# Patient Record
Sex: FEMALE | Race: WHITE | NOT HISPANIC OR LATINO | URBAN - METROPOLITAN AREA
[De-identification: names, ages, dates, MRNs, and addresses within clinical notes are randomized per-mention and may not be internally consistent; named-entity substitution may affect disease eponyms.]

---

## 2017-07-17 ENCOUNTER — EMERGENCY (EMERGENCY)
Facility: HOSPITAL | Age: 82
LOS: 1 days | Discharge: HOME | End: 2017-07-17
Attending: EMERGENCY MEDICINE | Admitting: EMERGENCY MEDICINE
Payer: MEDICARE

## 2017-07-17 VITALS
DIASTOLIC BLOOD PRESSURE: 98 MMHG | SYSTOLIC BLOOD PRESSURE: 194 MMHG | TEMPERATURE: 98 F | OXYGEN SATURATION: 97 % | HEART RATE: 74 BPM | RESPIRATION RATE: 18 BRPM

## 2017-07-17 DIAGNOSIS — Z95.810 PRESENCE OF AUTOMATIC (IMPLANTABLE) CARDIAC DEFIBRILLATOR: Chronic | ICD-10-CM

## 2017-07-17 LAB
BASE EXCESS BLDV CALC-SCNC: 1.6 MMOL/L — SIGNIFICANT CHANGE UP
BLOOD GAS VENOUS - CREATININE: 1.12 MG/DL — SIGNIFICANT CHANGE UP (ref 0.5–1.3)
CHLORIDE BLDV-SCNC: 103 MMOL/L — SIGNIFICANT CHANGE UP (ref 96–108)
GAS PNL BLDV: 137 MMOL/L — SIGNIFICANT CHANGE UP (ref 136–146)
GLUCOSE BLDV-MCNC: 272 — HIGH (ref 70–99)
HCO3 BLDV-SCNC: 25 MMOL/L — SIGNIFICANT CHANGE UP (ref 20–27)
HCT VFR BLDV CALC: 38.8 % — SIGNIFICANT CHANGE UP (ref 34.5–45)
HGB BLDV-MCNC: 12.6 G/DL — SIGNIFICANT CHANGE UP (ref 11.5–15.5)
LACTATE BLDV-MCNC: 1.9 MMOL/L — SIGNIFICANT CHANGE UP (ref 0.5–2)
PCO2 BLDV: 44 MMHG — SIGNIFICANT CHANGE UP (ref 41–51)
PH BLDV: 7.39 PH — SIGNIFICANT CHANGE UP (ref 7.32–7.43)
PO2 BLDV: 36 MMHG — SIGNIFICANT CHANGE UP (ref 35–40)
POTASSIUM BLDV-SCNC: 3.9 MMOL/L — SIGNIFICANT CHANGE UP (ref 3.4–4.5)
SAO2 % BLDV: 61.4 % — SIGNIFICANT CHANGE UP (ref 60–85)

## 2017-07-17 PROCEDURE — 99285 EMERGENCY DEPT VISIT HI MDM: CPT | Mod: 25

## 2017-07-17 RX ORDER — ONDANSETRON 8 MG/1
4 TABLET, FILM COATED ORAL ONCE
Qty: 0 | Refills: 0 | Status: COMPLETED | OUTPATIENT
Start: 2017-07-17 | End: 2017-07-17

## 2017-07-17 NOTE — ED PROVIDER NOTE - OBJECTIVE STATEMENT
88 yo female with pmhx of HTN, DM2, MV/AR valve replacement,  Afib on coumadin, PPM/DFIB, CHF, h/o hysterectomy p/w abdominal pain, nausea, vomiting since last night. Last BM 2 days ago, usually goes daily, unable to pass gas. No fevers, chills.

## 2017-07-17 NOTE — ED PROVIDER NOTE - CARE PLAN
Principal Discharge DX:	Abdominal pain  Instructions for follow-up, activity and diet:	1) Follow up with your doctor in 1-2 days  2) Return to the ER for worsening or concerning symptoms  3) Take keflex twice a day for 7 days for urinary tract infection  Secondary Diagnosis:	UTI (urinary tract infection)

## 2017-07-17 NOTE — ED PROVIDER NOTE - PSH
AICD (automatic cardioverter/defibrillator) present    Aortic valve replaced  10/11  History of cataract extraction  bilateral  History of tonsillectomy    Mitral valve replaced  10/11  S/P GERTRUDE (Total Abdominal Hysterectomy)

## 2017-07-17 NOTE — ED PROVIDER NOTE - PMH
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

## 2017-07-17 NOTE — ED PROVIDER NOTE - PLAN OF CARE
1) Follow up with your doctor in 1-2 days  2) Return to the ER for worsening or concerning symptoms  3) Take keflex twice a day for 7 days for urinary tract infection

## 2017-07-17 NOTE — ED PROVIDER NOTE - ATTENDING CONTRIBUTION TO CARE
pt with constipation and NV denying BM and flatus.  Abd is ttp with no rebound.  Will get CT to ensure not SBO.

## 2017-07-17 NOTE — ED PROVIDER NOTE - PROGRESS NOTE DETAILS
Ronan: Pain improved, abdomen soft nontender, tolerating PO. Ct with no acute findings, pt to follow up with pmd/surgery re gallstones though not surgical candidate. Will dc on keflex for UTI

## 2017-07-17 NOTE — ED ADULT TRIAGE NOTE - CHIEF COMPLAINT QUOTE
pt. c/o abd pain and nausea w/ 4 episodes of vomiting since yesterday, last BM 3 days ago.  Pt. denies CP, endorses mild SOB but states she feels this way at baseline.  Pt. hypertensive in triage, states she was not able to take her PM dose of meds d/t the n/v.  PMHx AICD, CHF, Afib, mitral valve replacement, DM ().

## 2017-07-18 VITALS
HEART RATE: 69 BPM | SYSTOLIC BLOOD PRESSURE: 199 MMHG | DIASTOLIC BLOOD PRESSURE: 71 MMHG | RESPIRATION RATE: 16 BRPM | TEMPERATURE: 98 F | OXYGEN SATURATION: 99 %

## 2017-07-18 LAB
ALBUMIN SERPL ELPH-MCNC: 4.3 G/DL — SIGNIFICANT CHANGE UP (ref 3.3–5)
ALP SERPL-CCNC: 81 U/L — SIGNIFICANT CHANGE UP (ref 40–120)
ALT FLD-CCNC: 12 U/L — SIGNIFICANT CHANGE UP (ref 4–33)
APPEARANCE UR: CLEAR — SIGNIFICANT CHANGE UP
APTT BLD: 33.5 SEC — SIGNIFICANT CHANGE UP (ref 27.5–37.4)
AST SERPL-CCNC: 19 U/L — SIGNIFICANT CHANGE UP (ref 4–32)
BACTERIA # UR AUTO: SIGNIFICANT CHANGE UP
BASOPHILS # BLD AUTO: 0.02 K/UL — SIGNIFICANT CHANGE UP (ref 0–0.2)
BASOPHILS NFR BLD AUTO: 0.2 % — SIGNIFICANT CHANGE UP (ref 0–2)
BILIRUB SERPL-MCNC: 0.5 MG/DL — SIGNIFICANT CHANGE UP (ref 0.2–1.2)
BILIRUB UR-MCNC: NEGATIVE — SIGNIFICANT CHANGE UP
BLOOD UR QL VISUAL: NEGATIVE — SIGNIFICANT CHANGE UP
BUN SERPL-MCNC: 24 MG/DL — HIGH (ref 7–23)
CALCIUM SERPL-MCNC: 9.1 MG/DL — SIGNIFICANT CHANGE UP (ref 8.4–10.5)
CHLORIDE SERPL-SCNC: 99 MMOL/L — SIGNIFICANT CHANGE UP (ref 98–107)
CO2 SERPL-SCNC: 22 MMOL/L — SIGNIFICANT CHANGE UP (ref 22–31)
COLOR SPEC: SIGNIFICANT CHANGE UP
CREAT SERPL-MCNC: 1.23 MG/DL — SIGNIFICANT CHANGE UP (ref 0.5–1.3)
EOSINOPHIL # BLD AUTO: 0.01 K/UL — SIGNIFICANT CHANGE UP (ref 0–0.5)
EOSINOPHIL NFR BLD AUTO: 0.1 % — SIGNIFICANT CHANGE UP (ref 0–6)
GLUCOSE SERPL-MCNC: 256 MG/DL — HIGH (ref 70–99)
GLUCOSE UR-MCNC: 300 — SIGNIFICANT CHANGE UP
HCT VFR BLD CALC: 37.9 % — SIGNIFICANT CHANGE UP (ref 34.5–45)
HGB BLD-MCNC: 12.3 G/DL — SIGNIFICANT CHANGE UP (ref 11.5–15.5)
IMM GRANULOCYTES # BLD AUTO: 0.03 # — SIGNIFICANT CHANGE UP
IMM GRANULOCYTES NFR BLD AUTO: 0.3 % — SIGNIFICANT CHANGE UP (ref 0–1.5)
INR BLD: 1.98 — HIGH (ref 0.88–1.17)
KETONES UR-MCNC: SIGNIFICANT CHANGE UP
LEUKOCYTE ESTERASE UR-ACNC: HIGH
LIDOCAIN IGE QN: 24.5 U/L — SIGNIFICANT CHANGE UP (ref 7–60)
LYMPHOCYTES # BLD AUTO: 1.18 K/UL — SIGNIFICANT CHANGE UP (ref 1–3.3)
LYMPHOCYTES # BLD AUTO: 13.6 % — SIGNIFICANT CHANGE UP (ref 13–44)
MCHC RBC-ENTMCNC: 27.8 PG — SIGNIFICANT CHANGE UP (ref 27–34)
MCHC RBC-ENTMCNC: 32.5 % — SIGNIFICANT CHANGE UP (ref 32–36)
MCV RBC AUTO: 85.6 FL — SIGNIFICANT CHANGE UP (ref 80–100)
MONOCYTES # BLD AUTO: 0.26 K/UL — SIGNIFICANT CHANGE UP (ref 0–0.9)
MONOCYTES NFR BLD AUTO: 3 % — SIGNIFICANT CHANGE UP (ref 2–14)
MUCOUS THREADS # UR AUTO: SIGNIFICANT CHANGE UP
NEUTROPHILS # BLD AUTO: 7.2 K/UL — SIGNIFICANT CHANGE UP (ref 1.8–7.4)
NEUTROPHILS NFR BLD AUTO: 82.8 % — HIGH (ref 43–77)
NITRITE UR-MCNC: NEGATIVE — SIGNIFICANT CHANGE UP
NRBC # FLD: 0 — SIGNIFICANT CHANGE UP
PH UR: 7.5 — SIGNIFICANT CHANGE UP (ref 4.6–8)
PLATELET # BLD AUTO: 123 K/UL — LOW (ref 150–400)
PMV BLD: 11.1 FL — SIGNIFICANT CHANGE UP (ref 7–13)
POTASSIUM SERPL-MCNC: 4.1 MMOL/L — SIGNIFICANT CHANGE UP (ref 3.5–5.3)
POTASSIUM SERPL-SCNC: 4.1 MMOL/L — SIGNIFICANT CHANGE UP (ref 3.5–5.3)
PROT SERPL-MCNC: 7.9 G/DL — SIGNIFICANT CHANGE UP (ref 6–8.3)
PROT UR-MCNC: 30 — HIGH
PROTHROM AB SERPL-ACNC: 22.5 SEC — HIGH (ref 9.8–13.1)
RBC # BLD: 4.43 M/UL — SIGNIFICANT CHANGE UP (ref 3.8–5.2)
RBC # FLD: 15.1 % — HIGH (ref 10.3–14.5)
RBC CASTS # UR COMP ASSIST: SIGNIFICANT CHANGE UP (ref 0–?)
SODIUM SERPL-SCNC: 139 MMOL/L — SIGNIFICANT CHANGE UP (ref 135–145)
SP GR SPEC: 1.02 — SIGNIFICANT CHANGE UP (ref 1–1.03)
SQUAMOUS # UR AUTO: SIGNIFICANT CHANGE UP
UROBILINOGEN FLD QL: NORMAL E.U. — SIGNIFICANT CHANGE UP (ref 0.1–0.2)
WBC # BLD: 8.7 K/UL — SIGNIFICANT CHANGE UP (ref 3.8–10.5)
WBC # FLD AUTO: 8.7 K/UL — SIGNIFICANT CHANGE UP (ref 3.8–10.5)
WBC UR QL: SIGNIFICANT CHANGE UP (ref 0–?)

## 2017-07-18 PROCEDURE — 74177 CT ABD & PELVIS W/CONTRAST: CPT | Mod: 26

## 2017-07-18 PROCEDURE — 71010: CPT | Mod: 26

## 2017-07-18 RX ORDER — ONDANSETRON 8 MG/1
4 TABLET, FILM COATED ORAL ONCE
Qty: 0 | Refills: 0 | Status: COMPLETED | OUTPATIENT
Start: 2017-07-18 | End: 2017-07-18

## 2017-07-18 RX ORDER — CEPHALEXIN 500 MG
1 CAPSULE ORAL
Qty: 14 | Refills: 0
Start: 2017-07-18 | End: 2017-07-25

## 2017-07-18 RX ORDER — CEFTRIAXONE 500 MG/1
1 INJECTION, POWDER, FOR SOLUTION INTRAMUSCULAR; INTRAVENOUS ONCE
Qty: 0 | Refills: 0 | Status: COMPLETED | OUTPATIENT
Start: 2017-07-18 | End: 2017-07-18

## 2017-07-18 RX ORDER — ONDANSETRON 8 MG/1
1 TABLET, FILM COATED ORAL
Qty: 12 | Refills: 0
Start: 2017-07-18

## 2017-07-18 RX ADMIN — CEFTRIAXONE 100 GRAM(S): 500 INJECTION, POWDER, FOR SOLUTION INTRAMUSCULAR; INTRAVENOUS at 03:23

## 2017-07-18 RX ADMIN — ONDANSETRON 4 MILLIGRAM(S): 8 TABLET, FILM COATED ORAL at 04:32

## 2017-07-18 RX ADMIN — ONDANSETRON 4 MILLIGRAM(S): 8 TABLET, FILM COATED ORAL at 00:23

## 2017-07-18 NOTE — ED ADULT NURSE REASSESSMENT NOTE - NS ED NURSE REASSESS COMMENT FT1
Report received from float RN. Medication given as ordered. VS as noted. Will continue to monitor.
No acute distress. Pt. discharged.
Pt. is being discharged. Pt. began feeling nauseous. Medication given as ordered. Will continue to monitor.
Report received from break coverage MICHAEL Nails. Pt. is resting comfortably. No acute distress at present. Medication infusing as per order. Will continue to monitor.

## 2017-07-18 NOTE — ED ADULT NURSE NOTE - OBJECTIVE STATEMENT
Pt recd A+Ox3. C/o abd pain since yesterday with N/V that began today. abd pain is generalized. LBM 3 days ago. Pt usually has a BM every day. Denies fever, dysuria. Skin intact. PSH hysterectomy, appendectomy. Primary RN made aware of all findings.

## 2017-07-19 ENCOUNTER — EMERGENCY (EMERGENCY)
Facility: HOSPITAL | Age: 82
LOS: 1 days | Discharge: HOME | End: 2017-07-19
Attending: EMERGENCY MEDICINE | Admitting: EMERGENCY MEDICINE
Payer: MEDICARE

## 2017-07-19 VITALS
OXYGEN SATURATION: 96 % | DIASTOLIC BLOOD PRESSURE: 97 MMHG | TEMPERATURE: 98 F | RESPIRATION RATE: 18 BRPM | SYSTOLIC BLOOD PRESSURE: 149 MMHG | HEART RATE: 69 BPM

## 2017-07-19 DIAGNOSIS — Z95.810 PRESENCE OF AUTOMATIC (IMPLANTABLE) CARDIAC DEFIBRILLATOR: Chronic | ICD-10-CM

## 2017-07-19 LAB
ALBUMIN SERPL ELPH-MCNC: 3.9 G/DL — SIGNIFICANT CHANGE UP (ref 3.3–5)
ALP SERPL-CCNC: 80 U/L — SIGNIFICANT CHANGE UP (ref 40–120)
ALT FLD-CCNC: 12 U/L — SIGNIFICANT CHANGE UP (ref 4–33)
APPEARANCE UR: CLEAR — SIGNIFICANT CHANGE UP
APTT BLD: 35.2 SEC — SIGNIFICANT CHANGE UP (ref 27.5–37.4)
AST SERPL-CCNC: 22 U/L — SIGNIFICANT CHANGE UP (ref 4–32)
BACTERIA UR CULT: SIGNIFICANT CHANGE UP
BASOPHILS # BLD AUTO: 0.02 K/UL — SIGNIFICANT CHANGE UP (ref 0–0.2)
BASOPHILS NFR BLD AUTO: 0.2 % — SIGNIFICANT CHANGE UP (ref 0–2)
BILIRUB SERPL-MCNC: 0.8 MG/DL — SIGNIFICANT CHANGE UP (ref 0.2–1.2)
BILIRUB UR-MCNC: NEGATIVE — SIGNIFICANT CHANGE UP
BLOOD UR QL VISUAL: HIGH
BUN SERPL-MCNC: 17 MG/DL — SIGNIFICANT CHANGE UP (ref 7–23)
CALCIUM SERPL-MCNC: 9.2 MG/DL — SIGNIFICANT CHANGE UP (ref 8.4–10.5)
CHLORIDE SERPL-SCNC: 100 MMOL/L — SIGNIFICANT CHANGE UP (ref 98–107)
CO2 SERPL-SCNC: 24 MMOL/L — SIGNIFICANT CHANGE UP (ref 22–31)
COLOR SPEC: YELLOW — SIGNIFICANT CHANGE UP
CREAT SERPL-MCNC: 1.19 MG/DL — SIGNIFICANT CHANGE UP (ref 0.5–1.3)
EOSINOPHIL # BLD AUTO: 0.02 K/UL — SIGNIFICANT CHANGE UP (ref 0–0.5)
EOSINOPHIL NFR BLD AUTO: 0.2 % — SIGNIFICANT CHANGE UP (ref 0–6)
GLUCOSE SERPL-MCNC: 178 MG/DL — HIGH (ref 70–99)
GLUCOSE UR-MCNC: NEGATIVE — SIGNIFICANT CHANGE UP
HCT VFR BLD CALC: 39 % — SIGNIFICANT CHANGE UP (ref 34.5–45)
HGB BLD-MCNC: 12.8 G/DL — SIGNIFICANT CHANGE UP (ref 11.5–15.5)
IMM GRANULOCYTES # BLD AUTO: 0.04 # — SIGNIFICANT CHANGE UP
IMM GRANULOCYTES NFR BLD AUTO: 0.4 % — SIGNIFICANT CHANGE UP (ref 0–1.5)
INR BLD: 2.75 — HIGH (ref 0.88–1.17)
KETONES UR-MCNC: NEGATIVE — SIGNIFICANT CHANGE UP
LEUKOCYTE ESTERASE UR-ACNC: NEGATIVE — SIGNIFICANT CHANGE UP
LYMPHOCYTES # BLD AUTO: 1.84 K/UL — SIGNIFICANT CHANGE UP (ref 1–3.3)
LYMPHOCYTES # BLD AUTO: 16.7 % — SIGNIFICANT CHANGE UP (ref 13–44)
MCHC RBC-ENTMCNC: 27.4 PG — SIGNIFICANT CHANGE UP (ref 27–34)
MCHC RBC-ENTMCNC: 32.8 % — SIGNIFICANT CHANGE UP (ref 32–36)
MCV RBC AUTO: 83.3 FL — SIGNIFICANT CHANGE UP (ref 80–100)
MONOCYTES # BLD AUTO: 0.86 K/UL — SIGNIFICANT CHANGE UP (ref 0–0.9)
MONOCYTES NFR BLD AUTO: 7.8 % — SIGNIFICANT CHANGE UP (ref 2–14)
MUCOUS THREADS # UR AUTO: SIGNIFICANT CHANGE UP
NEUTROPHILS # BLD AUTO: 8.23 K/UL — HIGH (ref 1.8–7.4)
NEUTROPHILS NFR BLD AUTO: 74.7 % — SIGNIFICANT CHANGE UP (ref 43–77)
NITRITE UR-MCNC: NEGATIVE — SIGNIFICANT CHANGE UP
NRBC # FLD: 0 — SIGNIFICANT CHANGE UP
OB PNL STL: NEGATIVE — SIGNIFICANT CHANGE UP
PH UR: 6.5 — SIGNIFICANT CHANGE UP (ref 4.6–8)
PLATELET # BLD AUTO: 129 K/UL — LOW (ref 150–400)
PMV BLD: 10.9 FL — SIGNIFICANT CHANGE UP (ref 7–13)
POTASSIUM SERPL-MCNC: 3.4 MMOL/L — LOW (ref 3.5–5.3)
POTASSIUM SERPL-SCNC: 3.4 MMOL/L — LOW (ref 3.5–5.3)
PROT SERPL-MCNC: 7.6 G/DL — SIGNIFICANT CHANGE UP (ref 6–8.3)
PROT UR-MCNC: 100 — HIGH
PROTHROM AB SERPL-ACNC: 31.4 SEC — HIGH (ref 9.8–13.1)
RBC # BLD: 4.68 M/UL — SIGNIFICANT CHANGE UP (ref 3.8–5.2)
RBC # FLD: 15.1 % — HIGH (ref 10.3–14.5)
RBC CASTS # UR COMP ASSIST: HIGH (ref 0–?)
SODIUM SERPL-SCNC: 137 MMOL/L — SIGNIFICANT CHANGE UP (ref 135–145)
SP GR SPEC: 1.02 — SIGNIFICANT CHANGE UP (ref 1–1.03)
SPECIMEN SOURCE: SIGNIFICANT CHANGE UP
SQUAMOUS # UR AUTO: SIGNIFICANT CHANGE UP
UROBILINOGEN FLD QL: NORMAL E.U. — SIGNIFICANT CHANGE UP (ref 0.1–0.2)
WBC # BLD: 11.01 K/UL — HIGH (ref 3.8–10.5)
WBC # FLD AUTO: 11.01 K/UL — HIGH (ref 3.8–10.5)
WBC UR QL: SIGNIFICANT CHANGE UP (ref 0–?)

## 2017-07-19 PROCEDURE — 99284 EMERGENCY DEPT VISIT MOD MDM: CPT

## 2017-07-19 RX ORDER — SODIUM CHLORIDE 9 MG/ML
500 INJECTION INTRAMUSCULAR; INTRAVENOUS; SUBCUTANEOUS ONCE
Qty: 0 | Refills: 0 | Status: COMPLETED | OUTPATIENT
Start: 2017-07-19 | End: 2017-07-19

## 2017-07-19 RX ORDER — MULTIVIT WITH MIN/MFOLATE/K2 340-15/3 G
300 POWDER (GRAM) ORAL ONCE
Qty: 0 | Refills: 0 | Status: DISCONTINUED | OUTPATIENT
Start: 2017-07-19 | End: 2017-07-19

## 2017-07-19 RX ADMIN — Medication 1 ENEMA: at 14:47

## 2017-07-19 RX ADMIN — SODIUM CHLORIDE 250 MILLILITER(S): 9 INJECTION INTRAMUSCULAR; INTRAVENOUS; SUBCUTANEOUS at 14:47

## 2017-07-19 NOTE — ED PROVIDER NOTE - PLAN OF CARE
Rest and drink plenty of fluids. Advance your diet as tolerated. Continue current medications as prescribed. Follow a high fiber diet. Follow up with your primary care doctor within 2-3 days of hospital discharge. Follow up with a gastroenterologist if pain persists (referral list given). If symptoms worsen, please come back to the emergency room.

## 2017-07-19 NOTE — ED PROVIDER NOTE - OBJECTIVE STATEMENT
86 yo female with pmhx of HTN, DM2, MV/AR valve replacement,  Afib on coumadin, PPM/DFIB, CHF, h/o hysterectomy p/w abdominal pain, nausea, vomiting for 4 days Last BM 6 days ago, usually goes daily, unable to pass gas. Pt was here 2 days ago, ct abdomen pelvis and xray chest unremarkable, diagnosed with UTI, currently taking keflex and zofran. Pt denies f/c, weakness, sob, cp.      No fevers, chills. 88 yo female with pmhx of HTN, DM2, MV/AR valve replacement,  Afib on coumadin, PPM/DFIB, CHF, h/o hysterectomy p/w abdominal pain, nausea, vomiting for 4 days Last BM 6 days ago, usually goes daily. Pt was here 2 days ago, ct abdomen pelvis and xray chest unremarkable, diagnosed with UTI, currently taking keflex and zofran. Pt denies f/c, weakness, sob, cp.      No fevers, chills.  Attending - Reviewed above and I evaluated patient myself.  86 y/o F with son at bedside.  c/o nausea, vomiting, and constipation since Sunday.  No focal abd pain but reports mild diffuse discomfort from feeling bloated.  Decreased PO intake.  No hematemesis.  No BRBPR.  Normally has BM QD.  To ED on Monday.  [place] records reviewed.  Found to have UTI.  Taking Keflex and zofran since.  RTER because symptoms persist.  States has not used miralax or colace.  No urinary complaints.  No chest pain or shortness of breathe.  No other complaints. 88 yo female with pmhx of HTN, DM2, MV/AR valve replacement,  Afib on coumadin, PPM/DFIB, CHF, h/o hysterectomy p/w abdominal pain, nausea, vomiting for 4 days Last BM 6 days ago, usually goes daily. Pt was here 2 days ago, ct abdomen pelvis and xray chest unremarkable, diagnosed with UTI, currently taking keflex and zofran. Pt denies f/c, weakness, sob, cp.      Attending - Reviewed above and I evaluated patient myself.  86 y/o F with son at bedside.  c/o nausea, vomiting, and constipation since Sunday.  No focal abd pain but reports mild diffuse discomfort from feeling bloated.  Decreased PO intake.  No hematemesis.  No BRBPR.  Normally has BM QD.  To ED on Monday.  [place] records reviewed.  Found to have UTI.  Taking Keflex and zofran since.  RTER because symptoms persist.  States has not used miralax or colace.  No urinary complaints.  No chest pain or shortness of breathe.  No other complaints.

## 2017-07-19 NOTE — ED PROVIDER NOTE - PHYSICAL EXAMINATION
ATTENDING PHYSICAL EXAM DR. Diaz ***GEN - NAD; A+O x3 ***HEAD - NC/AT ***EYES/NOSE - PERRL, EOMI, mucous membranes moist, no discharge ***THROAT: Oral cavity and pharynx normal. No inflammation, swelling, exudate, or lesions.  ***NECK: Neck supple, non-tender without lymphadenopathy, no masses, no JVD.   ***PULMONARY - CTA b/l, symmetric breath sounds. ***CARDIAC -clicking S2, 3/6 sys murmur LUSB  ***ABDOMEN - +BS, ND, NT, soft, no guarding, no rebound, no masses, Rectal exam performed with TATYANA Gonzáles - no mass, no impaction appreciated.  ***BACK - no CVA tenderness, Normal  spine ***EXTREMITIES - symmetric pulses, 2+ dp, capillary refill < 2 seconds, no clubbing, no cyanosis, no edema ***SKIN - no rash or bruising   ***NEUROLOGIC - alert, CN 2-12 intact

## 2017-07-19 NOTE — ED PROVIDER NOTE - CARE PLAN
Instructions for follow-up, activity and diet:	Rest and drink plenty of fluids. Advance your diet as tolerated. Continue current medications as prescribed. Follow a high fiber diet. Follow up with your primary care doctor within 2-3 days of hospital discharge. Follow up with a gastroenterologist if pain persists (referral list given). If symptoms worsen, please come back to the emergency room. Principal Discharge DX:	Constipation  Instructions for follow-up, activity and diet:	Rest and drink plenty of fluids. Advance your diet as tolerated. Continue current medications as prescribed. Follow a high fiber diet. Follow up with your primary care doctor within 2-3 days of hospital discharge. Follow up with a gastroenterologist if pain persists (referral list given). If symptoms worsen, please come back to the emergency room.

## 2017-07-19 NOTE — ED ADULT TRIAGE NOTE - CHIEF COMPLAINT QUOTE
pt states that she has not had a BM since Saturday, seen  in this ED 2 days ago for abd pain and constipation.  Pt states that she has still not had a BM, but has not taken any laxatives or stool softeners, states that she is uncomfortable and feels like she needs to have BM

## 2017-07-19 NOTE — ED ADULT NURSE REASSESSMENT NOTE - NS ED NURSE REASSESS COMMENT FT1
Fleet enema given while pt lying on L side.  Pt passed brownish stool, with symptom relief, pt feeling a lot better.   MD Diaz made aware.

## 2017-07-19 NOTE — ED PROVIDER NOTE - MEDICAL DECISION MAKING DETAILS
86 y/o F pt returns to ED after persistent constipation, nausea and vomiting, with recent UTI  constipation, gastritis, cbc, cmp, ua, coags,

## 2020-05-14 ENCOUNTER — APPOINTMENT (OUTPATIENT)
Dept: ELECTROPHYSIOLOGY | Facility: CLINIC | Age: 85
End: 2020-05-14
Payer: MEDICARE

## 2020-05-14 PROCEDURE — 93296 REM INTERROG EVL PM/IDS: CPT

## 2020-05-14 PROCEDURE — 93295 DEV INTERROG REMOTE 1/2/MLT: CPT

## 2020-05-19 ENCOUNTER — INPATIENT (INPATIENT)
Facility: HOSPITAL | Age: 85
LOS: 1 days | Discharge: HOME HEALTH | End: 2020-05-21
Attending: INTERNAL MEDICINE | Admitting: INTERNAL MEDICINE
Payer: MEDICARE

## 2020-05-19 VITALS
RESPIRATION RATE: 20 BRPM | SYSTOLIC BLOOD PRESSURE: 134 MMHG | HEART RATE: 70 BPM | DIASTOLIC BLOOD PRESSURE: 108 MMHG | TEMPERATURE: 100 F | OXYGEN SATURATION: 94 %

## 2020-05-19 DIAGNOSIS — R50.9 FEVER, UNSPECIFIED: ICD-10-CM

## 2020-05-19 DIAGNOSIS — I48.91 UNSPECIFIED ATRIAL FIBRILLATION: ICD-10-CM

## 2020-05-19 DIAGNOSIS — E11.9 TYPE 2 DIABETES MELLITUS WITHOUT COMPLICATIONS: ICD-10-CM

## 2020-05-19 DIAGNOSIS — I71.9 AORTIC ANEURYSM OF UNSPECIFIED SITE, WITHOUT RUPTURE: ICD-10-CM

## 2020-05-19 DIAGNOSIS — Z95.2 PRESENCE OF PROSTHETIC HEART VALVE: ICD-10-CM

## 2020-05-19 DIAGNOSIS — I50.22 CHRONIC SYSTOLIC (CONGESTIVE) HEART FAILURE: ICD-10-CM

## 2020-05-19 DIAGNOSIS — I10 ESSENTIAL (PRIMARY) HYPERTENSION: ICD-10-CM

## 2020-05-19 DIAGNOSIS — Z95.810 PRESENCE OF AUTOMATIC (IMPLANTABLE) CARDIAC DEFIBRILLATOR: Chronic | ICD-10-CM

## 2020-05-19 DIAGNOSIS — Z29.9 ENCOUNTER FOR PROPHYLACTIC MEASURES, UNSPECIFIED: ICD-10-CM

## 2020-05-19 DIAGNOSIS — A41.9 SEPSIS, UNSPECIFIED ORGANISM: ICD-10-CM

## 2020-05-19 LAB
ALBUMIN SERPL ELPH-MCNC: 3.4 G/DL — SIGNIFICANT CHANGE UP (ref 3.3–5)
ALP SERPL-CCNC: 61 U/L — SIGNIFICANT CHANGE UP (ref 40–120)
ALT FLD-CCNC: 6 U/L — SIGNIFICANT CHANGE UP (ref 4–33)
ANION GAP SERPL CALC-SCNC: 13 MMO/L — SIGNIFICANT CHANGE UP (ref 7–14)
APPEARANCE UR: CLEAR — SIGNIFICANT CHANGE UP
APTT BLD: 38 SEC — HIGH (ref 27.5–36.3)
AST SERPL-CCNC: 9 U/L — SIGNIFICANT CHANGE UP (ref 4–32)
BACTERIA # UR AUTO: SIGNIFICANT CHANGE UP
BASE EXCESS BLDV CALC-SCNC: 0.6 MMOL/L — SIGNIFICANT CHANGE UP
BASOPHILS # BLD AUTO: 0.03 K/UL — SIGNIFICANT CHANGE UP (ref 0–0.2)
BASOPHILS NFR BLD AUTO: 0.2 % — SIGNIFICANT CHANGE UP (ref 0–2)
BILIRUB SERPL-MCNC: 0.8 MG/DL — SIGNIFICANT CHANGE UP (ref 0.2–1.2)
BILIRUB UR-MCNC: NEGATIVE — SIGNIFICANT CHANGE UP
BLOOD GAS VENOUS - CREATININE: 1.12 MG/DL — SIGNIFICANT CHANGE UP (ref 0.5–1.3)
BLOOD UR QL VISUAL: NEGATIVE — SIGNIFICANT CHANGE UP
BUN SERPL-MCNC: 22 MG/DL — SIGNIFICANT CHANGE UP (ref 7–23)
CALCIUM SERPL-MCNC: 8.9 MG/DL — SIGNIFICANT CHANGE UP (ref 8.4–10.5)
CHLORIDE BLDV-SCNC: 103 MMOL/L — SIGNIFICANT CHANGE UP (ref 96–108)
CHLORIDE SERPL-SCNC: 99 MMOL/L — SIGNIFICANT CHANGE UP (ref 98–107)
CO2 SERPL-SCNC: 23 MMOL/L — SIGNIFICANT CHANGE UP (ref 22–31)
COLOR SPEC: YELLOW — SIGNIFICANT CHANGE UP
CREAT SERPL-MCNC: 1.18 MG/DL — SIGNIFICANT CHANGE UP (ref 0.5–1.3)
EOSINOPHIL # BLD AUTO: 0.24 K/UL — SIGNIFICANT CHANGE UP (ref 0–0.5)
EOSINOPHIL NFR BLD AUTO: 1.9 % — SIGNIFICANT CHANGE UP (ref 0–6)
GAS PNL BLDV: 136 MMOL/L — SIGNIFICANT CHANGE UP (ref 136–146)
GLUCOSE BLDV-MCNC: 369 MG/DL — HIGH (ref 70–99)
GLUCOSE SERPL-MCNC: 395 MG/DL — HIGH (ref 70–99)
GLUCOSE UR-MCNC: 150 — HIGH
HCO3 BLDV-SCNC: 24 MMOL/L — SIGNIFICANT CHANGE UP (ref 20–27)
HCT VFR BLD CALC: 34.1 % — LOW (ref 34.5–45)
HCT VFR BLDV CALC: 35.5 % — SIGNIFICANT CHANGE UP (ref 34.5–45)
HGB BLD-MCNC: 11.2 G/DL — LOW (ref 11.5–15.5)
HGB BLDV-MCNC: 11.5 G/DL — SIGNIFICANT CHANGE UP (ref 11.5–15.5)
HYALINE CASTS # UR AUTO: HIGH
IMM GRANULOCYTES NFR BLD AUTO: 0.6 % — SIGNIFICANT CHANGE UP (ref 0–1.5)
INR BLD: 3 — HIGH (ref 0.88–1.17)
KETONES UR-MCNC: NEGATIVE — SIGNIFICANT CHANGE UP
LACTATE BLDV-MCNC: 3.1 MMOL/L — HIGH (ref 0.5–2)
LEUKOCYTE ESTERASE UR-ACNC: SIGNIFICANT CHANGE UP
LYMPHOCYTES # BLD AUTO: 1.26 K/UL — SIGNIFICANT CHANGE UP (ref 1–3.3)
LYMPHOCYTES # BLD AUTO: 10 % — LOW (ref 13–44)
MCHC RBC-ENTMCNC: 28.5 PG — SIGNIFICANT CHANGE UP (ref 27–34)
MCHC RBC-ENTMCNC: 32.8 % — SIGNIFICANT CHANGE UP (ref 32–36)
MCV RBC AUTO: 86.8 FL — SIGNIFICANT CHANGE UP (ref 80–100)
MONOCYTES # BLD AUTO: 0.86 K/UL — SIGNIFICANT CHANGE UP (ref 0–0.9)
MONOCYTES NFR BLD AUTO: 6.8 % — SIGNIFICANT CHANGE UP (ref 2–14)
MUCOUS THREADS # UR AUTO: SIGNIFICANT CHANGE UP
NEUTROPHILS # BLD AUTO: 10.17 K/UL — HIGH (ref 1.8–7.4)
NEUTROPHILS NFR BLD AUTO: 80.5 % — HIGH (ref 43–77)
NITRITE UR-MCNC: NEGATIVE — SIGNIFICANT CHANGE UP
NRBC # FLD: 0 K/UL — SIGNIFICANT CHANGE UP (ref 0–0)
NT-PROBNP SERPL-SCNC: SIGNIFICANT CHANGE UP PG/ML
PCO2 BLDV: 42 MMHG — SIGNIFICANT CHANGE UP (ref 41–51)
PH BLDV: 7.39 PH — SIGNIFICANT CHANGE UP (ref 7.32–7.43)
PH UR: 5.5 — SIGNIFICANT CHANGE UP (ref 5–8)
PLATELET # BLD AUTO: 120 K/UL — LOW (ref 150–400)
PMV BLD: 12.4 FL — SIGNIFICANT CHANGE UP (ref 7–13)
PO2 BLDV: 29 MMHG — LOW (ref 35–40)
POTASSIUM BLDV-SCNC: 3.9 MMOL/L — SIGNIFICANT CHANGE UP (ref 3.4–4.5)
POTASSIUM SERPL-MCNC: 4 MMOL/L — SIGNIFICANT CHANGE UP (ref 3.5–5.3)
POTASSIUM SERPL-SCNC: 4 MMOL/L — SIGNIFICANT CHANGE UP (ref 3.5–5.3)
PROT SERPL-MCNC: 6.9 G/DL — SIGNIFICANT CHANGE UP (ref 6–8.3)
PROT UR-MCNC: 30 — SIGNIFICANT CHANGE UP
PROTHROM AB SERPL-ACNC: 35.7 SEC — HIGH (ref 9.8–13.1)
RBC # BLD: 3.93 M/UL — SIGNIFICANT CHANGE UP (ref 3.8–5.2)
RBC # FLD: 15.1 % — HIGH (ref 10.3–14.5)
RBC CASTS # UR COMP ASSIST: SIGNIFICANT CHANGE UP (ref 0–?)
SAO2 % BLDV: 49.2 % — LOW (ref 60–85)
SARS-COV-2 RNA SPEC QL NAA+PROBE: SIGNIFICANT CHANGE UP
SODIUM SERPL-SCNC: 135 MMOL/L — SIGNIFICANT CHANGE UP (ref 135–145)
SP GR SPEC: 1.02 — SIGNIFICANT CHANGE UP (ref 1–1.04)
SQUAMOUS # UR AUTO: SIGNIFICANT CHANGE UP
UROBILINOGEN FLD QL: NORMAL — SIGNIFICANT CHANGE UP
WBC # BLD: 12.63 K/UL — HIGH (ref 3.8–10.5)
WBC # FLD AUTO: 12.63 K/UL — HIGH (ref 3.8–10.5)
WBC UR QL: HIGH (ref 0–?)

## 2020-05-19 PROCEDURE — 71045 X-RAY EXAM CHEST 1 VIEW: CPT | Mod: 26

## 2020-05-19 PROCEDURE — 99223 1ST HOSP IP/OBS HIGH 75: CPT

## 2020-05-19 RX ORDER — INSULIN LISPRO 100/ML
VIAL (ML) SUBCUTANEOUS AT BEDTIME
Refills: 0 | Status: DISCONTINUED | OUTPATIENT
Start: 2020-05-19 | End: 2020-05-21

## 2020-05-19 RX ORDER — DEXTROSE 50 % IN WATER 50 %
25 SYRINGE (ML) INTRAVENOUS ONCE
Refills: 0 | Status: DISCONTINUED | OUTPATIENT
Start: 2020-05-19 | End: 2020-05-21

## 2020-05-19 RX ORDER — SODIUM CHLORIDE 9 MG/ML
1000 INJECTION, SOLUTION INTRAVENOUS
Refills: 0 | Status: DISCONTINUED | OUTPATIENT
Start: 2020-05-19 | End: 2020-05-21

## 2020-05-19 RX ORDER — SODIUM CHLORIDE 9 MG/ML
500 INJECTION INTRAMUSCULAR; INTRAVENOUS; SUBCUTANEOUS ONCE
Refills: 0 | Status: COMPLETED | OUTPATIENT
Start: 2020-05-19 | End: 2020-05-19

## 2020-05-19 RX ORDER — FUROSEMIDE 40 MG
20 TABLET ORAL DAILY
Refills: 0 | Status: DISCONTINUED | OUTPATIENT
Start: 2020-05-19 | End: 2020-05-21

## 2020-05-19 RX ORDER — DEXTROSE 50 % IN WATER 50 %
12.5 SYRINGE (ML) INTRAVENOUS ONCE
Refills: 0 | Status: DISCONTINUED | OUTPATIENT
Start: 2020-05-19 | End: 2020-05-21

## 2020-05-19 RX ORDER — INSULIN LISPRO 100/ML
VIAL (ML) SUBCUTANEOUS
Refills: 0 | Status: DISCONTINUED | OUTPATIENT
Start: 2020-05-19 | End: 2020-05-21

## 2020-05-19 RX ORDER — WARFARIN SODIUM 2.5 MG/1
3.5 TABLET ORAL ONCE
Refills: 0 | Status: COMPLETED | OUTPATIENT
Start: 2020-05-19 | End: 2020-05-19

## 2020-05-19 RX ORDER — ACETAMINOPHEN 500 MG
650 TABLET ORAL ONCE
Refills: 0 | Status: COMPLETED | OUTPATIENT
Start: 2020-05-19 | End: 2020-05-19

## 2020-05-19 RX ORDER — GLUCAGON INJECTION, SOLUTION 0.5 MG/.1ML
1 INJECTION, SOLUTION SUBCUTANEOUS ONCE
Refills: 0 | Status: DISCONTINUED | OUTPATIENT
Start: 2020-05-19 | End: 2020-05-21

## 2020-05-19 RX ORDER — ISOSORBIDE MONONITRATE 60 MG/1
30 TABLET, EXTENDED RELEASE ORAL DAILY
Refills: 0 | Status: DISCONTINUED | OUTPATIENT
Start: 2020-05-19 | End: 2020-05-21

## 2020-05-19 RX ORDER — CEFTRIAXONE 500 MG/1
1000 INJECTION, POWDER, FOR SOLUTION INTRAMUSCULAR; INTRAVENOUS ONCE
Refills: 0 | Status: COMPLETED | OUTPATIENT
Start: 2020-05-19 | End: 2020-05-19

## 2020-05-19 RX ORDER — METOPROLOL TARTRATE 50 MG
25 TABLET ORAL
Refills: 0 | Status: DISCONTINUED | OUTPATIENT
Start: 2020-05-19 | End: 2020-05-21

## 2020-05-19 RX ORDER — DEXTROSE 50 % IN WATER 50 %
15 SYRINGE (ML) INTRAVENOUS ONCE
Refills: 0 | Status: DISCONTINUED | OUTPATIENT
Start: 2020-05-19 | End: 2020-05-21

## 2020-05-19 RX ORDER — CEFTRIAXONE 500 MG/1
1000 INJECTION, POWDER, FOR SOLUTION INTRAMUSCULAR; INTRAVENOUS EVERY 24 HOURS
Refills: 0 | Status: DISCONTINUED | OUTPATIENT
Start: 2020-05-20 | End: 2020-05-21

## 2020-05-19 RX ADMIN — WARFARIN SODIUM 3.5 MILLIGRAM(S): 2.5 TABLET ORAL at 17:54

## 2020-05-19 RX ADMIN — Medication 650 MILLIGRAM(S): at 13:12

## 2020-05-19 RX ADMIN — CEFTRIAXONE 100 MILLIGRAM(S): 500 INJECTION, POWDER, FOR SOLUTION INTRAMUSCULAR; INTRAVENOUS at 15:18

## 2020-05-19 RX ADMIN — Medication 25 MILLIGRAM(S): at 17:54

## 2020-05-19 RX ADMIN — SODIUM CHLORIDE 500 MILLILITER(S): 9 INJECTION INTRAMUSCULAR; INTRAVENOUS; SUBCUTANEOUS at 14:29

## 2020-05-19 NOTE — H&P ADULT - NSHPPHYSICALEXAM_GEN_ALL_CORE
PHYSICAL EXAM:  Vital Signs Vital Signs Last 24 Hrs  T(C): 37.1 (19 May 2020 14:29), Max: 38.6 (19 May 2020 12:53)  T(F): 98.8 (19 May 2020 14:29), Max: 101.4 (19 May 2020 12:53)  HR: 55 (19 May 2020 14:29) (55 - 70)  BP: 113/51 (19 May 2020 14:29) (113/51 - 134/108)  BP(mean): --  RR: 23 (19 May 2020 14:29) (20 - 27)  SpO2: 97% (19 May 2020 14:29) (91% - 97%)  CAPILLARY BLOOD GLUCOSE      POCT Blood Glucose.: 429 mg/dL (19 May 2020 11:34)    GENERAL: NAD, well-developed  HEENT: MMM, conjunctiva and sclera clear  NECK: Supple, No JVD  CHEST/LUNG: Clear to auscultation bilaterally; No wheezes  HEART: PPM in place. Regular rate and rhythm; Normal S1 and S2. No murmurs, rubs, or gallops  ABDOMEN: Bowel sounds present. Soft, Nontender, Nondistended.  No guarding, rigidity or rebound tenderness.   EXTREMITIES:  2+ Peripheral Pulses, 1+ LE edema  PSYCH: AAOx3  NEUROLOGY: non-focal  SKIN: No rashes or lesions

## 2020-05-19 NOTE — ED ADULT TRIAGE NOTE - CHIEF COMPLAINT QUOTE
pt coming with episode of mental status change yest. family noted and brought her for eval.  pt AOX 3 in triage, pt stated she was talking with grand daughter and got confuse for few min.  pt denies HA, CP, SOb. fever,

## 2020-05-19 NOTE — H&P ADULT - ASSESSMENT
Patient is a 89yoF with h/o MVR/AVR, Afib, DM type II, HTN, CHFrEF presents admitted with fever and SIRS.

## 2020-05-19 NOTE — ED ADULT NURSE NOTE - OBJECTIVE STATEMENT
Pt received to room 17 c/o 1 episode of confusion/AMS. Pt was dx with UTI on 5/6, was on Bactrim, has now been feeling increasingly weak and fatigued. Pt is awake/alert, Aox4, ambulatory with walker at baseline. Currently denies any dysuria, pain, denies fever at home. On arrival, pt appears SOB that worsens with movement or speaking, Spo 90-94% on RA, RR 25-33 on RA and with activity. Afib on cardiac monitor. AICD in place. Reports she is on coumadin. Denies feeling SOB. Denies any cough. Skin intact. 20g IV placed to R ac. Normotensive. Febrile, TATYANA Kingsley made aware/notified. Will continue to monitor.

## 2020-05-19 NOTE — H&P ADULT - NSICDXFAMILYHX_GEN_ALL_CORE_FT
FAMILY HISTORY:  Family history of congestive heart failure  Family history of coronary artery disease    Sibling  Still living? Unknown  Family history of cancer, Age at diagnosis: Age Unknown  Family history of coronary artery disease, Age at diagnosis: Age Unknown

## 2020-05-19 NOTE — H&P ADULT - NSHPLABSRESULTS_GEN_ALL_CORE
LABS:                        11.2   12.63 )-----------( 120      ( 19 May 2020 12:30 )             34.1     05-19    135  |  99  |  22  ----------------------------<  395<H>  4.0   |  23  |  1.18    Ca    8.9      19 May 2020 12:30    TPro  6.9  /  Alb  3.4  /  TBili  0.8  /  DBili  x   /  AST  9   /  ALT  6   /  AlkPhos  61  05-19      PT/INR - ( 19 May 2020 12:30 )   PT: 35.7 SEC;   INR: 3.00          PTT - ( 19 May 2020 12:30 )  PTT:38.0 SEC          VBG 05-19 @ 12:30  pH: 7.39/pCO2: 42 /pO2: 29/HCO3: 24/lactate: 3.1    Microbiology     EKG:    RADIOLOGY & ADDITIONAL TESTS:

## 2020-05-19 NOTE — ED PROVIDER NOTE - PROGRESS NOTE DETAILS
TATYANA Grey: Pts PMD  called, states pt diagnosed bactrim 5/06 for UTI, states family called her saying pt was incoherent, instructed them to bring her to the ER. TATYANA Grey: Spoke with pts son who confirmed pt history, Craig [phone]. Spoke with  who accepts pt to her service, pt agrees with admission for urosepsis TATYANA Grey: Spoke with pts son who confirmed pt history, Craig [phone]. Spoke with  who accepts pt to her service, pt agrees with admission for urosepsis. EKG discussed with attg

## 2020-05-19 NOTE — H&P ADULT - PROBLEM SELECTOR PLAN 3
Currently mildly hypervolemic. Lying relatively flat  Will c/w home lasix with close volume assessments  Daily weights, I/Os  c/w metoprolol  Has allergy to ACE INH  TTE

## 2020-05-19 NOTE — ED PROVIDER NOTE - ATTENDING CONTRIBUTION TO CARE
I performed a face to face bedside interview with patient regarding history of present illness, review of symptoms and past medical history. I completed an independent physical exam.  I have discussed patient's plan of care.   I agree with note as stated above, having amended the EMR as needed to reflect my findings. I have discussed the assessment and plan of care.  This includes during the time I functioned as the attending physician for this patient.  Attending Contribution to Care: agree with plan of pa. 89yF w/pmhx HTN, DM, MV/AR valve replacement, afib on coumadin?, PPM/dfib, CHF, recently diagnosed wit UTI 5/06 on bactrim presenting with AMS. Pt states she was first diagnosed with a UTI one month ago and has taken 2 separate antibiotics. States yesterday she was "incoherent" for a few hours she was having difficulty answering questions from her family. She states her granddaughter told her she had a fever this morning. Pt reports increased fatigue over the past month. Pt states when she was first diagnosed with a UTI she had urinary frequency which has since resolved. Pt denies abd pain, nausea, vomiting, diarrhea, back/flank pain, chest pain, shortness of breath, headache, dizziness, leg pain or swelling. Pt reports exertional dyspnea is at her baseline.   pt p/w +sirs, +sepsis due to probable UTI. pt stable for admission .abx given, cultures before abx.

## 2020-05-19 NOTE — ED PROVIDER NOTE - OBJECTIVE STATEMENT
89yF w/pmhx HTN, DM, MV/AR valve replacement, afib on coumadin?, PPM/dfib, CHF, recently diagnosed wit UTI 5/06 on bactrim presenting with AMS. Pt states she was first diagnosed with a UTI one month ago and has taken 2 separate antibiotics. States yesterday she was "incoherent" for a few hours she was having difficulty answering questions from her family. She states her granddaughter told her she had a fever this morning. Pt reports increased fatigue over the past month. Pt states when she was first diagnosed with a UTI she had urinary frequency which has since resolved. Pt denies abd pain, nausea, vomiting, diarrhea, back/flank pain, chest pain, shortness of breath, headache, dizziness, leg pain or swelling.  Pt reports exertional dyspnea is at her baseline.

## 2020-05-19 NOTE — H&P ADULT - NSICDXPASTSURGICALHX_GEN_ALL_CORE_FT
PAST SURGICAL HISTORY:  AICD (automatic cardioverter/defibrillator) present     Aortic valve replaced 10/11    History of cataract extraction bilateral    History of tonsillectomy     Mitral valve replaced 10/11    S/P GERTRUDE (Total Abdominal Hysterectomy)

## 2020-05-19 NOTE — H&P ADULT - PROBLEM SELECTOR PLAN 1
Suspect UTI vs COVID-19 infection  UA, Urine cx pending  COVID PCR pending  BCx pending  c/w IV ceftriaxone empirically

## 2020-05-19 NOTE — H&P ADULT - NSICDXPASTMEDICALHX_GEN_ALL_CORE_FT
PAST MEDICAL HISTORY:  Aneurysm of aorta 5.1 cm of ascending aorta as per CT scan    Aortic valve replaced pig valve    Arteriosclerotic heart disease (ASHD)     Atrial fibrillation     CHF (congestive heart failure)     Diabetes mellitus, type II     Hypertension     Mitral valve replaced     Pacemaker     Scoliosis

## 2020-05-19 NOTE — ED PROVIDER NOTE - CLINICAL SUMMARY MEDICAL DECISION MAKING FREE TEXT BOX
89yF w/pmhx HTN, DM, MV/AR valve replacement, afib on coumadin?, PPM/dfib, CHF, recently diagnosed wit UTI 5/06 on bactrim presenting with AMS. Concern for UTI vs urosepsis. Will check cbc/cmp/vbg, ua and culture, CXR. If rectally febrile will start on broad spectrum antibiotics.

## 2020-05-19 NOTE — H&P ADULT - NSHPREVIEWOFSYSTEMS_GEN_ALL_CORE
CONSTITUTIONAL: See HPI  EYES: No eye pain, visual disturbances  ENMT:  No difficulty hearing, tinnitus, vertigo;   NECK: No pain or stiffness  RESPIRATORY: See HPI  CARDIOVASCULAR: See HPI  GASTROINTESTINAL: See HPI  GENITOURINARY: See HPI  NEUROLOGICAL: No headaches or current confusion  SKIN: No itching, burning, rashes  LYMPH NODES: No enlarged lymph nodes  ENDOCRINE: No heat or cold intolerance;  MUSCULOSKELETAL: No joint pain or swelling; No muscle, back, or extremity pain  PSYCHIATRIC: No depression, anxiety  HEME/LYMPH: No easy bruising, or bleeding gums  ALLERGY AND IMMUNOLOGIC: No hives or eczema

## 2020-05-20 LAB
ALBUMIN SERPL ELPH-MCNC: 3.2 G/DL — LOW (ref 3.3–5)
ALP SERPL-CCNC: 59 U/L — SIGNIFICANT CHANGE UP (ref 40–120)
ALT FLD-CCNC: 7 U/L — SIGNIFICANT CHANGE UP (ref 4–33)
ANION GAP SERPL CALC-SCNC: 11 MMO/L — SIGNIFICANT CHANGE UP (ref 7–14)
ANION GAP SERPL CALC-SCNC: 11 MMO/L — SIGNIFICANT CHANGE UP (ref 7–14)
APTT BLD: 35.6 SEC — SIGNIFICANT CHANGE UP (ref 27.5–36.3)
AST SERPL-CCNC: 9 U/L — SIGNIFICANT CHANGE UP (ref 4–32)
BILIRUB SERPL-MCNC: 0.6 MG/DL — SIGNIFICANT CHANGE UP (ref 0.2–1.2)
BUN SERPL-MCNC: 24 MG/DL — HIGH (ref 7–23)
BUN SERPL-MCNC: 24 MG/DL — HIGH (ref 7–23)
CALCIUM SERPL-MCNC: 8.7 MG/DL — SIGNIFICANT CHANGE UP (ref 8.4–10.5)
CALCIUM SERPL-MCNC: 8.7 MG/DL — SIGNIFICANT CHANGE UP (ref 8.4–10.5)
CHLORIDE SERPL-SCNC: 101 MMOL/L — SIGNIFICANT CHANGE UP (ref 98–107)
CHLORIDE SERPL-SCNC: 101 MMOL/L — SIGNIFICANT CHANGE UP (ref 98–107)
CO2 SERPL-SCNC: 23 MMOL/L — SIGNIFICANT CHANGE UP (ref 22–31)
CO2 SERPL-SCNC: 23 MMOL/L — SIGNIFICANT CHANGE UP (ref 22–31)
CREAT SERPL-MCNC: 1.15 MG/DL — SIGNIFICANT CHANGE UP (ref 0.5–1.3)
CREAT SERPL-MCNC: 1.15 MG/DL — SIGNIFICANT CHANGE UP (ref 0.5–1.3)
CULTURE RESULTS: NO GROWTH — SIGNIFICANT CHANGE UP
GLUCOSE BLDC GLUCOMTR-MCNC: 211 MG/DL — HIGH (ref 70–99)
GLUCOSE BLDC GLUCOMTR-MCNC: 248 MG/DL — HIGH (ref 70–99)
GLUCOSE SERPL-MCNC: 264 MG/DL — HIGH (ref 70–99)
GLUCOSE SERPL-MCNC: 264 MG/DL — HIGH (ref 70–99)
HBA1C BLD-MCNC: 7.5 % — HIGH (ref 4–5.6)
HCT VFR BLD CALC: 32 % — LOW (ref 34.5–45)
HGB BLD-MCNC: 10.3 G/DL — LOW (ref 11.5–15.5)
INR BLD: 3.1 — HIGH (ref 0.88–1.17)
MAGNESIUM SERPL-MCNC: 1.8 MG/DL — SIGNIFICANT CHANGE UP (ref 1.6–2.6)
MCHC RBC-ENTMCNC: 27.5 PG — SIGNIFICANT CHANGE UP (ref 27–34)
MCHC RBC-ENTMCNC: 32.2 % — SIGNIFICANT CHANGE UP (ref 32–36)
MCV RBC AUTO: 85.3 FL — SIGNIFICANT CHANGE UP (ref 80–100)
NRBC # FLD: 0 K/UL — SIGNIFICANT CHANGE UP (ref 0–0)
PHOSPHATE SERPL-MCNC: 2.8 MG/DL — SIGNIFICANT CHANGE UP (ref 2.5–4.5)
PLATELET # BLD AUTO: 100 K/UL — LOW (ref 150–400)
PMV BLD: 12.2 FL — SIGNIFICANT CHANGE UP (ref 7–13)
POTASSIUM SERPL-MCNC: 3.9 MMOL/L — SIGNIFICANT CHANGE UP (ref 3.5–5.3)
POTASSIUM SERPL-MCNC: 3.9 MMOL/L — SIGNIFICANT CHANGE UP (ref 3.5–5.3)
POTASSIUM SERPL-SCNC: 3.9 MMOL/L — SIGNIFICANT CHANGE UP (ref 3.5–5.3)
POTASSIUM SERPL-SCNC: 3.9 MMOL/L — SIGNIFICANT CHANGE UP (ref 3.5–5.3)
PROT SERPL-MCNC: 6.6 G/DL — SIGNIFICANT CHANGE UP (ref 6–8.3)
PROTHROM AB SERPL-ACNC: 36.9 SEC — HIGH (ref 9.8–13.1)
RBC # BLD: 3.75 M/UL — LOW (ref 3.8–5.2)
RBC # FLD: 15.2 % — HIGH (ref 10.3–14.5)
SODIUM SERPL-SCNC: 135 MMOL/L — SIGNIFICANT CHANGE UP (ref 135–145)
SODIUM SERPL-SCNC: 135 MMOL/L — SIGNIFICANT CHANGE UP (ref 135–145)
SPECIMEN SOURCE: SIGNIFICANT CHANGE UP
WBC # BLD: 10.5 K/UL — SIGNIFICANT CHANGE UP (ref 3.8–10.5)
WBC # FLD AUTO: 10.5 K/UL — SIGNIFICANT CHANGE UP (ref 3.8–10.5)

## 2020-05-20 PROCEDURE — 99233 SBSQ HOSP IP/OBS HIGH 50: CPT

## 2020-05-20 PROCEDURE — 93306 TTE W/DOPPLER COMPLETE: CPT | Mod: 26

## 2020-05-20 RX ORDER — WARFARIN SODIUM 2.5 MG/1
3.5 TABLET ORAL ONCE
Refills: 0 | Status: COMPLETED | OUTPATIENT
Start: 2020-05-20 | End: 2020-05-20

## 2020-05-20 RX ADMIN — ISOSORBIDE MONONITRATE 30 MILLIGRAM(S): 60 TABLET, EXTENDED RELEASE ORAL at 11:16

## 2020-05-20 RX ADMIN — CEFTRIAXONE 100 MILLIGRAM(S): 500 INJECTION, POWDER, FOR SOLUTION INTRAMUSCULAR; INTRAVENOUS at 14:36

## 2020-05-20 RX ADMIN — Medication 25 MILLIGRAM(S): at 05:39

## 2020-05-20 RX ADMIN — Medication 25 MILLIGRAM(S): at 17:34

## 2020-05-20 RX ADMIN — WARFARIN SODIUM 3.5 MILLIGRAM(S): 2.5 TABLET ORAL at 17:35

## 2020-05-20 RX ADMIN — Medication 20 MILLIGRAM(S): at 05:39

## 2020-05-20 NOTE — PROGRESS NOTE ADULT - PROBLEM SELECTOR PLAN 3
Appears euvolemic, c/w home lasix with close volume assessments  Daily weights, I/Os  c/w metoprolol  Has allergy to ACE INH  TTE performed, report as above

## 2020-05-20 NOTE — PROVIDER CONTACT NOTE (OTHER) - ASSESSMENT
Patient with blood glucose of 248. Patient refusing insulin at this time stating "I take metformin at home ." Education provided regarding importance of blood sugar control and infection, patient verified understanding with teach back method, continues to refuse.

## 2020-05-20 NOTE — PROGRESS NOTE ADULT - PROBLEM SELECTOR PLAN 8
Recent -150s, continue to hold amlodipine for now in setting of presumed sepsis  c/w imdur, metoprolol  monitor BP

## 2020-05-20 NOTE — PROVIDER CONTACT NOTE (OTHER) - ASSESSMENT
Patient with blood glucose of 241. Patient refusing insulin at this time stating "I take metformin at home, I do not want to start on insulin and go down that road." Education provided regarding importance of blood sugar control and infection, patient verified understanding with teach back method, continues to refuse.

## 2020-05-20 NOTE — PROGRESS NOTE ADULT - PROBLEM SELECTOR PLAN 9
Hold metformin  Start ISS with FS monitoring, encouraged pt to take as she was refusing, continue to encourage as possible  A1C 7.5 however in setting of mild anemia

## 2020-05-20 NOTE — PROGRESS NOTE ADULT - PROBLEM SELECTOR PLAN 1
UA not suggestive of infection, history of prior UTIs  F/u urine and blood cultures, if negative plan to DC empiric ceftriaxone  COVID19 negative

## 2020-05-20 NOTE — PROGRESS NOTE ADULT - SUBJECTIVE AND OBJECTIVE BOX
[facility] Division of Hospital Medicine  Arnav Deshpande MD  Pager #[phone]    Patient is a 89y old  Female who presents with a chief complaint of Confusion (19 May 2020 15:45)    SUBJECTIVE / OVERNIGHT EVENTS: No acute events, feels well this AM, still fatigued but otherwise without pain or discomfort.     ADDITIONAL REVIEW OF SYSTEMS:    MEDICATIONS  (STANDING):  cefTRIAXone   IVPB 1000 milliGRAM(s) IV Intermittent every 24 hours  dextrose 5%. 1000 milliLiter(s) (50 mL/Hr) IV Continuous <Continuous>  dextrose 50% Injectable 12.5 Gram(s) IV Push once  dextrose 50% Injectable 25 Gram(s) IV Push once  dextrose 50% Injectable 25 Gram(s) IV Push once  furosemide    Tablet 20 milliGRAM(s) Oral daily  insulin lispro (HumaLOG) corrective regimen sliding scale   SubCutaneous three times a day before meals  insulin lispro (HumaLOG) corrective regimen sliding scale   SubCutaneous at bedtime  isosorbide   mononitrate ER Tablet (IMDUR) 30 milliGRAM(s) Oral daily  metoprolol tartrate 25 milliGRAM(s) Oral two times a day  warfarin 3.5 milliGRAM(s) Oral once    MEDICATIONS  (PRN):  dextrose 40% Gel 15 Gram(s) Oral once PRN Blood Glucose LESS THAN 70 milliGRAM(s)/deciliter  glucagon  Injectable 1 milliGRAM(s) IntraMuscular once PRN Glucose LESS THAN 70 milligrams/deciliter    CAPILLARY BLOOD GLUCOSE    POCT Blood Glucose.: 248 mg/dL (20 May 2020 11:28)  POCT Blood Glucose.: 241 mg/dL (20 May 2020 07:45)  POCT Blood Glucose.: 232 mg/dL (19 May 2020 22:12)  POCT Blood Glucose.: 183 mg/dL (19 May 2020 17:56)    I&O's Summary    19 May 2020 07:01  -  20 May 2020 07:00  --------------------------------------------------------  IN: 200 mL / OUT: 550 mL / NET: -350 mL    PHYSICAL EXAM:  Vital Signs Last 24 Hrs  T(C): 37.2 (20 May 2020 11:11), Max: 37.4 (19 May 2020 20:53)  T(F): 99 (20 May 2020 11:11), Max: 99.3 (19 May 2020 20:53)  HR: 68 (20 May 2020 11:11) (53 - 88)  BP: 150/63 (20 May 2020 11:11) (113/51 - 153/77)  BP(mean): --  RR: 17 (20 May 2020 11:11) (17 - 23)  SpO2: 96% (20 May 2020 11:11) (93% - 99%)    CONSTITUTIONAL: NAD, well-developed  EYES: conjunctiva and sclera clear  ENMT: Moist oral mucosa, no pharyngeal injection or exudates  NECK: Supple, no palpable masses; no thyromegaly  RESPIRATORY: Normal respiratory effort; lungs are clear to auscultation bilaterally  CARDIOVASCULAR: Regular rate and rhythm, normal S1 and S2, no murmur/rub/gallop; No lower extremity edema; Peripheral pulses are 2+ bilaterally  ABDOMEN: Nontender to palpation, normoactive bowel sounds, no rebound/guarding; No hepatosplenomegaly  PSYCH: A+O to person, place, and time; affect appropriate  NEUROLOGY: no gross deficits   SKIN: No rashes; no palpable lesions    LABS:                        10.3   10.50 )-----------( 100      ( 20 May 2020 05:30 )             32.0     05-20    135  |  101  |  24<H>  ----------------------------<  264<H>  3.9   |  23  |  1.15    Ca    8.7      20 May 2020 05:30  Phos  2.8     05-20  Mg     1.8     05-20    TPro  6.6  /  Alb  3.2<L>  /  TBili  0.6  /  DBili  x   /  AST  9   /  ALT  7   /  AlkPhos  59  05-20    PT/INR - ( 20 May 2020 05:30 )   PT: 36.9 SEC;   INR: 3.10        PTT - ( 20 May 2020 05:30 )  PTT:35.6 SEC    Urinalysis Basic - ( 19 May 2020 16:00 )    Color: YELLOW / Appearance: CLEAR / S.023 / pH: 5.5  Gluc: 150 / Ketone: NEGATIVE  / Bili: NEGATIVE / Urobili: NORMAL   Blood: NEGATIVE / Protein: 30 / Nitrite: NEGATIVE   Leuk Esterase: SMALL / RBC: 3-5 / WBC 6-10   Sq Epi: FEW / Non Sq Epi: x / Bacteria: SMALL

## 2020-05-20 NOTE — PROVIDER CONTACT NOTE (OTHER) - ASSESSMENT
Patient refusing pre-dinner insulin, stating she "takes metformin at home and does not want to get started on anything new." Patient educated on the need for insulin.

## 2020-05-21 ENCOUNTER — TRANSCRIPTION ENCOUNTER (OUTPATIENT)
Age: 85
End: 2020-05-21

## 2020-05-21 VITALS — SYSTOLIC BLOOD PRESSURE: 148 MMHG | HEART RATE: 77 BPM | DIASTOLIC BLOOD PRESSURE: 76 MMHG

## 2020-05-21 LAB
ALBUMIN SERPL ELPH-MCNC: 3.3 G/DL — SIGNIFICANT CHANGE UP (ref 3.3–5)
ALP SERPL-CCNC: 60 U/L — SIGNIFICANT CHANGE UP (ref 40–120)
ALT FLD-CCNC: 5 U/L — SIGNIFICANT CHANGE UP (ref 4–33)
ANION GAP SERPL CALC-SCNC: 11 MMO/L — SIGNIFICANT CHANGE UP (ref 7–14)
APTT BLD: 32.3 SEC — SIGNIFICANT CHANGE UP (ref 27.5–36.3)
AST SERPL-CCNC: 9 U/L — SIGNIFICANT CHANGE UP (ref 4–32)
BILIRUB SERPL-MCNC: 0.6 MG/DL — SIGNIFICANT CHANGE UP (ref 0.2–1.2)
BUN SERPL-MCNC: 24 MG/DL — HIGH (ref 7–23)
CALCIUM SERPL-MCNC: 8.8 MG/DL — SIGNIFICANT CHANGE UP (ref 8.4–10.5)
CHLORIDE SERPL-SCNC: 102 MMOL/L — SIGNIFICANT CHANGE UP (ref 98–107)
CO2 SERPL-SCNC: 26 MMOL/L — SIGNIFICANT CHANGE UP (ref 22–31)
CREAT SERPL-MCNC: 1.05 MG/DL — SIGNIFICANT CHANGE UP (ref 0.5–1.3)
GLUCOSE BLDC GLUCOMTR-MCNC: 241 MG/DL — HIGH (ref 70–99)
GLUCOSE BLDC GLUCOMTR-MCNC: 261 MG/DL — HIGH (ref 70–99)
GLUCOSE SERPL-MCNC: 239 MG/DL — HIGH (ref 70–99)
HCT VFR BLD CALC: 33.7 % — LOW (ref 34.5–45)
HGB BLD-MCNC: 10.8 G/DL — LOW (ref 11.5–15.5)
INR BLD: 2.02 — HIGH (ref 0.88–1.17)
MCHC RBC-ENTMCNC: 27.5 PG — SIGNIFICANT CHANGE UP (ref 27–34)
MCHC RBC-ENTMCNC: 32 % — SIGNIFICANT CHANGE UP (ref 32–36)
MCV RBC AUTO: 85.8 FL — SIGNIFICANT CHANGE UP (ref 80–100)
NRBC # FLD: 0 K/UL — SIGNIFICANT CHANGE UP (ref 0–0)
PLATELET # BLD AUTO: 115 K/UL — LOW (ref 150–400)
PMV BLD: 12.1 FL — SIGNIFICANT CHANGE UP (ref 7–13)
POTASSIUM SERPL-MCNC: 3.9 MMOL/L — SIGNIFICANT CHANGE UP (ref 3.5–5.3)
POTASSIUM SERPL-SCNC: 3.9 MMOL/L — SIGNIFICANT CHANGE UP (ref 3.5–5.3)
PROT SERPL-MCNC: 6.6 G/DL — SIGNIFICANT CHANGE UP (ref 6–8.3)
PROTHROM AB SERPL-ACNC: 23.5 SEC — HIGH (ref 9.8–13.1)
RBC # BLD: 3.93 M/UL — SIGNIFICANT CHANGE UP (ref 3.8–5.2)
RBC # FLD: 14.8 % — HIGH (ref 10.3–14.5)
SODIUM SERPL-SCNC: 139 MMOL/L — SIGNIFICANT CHANGE UP (ref 135–145)
WBC # BLD: 8.14 K/UL — SIGNIFICANT CHANGE UP (ref 3.8–10.5)
WBC # FLD AUTO: 8.14 K/UL — SIGNIFICANT CHANGE UP (ref 3.8–10.5)

## 2020-05-21 PROCEDURE — 99239 HOSP IP/OBS DSCHRG MGMT >30: CPT

## 2020-05-21 RX ORDER — METFORMIN HYDROCHLORIDE 850 MG/1
0 TABLET ORAL
Qty: 0 | Refills: 0 | DISCHARGE

## 2020-05-21 RX ORDER — WARFARIN SODIUM 2.5 MG/1
5 TABLET ORAL ONCE
Refills: 0 | Status: COMPLETED | OUTPATIENT
Start: 2020-05-21 | End: 2020-05-21

## 2020-05-21 RX ORDER — METFORMIN HYDROCHLORIDE 850 MG/1
1 TABLET ORAL
Qty: 60 | Refills: 0
Start: 2020-05-21 | End: 2020-06-19

## 2020-05-21 RX ADMIN — Medication 2: at 08:20

## 2020-05-21 RX ADMIN — Medication 3: at 12:07

## 2020-05-21 RX ADMIN — Medication 25 MILLIGRAM(S): at 05:36

## 2020-05-21 RX ADMIN — Medication 25 MILLIGRAM(S): at 16:35

## 2020-05-21 RX ADMIN — Medication 20 MILLIGRAM(S): at 05:36

## 2020-05-21 RX ADMIN — WARFARIN SODIUM 5 MILLIGRAM(S): 2.5 TABLET ORAL at 16:35

## 2020-05-21 RX ADMIN — ISOSORBIDE MONONITRATE 30 MILLIGRAM(S): 60 TABLET, EXTENDED RELEASE ORAL at 12:07

## 2020-05-21 NOTE — PROGRESS NOTE ADULT - PROBLEM SELECTOR PLAN 1
UA not suggestive of infection, history of prior UTIs  No growth on urine or blood cultures, will stop ceftriaxone  COVID19 negative  Fever curve downtrended, no fevers >24 hrs

## 2020-05-21 NOTE — PHYSICAL THERAPY INITIAL EVALUATION ADULT - DISCHARGE DISPOSITION, PT EVAL
anticipated discharge to home with no skilled restorative physical therapy services upon discharge from [facility]. Please follow therapy for continued assessment.

## 2020-05-21 NOTE — PROGRESS NOTE ADULT - SUBJECTIVE AND OBJECTIVE BOX
[facility] Division of Hospital Medicine  Arnav Deshpande MD  Pager #[phone]    Patient is a 89y old  Female who presents with a chief complaint of Confusion (19 May 2020 15:45)    SUBJECTIVE / OVERNIGHT EVENTS: No acute events, feels well this AM. Still feeling tired but otherwise no complaints.     MEDICATIONS  (STANDING):  dextrose 5%. 1000 milliLiter(s) (50 mL/Hr) IV Continuous <Continuous>  dextrose 50% Injectable 12.5 Gram(s) IV Push once  dextrose 50% Injectable 25 Gram(s) IV Push once  dextrose 50% Injectable 25 Gram(s) IV Push once  furosemide    Tablet 20 milliGRAM(s) Oral daily  insulin lispro (HumaLOG) corrective regimen sliding scale   SubCutaneous three times a day before meals  insulin lispro (HumaLOG) corrective regimen sliding scale   SubCutaneous at bedtime  isosorbide   mononitrate ER Tablet (IMDUR) 30 milliGRAM(s) Oral daily  metoprolol tartrate 25 milliGRAM(s) Oral two times a day  warfarin 5 milliGRAM(s) Oral once    MEDICATIONS  (PRN):  dextrose 40% Gel 15 Gram(s) Oral once PRN Blood Glucose LESS THAN 70 milliGRAM(s)/deciliter  glucagon  Injectable 1 milliGRAM(s) IntraMuscular once PRN Glucose LESS THAN 70 milligrams/deciliter    Vital Signs Last 24 Hrs  T(C): 36.9 (21 May 2020 05:31), Max: 36.9 (20 May 2020 15:51)  T(F): 98.5 (21 May 2020 05:31), Max: 98.5 (21 May 2020 05:31)  HR: 70 (21 May 2020 05:31) (66 - 71)  BP: 141/55 (21 May 2020 05:31) (137/67 - 144/87)  BP(mean): --  RR: 17 (21 May 2020 05:31) (17 - 18)  SpO2: 94% (21 May 2020 05:31) (94% - 95%)    I&O's Detail    20 May 2020 07:01  -  21 May 2020 07:00  --------------------------------------------------------  IN:    Oral Fluid: 100 mL  Total IN: 100 mL    OUT:    Voided: 400 mL  Total OUT: 400 mL    Total NET: -300 mL      21 May 2020 07:01  -  21 May 2020 11:45  --------------------------------------------------------  IN:    Oral Fluid: 240 mL  Total IN: 240 mL    OUT:    Voided: 300 mL  Total OUT: 300 mL    Total NET: -60 mL    CAPILLARY BLOOD GLUCOSE    POCT Blood Glucose.: 261 mg/dL (21 May 2020 11:37)  POCT Blood Glucose.: 241 mg/dL (21 May 2020 07:53)  POCT Blood Glucose.: 211 mg/dL (20 May 2020 16:53)    CONSTITUTIONAL: NAD, well-developed  EYES: conjunctiva and sclera clear  ENMT: Moist oral mucosa, no pharyngeal injection or exudates  NECK: Supple, no palpable masses; no thyromegaly  RESPIRATORY: Normal respiratory effort; lungs are clear to auscultation bilaterally  CARDIOVASCULAR: Regular rate and rhythm, normal S1 and S2, no murmur/rub/gallop; No lower extremity edema; Peripheral pulses are 2+ bilaterally  ABDOMEN: Nontender to palpation, normoactive bowel sounds, no rebound/guarding; No hepatosplenomegaly  PSYCH: A+O to person, place, and time; affect appropriate  NEUROLOGY: no gross deficits   SKIN: No rashes; no palpable lesions    LABS:                        10.8   8.14  )-----------( 115      ( 21 May 2020 05:45 )             33.7     05-21    139  |  102  |  24<H>  ----------------------------<  239<H>  3.9   |  26  |  1.05    Ca    8.8      21 May 2020 05:45  Phos  2.8     05-20  Mg     1.8     20    TPro  6.6  /  Alb  3.3  /  TBili  0.6  /  DBili  x   /  AST  9   /  ALT  5   /  AlkPhos  60  05-21    PT/INR - ( 21 May 2020 05:45 )   PT: 23.5 SEC;   INR: 2.02          PTT - ( 21 May 2020 05:45 )  PTT:32.3 SEC  Urinalysis Basic - ( 19 May 2020 16:00 )    Color: YELLOW / Appearance: CLEAR / S.023 / pH: 5.5  Gluc: 150 / Ketone: NEGATIVE  / Bili: NEGATIVE / Urobili: NORMAL   Blood: NEGATIVE / Protein: 30 / Nitrite: NEGATIVE   Leuk Esterase: SMALL / RBC: 3-5 / WBC 6-10   Sq Epi: FEW / Non Sq Epi: x / Bacteria: SMALL    RADIOLOGY, & ADDITIONAL TESTS: Reviewed.

## 2020-05-21 NOTE — DISCHARGE NOTE PROVIDER - HOSPITAL COURSE
Patient is a 89yoF with h/o MVR/AVR, Afib, DM type II, HTN, CHFrEF presents with AMS. Admitted with fever and meeting SIRS criteria (fever + leukocytosis). Patient without localizing symptoms. Started on empiric ceftriaxone (completed 2 days). UA and UCx negative. BCx with NGTD. CXR unremarkable. TTE unremarkable. Patient remained hemodynamically stable without further fevers and leukocytosis resolved. Complained of increased urinary frequency on admission and was on bactrim for a UTI after completing an alternative antibiotic for UTI. Back to baseline mental status. Fever/AMS/Leukocytosis likely from UTI. Patient with elevated blood glucose this admission. Metformin increased from 500mg daily to 500mg BID on discharge.         Patient seen and evaluated. Reviewed discharge medications with patient. All new medications requiring new prescriptions were sent to the pharmacy of patient's choice. Reviewed need for prescription for previous home medications and new prescriptions sent if requested. Medically cleared/stable for discharge as per Dr. Deshpande on 5/21/2020 with appropriate follow up. Patient understands and agrees with plan of care. Patient is a 89yoF with h/o MVR/AVR, Afib, DM type II, HTN, CHFrEF presents with AMS. Admitted with fever and meeting SIRS criteria (fever + leukocytosis). Patient without localizing symptoms. Started on empiric ceftriaxone (completed 2 days). UA and UCx negative. BCx with NGTD. CXR unremarkable. TTE unremarkable. Patient remained hemodynamically stable without further fevers and leukocytosis resolved. Complained of increased urinary frequency on admission and was on bactrim for a UTI after completing an alternative antibiotic for UTI. Back to baseline mental status. Fever/AMS/Leukocytosis likely from UTI. Patient with elevated blood glucose this admission. Metformin increased from 500mg daily to 500mg BID on discharge.     Patient seen and evaluated. Reviewed discharge medications with patient. All new medications requiring new prescriptions were sent to the pharmacy of patient's choice. Reviewed need for prescription for previous home medications and new prescriptions sent if requested. Medically cleared/stable for discharge as per Dr. Deshpande on 5/21/2020 with appropriate follow up. Patient understands and agrees with plan of care.

## 2020-05-21 NOTE — DISCHARGE NOTE PROVIDER - NSDCMRMEDTOKEN_GEN_ALL_CORE_FT
amLODIPine 10 mg oral tablet: 1 tab(s) orally once a day  Coumadin 5 mg oral tablet: 1 tab(s) orally once a day  folic acid 0.4 mg oral tablet: 1 tab(s) orally once a day  isosorbide mononitrate 30 mg oral tablet, extended release: 1 tab(s) orally once a day  Lasix 20 mg oral tablet: 1 tab(s) orally once a day  metFORMIN 500 mg oral tablet: orally once a day  metoprolol tartrate 25 mg oral tablet: 1 tab(s) orally 2 times a day amLODIPine 10 mg oral tablet: 1 tab(s) orally once a day  Coumadin 5 mg oral tablet: 1 tab(s) orally once a day  folic acid 0.4 mg oral tablet: 1 tab(s) orally once a day  isosorbide mononitrate 30 mg oral tablet, extended release: 1 tab(s) orally once a day  Lasix 20 mg oral tablet: 1 tab(s) orally once a day  metFORMIN 500 mg oral tablet: 1 tab(s) orally 2 times a day   metoprolol tartrate 25 mg oral tablet: 1 tab(s) orally 2 times a day

## 2020-05-21 NOTE — DISCHARGE NOTE NURSING/CASE MANAGEMENT/SOCIAL WORK - PATIENT PORTAL LINK FT
You can access the FollowMyHealth Patient Portal offered by [facility] by registering at the following website: [email or web address]. By joining [facility]’s FollowMyHealth portal, you will also be able to view your health information using other applications (apps) compatible with our system.

## 2020-05-21 NOTE — DISCHARGE NOTE PROVIDER - NSDCCPCAREPLAN_GEN_ALL_CORE_FT
PRINCIPAL DISCHARGE DIAGNOSIS  Diagnosis: Sepsis without acute organ dysfunction, due to unspecified organism  Assessment and Plan of Treatment: You came to the hospital with confusion and fever. You were found to have an elevated white blood cell count. Your COVID test was negative, your echocardiogram (ultrasound of the heart) was normal, your blood cultures were negative, your chest xray was negative and you urine culture was negative. Your symptoms were likely from a resolving UTI. You do not further require any antibiotics at this time. Report symptoms of urinary frequency or pain with urination or fever to your primary care physician. Follow up with your primary care physician within 1-2 weeks of discharge.      SECONDARY DISCHARGE DIAGNOSES  Diagnosis: Diabetes mellitus  Assessment and Plan of Treatment: Your finger sticks were elvated in the hospital. Please increase your metformin from once a day to 500mg TWICE a day. Keep a log of your daily glucose measurements and bring them to your doctor's appointment. Follow up with your PCP in 1-2 weeks and follow up with your endocrinologist.    Diagnosis: HTN (hypertension)  Assessment and Plan of Treatment: Continue blood pressure medications as prescribed. A low salt diet is recommended. Routine follow up with your PCP.    Diagnosis: Chronic systolic congestive heart failure  Assessment and Plan of Treatment: Continue with lasix and other cardiac medications as prescribed. Routine follow up with your cardiologist.    Diagnosis: Atrial fibrillation  Assessment and Plan of Treatment: Continue with your metoprolol and coumadin as prescribed. Routine follow up with your PCP and cardiologist.

## 2020-05-21 NOTE — PHYSICAL THERAPY INITIAL EVALUATION ADULT - PERTINENT HX OF CURRENT PROBLEM, REHAB EVAL
Pt .is a 89 year old female admitted with sepsis. PMH: PPM, Atrial fibrillation. CHF, Scoliosis, aneurysm of aorta.

## 2020-05-21 NOTE — PROGRESS NOTE ADULT - PROBLEM SELECTOR PLAN 9
Hold metformin, resume on discharge  Start ISS with FS monitoring, encouraged pt to take as she was refusing, continue to encourage as possible  A1C 7.5 however in setting of mild anemia

## 2020-05-24 LAB
CULTURE RESULTS: SIGNIFICANT CHANGE UP
CULTURE RESULTS: SIGNIFICANT CHANGE UP
SPECIMEN SOURCE: SIGNIFICANT CHANGE UP
SPECIMEN SOURCE: SIGNIFICANT CHANGE UP

## 2020-08-13 ENCOUNTER — APPOINTMENT (OUTPATIENT)
Dept: ELECTROPHYSIOLOGY | Facility: CLINIC | Age: 85
End: 2020-08-13
Payer: MEDICARE

## 2020-08-13 PROCEDURE — 93296 REM INTERROG EVL PM/IDS: CPT

## 2020-08-13 PROCEDURE — 93295 DEV INTERROG REMOTE 1/2/MLT: CPT

## 2020-11-12 ENCOUNTER — APPOINTMENT (OUTPATIENT)
Dept: ELECTROPHYSIOLOGY | Facility: CLINIC | Age: 85
End: 2020-11-12
Payer: MEDICARE

## 2020-11-12 PROCEDURE — 93295 DEV INTERROG REMOTE 1/2/MLT: CPT

## 2020-11-12 PROCEDURE — 93296 REM INTERROG EVL PM/IDS: CPT

## 2021-01-22 ENCOUNTER — INPATIENT (INPATIENT)
Facility: HOSPITAL | Age: 86
LOS: 13 days | Discharge: NURSING, REHAB OR LONG-TERM CARE | DRG: 177 | End: 2021-02-05
Attending: GENERAL PRACTICE | Admitting: GENERAL PRACTICE
Payer: MEDICARE

## 2021-01-22 VITALS
WEIGHT: 149.91 LBS | OXYGEN SATURATION: 100 % | HEIGHT: 63 IN | RESPIRATION RATE: 18 BRPM | SYSTOLIC BLOOD PRESSURE: 100 MMHG | HEART RATE: 55 BPM | TEMPERATURE: 98 F | DIASTOLIC BLOOD PRESSURE: 64 MMHG

## 2021-01-22 DIAGNOSIS — R41.82 ALTERED MENTAL STATUS, UNSPECIFIED: ICD-10-CM

## 2021-01-22 DIAGNOSIS — Z95.810 PRESENCE OF AUTOMATIC (IMPLANTABLE) CARDIAC DEFIBRILLATOR: Chronic | ICD-10-CM

## 2021-01-22 LAB
APTT BLD: 39.1 SEC — HIGH (ref 27.5–35.5)
BASOPHILS # BLD AUTO: 0.01 K/UL — SIGNIFICANT CHANGE UP (ref 0–0.2)
BASOPHILS NFR BLD AUTO: 0.1 % — SIGNIFICANT CHANGE UP (ref 0–2)
BLD GP AB SCN SERPL QL: NEGATIVE — SIGNIFICANT CHANGE UP
EOSINOPHIL # BLD AUTO: 0.18 K/UL — SIGNIFICANT CHANGE UP (ref 0–0.5)
EOSINOPHIL NFR BLD AUTO: 1.9 % — SIGNIFICANT CHANGE UP (ref 0–6)
GAS PNL BLDV: SIGNIFICANT CHANGE UP
HCT VFR BLD CALC: 29.6 % — LOW (ref 34.5–45)
HGB BLD-MCNC: 9.1 G/DL — LOW (ref 11.5–15.5)
IMM GRANULOCYTES NFR BLD AUTO: 0.7 % — SIGNIFICANT CHANGE UP (ref 0–1.5)
INR BLD: 3.28 RATIO — HIGH (ref 0.88–1.16)
LYMPHOCYTES # BLD AUTO: 1.45 K/UL — SIGNIFICANT CHANGE UP (ref 1–3.3)
LYMPHOCYTES # BLD AUTO: 15.2 % — SIGNIFICANT CHANGE UP (ref 13–44)
MCHC RBC-ENTMCNC: 26.9 PG — LOW (ref 27–34)
MCHC RBC-ENTMCNC: 30.7 GM/DL — LOW (ref 32–36)
MCV RBC AUTO: 87.6 FL — SIGNIFICANT CHANGE UP (ref 80–100)
MONOCYTES # BLD AUTO: 0.4 K/UL — SIGNIFICANT CHANGE UP (ref 0–0.9)
MONOCYTES NFR BLD AUTO: 4.2 % — SIGNIFICANT CHANGE UP (ref 2–14)
NEUTROPHILS # BLD AUTO: 7.45 K/UL — HIGH (ref 1.8–7.4)
NEUTROPHILS NFR BLD AUTO: 77.9 % — HIGH (ref 43–77)
NRBC # BLD: 0 /100 WBCS — SIGNIFICANT CHANGE UP (ref 0–0)
PLATELET # BLD AUTO: 185 K/UL — SIGNIFICANT CHANGE UP (ref 150–400)
PROTHROM AB SERPL-ACNC: 37.2 SEC — HIGH (ref 10.6–13.6)
RBC # BLD: 3.38 M/UL — LOW (ref 3.8–5.2)
RBC # FLD: 15 % — HIGH (ref 10.3–14.5)
RH IG SCN BLD-IMP: POSITIVE — SIGNIFICANT CHANGE UP
SARS-COV-2 RNA SPEC QL NAA+PROBE: DETECTED
TROPONIN T, HIGH SENSITIVITY RESULT: 49 NG/L — SIGNIFICANT CHANGE UP (ref 0–51)
WBC # BLD: 9.56 K/UL — SIGNIFICANT CHANGE UP (ref 3.8–10.5)
WBC # FLD AUTO: 9.56 K/UL — SIGNIFICANT CHANGE UP (ref 3.8–10.5)

## 2021-01-22 PROCEDURE — 70450 CT HEAD/BRAIN W/O DYE: CPT | Mod: 26,MA

## 2021-01-22 PROCEDURE — 71045 X-RAY EXAM CHEST 1 VIEW: CPT | Mod: 26

## 2021-01-22 PROCEDURE — 99223 1ST HOSP IP/OBS HIGH 75: CPT

## 2021-01-22 PROCEDURE — 99285 EMERGENCY DEPT VISIT HI MDM: CPT

## 2021-01-22 PROCEDURE — 93010 ELECTROCARDIOGRAM REPORT: CPT

## 2021-01-22 RX ORDER — FUROSEMIDE 40 MG
1 TABLET ORAL
Qty: 0 | Refills: 0 | DISCHARGE

## 2021-01-22 RX ORDER — AMLODIPINE BESYLATE 2.5 MG/1
1 TABLET ORAL
Qty: 0 | Refills: 0 | DISCHARGE

## 2021-01-22 RX ORDER — SODIUM CHLORIDE 9 MG/ML
500 INJECTION, SOLUTION INTRAVENOUS ONCE
Refills: 0 | Status: COMPLETED | OUTPATIENT
Start: 2021-01-22 | End: 2021-01-22

## 2021-01-22 RX ADMIN — SODIUM CHLORIDE 500 MILLILITER(S): 9 INJECTION, SOLUTION INTRAVENOUS at 19:43

## 2021-01-22 NOTE — H&P ADULT - PROBLEM SELECTOR PLAN 2
Found to have COVID 1/7/21 and 1/22/21  will start IV dexamethasone  hold off remdesivir for now  ID consult in AM

## 2021-01-22 NOTE — ED PROVIDER NOTE - CARE PLAN
Principal Discharge DX:	Altered mental status  Secondary Diagnosis:	MICHELLE (acute kidney injury)  Secondary Diagnosis:	COVID-19  Secondary Diagnosis:	INR (international normal ratio) abnormal

## 2021-01-22 NOTE — ED ADULT NURSE REASSESSMENT NOTE - NS ED NURSE REASSESS COMMENT FT1
Pt repositioned don bed, perineal care provided, prima fit applied to collect urine sample. VSS, pt doesn't have any complaints at this time.

## 2021-01-22 NOTE — H&P ADULT - PROBLEM SELECTOR PLAN 7
IV lasix 40mg x 2 one day and then switch to PO  monitor ins/outs and daily weights  c/w imdur IV lasix 40mg x 2 one day and then switch to PO  monitor ins/outs and daily weights  c/w imdur  ACID/PPM : ?last interrogation; will get cardiology

## 2021-01-22 NOTE — ED PROVIDER NOTE - ATTENDING CONTRIBUTION TO CARE
Attending MD Mendoza:  I personally have seen and examined this patient.  Resident note reviewed and agree on plan of care and except where noted.  See HPI, PE, and MDM for details.

## 2021-01-22 NOTE — ED PROVIDER NOTE - PHYSICAL EXAMINATION
Gen: AAOx3, non-toxic  Head: NCAT  HEENT: EOMI, oral mucosa moist, normal conjunctiva  Lung: CTAB, no respiratory distress, no wheezes/rhonchi/rales B/L, speaking in full sentences  CV: Bradycardic, no murmurs, rubs or gallops  Abd: soft, NTND, no guarding, no CVA tenderness  MSK: no visible deformities  Neuro: No focal sensory or motor deficits, normal CN exam   Skin: Warm, well perfused, no rash  Psych: normal affect.     Shabbir Davis PGY3 Gen: AAOx3, non-toxic  Head: NCAT  HEENT: EOMI, oral mucosa moist, normal conjunctiva  Lung: CTAB, no respiratory distress, no wheezes/rhonchi/rales B/L, speaking in full sentences  CV: Bradycardic, no murmurs, rubs or gallops  Abd: soft, NTND, no guarding, no CVA tenderness  Rectal: no bleeding or melena   MSK: no visible deformities  Neuro: No focal sensory or motor deficits, normal CN exam   Skin: Warm, well perfused, no rash  Psych: normal affect.     Shabbir Davis PGY3

## 2021-01-22 NOTE — H&P ADULT - NSHPLABSRESULTS_GEN_ALL_CORE
Labs personally reviewed:                          9.1    9.56  )-----------( 185      ( 22 Jan 2021 19:32 )             29.6     01-22    143  |  107  |  79<H>  ----------------------------<  253<H>  3.7   |  25  |  1.85<H>    Ca    8.4      22 Jan 2021 19:32    TPro  6.0  /  Alb  2.7<L>  /  TBili  0.4  /  DBili  x   /  AST  27  /  ALT  22  /  AlkPhos  48  01-22        LIVER FUNCTIONS - ( 22 Jan 2021 19:32 )  Alb: 2.7 g/dL / Pro: 6.0 g/dL / ALK PHOS: 48 U/L / ALT: 22 U/L / AST: 27 U/L / GGT: x           PT/INR - ( 22 Jan 2021 19:32 )   PT: 37.2 sec;   INR: 3.28 ratio         PTT - ( 22 Jan 2021 19:32 )  PTT:39.1 sec    CAPILLARY BLOOD GLUCOSE      Imaging:  CXR personally reviewed: b/l pleural effusion, and pulm edema    EKG personally reviewed: afib; telemetry: afib rate 50s, PVCs

## 2021-01-22 NOTE — ED CLERICAL - NS ED CLERK NOTE PRE-ARRIVAL INFORMATION; ADDITIONAL PRE-ARRIVAL INFORMATION
CC/Reason For referral: R/O intracranial Bleeding Altered Mental Status INR 11.38 BUN 65 Creatinine 1.62 Covid +  Preferred Consultant(if applicable): N/A  Who admits for you (if needed): N/A  Do you have documents you would like to fax over? No  Would you still like to speak to an ED attending? No

## 2021-01-22 NOTE — H&P ADULT - NSHPREVIEWOFSYSTEMS_GEN_ALL_CORE
CONSTITUTIONAL: +generalized weakness  EYES/ENT: No visual changes;  No dysphagia  NECK: No pain or stiffness  RESPIRATORY: No cough, wheezing, hemoptysis; No shortness of breath  CARDIOVASCULAR: No chest pain or palpitations; No lower extremity edema  EXTREMITIES: no le edema, cyanosis, clubbing  MUSCULOSKELETAL: no joint pain, swelling  GASTROINTESTINAL: No abdominal or epigastric pain. No nausea, vomiting, or hematemesis; No diarrhea or constipation. No melena or hematochezia.  BACK: No back pain  GENITOURINARY: No dysuria, frequency or hematuria  NEUROLOGICAL: No numbness or weakness  SKIN: No itching, burning, rashes, or lesions   PSYCH: no agitation  All other review of systems is negative unless indicated above.

## 2021-01-22 NOTE — H&P ADULT - PROBLEM SELECTOR PLAN 4
unclear baseline mental status, currently AAO x 2  CT brain negative for acute pathology  ?sepsis induced  will monitor closely

## 2021-01-22 NOTE — H&P ADULT - ASSESSMENT
89 yo F w/ h/o MVR/AVR, Afib on coumadin, s/p AICD/PPM, DM type II, HTN, CHFrEF presents with elevated INR;

## 2021-01-22 NOTE — H&P ADULT - PROBLEM SELECTOR PLAN 6
on coumadin  monitor PT/INR  reduce dose of metoprolol to 25mg bid on coumadin  monitor PT/INR  ACID/PPM : ?last interrogation; will get cardiology  reduce dose of metoprolol to 25mg bid

## 2021-01-22 NOTE — ED PROVIDER NOTE - OBJECTIVE STATEMENT
89 y.o F with PMHx of HLD, HTN, DM, CHF, A fib on coumadin, pacemaker and PSHx aortic and mitral valve replacement presents with supratherapeutic INR of 11. Pt endorses generalized weakness recently. Of note, pt found to be COVID positive today. Denies any other sx including CP, SOB, HA, numbness or weakness.

## 2021-01-22 NOTE — H&P ADULT - ATTENDING COMMENTS
Patient assigned to me by night hospitalist in charge for management and care for patient for this evening only. Care to be resumed by day hospitalist (Dr. Rogers) in the morning and thereafter.     Patient's care rendered on 1/22/21 at 11PM.      Plan discussed with Patient, RN

## 2021-01-22 NOTE — ED ADULT NURSE NOTE - CHIEF COMPLAINT QUOTE
Sent from assisted living for abnormal labs, pt has elevated Na, BUN and Cr. Pt has elevated PT/INR. Vit K was given at sending assisted living facility., Pt AXOX3, Pt COVID + as of 1/21/21

## 2021-01-22 NOTE — H&P ADULT - PROBLEM SELECTOR PLAN 3
Unclear baseline creatinine; ?Cr 1.6 on 1/21/21  c/w IV lasix 40mg bid x 1 day and change back to PO lasix   monitor ins/outs and daily weights  monitor BMP  avoid nephrotoxic agents

## 2021-01-22 NOTE — H&P ADULT - PROBLEM SELECTOR PLAN 1
Patient with acute respiratory failure, was being treated for pneumonia since 1/18/20; Unclear if actually bacterial pneumonia and/or COVID and/or CHF exacerbation.  CXR does not show obvious infiltrate  will c/w current abx IV ceftriaxone/azithromycin  check inflammatory markers  start IV dexamethasone  consider pulm consult in AM Patient with acute respiratory failure, was being treated for pneumonia since 1/18/20; Unclear if actually bacterial pneumonia and/or COVID and/or CHF exacerbation.  CXR does not show obvious infiltrate  will c/w current abx IV ceftriaxone; already received azithromycin x 3 days  check inflammatory markers  start IV dexamethasone  consider pulm consult in AM

## 2021-01-22 NOTE — ED ADULT TRIAGE NOTE - CHIEF COMPLAINT QUOTE
Sent from assisted living for abnormal labs, pt has elevated Na, BUN and Cr. Pt has elevated PT/INR. Vit K was given at sending facility., Pt COVID + as of 1/21/21 Sent from assisted living for abnormal labs, pt has elevated Na, BUN and Cr. Pt has elevated PT/INR. Vit K was given at sending assisted living facility., Pt AXOX3, Pt COVID + as of 1/21/21

## 2021-01-22 NOTE — ED ADULT NURSE NOTE - OBJECTIVE STATEMENT
89 y/o female  arrives to the ER  via EMS complaining of abnormal labs.  PMH of HLD, HTN, DM, CHF, Aortic valve replacement, a fib on coumadin. Pt is A&Ox4, speaking coherently. As per EMS pt was sent for abnormal labs, INR of 11, and being COVID positive. Pt states having generalized weakness.  Pt placed on continuous pulse ox and cardiac monitor, sinus bradycardia noted. On assessment airway is patent, breathing spontaneously and unlabored, skin is dry, warm and color appropriate to race. abdomen is soft, no distended, no tender,  full ROM in all extremities. Pt denies SOB, chest pain, N/V/D, urinary symptoms, fevers, chills. Comfort measures provided. call bell within reach, bed locked in the lowest position. will continue to reassess . 89 y/o female  arrives to the ER  via EMS complaining of abnormal labs.  PMH of HLD, HTN, DM, CHF, Aortic valve replacement, a fib on coumadin. Pt is A&Ox4, speaking coherently. As per EMS pt was sent for assisting living for abnormal labs, INR of 11, increased AMS, and to r/o intracranial bleed. patient is COVID positive. Pt states having generalized weakness.  Pt placed on continuous pulse ox and cardiac monitor, sinus bradycardia noted. On assessment airway is patent, breathing spontaneously and unlabored, skin is dry, warm and color appropriate to race. abdomen is soft, no distended, no tender,  full ROM in all extremities. Pt denies SOB, chest pain, N/V/D, urinary symptoms, fevers, chills. Comfort measures provided. call bell within reach, bed locked in the lowest position. will continue to reassess .

## 2021-01-22 NOTE — ED PROVIDER NOTE - PSH
Refill request for Valium and Naproxen.  Patient has not had these medications filled through our office.  Patient has been receiving refills from her PCP.  Refills denied with note for patient to follow up with PCP.    Encounter closed.   AICD (automatic cardioverter/defibrillator) present    Aortic valve replaced  10/11  History of cataract extraction  bilateral  History of tonsillectomy    Mitral valve replaced  10/11  S/P GERTRUDE (Total Abdominal Hysterectomy)

## 2021-01-22 NOTE — H&P ADULT - NSHPPHYSICALEXAM_GEN_ALL_CORE
PHYSICAL EXAM:  Vital Signs Last 24 Hrs  T(C): 36.7 (01-22-21 @ 22:39)  T(F): 98 (01-22-21 @ 22:39), Max: 98.8 (01-22-21 @ 19:42)  HR: 66 (01-22-21 @ 22:39) (55 - 68)  BP: 136/67 (01-22-21 @ 22:39)  BP(mean): 86 (01-22-21 @ 21:02) (86 - 87)  RR: 15 (01-22-21 @ 22:39) (15 - 24)  SpO2: 97% (01-22-21 @ 22:39) (97% - 100%)  Wt(kg): --    Constitutional: NAD, awake and alert  EYES: EOMI  ENT:  Normal Hearing, no tonsillar exudates   Neck: Soft and supple, No JVD  Lungs: Breath sounds are clear bilaterally, No wheezing, rales or rhonchi  Heart: S1 and S2, regular rate and rhythm, no Murmurs, gallops or rubs  Abdomen: Bowel Sounds present, soft, nontender, nondistended, no guarding, no rebound  Extremities: No cyanosis or clubbing; warm to touch  Vascular: 2+ peripheral pulses lower ex  Neurological: A/O x 2, no focal deficits  Musculoskeletal: moving all extremities  Skin: No rashes  Psych: no depression or anhedonia  HEME: no bruises, no nose bleeds

## 2021-01-22 NOTE — H&P ADULT - HISTORY OF PRESENT ILLNESS
89 yo F w/ h/o MVR/AVR, Afib on coumadin, s/p AICD/PPM, DM type II, HTN, CHFrEF presents with elevated INR;      89 y.o F with PMHx of HLD, HTN, DM, CHF, A fib on coumadin, pacemaker and PSHx aortic and mitral valve replacement presents with supratherapeutic INR of 11. Pt endorses generalized weakness recently. Of note, pt found to be COVID positive today. Denies any other sx including CP, SOB, HA, numbness or weakness. 89 yo F w/ h/o MVR/AVR, Afib on coumadin, s/p AICD/PPM, DM type II, HTN, CHFrEF presents with elevated INR;  Patient is AAO x2, limited history obtained from patient and NH staff.  Patient was being treated for pneumonia for last 3 days with IV ceftriaxone/azithromycin.  Patient however had no complaints, has been eating and drinking well.  Today she was found to have COVID and elevated INR, which prompted them to send patient to [facility].  Patient otherwise denies chest pain, SOB, cough.  On arrival, she had temp 97.9, HR 81, /83, RR 16, saturation 100% on 4L.      89 yo F w/ h/o MVR/AVR, Afib on coumadin, s/p AICD/PPM, DM type II, HTN, CHFrEF presents with elevated INR;  Patient is AAO x2, limited history obtained from patient and NH staff.  Patient was being treated for pneumonia for last 3 days with IV ceftriaxone/azithromycin.  Patient however had no complaints, has been eating and drinking well.  Today she was found to have COVID and elevated INR (11.2), which prompted them to send patient to [facility].  Patient was given 10mg of vitamin K this morning.  Per chart review, patient was also positive for COVID on 1/7/21 which NH staff could not confirm.  Patient otherwise denies chest pain, SOB, cough.  On arrival, she had temp 97.9, HR 81, /83, RR 16, saturation 100% on 4L.      89 yo F w/ h/o MVR/AVR, Afib on coumadin, s/p AICD/PPM, DM type II, HTN, CHFrEF presents with elevated INR;  Patient is AAO x2, limited history obtained from patient and NH staff.  Patient was being treated for pneumonia for last 3 days with IV ceftriaxone/azithromycin and possibly IVF.  Patient however had no complaints, has been eating and drinking well (although likely unreliable historian).  Today she was found to have COVID and elevated INR (11.2), which prompted them to send patient to [facility].  Patient was given 10mg of vitamin K this morning.  Per chart review, patient was also positive for COVID on 1/7/21 which NH staff could not confirm.  Patient otherwise denies chest pain, SOB, cough.  On arrival, she had temp 97.9, HR 81, /83, RR 16, saturation 100% on 4L.

## 2021-01-22 NOTE — ED PROVIDER NOTE - CLINICAL SUMMARY MEDICAL DECISION MAKING FREE TEXT BOX
Susan PGY3: 89 y.o F presents with generalized weakness in setting of COVID positive and supratherapeutic INR. PT is well appearing, hemodynamically stable, normal neuro exam. Will assess for intracranial hemorrhage, electrolyte abnormality, and ACS. Dispo pending results. Susan PGY3: 89 y.o F presents with generalized weakness in setting of COVID positive and supratherapeutic INR. PT is well appearing, afebrile rectally, hemodynamically stable, normal neuro exam. Will assess for intracranial hemorrhage, electrolyte abnormality, and ACS. Dispo pending results.

## 2021-01-23 DIAGNOSIS — R41.82 ALTERED MENTAL STATUS, UNSPECIFIED: ICD-10-CM

## 2021-01-23 DIAGNOSIS — E11.9 TYPE 2 DIABETES MELLITUS WITHOUT COMPLICATIONS: ICD-10-CM

## 2021-01-23 DIAGNOSIS — I48.91 UNSPECIFIED ATRIAL FIBRILLATION: ICD-10-CM

## 2021-01-23 DIAGNOSIS — I50.9 HEART FAILURE, UNSPECIFIED: ICD-10-CM

## 2021-01-23 DIAGNOSIS — Z29.9 ENCOUNTER FOR PROPHYLACTIC MEASURES, UNSPECIFIED: ICD-10-CM

## 2021-01-23 DIAGNOSIS — I10 ESSENTIAL (PRIMARY) HYPERTENSION: ICD-10-CM

## 2021-01-23 DIAGNOSIS — R79.1 ABNORMAL COAGULATION PROFILE: ICD-10-CM

## 2021-01-23 DIAGNOSIS — N17.9 ACUTE KIDNEY FAILURE, UNSPECIFIED: ICD-10-CM

## 2021-01-23 DIAGNOSIS — U07.1 COVID-19: ICD-10-CM

## 2021-01-23 DIAGNOSIS — J96.01 ACUTE RESPIRATORY FAILURE WITH HYPOXIA: ICD-10-CM

## 2021-01-23 LAB
A1C WITH ESTIMATED AVERAGE GLUCOSE RESULT: 7.2 % — HIGH (ref 4–5.6)
ALBUMIN SERPL ELPH-MCNC: 2.8 G/DL — LOW (ref 3.3–5)
ALP SERPL-CCNC: 43 U/L — SIGNIFICANT CHANGE UP (ref 40–120)
ALT FLD-CCNC: 18 U/L — SIGNIFICANT CHANGE UP (ref 10–45)
ANION GAP SERPL CALC-SCNC: 11 MMOL/L — SIGNIFICANT CHANGE UP (ref 5–17)
APPEARANCE UR: CLEAR — SIGNIFICANT CHANGE UP
AST SERPL-CCNC: 23 U/L — SIGNIFICANT CHANGE UP (ref 10–40)
BASOPHILS # BLD AUTO: 0.01 K/UL — SIGNIFICANT CHANGE UP (ref 0–0.2)
BASOPHILS NFR BLD AUTO: 0.1 % — SIGNIFICANT CHANGE UP (ref 0–2)
BILIRUB SERPL-MCNC: 0.6 MG/DL — SIGNIFICANT CHANGE UP (ref 0.2–1.2)
BILIRUB UR-MCNC: NEGATIVE — SIGNIFICANT CHANGE UP
BUN SERPL-MCNC: 75 MG/DL — HIGH (ref 7–23)
CALCIUM SERPL-MCNC: 8.5 MG/DL — SIGNIFICANT CHANGE UP (ref 8.4–10.5)
CHLORIDE SERPL-SCNC: 108 MMOL/L — SIGNIFICANT CHANGE UP (ref 96–108)
CK SERPL-CCNC: 38 U/L — SIGNIFICANT CHANGE UP (ref 25–170)
CO2 SERPL-SCNC: 26 MMOL/L — SIGNIFICANT CHANGE UP (ref 22–31)
COLOR SPEC: YELLOW — SIGNIFICANT CHANGE UP
CREAT SERPL-MCNC: 1.55 MG/DL — HIGH (ref 0.5–1.3)
CRP SERPL-MCNC: 0.92 MG/DL — HIGH (ref 0–0.4)
D DIMER BLD IA.RAPID-MCNC: 198 NG/ML DDU — SIGNIFICANT CHANGE UP
DIFF PNL FLD: NEGATIVE — SIGNIFICANT CHANGE UP
EOSINOPHIL # BLD AUTO: 0.22 K/UL — SIGNIFICANT CHANGE UP (ref 0–0.5)
EOSINOPHIL NFR BLD AUTO: 2.6 % — SIGNIFICANT CHANGE UP (ref 0–6)
ESTIMATED AVERAGE GLUCOSE: 160 MG/DL — HIGH (ref 68–114)
FERRITIN SERPL-MCNC: 511 NG/ML — HIGH (ref 15–150)
GLUCOSE BLDC GLUCOMTR-MCNC: 220 MG/DL — HIGH (ref 70–99)
GLUCOSE BLDC GLUCOMTR-MCNC: 337 MG/DL — HIGH (ref 70–99)
GLUCOSE BLDC GLUCOMTR-MCNC: 390 MG/DL — HIGH (ref 70–99)
GLUCOSE BLDC GLUCOMTR-MCNC: 390 MG/DL — HIGH (ref 70–99)
GLUCOSE BLDC GLUCOMTR-MCNC: 398 MG/DL — HIGH (ref 70–99)
GLUCOSE BLDC GLUCOMTR-MCNC: 416 MG/DL — HIGH (ref 70–99)
GLUCOSE SERPL-MCNC: 138 MG/DL — HIGH (ref 70–99)
GLUCOSE UR QL: NEGATIVE — SIGNIFICANT CHANGE UP
HCT VFR BLD CALC: 30 % — LOW (ref 34.5–45)
HGB BLD-MCNC: 9.3 G/DL — LOW (ref 11.5–15.5)
IMM GRANULOCYTES NFR BLD AUTO: 0.7 % — SIGNIFICANT CHANGE UP (ref 0–1.5)
INR BLD: 1.8 RATIO — HIGH (ref 0.88–1.16)
KETONES UR-MCNC: NEGATIVE — SIGNIFICANT CHANGE UP
LDH SERPL L TO P-CCNC: 427 U/L — HIGH (ref 50–242)
LEUKOCYTE ESTERASE UR-ACNC: ABNORMAL
LYMPHOCYTES # BLD AUTO: 1.29 K/UL — SIGNIFICANT CHANGE UP (ref 1–3.3)
LYMPHOCYTES # BLD AUTO: 15.4 % — SIGNIFICANT CHANGE UP (ref 13–44)
MAGNESIUM SERPL-MCNC: 2.6 MG/DL — SIGNIFICANT CHANGE UP (ref 1.6–2.6)
MCHC RBC-ENTMCNC: 27.1 PG — SIGNIFICANT CHANGE UP (ref 27–34)
MCHC RBC-ENTMCNC: 31 GM/DL — LOW (ref 32–36)
MCV RBC AUTO: 87.5 FL — SIGNIFICANT CHANGE UP (ref 80–100)
MONOCYTES # BLD AUTO: 0.35 K/UL — SIGNIFICANT CHANGE UP (ref 0–0.9)
MONOCYTES NFR BLD AUTO: 4.2 % — SIGNIFICANT CHANGE UP (ref 2–14)
NEUTROPHILS # BLD AUTO: 6.45 K/UL — SIGNIFICANT CHANGE UP (ref 1.8–7.4)
NEUTROPHILS NFR BLD AUTO: 77 % — SIGNIFICANT CHANGE UP (ref 43–77)
NITRITE UR-MCNC: NEGATIVE — SIGNIFICANT CHANGE UP
NRBC # BLD: 0 /100 WBCS — SIGNIFICANT CHANGE UP (ref 0–0)
PH UR: 5.5 — SIGNIFICANT CHANGE UP (ref 5–8)
PHOSPHATE SERPL-MCNC: 3.8 MG/DL — SIGNIFICANT CHANGE UP (ref 2.5–4.5)
PLATELET # BLD AUTO: 170 K/UL — SIGNIFICANT CHANGE UP (ref 150–400)
POTASSIUM SERPL-MCNC: 3.6 MMOL/L — SIGNIFICANT CHANGE UP (ref 3.5–5.3)
POTASSIUM SERPL-SCNC: 3.6 MMOL/L — SIGNIFICANT CHANGE UP (ref 3.5–5.3)
PROCALCITONIN SERPL-MCNC: 0.07 NG/ML — SIGNIFICANT CHANGE UP (ref 0.02–0.1)
PROT SERPL-MCNC: 6.2 G/DL — SIGNIFICANT CHANGE UP (ref 6–8.3)
PROT UR-MCNC: ABNORMAL
PROTHROM AB SERPL-ACNC: 20.8 SEC — HIGH (ref 10.6–13.6)
RBC # BLD: 3.43 M/UL — LOW (ref 3.8–5.2)
RBC # FLD: 15 % — HIGH (ref 10.3–14.5)
SODIUM SERPL-SCNC: 145 MMOL/L — SIGNIFICANT CHANGE UP (ref 135–145)
SP GR SPEC: 1.02 — SIGNIFICANT CHANGE UP (ref 1.01–1.02)
TROPONIN T, HIGH SENSITIVITY RESULT: 47 NG/L — SIGNIFICANT CHANGE UP (ref 0–51)
UROBILINOGEN FLD QL: NEGATIVE — SIGNIFICANT CHANGE UP
WBC # BLD: 8.38 K/UL — SIGNIFICANT CHANGE UP (ref 3.8–10.5)
WBC # FLD AUTO: 8.38 K/UL — SIGNIFICANT CHANGE UP (ref 3.8–10.5)

## 2021-01-23 RX ORDER — DEXTROSE 50 % IN WATER 50 %
25 SYRINGE (ML) INTRAVENOUS ONCE
Refills: 0 | Status: DISCONTINUED | OUTPATIENT
Start: 2021-01-23 | End: 2021-02-05

## 2021-01-23 RX ORDER — HYDRALAZINE HCL 50 MG
50 TABLET ORAL EVERY 8 HOURS
Refills: 0 | Status: DISCONTINUED | OUTPATIENT
Start: 2021-01-23 | End: 2021-01-23

## 2021-01-23 RX ORDER — GLUCAGON INJECTION, SOLUTION 0.5 MG/.1ML
1 INJECTION, SOLUTION SUBCUTANEOUS ONCE
Refills: 0 | Status: DISCONTINUED | OUTPATIENT
Start: 2021-01-23 | End: 2021-02-05

## 2021-01-23 RX ORDER — ROSUVASTATIN CALCIUM 5 MG/1
1 TABLET ORAL
Qty: 0 | Refills: 0 | DISCHARGE

## 2021-01-23 RX ORDER — SODIUM CHLORIDE 9 MG/ML
1000 INJECTION, SOLUTION INTRAVENOUS
Refills: 0 | Status: DISCONTINUED | OUTPATIENT
Start: 2021-01-23 | End: 2021-02-05

## 2021-01-23 RX ORDER — FOLIC ACID 0.8 MG
1 TABLET ORAL DAILY
Refills: 0 | Status: DISCONTINUED | OUTPATIENT
Start: 2021-01-23 | End: 2021-02-05

## 2021-01-23 RX ORDER — INSULIN LISPRO 100/ML
VIAL (ML) SUBCUTANEOUS
Refills: 0 | Status: DISCONTINUED | OUTPATIENT
Start: 2021-01-23 | End: 2021-01-23

## 2021-01-23 RX ORDER — CEFTRIAXONE 500 MG/1
1000 INJECTION, POWDER, FOR SOLUTION INTRAMUSCULAR; INTRAVENOUS EVERY 24 HOURS
Refills: 0 | Status: DISCONTINUED | OUTPATIENT
Start: 2021-01-23 | End: 2021-01-23

## 2021-01-23 RX ORDER — DEXTROSE 50 % IN WATER 50 %
12.5 SYRINGE (ML) INTRAVENOUS ONCE
Refills: 0 | Status: DISCONTINUED | OUTPATIENT
Start: 2021-01-23 | End: 2021-02-05

## 2021-01-23 RX ORDER — METOPROLOL TARTRATE 50 MG
25 TABLET ORAL
Refills: 0 | Status: DISCONTINUED | OUTPATIENT
Start: 2021-01-23 | End: 2021-01-23

## 2021-01-23 RX ORDER — PHYTONADIONE (VIT K1) 5 MG
1 TABLET ORAL
Qty: 0 | Refills: 0 | DISCHARGE

## 2021-01-23 RX ORDER — ATORVASTATIN CALCIUM 80 MG/1
40 TABLET, FILM COATED ORAL AT BEDTIME
Refills: 0 | Status: DISCONTINUED | OUTPATIENT
Start: 2021-01-23 | End: 2021-02-05

## 2021-01-23 RX ORDER — FUROSEMIDE 40 MG
40 TABLET ORAL ONCE
Refills: 0 | Status: COMPLETED | OUTPATIENT
Start: 2021-01-23 | End: 2021-01-23

## 2021-01-23 RX ORDER — FUROSEMIDE 40 MG
20 TABLET ORAL
Refills: 0 | Status: DISCONTINUED | OUTPATIENT
Start: 2021-01-24 | End: 2021-01-27

## 2021-01-23 RX ORDER — INSULIN LISPRO 100/ML
VIAL (ML) SUBCUTANEOUS
Refills: 0 | Status: DISCONTINUED | OUTPATIENT
Start: 2021-01-23 | End: 2021-01-25

## 2021-01-23 RX ORDER — CEFTRIAXONE 500 MG/1
1000 INJECTION, POWDER, FOR SOLUTION INTRAMUSCULAR; INTRAVENOUS EVERY 24 HOURS
Refills: 0 | Status: COMPLETED | OUTPATIENT
Start: 2021-01-23 | End: 2021-01-27

## 2021-01-23 RX ORDER — LACTOBACILLUS ACIDOPHILUS 100MM CELL
1 CAPSULE ORAL DAILY
Refills: 0 | Status: DISCONTINUED | OUTPATIENT
Start: 2021-01-23 | End: 2021-02-05

## 2021-01-23 RX ORDER — DEXAMETHASONE 0.5 MG/5ML
6 ELIXIR ORAL DAILY
Refills: 0 | Status: DISCONTINUED | OUTPATIENT
Start: 2021-01-23 | End: 2021-02-02

## 2021-01-23 RX ORDER — DEXTROSE 50 % IN WATER 50 %
15 SYRINGE (ML) INTRAVENOUS ONCE
Refills: 0 | Status: DISCONTINUED | OUTPATIENT
Start: 2021-01-23 | End: 2021-02-05

## 2021-01-23 RX ORDER — INSULIN LISPRO 100/ML
VIAL (ML) SUBCUTANEOUS AT BEDTIME
Refills: 0 | Status: DISCONTINUED | OUTPATIENT
Start: 2021-01-23 | End: 2021-01-25

## 2021-01-23 RX ORDER — INSULIN LISPRO 100/ML
VIAL (ML) SUBCUTANEOUS AT BEDTIME
Refills: 0 | Status: DISCONTINUED | OUTPATIENT
Start: 2021-01-23 | End: 2021-01-23

## 2021-01-23 RX ORDER — ASCORBIC ACID 60 MG
250 TABLET,CHEWABLE ORAL DAILY
Refills: 0 | Status: DISCONTINUED | OUTPATIENT
Start: 2021-01-23 | End: 2021-02-05

## 2021-01-23 RX ORDER — FUROSEMIDE 40 MG
40 TABLET ORAL
Refills: 0 | Status: COMPLETED | OUTPATIENT
Start: 2021-01-23 | End: 2021-01-23

## 2021-01-23 RX ORDER — ISOSORBIDE MONONITRATE 60 MG/1
30 TABLET, EXTENDED RELEASE ORAL DAILY
Refills: 0 | Status: DISCONTINUED | OUTPATIENT
Start: 2021-01-23 | End: 2021-02-05

## 2021-01-23 RX ADMIN — Medication 8: at 21:12

## 2021-01-23 RX ADMIN — CEFTRIAXONE 100 MILLIGRAM(S): 500 INJECTION, POWDER, FOR SOLUTION INTRAMUSCULAR; INTRAVENOUS at 05:12

## 2021-01-23 RX ADMIN — Medication 40 MILLIGRAM(S): at 05:13

## 2021-01-23 RX ADMIN — Medication 25 MILLIGRAM(S): at 05:13

## 2021-01-23 RX ADMIN — Medication 50 MILLIGRAM(S): at 21:12

## 2021-01-23 RX ADMIN — Medication 1 MILLIGRAM(S): at 10:20

## 2021-01-23 RX ADMIN — Medication 40 MILLIGRAM(S): at 17:29

## 2021-01-23 RX ADMIN — Medication 250 MILLIGRAM(S): at 10:19

## 2021-01-23 RX ADMIN — Medication 50 MILLIGRAM(S): at 05:13

## 2021-01-23 RX ADMIN — ISOSORBIDE MONONITRATE 30 MILLIGRAM(S): 60 TABLET, EXTENDED RELEASE ORAL at 10:20

## 2021-01-23 RX ADMIN — Medication 10: at 12:29

## 2021-01-23 RX ADMIN — Medication 1 TABLET(S): at 10:20

## 2021-01-23 RX ADMIN — ATORVASTATIN CALCIUM 40 MILLIGRAM(S): 80 TABLET, FILM COATED ORAL at 21:12

## 2021-01-23 RX ADMIN — Medication 10: at 17:18

## 2021-01-23 RX ADMIN — Medication 4: at 08:30

## 2021-01-23 RX ADMIN — Medication 6 MILLIGRAM(S): at 05:13

## 2021-01-24 LAB
ANION GAP SERPL CALC-SCNC: 14 MMOL/L — SIGNIFICANT CHANGE UP (ref 5–17)
ANION GAP SERPL CALC-SCNC: 9 MMOL/L — SIGNIFICANT CHANGE UP (ref 5–17)
BUN SERPL-MCNC: 77 MG/DL — HIGH (ref 7–23)
BUN SERPL-MCNC: 79 MG/DL — HIGH (ref 7–23)
CALCIUM SERPL-MCNC: 8.1 MG/DL — LOW (ref 8.4–10.5)
CALCIUM SERPL-MCNC: <3 MG/DL — CRITICAL LOW (ref 8.4–10.5)
CHLORIDE SERPL-SCNC: 100 MMOL/L — SIGNIFICANT CHANGE UP (ref 96–108)
CHLORIDE SERPL-SCNC: 98 MMOL/L — SIGNIFICANT CHANGE UP (ref 96–108)
CO2 SERPL-SCNC: 25 MMOL/L — SIGNIFICANT CHANGE UP (ref 22–31)
CO2 SERPL-SCNC: 28 MMOL/L — SIGNIFICANT CHANGE UP (ref 22–31)
CREAT SERPL-MCNC: 1.7 MG/DL — HIGH (ref 0.5–1.3)
CREAT SERPL-MCNC: 1.72 MG/DL — HIGH (ref 0.5–1.3)
CRP SERPL-MCNC: 0.63 MG/DL — HIGH (ref 0–0.4)
D DIMER BLD IA.RAPID-MCNC: 396 NG/ML DDU — HIGH
FERRITIN SERPL-MCNC: 464 NG/ML — HIGH (ref 15–150)
GLUCOSE BLDC GLUCOMTR-MCNC: 270 MG/DL — HIGH (ref 70–99)
GLUCOSE BLDC GLUCOMTR-MCNC: 273 MG/DL — HIGH (ref 70–99)
GLUCOSE BLDC GLUCOMTR-MCNC: 363 MG/DL — HIGH (ref 70–99)
GLUCOSE BLDC GLUCOMTR-MCNC: 438 MG/DL — HIGH (ref 70–99)
GLUCOSE BLDC GLUCOMTR-MCNC: 445 MG/DL — HIGH (ref 70–99)
GLUCOSE BLDC GLUCOMTR-MCNC: 464 MG/DL — CRITICAL HIGH (ref 70–99)
GLUCOSE BLDC GLUCOMTR-MCNC: 473 MG/DL — CRITICAL HIGH (ref 70–99)
GLUCOSE BLDC GLUCOMTR-MCNC: 511 MG/DL — CRITICAL HIGH (ref 70–99)
GLUCOSE SERPL-MCNC: 212 MG/DL — HIGH (ref 70–99)
GLUCOSE SERPL-MCNC: 345 MG/DL — HIGH (ref 70–99)
HCT VFR BLD CALC: 28.1 % — LOW (ref 34.5–45)
HCT VFR BLD CALC: 28.9 % — LOW (ref 34.5–45)
HGB BLD-MCNC: 8.8 G/DL — LOW (ref 11.5–15.5)
HGB BLD-MCNC: 9.2 G/DL — LOW (ref 11.5–15.5)
INR BLD: 1.34 RATIO — HIGH (ref 0.88–1.16)
LACTATE SERPL-SCNC: 1 MMOL/L — SIGNIFICANT CHANGE UP (ref 0.7–2)
LDH SERPL L TO P-CCNC: 404 U/L — HIGH (ref 50–242)
MCHC RBC-ENTMCNC: 27.1 PG — SIGNIFICANT CHANGE UP (ref 27–34)
MCHC RBC-ENTMCNC: 27.7 PG — SIGNIFICANT CHANGE UP (ref 27–34)
MCHC RBC-ENTMCNC: 31.3 GM/DL — LOW (ref 32–36)
MCHC RBC-ENTMCNC: 31.8 GM/DL — LOW (ref 32–36)
MCV RBC AUTO: 86.5 FL — SIGNIFICANT CHANGE UP (ref 80–100)
MCV RBC AUTO: 87 FL — SIGNIFICANT CHANGE UP (ref 80–100)
NRBC # BLD: 0 /100 WBCS — SIGNIFICANT CHANGE UP (ref 0–0)
NRBC # BLD: 0 /100 WBCS — SIGNIFICANT CHANGE UP (ref 0–0)
PLATELET # BLD AUTO: 146 K/UL — LOW (ref 150–400)
PLATELET # BLD AUTO: 158 K/UL — SIGNIFICANT CHANGE UP (ref 150–400)
POTASSIUM SERPL-MCNC: 4.4 MMOL/L — SIGNIFICANT CHANGE UP (ref 3.5–5.3)
POTASSIUM SERPL-MCNC: >9 MMOL/L — CRITICAL HIGH (ref 3.5–5.3)
POTASSIUM SERPL-SCNC: 4.4 MMOL/L — SIGNIFICANT CHANGE UP (ref 3.5–5.3)
POTASSIUM SERPL-SCNC: >9 MMOL/L — CRITICAL HIGH (ref 3.5–5.3)
PROTHROM AB SERPL-ACNC: 15.9 SEC — HIGH (ref 10.6–13.6)
RBC # BLD: 3.25 M/UL — LOW (ref 3.8–5.2)
RBC # BLD: 3.32 M/UL — LOW (ref 3.8–5.2)
RBC # FLD: 15.1 % — HIGH (ref 10.3–14.5)
RBC # FLD: 15.2 % — HIGH (ref 10.3–14.5)
SODIUM SERPL-SCNC: 137 MMOL/L — SIGNIFICANT CHANGE UP (ref 135–145)
SODIUM SERPL-SCNC: 137 MMOL/L — SIGNIFICANT CHANGE UP (ref 135–145)
WBC # BLD: 11 K/UL — HIGH (ref 3.8–10.5)
WBC # BLD: 12.2 K/UL — HIGH (ref 3.8–10.5)
WBC # FLD AUTO: 11 K/UL — HIGH (ref 3.8–10.5)
WBC # FLD AUTO: 12.2 K/UL — HIGH (ref 3.8–10.5)

## 2021-01-24 RX ORDER — HYDRALAZINE HCL 50 MG
50 TABLET ORAL EVERY 8 HOURS
Refills: 0 | Status: DISCONTINUED | OUTPATIENT
Start: 2021-01-23 | End: 2021-02-05

## 2021-01-24 RX ORDER — WARFARIN SODIUM 2.5 MG/1
5 TABLET ORAL ONCE
Refills: 0 | Status: COMPLETED | OUTPATIENT
Start: 2021-01-24 | End: 2021-01-24

## 2021-01-24 RX ORDER — INSULIN GLARGINE 100 [IU]/ML
5 INJECTION, SOLUTION SUBCUTANEOUS AT BEDTIME
Refills: 0 | Status: DISCONTINUED | OUTPATIENT
Start: 2021-01-24 | End: 2021-01-25

## 2021-01-24 RX ORDER — METOPROLOL TARTRATE 50 MG
25 TABLET ORAL
Refills: 0 | Status: DISCONTINUED | OUTPATIENT
Start: 2021-01-23 | End: 2021-02-05

## 2021-01-24 RX ADMIN — Medication 8: at 22:40

## 2021-01-24 RX ADMIN — Medication 6: at 08:11

## 2021-01-24 RX ADMIN — Medication 20 MILLIGRAM(S): at 17:35

## 2021-01-24 RX ADMIN — ATORVASTATIN CALCIUM 40 MILLIGRAM(S): 80 TABLET, FILM COATED ORAL at 22:44

## 2021-01-24 RX ADMIN — Medication 12: at 17:35

## 2021-01-24 RX ADMIN — Medication 6 MILLIGRAM(S): at 05:07

## 2021-01-24 RX ADMIN — Medication 1 MILLIGRAM(S): at 11:02

## 2021-01-24 RX ADMIN — Medication 1 TABLET(S): at 11:02

## 2021-01-24 RX ADMIN — WARFARIN SODIUM 5 MILLIGRAM(S): 2.5 TABLET ORAL at 22:45

## 2021-01-24 RX ADMIN — INSULIN GLARGINE 5 UNIT(S): 100 INJECTION, SOLUTION SUBCUTANEOUS at 22:45

## 2021-01-24 RX ADMIN — Medication 10: at 13:04

## 2021-01-24 RX ADMIN — Medication 25 MILLIGRAM(S): at 05:07

## 2021-01-24 RX ADMIN — Medication 25 MILLIGRAM(S): at 17:35

## 2021-01-24 RX ADMIN — Medication 250 MILLIGRAM(S): at 11:02

## 2021-01-24 RX ADMIN — CEFTRIAXONE 100 MILLIGRAM(S): 500 INJECTION, POWDER, FOR SOLUTION INTRAMUSCULAR; INTRAVENOUS at 05:07

## 2021-01-24 RX ADMIN — ISOSORBIDE MONONITRATE 30 MILLIGRAM(S): 60 TABLET, EXTENDED RELEASE ORAL at 11:02

## 2021-01-24 RX ADMIN — Medication 20 MILLIGRAM(S): at 05:07

## 2021-01-24 NOTE — CONSULT NOTE ADULT - SUBJECTIVE AND OBJECTIVE BOX
CHIEF COMPLAINT:Patient is a 89y old  Female who presents with a chief complaint of sent for elevated INR (24 Jan 2021 13:57)      HPI:  89 yo F w/ h/o MVR/AVR, Afib on coumadin, s/p AICD/PPM, DM type II, HTN, CHFrEF presents with elevated INR;  Patient is AAO x2, limited history obtained from patient and NH staff.  Patient was being treated for pneumonia for last 3 days with IV ceftriaxone/azithromycin and possibly IVF.  Patient however had no complaints, has been eating and drinking well (although likely unreliable historian).  Today she was found to have COVID and elevated INR (11.2), which prompted them to send patient to [facility].  Patient was given 10mg of vitamin K this morning.  Per chart review, patient was also positive for COVID on 1/7/21 which NH staff could not confirm.  Patient otherwise denies chest pain, SOB, cough.  On arrival, she had temp 97.9, HR 81, /83, RR 16, saturation 100% on 4L.           PAST MEDICAL & SURGICAL HISTORY:  Pacemaker    Mitral valve replaced    Aortic valve replaced  pig valve    Diabetes mellitus, type II    Atrial fibrillation    Aneurysm of aorta  5.1 cm of ascending aorta as per CT scan    Arteriosclerotic heart disease (ASHD)    CHF (congestive heart failure)    Scoliosis    Hypertension    AICD (automatic cardioverter/defibrillator) present    History of cataract extraction  bilateral    History of tonsillectomy    Mitral valve replaced  10/11    Aortic valve replaced  10/11    S/P GERTRUDE (Total Abdominal Hysterectomy)        MEDICATIONS  (STANDING):  ascorbic acid 250 milliGRAM(s) Oral daily  atorvastatin 40 milliGRAM(s) Oral at bedtime  cefTRIAXone   IVPB 1000 milliGRAM(s) IV Intermittent every 24 hours  dexAMETHasone  Injectable 6 milliGRAM(s) IV Push daily  dextrose 40% Gel 15 Gram(s) Oral once  dextrose 5%. 1000 milliLiter(s) (50 mL/Hr) IV Continuous <Continuous>  dextrose 5%. 1000 milliLiter(s) (100 mL/Hr) IV Continuous <Continuous>  dextrose 50% Injectable 25 Gram(s) IV Push once  dextrose 50% Injectable 12.5 Gram(s) IV Push once  dextrose 50% Injectable 25 Gram(s) IV Push once  folic acid 1 milliGRAM(s) Oral daily  furosemide    Tablet 20 milliGRAM(s) Oral two times a day  glucagon  Injectable 1 milliGRAM(s) IntraMuscular once  hydrALAZINE 50 milliGRAM(s) Oral every 8 hours  insulin glargine Injectable (LANTUS) 5 Unit(s) SubCutaneous at bedtime  insulin lispro (ADMELOG) corrective regimen sliding scale   SubCutaneous three times a day before meals  insulin lispro (ADMELOG) corrective regimen sliding scale   SubCutaneous at bedtime  isosorbide   mononitrate ER Tablet (IMDUR) 30 milliGRAM(s) Oral daily  lactobacillus acidophilus 1 Tablet(s) Oral daily  metoprolol tartrate 25 milliGRAM(s) Oral two times a day  warfarin 5 milliGRAM(s) Oral once    MEDICATIONS  (PRN):      FAMILY HISTORY:  Family history of cancer (Sibling)    Family history of coronary artery disease (Sibling)    Family history of coronary artery disease    Family history of congestive heart failure        SOCIAL HISTORY:    [ ] Non-smoker  [ ] Smoker  [ ] Alcohol    Allergies    Allergies ACEI RX: Throat swelling (Other; Swelling)  angiotensin converting enzyme inhibitors (Swelling)    Intolerances    	    REVIEW OF SYSTEMS:  CONSTITUTIONAL: No fever, weight loss, or fatigue  EYES: No eye pain, visual disturbances, or discharge  ENT:  No difficulty hearing, tinnitus, vertigo; No sinus or throat pain  NECK: No pain or stiffness  RESPIRATORY: No cough, wheezing, chills or hemoptysis; + Shortness of Breath  CARDIOVASCULAR: No chest pain, palpitations, passing out, dizziness, or leg swelling  GASTROINTESTINAL: No abdominal or epigastric pain. No nausea, vomiting, or hematemesis; No diarrhea or constipation. No melena or hematochezia.  GENITOURINARY: No dysuria, frequency, hematuria, or incontinence  NEUROLOGICAL: No headaches, memory loss, loss of strength, numbness, or tremors  SKIN: No itching, burning, rashes, or lesions   LYMPH Nodes: No enlarged glands  ENDOCRINE: No heat or cold intolerance; No hair loss  MUSCULOSKELETAL: No joint pain or swelling; No muscle, back, or extremity pain  PSYCHIATRIC: No depression, anxiety, mood swings, or difficulty sleeping  HEME/LYMPH: No easy bruising, or bleeding gums  ALLERGY AND IMMUNOLOGIC: No hives or eczema	    [ ] All others negative	  [ ] Unable to obtain    PHYSICAL EXAM:  T(C): 36.6 (01-24-21 @ 13:30), Max: 36.8 (01-23-21 @ 16:26)  HR: 65 (01-24-21 @ 13:30) (54 - 65)  BP: 125/79 (01-24-21 @ 13:30) (96/58 - 144/86)  RR: 17 (01-24-21 @ 13:30) (17 - 19)  SpO2: 95% (01-24-21 @ 13:30) (92% - 99%)  Wt(kg): --  I&O's Summary    23 Jan 2021 07:01  -  24 Jan 2021 07:00  --------------------------------------------------------  IN: 1040 mL / OUT: 1100 mL / NET: -60 mL    24 Jan 2021 07:01  -  24 Jan 2021 15:35  --------------------------------------------------------  IN: 440 mL / OUT: 200 mL / NET: 240 mL        Appearance: Normal	  HEENT:   Normal oral mucosa, PERRL, EOMI	  Lymphatic: No lymphadenopathy  Cardiovascular: Normal S1 S2, No JVD, + murmurs, No edema  Respiratory: Lungs clear to auscultation	  Psychiatry: A & O x 3, Mood & affect appropriate  Gastrointestinal:  Soft, Non-tender, + BS	  Skin: No rashes, No ecchymoses, No cyanosis	  Neurologic: Non-focal  Extremities: Normal range of motion, No clubbing, cyanosis or edema  Vascular: Peripheral pulses palpable 2+ bilaterally    TELEMETRY: 	    ECG:  	  RADIOLOGY:  OTHER: 	  	  LABS:	 	    CARDIAC MARKERS:  CARDIAC MARKERS ( 23 Jan 2021 04:39 )  x     / x     / 38 U/L / x     / x                                  8.8    12.20 )-----------( 146      ( 24 Jan 2021 09:55 )             28.1     01-24    137  |  100  |  77<H>  ----------------------------<  345<H>  4.4   |  28  |  1.70<H>    Ca    8.1<L>      24 Jan 2021 09:55  Phos  3.8     01-23  Mg     2.6     01-23    TPro  6.2  /  Alb  2.8<L>  /  TBili  0.6  /  DBili  x   /  AST  23  /  ALT  18  /  AlkPhos  43  01-23    proBNP:   Lipid Profile:   HgA1c:   TSH:   PT/INR - ( 24 Jan 2021 07:41 )   PT: 15.9 sec;   INR: 1.34 ratio         PTT - ( 22 Jan 2021 19:32 )  PTT:39.1 sec    PREVIOUS DIAGNOSTIC TESTING:    < from: 12 Lead ECG (01.22.21 @ 19:06) >  Diagnosis Line JUNCTIONAL RHYTHM WITH FREQUENT PREMATURE ATRIAL COMPLEXES  INCOMPLETE LEFT BUNDLE BRANCH BLOCK  LEFT VENTRICULAR HYPERTROPHY WITH REPOLARIZATION ABNORMALITY  PROLONGED QT  ABNORMAL ECG  WHEN COMPARED WITH ECG OF 20-OCT-2014 14:17,  SIGNIFICANT CHANGES HAVE OCCURRED  < from: Xray Chest 1 View- PORTABLE-Urgent (Xray Chest 1 View- PORTABLE-Urgent .) (01.22.21 @ 20:14) >  Mild pulmonary edema with small bilateral pleural effusions.    < from: Transthoracic Echocardiogram (05.20.20 @ 08:55) >  1. Bioprosthetic mitral valve replacement. Mild mitral  regurgitation.  Mean transmitral valve gradient equals 5 mm  Hg, which is probably normal in the setting of a prosthetic  valve.  2. Bioprosthetic aortic valve replacement. Peak transaortic  valve gradient equals 24 mm Hg, mean transaortic valve  gradient equals 14 mm Hg, which is probably normal in the  presence of a prosthetic valve. Minimal aortic  regurgitation.  3. Normal aortic root (about  3.7 cm at the sinuses of  Valsalva).  Dilated ascending aorta (about  4.7 cm).  4. Moderately dilated left atrium.  LA volume index = 46  cc/m2.  5. Normal left ventricular internal dimensions and wall  thicknesses.  6. Mild to moderate global left ventricular systolic  dysfunction. Septal motion is consistent with RV pacing and  post surgical status.  7. The right ventricle is not well visualized; grossly  normal right ventricular systolic function.  A device wire  is noted in the right heart.  8. Estimated right ventricular systolic pressure equals 54  mm Hg, assuming right atrial pressure equals 10 mm Hg,  consistent with moderate pulmonary hypertension.    < end of copied text >              COVID-19 PCR . (01.22.21 @ 19:33)    COVID-19 PCR: Detected: You can help in the fight against COVID-19. [facility] may contact  you to see if you are interested in voluntarily participating in one of  our clinical trials.  Testing is performed using polymerase chain reaction (PCR) or  transcription mediated amplification (TMA). This COVID-19 (SARS-CoV-2)  nucleic acid amplification test was validated by [facility] and is  in use under the FDA Emergency Use Authorization (EUA) for clinical labs  CLIA-certified to perform high complexity testing. Test results should be  correlated with clinical presentation, patient history, and epidemiology.

## 2021-01-24 NOTE — PROGRESS NOTE ADULT - ASSESSMENT
_________________________________________________________________________________________  ========>>  M E D I C A L   A T T E N D I N G    F O L L O W  U P  N O T E  <<=========  -----------------------------------------------------------------------------------------------------    - Patient seen and examined by me earlier today.   - In summary,  CHARLES ADAMS is a 89y year old woman admitted with elevated INR, AMS, COVID   - Patient today overall doing ok, comfortable, eating OK. comfortable on room air     ==================>> REVIEW OF SYSTEM <<=================    GEN: no fever, no chills, no pain  RESP: no SOB, no cough, no sputum  CVS: no chest pain, no palpitations, no edema  GI: no abdominal pain, no nausea, feels hungry ...  : no dysuria, no frequency, no   Neuro: no headache, no dizziness  Derm : no itching, no rash    ==================>> PHYSICAL EXAM <<=================    GEN: A&O X 2 , NAD , comfortable  HEENT: NCAT, PERRL, MMM, hearing intact  Neck: supple , no JVD appreciated  CVS: S1S2 , regular , No M/R/G appreciated  PULM: CTA B/L,  no W/R/R appreciated  ABD.: soft. non tender, non distended,  bowel sounds present  Extrem: intact pulses , no edema   PSYCH : normal mood,  not anxious      ==================>> MEDICATIONS <<====================    MEDICATIONS  (STANDING):  ascorbic acid 250 milliGRAM(s) Oral daily  atorvastatin 40 milliGRAM(s) Oral at bedtime  cefTRIAXone   IVPB 1000 milliGRAM(s) IV Intermittent every 24 hours  dexAMETHasone  Injectable 6 milliGRAM(s) IV Push daily  dextrose 40% Gel 15 Gram(s) Oral once  dextrose 5%. 1000 milliLiter(s) (50 mL/Hr) IV Continuous <Continuous>  dextrose 5%. 1000 milliLiter(s) (100 mL/Hr) IV Continuous <Continuous>  dextrose 50% Injectable 25 Gram(s) IV Push once  dextrose 50% Injectable 12.5 Gram(s) IV Push once  dextrose 50% Injectable 25 Gram(s) IV Push once  folic acid 1 milliGRAM(s) Oral daily  furosemide    Tablet 20 milliGRAM(s) Oral two times a day  glucagon  Injectable 1 milliGRAM(s) IntraMuscular once  hydrALAZINE 50 milliGRAM(s) Oral every 8 hours  insulin lispro (ADMELOG) corrective regimen sliding scale   SubCutaneous three times a day before meals  insulin lispro (ADMELOG) corrective regimen sliding scale   SubCutaneous at bedtime  isosorbide   mononitrate ER Tablet (IMDUR) 30 milliGRAM(s) Oral daily  lactobacillus acidophilus 1 Tablet(s) Oral daily  metoprolol tartrate 25 milliGRAM(s) Oral two times a day    ___________  Active diet:  Diet, Regular:   Consistent Carbohydrate No Snacks (CSTCHO)  DASH/TLC Sodium & Cholesterol Restricted (DASH)  Supplement Feeding Modality:  Oral  Ensure Enlive Cans or Servings Per Day:  2       Frequency:  Two Times a day  ___________________    ==================>> VITAL SIGNS <<==================    T(C): 36.6 (01-24-21 @ 13:30), Max: 36.8 (01-23-21 @ 16:26)  HR: 65 (01-24-21 @ 13:30) (54 - 65)  BP: 125/79 (01-24-21 @ 13:30) (96/58 - 144/86)  RR: 17 (01-24-21 @ 13:30) (17 - 19)  SpO2: 95% (01-24-21 @ 13:30) (92% - 99%)     POCT Blood Glucose.: 363 mg/dL (24 Jan 2021 12:42)  POCT Blood Glucose.: 270 mg/dL (24 Jan 2021 08:04)  POCT Blood Glucose.: 273 mg/dL (24 Jan 2021 00:43)  POCT Blood Glucose.: 337 mg/dL (23 Jan 2021 22:42)  POCT Blood Glucose.: 390 mg/dL (23 Jan 2021 21:26)  POCT Blood Glucose.: 416 mg/dL (23 Jan 2021 21:11)  POCT Blood Glucose.: 398 mg/dL (23 Jan 2021 16:52)    I&O's Summary    23 Jan 2021 07:01  -  24 Jan 2021 07:00  --------------------------------------------------------  IN: 1040 mL / OUT: 1100 mL / NET: -60 mL    24 Jan 2021 07:01  -  24 Jan 2021 13:57  --------------------------------------------------------  IN: 440 mL / OUT: 0 mL / NET: 440 mL       ==================>> LAB AND IMAGING <<==================                        8.8    12.20 )-----------( 146      ( 24 Jan 2021 09:55 )             28.1        01-24    137  |  100  |  77<H>  ----------------------------<  345<H>  4.4   |  28  |  1.70<H>    Ca    8.1<L>      24 Jan 2021 09:55  Phos  3.8     01-23  Mg     2.6     01-23    TPro  6.2  /  Alb  2.8<L>  /  TBili  0.6  /  DBili  x   /  AST  23  /  ALT  18  /  AlkPhos  43  01-23    PT/INR - ( 24 Jan 2021 07:41 )   PT: 15.9 sec;   INR: 1.34 ratio      I N R :  1.34  <<==, 1.80  <<==, 3.28  <<==     Urinalysis (01.23.21 @ 07:50)    pH Urine: 5.5    Glucose Qualitative, Urine: Negative    Blood, Urine: Negative    Color: Yellow    Urine Appearance: Clear    Bilirubin: Negative    Ketone - Urine: Negative    Specific Gravity: 1.022    Protein, Urine: Trace    Urobilinogen: Negative    Nitrite: Negative    Leukocyte Esterase Concentration: Large    HgA1C:     (01-23-21)      7.2    _______________________  C U L T U R E S :    COVID-19 PCR: Detected (01-22-21 @ 19:33)    ___________________________________________________________________________________  ===============>>  A S S E S S M E N T   A N D   P L A N <<===============  ------------------------------------------------------------------------------------------    · Assessment	  89 yo F w/ h/o MVR/AVR, Afib on coumadin, s/p AICD/PPM, DM type II, HTN, CHFrEF presents with elevated INR;     Problem/Plan - 1:  ·  Problem: Acute hypoxemic respiratory failure due to COVID-19 with Viral pneumonia from Viral syndrome.   trend inflammatory markers  on IV dexamethasone  not on Remdesivir due to renal insufficiency   pulm consult as needed   supportive care  monitoring off O2     Problem/Plan - 2:  ·  Problem: UTI  abx IV ceftriaxone;   follow cultures     Problem/Plan - 3:  ·  Problem: MICHELLE on CKD III  monitor closely on PO lasix for CHF  monitor ins/outs and daily weights  monitor BMP  avoid nephrotoxic agents.     Problem/Plan - 4:  ·  Problem: metabolic encephalopathy due to above  appears\ improved: pt interactive and conversive, alert..   CT brain negative for acute pathology  monitor for now     Problem/Plan - 5:  ·  Problem: INR abnormal.    s/p vitamin K 10mg at NH  resume coumadin  monitor iNR     Problem/Plan - 6:  Problem: Atrial fibrillation. Plan: on coumadin  monitor INR on coumadin  ? ACID/PPM interrogation as needed   monitor cardio    Problem/Plan - 7:  ·  Problem: chronic systolic CHF   on lasix: monitor as stable   monitor ins/outs and daily weights  Continue Current medications otherwise and monitor.   cardio     Problem/Plan - 8:  ·  Problem: Hypertension.  Plan: c/w imdur and hydralazine.     Problem/Plan - 9:  ·  Problem: Diabetes mellitus, type II.    on sliding scale  adding lantus  oral meds on hold  monitor FS   A1C     -GI/DVT Prophylaxis per protocol.    CODE STATUS: DNR/DNI (MOLST in Chart).    --------------------------------------------  Case discussed with pt, RN  Education given on findings and plan of care  ___________________________  HAbhilash Rogers D.O.  Pager: [phone]

## 2021-01-24 NOTE — PROVIDER CONTACT NOTE (OTHER) - ASSESSMENT
pt is A&Ox4, able to verbalize understanding. no complaints of chest pain, SOB, lightheadeness, dizziness, n/v at this time. pt asymptomatic. VSS. pt on 2.5L NC. as per day shift RN Erum Pozo, pt's blood sugar has been elevated in the 300's all day due to the fact that the patient has been receiving protein shakes & sugary beverages w/ her meals that she has been receiving from dietary. pt is ordered for an ADA diet. Patient is also on IV Push decadron daily. at bedtime, her fingerstick was 416, asymptomatic. Primary RN Sofía Lopez was in the patient's room at this time, therefore MICHAEL Heredia called NP Jadyn Garcia to relay the message about the elevated blood sugar. as per MICHAEL Heredia, CELSO Garcia stated, "it's nursing judgment to remove any products that are high in sugar content from the patient's tray and I would like to discuss the plan of care with the primary RN rather than speak to a third party." pt is A&Ox4, able to verbalize understanding. no complaints of chest pain, SOB, lightheadeness, dizziness, n/v at this time. pt asymptomatic. VSS. pt on 2.5L NC. as per day shift RN Erum Pozo, pt's blood sugar has been elevated in the 300's all day due to the fact that the patient has been receiving protein shakes & sugary beverages w/ her meals that she has been receiving from dietary. pt is ordered for an ADA diet. Patient is also on IV Push decadron daily. at bedtime, her fingerstick was 416, asymptomatic. Primary RN Sofía Lopez was in the patient's room at this time, therefore MICHAEL Heredia called NP Jadyn Garcia to relay the message about the elevated blood sugar. as per MICHAEL Heredia, CELSO Garcia stated, "it's nursing judgment to remove any products that are high in sugar content from the patient's tray and I would like to discuss the plan of care with the primary RN rather than speak to a third party." Upon rounds, RN did not notice any sugary beverages/snack at bedside table.

## 2021-01-24 NOTE — PROVIDER CONTACT NOTE (OTHER) - RECOMMENDATIONS
NP notified. NP notified. Recommend endocrinology consult, lantus, and/or premeal insulin to be ordered.

## 2021-01-25 DIAGNOSIS — I10 ESSENTIAL (PRIMARY) HYPERTENSION: ICD-10-CM

## 2021-01-25 DIAGNOSIS — E11.65 TYPE 2 DIABETES MELLITUS WITH HYPERGLYCEMIA: ICD-10-CM

## 2021-01-25 DIAGNOSIS — E78.2 MIXED HYPERLIPIDEMIA: ICD-10-CM

## 2021-01-25 LAB
ANION GAP SERPL CALC-SCNC: 10 MMOL/L — SIGNIFICANT CHANGE UP (ref 5–17)
BUN SERPL-MCNC: 78 MG/DL — HIGH (ref 7–23)
CALCIUM SERPL-MCNC: 8.6 MG/DL — SIGNIFICANT CHANGE UP (ref 8.4–10.5)
CHLORIDE SERPL-SCNC: 100 MMOL/L — SIGNIFICANT CHANGE UP (ref 96–108)
CO2 SERPL-SCNC: 26 MMOL/L — SIGNIFICANT CHANGE UP (ref 22–31)
CREAT SERPL-MCNC: 1.65 MG/DL — HIGH (ref 0.5–1.3)
CRP SERPL-MCNC: 0.52 MG/DL — HIGH (ref 0–0.4)
CRP SERPL-MCNC: 0.54 MG/DL — HIGH (ref 0–0.4)
D DIMER BLD IA.RAPID-MCNC: 597 NG/ML DDU — HIGH
FERRITIN SERPL-MCNC: 446 NG/ML — HIGH (ref 15–150)
GLUCOSE BLDC GLUCOMTR-MCNC: 204 MG/DL — HIGH (ref 70–99)
GLUCOSE BLDC GLUCOMTR-MCNC: 239 MG/DL — HIGH (ref 70–99)
GLUCOSE BLDC GLUCOMTR-MCNC: 257 MG/DL — HIGH (ref 70–99)
GLUCOSE BLDC GLUCOMTR-MCNC: 408 MG/DL — HIGH (ref 70–99)
GLUCOSE BLDC GLUCOMTR-MCNC: 425 MG/DL — HIGH (ref 70–99)
GLUCOSE BLDC GLUCOMTR-MCNC: 428 MG/DL — HIGH (ref 70–99)
GLUCOSE BLDC GLUCOMTR-MCNC: 440 MG/DL — HIGH (ref 70–99)
GLUCOSE BLDC GLUCOMTR-MCNC: 485 MG/DL — CRITICAL HIGH (ref 70–99)
GLUCOSE BLDC GLUCOMTR-MCNC: 510 MG/DL — CRITICAL HIGH (ref 70–99)
GLUCOSE SERPL-MCNC: 184 MG/DL — HIGH (ref 70–99)
INR BLD: 1.69 RATIO — HIGH (ref 0.88–1.16)
LDH SERPL L TO P-CCNC: 397 U/L — HIGH (ref 50–242)
POTASSIUM SERPL-MCNC: 4.4 MMOL/L — SIGNIFICANT CHANGE UP (ref 3.5–5.3)
POTASSIUM SERPL-SCNC: 4.4 MMOL/L — SIGNIFICANT CHANGE UP (ref 3.5–5.3)
PROCALCITONIN SERPL-MCNC: 0.07 NG/ML — SIGNIFICANT CHANGE UP (ref 0.02–0.1)
PROTHROM AB SERPL-ACNC: 19.8 SEC — HIGH (ref 10.6–13.6)
RAPID RVP RESULT: DETECTED
SARS-COV-2 RNA SPEC QL NAA+PROBE: DETECTED
SODIUM SERPL-SCNC: 136 MMOL/L — SIGNIFICANT CHANGE UP (ref 135–145)

## 2021-01-25 PROCEDURE — 71250 CT THORAX DX C-: CPT | Mod: 26

## 2021-01-25 PROCEDURE — 99223 1ST HOSP IP/OBS HIGH 75: CPT

## 2021-01-25 RX ORDER — INSULIN LISPRO 100/ML
VIAL (ML) SUBCUTANEOUS AT BEDTIME
Refills: 0 | Status: DISCONTINUED | OUTPATIENT
Start: 2021-01-25 | End: 2021-01-26

## 2021-01-25 RX ORDER — POLYETHYLENE GLYCOL 3350 17 G/17G
17 POWDER, FOR SOLUTION ORAL DAILY
Refills: 0 | Status: DISCONTINUED | OUTPATIENT
Start: 2021-01-25 | End: 2021-02-05

## 2021-01-25 RX ORDER — SENNA PLUS 8.6 MG/1
2 TABLET ORAL AT BEDTIME
Refills: 0 | Status: DISCONTINUED | OUTPATIENT
Start: 2021-01-25 | End: 2021-02-05

## 2021-01-25 RX ORDER — WARFARIN SODIUM 2.5 MG/1
6 TABLET ORAL ONCE
Refills: 0 | Status: COMPLETED | OUTPATIENT
Start: 2021-01-25 | End: 2021-01-25

## 2021-01-25 RX ORDER — INSULIN GLARGINE 100 [IU]/ML
15 INJECTION, SOLUTION SUBCUTANEOUS AT BEDTIME
Refills: 0 | Status: DISCONTINUED | OUTPATIENT
Start: 2021-01-25 | End: 2021-01-26

## 2021-01-25 RX ORDER — INSULIN LISPRO 100/ML
VIAL (ML) SUBCUTANEOUS
Refills: 0 | Status: DISCONTINUED | OUTPATIENT
Start: 2021-01-25 | End: 2021-01-26

## 2021-01-25 RX ORDER — INSULIN LISPRO 100/ML
10 VIAL (ML) SUBCUTANEOUS
Refills: 0 | Status: DISCONTINUED | OUTPATIENT
Start: 2021-01-25 | End: 2021-01-26

## 2021-01-25 RX ADMIN — CEFTRIAXONE 100 MILLIGRAM(S): 500 INJECTION, POWDER, FOR SOLUTION INTRAMUSCULAR; INTRAVENOUS at 05:53

## 2021-01-25 RX ADMIN — Medication 250 MILLIGRAM(S): at 12:51

## 2021-01-25 RX ADMIN — Medication 1 TABLET(S): at 12:50

## 2021-01-25 RX ADMIN — ATORVASTATIN CALCIUM 40 MILLIGRAM(S): 80 TABLET, FILM COATED ORAL at 20:20

## 2021-01-25 RX ADMIN — Medication 20 MILLIGRAM(S): at 17:49

## 2021-01-25 RX ADMIN — Medication 6 MILLIGRAM(S): at 05:53

## 2021-01-25 RX ADMIN — Medication 1 MILLIGRAM(S): at 12:52

## 2021-01-25 RX ADMIN — Medication 25 MILLIGRAM(S): at 05:53

## 2021-01-25 RX ADMIN — Medication 20 MILLIGRAM(S): at 05:53

## 2021-01-25 RX ADMIN — INSULIN GLARGINE 15 UNIT(S): 100 INJECTION, SOLUTION SUBCUTANEOUS at 21:19

## 2021-01-25 RX ADMIN — WARFARIN SODIUM 6 MILLIGRAM(S): 2.5 TABLET ORAL at 20:20

## 2021-01-25 RX ADMIN — Medication 4: at 20:54

## 2021-01-25 RX ADMIN — Medication 25 MILLIGRAM(S): at 17:48

## 2021-01-25 RX ADMIN — Medication 4: at 08:43

## 2021-01-25 RX ADMIN — Medication 10 UNIT(S): at 17:48

## 2021-01-25 RX ADMIN — Medication 50 MILLIGRAM(S): at 05:53

## 2021-01-25 RX ADMIN — ISOSORBIDE MONONITRATE 30 MILLIGRAM(S): 60 TABLET, EXTENDED RELEASE ORAL at 12:51

## 2021-01-25 RX ADMIN — SENNA PLUS 2 TABLET(S): 8.6 TABLET ORAL at 20:19

## 2021-01-25 RX ADMIN — Medication 6: at 17:48

## 2021-01-25 RX ADMIN — Medication 12: at 12:26

## 2021-01-25 NOTE — CONSULT NOTE ADULT - PROBLEM SELECTOR RECOMMENDATION 3
-atorvastatin 40mg po qhs  -discussed with FARSHAD Henley and patient's son Craig Dalal MD  Endocrinology Attending  Mondays and Tuesdays 9am-6pm: [phone] (pager)  Other days, night and weekend: [phone]

## 2021-01-25 NOTE — CONSULT NOTE ADULT - ASSESSMENT
89 yo F w/ h/o MVR/AVR, Afib on coumadin, s/p AICD/PPM, DM type II, HTN, CHFrEF presents with elevated INR;  Patient is AAO x2, limited history obtained from patient and NH staff.  Patient was being treated for pneumonia for last 3 days with IV ceftriaxone/azithromycin and possibly IVF.  Patient however had no complaints, has been eating and drinking well (although likely unreliable historian).  Today she was found to have COVID and elevated INR (11.2), which prompted them to send patient to [facility].  Patient was given 10mg of vitamin K this morning.  Per chart review, patient was also positive for COVID on 1/7/21 which NH staff could not confirm.  Patient otherwise denies chest pain, SOB, cough.  On arrival, she had temp 97.9, HR 81, /83, RR 16, saturation 100% on 4L.     chf systolic acute on chronic  will adjust cardiac meds  check ct chest no contrast ?vascular congestion ?covid  echo noted  tx for covid as per protocol  dvt prophylaxis  ac keep inr 2-3  will fu  add IMDUR to hydralazine for chf therapy  
89 yo with T2D x 20 years with cataracts s/p b/l surgery and CKD 3, MVR, AVR, HTN, HLD, a. fib with ICD/PPM here with elevated INR and COVID19 pneumonia being treated with decadron (started 1/23) and remdisivir, endocrine consulted fro severe hyperglycemia: 400-500s.

## 2021-01-25 NOTE — PROGRESS NOTE ADULT - ASSESSMENT
_________________________________________________________________________________________  ========>>  M E D I C A L   A T T E N D I N G    F O L L O W  U P  N O T E  <<=========  -----------------------------------------------------------------------------------------------------    - Patient seen and examined by me earlier today.   - In summary,  CHARLES ADAMS is a 89y year old woman admitted with elevated INR, AMS, COVID   - Patient today overall doing ok, comfortable, eating OK. comfortable on room air     ==================>> REVIEW OF SYSTEM <<=================    GEN: no fever, no chills, no pain  RESP: no SOB, no cough, no sputum  CVS: no chest pain, no palpitations, no edema  GI: no abdominal pain, no nausea  : no dysuria, no frequency, no   Neuro: no headache, no dizziness  Derm : no itching, no rash    ==================>> PHYSICAL EXAM <<=================    GEN: A&O X 2 , NAD , comfortable  HEENT: NCAT, PERRL, MMM, hearing intact  Neck: supple , no JVD appreciated  CVS: S1S2 , regular , No M/R/G appreciated  PULM: CTA B/L,  no W/R/R appreciated  ABD.: soft. non tender, non distended,  bowel sounds present  Extrem: intact pulses , no edema   PSYCH : normal mood,  not anxious      ==================>> MEDICATIONS <<====================    ascorbic acid 250 milliGRAM(s) Oral daily  atorvastatin 40 milliGRAM(s) Oral at bedtime  cefTRIAXone   IVPB 1000 milliGRAM(s) IV Intermittent every 24 hours  dexAMETHasone  Injectable 6 milliGRAM(s) IV Push daily  dextrose 40% Gel 15 Gram(s) Oral once  dextrose 5%. 1000 milliLiter(s) IV Continuous <Continuous>  dextrose 5%. 1000 milliLiter(s) IV Continuous <Continuous>  dextrose 50% Injectable 25 Gram(s) IV Push once  dextrose 50% Injectable 12.5 Gram(s) IV Push once  dextrose 50% Injectable 25 Gram(s) IV Push once  folic acid 1 milliGRAM(s) Oral daily  furosemide    Tablet 20 milliGRAM(s) Oral two times a day  glucagon  Injectable 1 milliGRAM(s) IntraMuscular once  hydrALAZINE 50 milliGRAM(s) Oral every 8 hours  insulin glargine Injectable (LANTUS) 15 Unit(s) SubCutaneous at bedtime  insulin lispro (ADMELOG) corrective regimen sliding scale   SubCutaneous three times a day before meals  insulin lispro (ADMELOG) corrective regimen sliding scale   SubCutaneous at bedtime  insulin lispro Injectable (ADMELOG) 10 Unit(s) SubCutaneous three times a day before meals  isosorbide   mononitrate ER Tablet (IMDUR) 30 milliGRAM(s) Oral daily  lactobacillus acidophilus 1 Tablet(s) Oral daily  metoprolol tartrate 25 milliGRAM(s) Oral two times a day  senna 2 Tablet(s) Oral at bedtime  warfarin 6 milliGRAM(s) Oral once    MEDICATIONS  (PRN):  polyethylene glycol 3350 17 Gram(s) Oral daily PRN Constipation    ___________  Active diet:  Diet, Regular:   Consistent Carbohydrate No Snacks (CSTCHO)  DASH/TLC Sodium & Cholesterol Restricted (DASH)  Supplement Feeding Modality:  Oral  Glucerna Shake Cans or Servings Per Day:  2       Frequency:  Daily  ___________________    ==================>> VITAL SIGNS <<==================    Vital Signs Last 24 HrsT(C): 36.8 (01-25-21 @ 17:49)  T(F): 98.3 (01-25-21 @ 17:49), Max: 98.3 (01-25-21 @ 17:49)  HR: 66 (01-25-21 @ 17:49) (59 - 78)  BP: 150/80 (01-25-21 @ 17:49)  RR: 16 (01-25-21 @ 17:49) (16 - 18)  SpO2: 95% (01-25-21 @ 17:49) (93% - 95%)      POCT Blood Glucose.: 440 mg/dL (25 Jan 2021 17:21)  POCT Blood Glucose.: 425 mg/dL (25 Jan 2021 17:17)  POCT Blood Glucose.: 510 mg/dL (25 Jan 2021 12:20)  POCT Blood Glucose.: 485 mg/dL (25 Jan 2021 12:19)  POCT Blood Glucose.: 239 mg/dL (25 Jan 2021 08:14)  POCT Blood Glucose.: 204 mg/dL (25 Jan 2021 06:40)  POCT Blood Glucose.: 257 mg/dL (25 Jan 2021 03:22)  POCT Blood Glucose.: 445 mg/dL (24 Jan 2021 21:46)  POCT Blood Glucose.: 438 mg/dL (24 Jan 2021 21:44)     ==================>> LAB AND IMAGING <<==================                        8.8    12.20 )-----------( 146      ( 24 Jan 2021 09:55 )             28.1     Hemoglobin:   8.8 <<==,  9.2 <<==,  9.3 <<==,  9.1 <<==       01-25    136  |  100  |  78<H>  ----------------------------<  184<H>  4.4   |  26  |  1.65<H>    Ca    8.6      25 Jan 2021 07:55    WBC count:   12.20 <<== ,  11.00 <<== ,  8.38 <<== ,  9.56 <<==   Hemoglobin:   8.8 <<==,  9.2 <<==,  9.3 <<==,  9.1 <<==  platelets:  146 <==, 158 <==, 170 <==, 185 <==    Creatinine:  1.65  <<==, 1.70  <<==, 1.72  <<==, 1.55  <<==, 1.85  <<==  Sodium:   136  <==, 137  <==, 137  <==, 145  <==, 143  <==       AST:          23 <== , 27 <==      ALT:        18  <== , 22  <==      AP:        43  <=, 48  <=     Bili:        0.6  <=, 0.4  <=    ^^^ Inflammatory markers :  ^^^  C R P :          0.52 (01-25-21)  <<--, 0.63 (01-24-21)  <<--, 0.92 (01-23-21)  <<--  P C T :         0.07 (01-25-21) <<--, 0.07 (01-23-21) <<--    Ferritin :         446 (01-25-21) <<--, 464 (01-24-21) <<--, 511 (01-23-21) <<--    D-Dimer :          597 (01-25-21) <<--, 396 (01-24-21) <<--, 198 (01-23-21) <<--    _______________________  C U L T U R E S :    SARS-CoV-2: Detected (01-25-21 @ 10:28)  COVID-19 PCR: Detected (01-22-21 @ 19:33)    ___________________________________________________________________________________  ===============>>  A S S E S S M E N T   A N D   P L A N <<===============  ------------------------------------------------------------------------------------------    · Assessment	  89 yo F w/ h/o MVR/AVR, Afib on coumadin, s/p AICD/PPM, DM type II, HTN, CHFrEF presents with elevated INR;     Problem/Plan - 1:  ·  Problem: Acute hypoxemic respiratory failure due to COVID-19 with Viral pneumonia from Viral syndrome.   trend inflammatory markers  finish dexamethasone per protocol   not on Remdesivir due to renal insufficiency   supportive care  monitoring off O2     Problem/Plan - 2:  ·  Problem: UTI  abx IV ceftriaxone;   follow cultures     Problem/Plan - 3:  ·  Problem: MICHELLE on CKD III, stable   monitor closely on PO lasix for CHF  monitor ins/outs and daily weights  monitor BMP  avoid nephrotoxic agents.     Problem/Plan - 4:  ·  Problem: metabolic encephalopathy due to above  appears\ improved: pt interactive and conversive, alert..   CT brain negative for acute pathology  monitor for now     Problem/Plan - 5:  ·  Problem: INR abnormal.    s/p vitamin K 10mg at NH  resume coumadin  monitor iNR     I N R :  1.69  <<==, 1.34  <<==, 1.80  <<==, 3.28  <<==    Problem/Plan - 6:  Problem: Atrial fibrillation. Plan: on coumadin  monitor INR on coumadin  ? ACID/PPM interrogation as needed   monitor cardio    Problem/Plan - 7:  ·  Problem: chronic systolic CHF   on lasix: monitor as stable   monitor ins/outs and daily weights  Continue Current medications otherwise and monitor.   cardio     Problem/Plan - 8:  ·  Problem: Hypertension.  Plan: c/w imdur and hydralazine.     Problem/Plan - 9:  ·  Problem: Diabetes mellitus, type II.    on sliding scale  adding lantus  oral meds on hold  monitor FS   A1C     -GI/DVT Prophylaxis per protocol.    CODE STATUS: DNR/DNI (MOLST in Chart).    if remains stable > Discharge planing     --------------------------------------------  Case discussed with pt, NP  Education given on findings and plan of care  ___________________________  H. AINSLEY Rogers.  Pager: [phone]

## 2021-01-25 NOTE — CONSULT NOTE ADULT - PROBLEM SELECTOR RECOMMENDATION 9
-Patient has been asking staff for juice which has worsened her hyperglycemia but her RN has told them to stop. The patient says she does not like the food so she has not been eating much. The PCA says that she eats most of her food.  -Lantus 15 units sq qhs and admelog 10 units sq tid-ac  -Small correctional scale tid-ac/qhs  DISPO: TBD based on if she is going home vs. [facility]

## 2021-01-25 NOTE — PROGRESS NOTE ADULT - SUBJECTIVE AND OBJECTIVE BOX
CARDIOLOGY     PROGRESS  NOTE   ________________________________________________    CHIEF COMPLAINT:Patient is a 89y old  Female who presents with a chief complaint of sent for elevated INR (24 Jan 2021 15:35)  no complain.  	  REVIEW OF SYSTEMS:  CONSTITUTIONAL: No fever, weight loss, or fatigue  EYES: No eye pain, visual disturbances, or discharge  ENT:  No difficulty hearing, tinnitus, vertigo; No sinus or throat pain  NECK: No pain or stiffness  RESPIRATORY: No cough, wheezing, chills or hemoptysis; + Shortness of Breath  CARDIOVASCULAR: No chest pain, palpitations, passing out, dizziness, or leg swelling  GASTROINTESTINAL: No abdominal or epigastric pain. No nausea, vomiting, or hematemesis; No diarrhea or constipation. No melena or hematochezia.  GENITOURINARY: No dysuria, frequency, hematuria, or incontinence  NEUROLOGICAL: No headaches, memory loss, loss of strength, numbness, or tremors  SKIN: No itching, burning, rashes, or lesions   LYMPH Nodes: No enlarged glands  ENDOCRINE: No heat or cold intolerance; No hair loss  MUSCULOSKELETAL: No joint pain or swelling; No muscle, back, or extremity pain  PSYCHIATRIC: No depression, anxiety, mood swings, or difficulty sleeping  HEME/LYMPH: No easy bruising, or bleeding gums  ALLERGY AND IMMUNOLOGIC: No hives or eczema	    [ ] All others negative	  [ ] Unable to obtain    PHYSICAL EXAM:  T(C): 36.8 (01-25-21 @ 05:52), Max: 36.8 (01-25-21 @ 05:52)  HR: 78 (01-25-21 @ 05:52) (59 - 78)  BP: 144/79 (01-25-21 @ 05:52) (125/79 - 144/86)  RR: 18 (01-25-21 @ 05:52) (17 - 18)  SpO2: 95% (01-25-21 @ 05:52) (92% - 95%)  Wt(kg): --  I&O's Summary    24 Jan 2021 07:01  -  25 Jan 2021 07:00  --------------------------------------------------------  IN: 560 mL / OUT: 200 mL / NET: 360 mL        Appearance: Normal	  HEENT:   Normal oral mucosa, PERRL, EOMI	  Lymphatic: No lymphadenopathy  Cardiovascular: Normal S1 S2, No JVD, + murmurs, No edema  Respiratory: Lungs clear to auscultation	  Psychiatry: A & O x 3, Mood & affect appropriate  Gastrointestinal:  Soft, Non-tender, + BS	  Skin: No rashes, No ecchymoses, No cyanosis	  Neurologic: Non-focal  Extremities: Normal range of motion, No clubbing, cyanosis or edema  Vascular: Peripheral pulses palpable 2+ bilaterally    MEDICATIONS  (STANDING):  ascorbic acid 250 milliGRAM(s) Oral daily  atorvastatin 40 milliGRAM(s) Oral at bedtime  cefTRIAXone   IVPB 1000 milliGRAM(s) IV Intermittent every 24 hours  dexAMETHasone  Injectable 6 milliGRAM(s) IV Push daily  dextrose 40% Gel 15 Gram(s) Oral once  dextrose 5%. 1000 milliLiter(s) (50 mL/Hr) IV Continuous <Continuous>  dextrose 5%. 1000 milliLiter(s) (100 mL/Hr) IV Continuous <Continuous>  dextrose 50% Injectable 25 Gram(s) IV Push once  dextrose 50% Injectable 12.5 Gram(s) IV Push once  dextrose 50% Injectable 25 Gram(s) IV Push once  folic acid 1 milliGRAM(s) Oral daily  furosemide    Tablet 20 milliGRAM(s) Oral two times a day  glucagon  Injectable 1 milliGRAM(s) IntraMuscular once  hydrALAZINE 50 milliGRAM(s) Oral every 8 hours  insulin glargine Injectable (LANTUS) 5 Unit(s) SubCutaneous at bedtime  insulin lispro (ADMELOG) corrective regimen sliding scale   SubCutaneous three times a day before meals  insulin lispro (ADMELOG) corrective regimen sliding scale   SubCutaneous at bedtime  isosorbide   mononitrate ER Tablet (IMDUR) 30 milliGRAM(s) Oral daily  lactobacillus acidophilus 1 Tablet(s) Oral daily  metoprolol tartrate 25 milliGRAM(s) Oral two times a day      TELEMETRY: 	    ECG:  	  RADIOLOGY:  OTHER: 	  	  LABS:	 	    CARDIAC MARKERS:                                8.8    12.20 )-----------( 146      ( 24 Jan 2021 09:55 )             28.1     01-24    137  |  100  |  77<H>  ----------------------------<  345<H>  4.4   |  28  |  1.70<H>    Ca    8.1<L>      24 Jan 2021 09:55      proBNP:   Lipid Profile:   HgA1c:   TSH:   PT/INR - ( 24 Jan 2021 07:41 )   PT: 15.9 sec;   INR: 1.34 ratio       < from: Xray Chest 1 View- PORTABLE-Urgent (Xray Chest 1 View- PORTABLE-Urgent .) (01.22.21 @ 20:14) >  Mild pulmonary edema with small bilateral pleural effusions.              Assessment and plan  ---------------------------  89 yo F w/ h/o MVR/AVR, Afib on coumadin, s/p AICD/PPM, DM type II, HTN, CHFrEF presents with elevated INR;  Patient is AAO x2, limited history obtained from patient and NH staff.  Patient was being treated for pneumonia for last 3 days with IV ceftriaxone/azithromycin and possibly IVF.  Patient however had no complaints, has been eating and drinking well (although likely unreliable historian).  Today she was found to have COVID and elevated INR (11.2), which prompted them to send patient to [facility].  Patient was given 10mg of vitamin K this morning.  Per chart review, patient was also positive for COVID on 1/7/21 which NH staff could not confirm.  Patient otherwise denies chest pain, SOB, cough.  On arrival, she had temp 97.9, HR 81, /83, RR 16, saturation 100% on 4L.     chf systolic acute on chronic  will adjust cardiac meds  check ct chest no contrast ?vascular congestion ?covid  echo noted  tx for covid as per protocol  dvt prophylaxis  ac keep inr 2-3  will fu  add IMDUR to hydralazine for chf therapy  ct chest

## 2021-01-25 NOTE — CONSULT NOTE ADULT - SUBJECTIVE AND OBJECTIVE BOX
HPI: 89 yo with T2D x 15 years with no known complications here with COVID19 pneumonia  89 yo F w/ h/o MVR/AVR, Afib on coumadin, s/p AICD/PPM, DM type II, HTN, CHFrEF presents with elevated INR;  Patient is AAO x2, limited history obtained from patient and NH staff.  Patient was being treated for pneumonia for last 3 days with IV ceftriaxone/azithromycin and possibly IVF.  Patient however had no complaints, has been eating and drinking well (although likely unreliable historian).  Today she was found to have COVID and elevated INR (11.2), which prompted them to send patient to [facility].  Patient was given 10mg of vitamin K this morning.  Per chart review, patient was also positive for COVID on 1/7/21 which NH staff could not confirm.  Patient otherwise denies chest pain, SOB, cough.  On arrival, she had temp 97.9, HR 81, /83, RR 16, saturation 100% on 4L.      (22 Jan 2021 22:49)  Tried to reach her daughter but she did not answer, so called her son Craig who is her health care proxy. He notes that his mother has been at [facility], which was just in the newspaper for not giving vaccines to those in rehab. He notes that she got COVID19 there. She was admitted to [facility] 1 month ago for a CHF exacerbation. While there her valves were assessed but she was deemed not be a surgical candidate so they optimized her medications. She was unable to ambulate well so her son wanted her to go to rehab. They picked [facility] as his mother-in-law had been there a couple of years ago. He was able to visit her and talk to her through the glass. She was diagnosed with COVID pneumonia there associated with mental status changes (thought her son was his uncle), hypoxia and MICHELLE. His cousin who was a dentist called and reviewed her labs with the nurse including a high INR, so she was concerned. They called 911 and she was brought to [facility]. No hx of MI, CVA, neuropathy, retinopathy, glaucoma or cataracts.  As an outpatient she was on metformin 500mg po bid. She does not like testing so she may test only once weekly. She was managed by her PCP Dr. Todd.    PAST MEDICAL & SURGICAL HISTORY:  Pacemaker  Mitral valve replaced  Aortic valve replaced pig valve  Diabetes mellitus, type II  Atrial fibrillation  Aneurysm of aorta 5.1 cm of ascending aorta as per CT scan  Arteriosclerotic heart disease (ASHD)  CHF (congestive heart failure)  Scoliosis  Hypertension  AICD (automatic cardioverter/defibrillator) present  History of cataract extraction bilateral  History of tonsillectomy  Mitral valve replaced 10/11  Aortic valve replaced 10/11  S/P GERTRUDE (Total Abdominal Hysterectomy)      FAMILY HISTORY:  Diabetes: son, mother  Glaucoma and cataracts: son      Social History:  Tobacco: never  ETOH: never  Occupation: retired, worked as       Outpatient Medications: metformin    MEDICATIONS  (STANDING):  ascorbic acid 250 milliGRAM(s) Oral daily  atorvastatin 40 milliGRAM(s) Oral at bedtime  cefTRIAXone   IVPB 1000 milliGRAM(s) IV Intermittent every 24 hours  dexAMETHasone  Injectable 6 milliGRAM(s) IV Push daily  dextrose 40% Gel 15 Gram(s) Oral once  dextrose 5%. 1000 milliLiter(s) (50 mL/Hr) IV Continuous <Continuous>  dextrose 5%. 1000 milliLiter(s) (100 mL/Hr) IV Continuous <Continuous>  dextrose 50% Injectable 25 Gram(s) IV Push once  dextrose 50% Injectable 12.5 Gram(s) IV Push once  dextrose 50% Injectable 25 Gram(s) IV Push once  folic acid 1 milliGRAM(s) Oral daily  furosemide    Tablet 20 milliGRAM(s) Oral two times a day  glucagon  Injectable 1 milliGRAM(s) IntraMuscular once  hydrALAZINE 50 milliGRAM(s) Oral every 8 hours  insulin glargine Injectable (LANTUS) 5 Unit(s) SubCutaneous at bedtime  insulin lispro (ADMELOG) corrective regimen sliding scale   SubCutaneous three times a day before meals  insulin lispro (ADMELOG) corrective regimen sliding scale   SubCutaneous at bedtime  isosorbide   mononitrate ER Tablet (IMDUR) 30 milliGRAM(s) Oral daily  lactobacillus acidophilus 1 Tablet(s) Oral daily  metoprolol tartrate 25 milliGRAM(s) Oral two times a day  senna 2 Tablet(s) Oral at bedtime  warfarin 6 milliGRAM(s) Oral once    MEDICATIONS  (PRN):  polyethylene glycol 3350 17 Gram(s) Oral daily PRN Constipation      Allergies    Allergies ACEI RX: Throat swelling (Other; Swelling)  angiotensin converting enzyme inhibitors (Swelling)    Intolerances      Review of Systems:  Constitutional: No fever, good appetite/po intake  Eyes: No blurry vision, diplopia  Neuro: No tremors  HEENT: No pain  Cardiovascular: No chest pain, palpitations  Respiratory: No SOB, no cough  GI: No nausea, vomiting,   : No dysuria, hematuria  Skin: no rash  Psych: no depression  Endocrine: no polyuria, polydipsia  Hem/lymph: no swelling  Osteoporosis: no fractures    ALL OTHER SYSTEMS REVIEWED AND NEGATIVE    UNABLE TO OBTAIN    PHYSICAL EXAM:  VITALS: T(C): 36.2 (01-25-21 @ 13:52)  T(F): 97.2 (01-25-21 @ 13:52), Max: 98.2 (01-25-21 @ 05:52)  HR: 67 (01-25-21 @ 13:52) (59 - 78)  BP: 105/58 (01-25-21 @ 13:52) (105/58 - 144/79)  RR:  (16 - 18)  SpO2:  (93% - 95%)  Wt(kg): --  GENERAL: NAD, well-groomed, well-developed  EYES: No proptosis, no lid lag, anicteric  HEENT:  Atraumatic, Normocephalic, moist mucous membranes  THYROID: Normal size, no palpable nodules  RESPIRATORY: Clear to auscultation bilaterally; No rales, rhonchi, wheezing, or rubs  CARDIOVASCULAR: Regular rate and rhythm; No murmurs; no peripheral edema  GI: Soft, nontender, non distended, normal bowel sounds  SKIN: Dry, intact, No rashes or lesions  NEURO: sensation intact, extraocular movements intact, no tremor, normal reflexes  PSYCH: reactive affect, euthymic mood  CUSHING'S SIGNS: no striae    POCT Blood Glucose.: 510 mg/dL (01-25-21 @ 12:20)  POCT Blood Glucose.: 485 mg/dL (01-25-21 @ 12:19)  POCT Blood Glucose.: 204 mg/dL (01-25-21 @ 06:40)  POCT Blood Glucose.: 257 mg/dL (01-25-21 @ 03:22)  POCT Blood Glucose.: 445 mg/dL (01-24-21 @ 21:46)  POCT Blood Glucose.: 438 mg/dL (01-24-21 @ 21:44)  POCT Blood Glucose.: 464 mg/dL (01-24-21 @ 17:25)  POCT Blood Glucose.: 511 mg/dL (01-24-21 @ 17:23)  POCT Blood Glucose.: 473 mg/dL (01-24-21 @ 17:22)  POCT Blood Glucose.: 363 mg/dL (01-24-21 @ 12:42)  POCT Blood Glucose.: 270 mg/dL (01-24-21 @ 08:04)  POCT Blood Glucose.: 273 mg/dL (01-24-21 @ 00:43)  POCT Blood Glucose.: 337 mg/dL (01-23-21 @ 22:42)  POCT Blood Glucose.: 390 mg/dL (01-23-21 @ 21:26)  POCT Blood Glucose.: 416 mg/dL (01-23-21 @ 21:11)  POCT Blood Glucose.: 398 mg/dL (01-23-21 @ 16:52)  POCT Blood Glucose.: 390 mg/dL (01-23-21 @ 12:23)  POCT Blood Glucose.: 220 mg/dL (01-23-21 @ 08:17)                            8.8    12.20 )-----------( 146      ( 24 Jan 2021 09:55 )             28.1       01-25    136  |  100  |  78<H>  ----------------------------<  184<H>  4.4   |  26  |  1.65<H>    EGFR if : 31<L>  EGFR if non : 27<L>    Ca    8.6      01-25  Mg     2.6     01-23  Phos  3.8     01-23    TPro  6.2  /  Alb  2.8<L>  /  TBili  0.6  /  DBili  x   /  AST  23  /  ALT  18  /  AlkPhos  43  01-23      Thyroid Function Tests:              Radiology:     A/P: yo male/female with hx of ................................... here with ..........         HPI: 89 yo with T2D x 20 years uncontrolled due to steroids (HbA1c 7.2%)with cataracts s/p b/l surgery and CKD 3, MVR, AVR, HTN, HLD, a. fib with ICD/PPM here with elevated INR and COVID19 pneumonia being treated with decadron (started 1/23) and remdisivir. The patient has some confusion around the recent state of events. I tried to reach her niece but she did not answer, so called her son Craig who is her health care proxy. He notes that his mother has been at [facility], which was just in the newspaper for not giving vaccines to those in rehab. He notes that she got COVID19 there. She was admitted to [facility] 1 month ago for a CHF exacerbation. While there her valves were assessed but she was deemed not be a surgical candidate so they optimized her medications. She was unable to ambulate well so her son wanted her to go to rehab. They picked [facility] as his mother-in-law had been there a couple of years ago. He was able to visit her and talk to her through the glass. She was diagnosed with COVID pneumonia there associated with mental status changes (thought her son was his uncle), hypoxia and MICHELLE. His cousin who was a dentist called and reviewed her labs with the nurse including a high INR, so she was concerned. They called 911 and she was brought to [facility]. No hx of MI, CVA, neuropathy, retinopathy, or glaucoma.  As an outpatient she was on metformin 500mg po bid. She does not like testing so she may test only once weekly. She was managed by her PCP Dr. Todd. Per the ED med rec she was on glipizide, alogliptin and Humulin R.     PAST MEDICAL & SURGICAL HISTORY:  Pacemaker  Mitral valve replaced  Aortic valve replaced pig valve  Diabetes mellitus, type II  Atrial fibrillation  Aneurysm of aorta 5.1 cm of ascending aorta as per CT scan  Arteriosclerotic heart disease (ASHD)  CHF (congestive heart failure)  Scoliosis  Hypertension  AICD (automatic cardioverter/defibrillator) present  History of cataract extraction bilateral  History of tonsillectomy  Mitral valve replaced 10/11  Aortic valve replaced 10/11  S/P GERTRUDE (Total Abdominal Hysterectomy)      FAMILY HISTORY:  Diabetes: son, mother  Glaucoma and cataracts: son      Social History:  Tobacco: never  ETOH: never  Occupation: retired, worked as       Outpatient Medications: metformin    MEDICATIONS  (STANDING):  ascorbic acid 250 milliGRAM(s) Oral daily  atorvastatin 40 milliGRAM(s) Oral at bedtime  cefTRIAXone   IVPB 1000 milliGRAM(s) IV Intermittent every 24 hours  dexAMETHasone  Injectable 6 milliGRAM(s) IV Push daily  dextrose 40% Gel 15 Gram(s) Oral once  dextrose 5%. 1000 milliLiter(s) (50 mL/Hr) IV Continuous <Continuous>  dextrose 5%. 1000 milliLiter(s) (100 mL/Hr) IV Continuous <Continuous>  dextrose 50% Injectable 25 Gram(s) IV Push once  dextrose 50% Injectable 12.5 Gram(s) IV Push once  dextrose 50% Injectable 25 Gram(s) IV Push once  folic acid 1 milliGRAM(s) Oral daily  furosemide    Tablet 20 milliGRAM(s) Oral two times a day  glucagon  Injectable 1 milliGRAM(s) IntraMuscular once  hydrALAZINE 50 milliGRAM(s) Oral every 8 hours  insulin glargine Injectable (LANTUS) 5 Unit(s) SubCutaneous at bedtime  insulin lispro (ADMELOG) corrective regimen sliding scale   SubCutaneous three times a day before meals  insulin lispro (ADMELOG) corrective regimen sliding scale   SubCutaneous at bedtime  isosorbide   mononitrate ER Tablet (IMDUR) 30 milliGRAM(s) Oral daily  lactobacillus acidophilus 1 Tablet(s) Oral daily  metoprolol tartrate 25 milliGRAM(s) Oral two times a day  senna 2 Tablet(s) Oral at bedtime  warfarin 6 milliGRAM(s) Oral once    MEDICATIONS  (PRN):  polyethylene glycol 3350 17 Gram(s) Oral daily PRN Constipation      Allergies  Allergies ACEI RX: Throat swelling (Other; Swelling)  angiotensin converting enzyme inhibitors (Swelling)          Review of Systems:  Constitutional: No fever/chills  Eyes: No blurry vision, diplopia  Neuro: No HA, neuropathy of feet  Cardiovascular: No chest pain, palpitations  Respiratory: No SOB, no cough  GI: No nausea, vomiting,   : No dysuria, hematuria, +urinary dripping  Endocrine: no polyuria, polydipsia  ALL OTHER SYSTEMS REVIEWED AND NEGATIVE    PHYSICAL EXAM:  VITALS: T(C): 36.2 (01-25-21 @ 13:52)  T(F): 97.2 (01-25-21 @ 13:52), Max: 98.2 (01-25-21 @ 05:52)  HR: 67 (01-25-21 @ 13:52) (59 - 78)  BP: 105/58 (01-25-21 @ 13:52) (105/58 - 144/79)  RR:  (16 - 18)  SpO2:  (93% - 95%)  Wt(kg): --  GENERAL: NAD, well-groomed, well-developed  EYES: No proptosis,  anicteric  HEENT:  Atraumatic, Normocephalic, moist mucous membranes  THYROID: Normal size, no palpable nodules  RESPIRATORY: no increased work of breathing - pt on RA  CARDIOVASCULAR: 2+ dorsal pedis and post. tibialis pulses b/l; no peripheral edema  GI: Soft, nontender, non distended,  SKIN: Dry, intact, No rashes or lesions on feet b/l  NEURO:  extraocular movements intact  PSYCH: reactive affect, euthymic mood      POCT Blood Glucose.: 510 mg/dL (01-25-21 @ 12:20)  POCT Blood Glucose.: 485 mg/dL (01-25-21 @ 12:19)  POCT Blood Glucose.: 204 mg/dL (01-25-21 @ 06:40)  POCT Blood Glucose.: 257 mg/dL (01-25-21 @ 03:22)  POCT Blood Glucose.: 445 mg/dL (01-24-21 @ 21:46)  POCT Blood Glucose.: 438 mg/dL (01-24-21 @ 21:44)  POCT Blood Glucose.: 464 mg/dL (01-24-21 @ 17:25)  POCT Blood Glucose.: 511 mg/dL (01-24-21 @ 17:23)  POCT Blood Glucose.: 473 mg/dL (01-24-21 @ 17:22)  POCT Blood Glucose.: 363 mg/dL (01-24-21 @ 12:42)  POCT Blood Glucose.: 270 mg/dL (01-24-21 @ 08:04)  POCT Blood Glucose.: 273 mg/dL (01-24-21 @ 00:43)  POCT Blood Glucose.: 337 mg/dL (01-23-21 @ 22:42)  POCT Blood Glucose.: 390 mg/dL (01-23-21 @ 21:26)  POCT Blood Glucose.: 416 mg/dL (01-23-21 @ 21:11)  POCT Blood Glucose.: 398 mg/dL (01-23-21 @ 16:52)  POCT Blood Glucose.: 390 mg/dL (01-23-21 @ 12:23)  POCT Blood Glucose.: 220 mg/dL (01-23-21 @ 08:17)                            8.8    12.20 )-----------( 146      ( 24 Jan 2021 09:55 )             28.1       01-25    136  |  100  |  78<H>  ----------------------------<  184<H>  4.4   |  26  |  1.65<H>    EGFR if : 31<L>  EGFR if non : 27<L>    Ca    8.6      01-25  Mg     2.6     01-23  Phos  3.8     01-23    TPro  6.2  /  Alb  2.8<L>  /  TBili  0.6  /  DBili  x   /  AST  23  /  ALT  18  /  AlkPhos  43  01-23

## 2021-01-26 LAB
ANION GAP SERPL CALC-SCNC: 9 MMOL/L — SIGNIFICANT CHANGE UP (ref 5–17)
BUN SERPL-MCNC: 79 MG/DL — HIGH (ref 7–23)
CALCIUM SERPL-MCNC: 8.6 MG/DL — SIGNIFICANT CHANGE UP (ref 8.4–10.5)
CHLORIDE SERPL-SCNC: 100 MMOL/L — SIGNIFICANT CHANGE UP (ref 96–108)
CO2 SERPL-SCNC: 28 MMOL/L — SIGNIFICANT CHANGE UP (ref 22–31)
CREAT SERPL-MCNC: 1.49 MG/DL — HIGH (ref 0.5–1.3)
CRP SERPL-MCNC: 0.33 MG/DL — SIGNIFICANT CHANGE UP (ref 0–0.4)
FERRITIN SERPL-MCNC: 460 NG/ML — HIGH (ref 15–150)
GLUCOSE BLDC GLUCOMTR-MCNC: 148 MG/DL — HIGH (ref 70–99)
GLUCOSE BLDC GLUCOMTR-MCNC: 234 MG/DL — HIGH (ref 70–99)
GLUCOSE BLDC GLUCOMTR-MCNC: 287 MG/DL — HIGH (ref 70–99)
GLUCOSE BLDC GLUCOMTR-MCNC: 302 MG/DL — HIGH (ref 70–99)
GLUCOSE BLDC GLUCOMTR-MCNC: 360 MG/DL — HIGH (ref 70–99)
GLUCOSE SERPL-MCNC: 169 MG/DL — HIGH (ref 70–99)
HCT VFR BLD CALC: 31 % — LOW (ref 34.5–45)
HGB BLD-MCNC: 9.8 G/DL — LOW (ref 11.5–15.5)
INR BLD: 2.15 RATIO — HIGH (ref 0.88–1.16)
LDH SERPL L TO P-CCNC: 400 U/L — HIGH (ref 50–242)
MCHC RBC-ENTMCNC: 27.1 PG — SIGNIFICANT CHANGE UP (ref 27–34)
MCHC RBC-ENTMCNC: 31.6 GM/DL — LOW (ref 32–36)
MCV RBC AUTO: 85.9 FL — SIGNIFICANT CHANGE UP (ref 80–100)
NRBC # BLD: 0 /100 WBCS — SIGNIFICANT CHANGE UP (ref 0–0)
PLATELET # BLD AUTO: 150 K/UL — SIGNIFICANT CHANGE UP (ref 150–400)
POTASSIUM SERPL-MCNC: 4.5 MMOL/L — SIGNIFICANT CHANGE UP (ref 3.5–5.3)
POTASSIUM SERPL-SCNC: 4.5 MMOL/L — SIGNIFICANT CHANGE UP (ref 3.5–5.3)
PROTHROM AB SERPL-ACNC: 24.9 SEC — HIGH (ref 10.6–13.6)
RBC # BLD: 3.61 M/UL — LOW (ref 3.8–5.2)
RBC # FLD: 15.6 % — HIGH (ref 10.3–14.5)
SODIUM SERPL-SCNC: 137 MMOL/L — SIGNIFICANT CHANGE UP (ref 135–145)
WBC # BLD: 12.88 K/UL — HIGH (ref 3.8–10.5)
WBC # FLD AUTO: 12.88 K/UL — HIGH (ref 3.8–10.5)

## 2021-01-26 PROCEDURE — 99232 SBSQ HOSP IP/OBS MODERATE 35: CPT

## 2021-01-26 RX ORDER — INSULIN LISPRO 100/ML
VIAL (ML) SUBCUTANEOUS AT BEDTIME
Refills: 0 | Status: DISCONTINUED | OUTPATIENT
Start: 2021-01-26 | End: 2021-02-04

## 2021-01-26 RX ORDER — WARFARIN SODIUM 2.5 MG/1
3 TABLET ORAL ONCE
Refills: 0 | Status: COMPLETED | OUTPATIENT
Start: 2021-01-26 | End: 2021-01-26

## 2021-01-26 RX ORDER — INSULIN LISPRO 100/ML
VIAL (ML) SUBCUTANEOUS
Refills: 0 | Status: DISCONTINUED | OUTPATIENT
Start: 2021-01-26 | End: 2021-02-04

## 2021-01-26 RX ORDER — INSULIN LISPRO 100/ML
13 VIAL (ML) SUBCUTANEOUS
Refills: 0 | Status: DISCONTINUED | OUTPATIENT
Start: 2021-01-26 | End: 2021-01-27

## 2021-01-26 RX ORDER — INSULIN GLARGINE 100 [IU]/ML
17 INJECTION, SOLUTION SUBCUTANEOUS AT BEDTIME
Refills: 0 | Status: DISCONTINUED | OUTPATIENT
Start: 2021-01-26 | End: 2021-01-27

## 2021-01-26 RX ADMIN — ATORVASTATIN CALCIUM 40 MILLIGRAM(S): 80 TABLET, FILM COATED ORAL at 21:17

## 2021-01-26 RX ADMIN — INSULIN GLARGINE 17 UNIT(S): 100 INJECTION, SOLUTION SUBCUTANEOUS at 21:17

## 2021-01-26 RX ADMIN — Medication 50 MILLIGRAM(S): at 04:42

## 2021-01-26 RX ADMIN — SENNA PLUS 2 TABLET(S): 8.6 TABLET ORAL at 21:18

## 2021-01-26 RX ADMIN — Medication 10 UNIT(S): at 08:38

## 2021-01-26 RX ADMIN — POLYETHYLENE GLYCOL 3350 17 GRAM(S): 17 POWDER, FOR SOLUTION ORAL at 22:12

## 2021-01-26 RX ADMIN — Medication 5 MILLIGRAM(S): at 22:12

## 2021-01-26 RX ADMIN — Medication 25 MILLIGRAM(S): at 04:42

## 2021-01-26 RX ADMIN — WARFARIN SODIUM 3 MILLIGRAM(S): 2.5 TABLET ORAL at 21:18

## 2021-01-26 RX ADMIN — Medication 10: at 12:33

## 2021-01-26 RX ADMIN — Medication 13 UNIT(S): at 17:12

## 2021-01-26 RX ADMIN — Medication 2: at 08:39

## 2021-01-26 RX ADMIN — Medication 10 UNIT(S): at 12:33

## 2021-01-26 RX ADMIN — Medication 25 MILLIGRAM(S): at 17:13

## 2021-01-26 RX ADMIN — Medication 8: at 17:13

## 2021-01-26 RX ADMIN — ISOSORBIDE MONONITRATE 30 MILLIGRAM(S): 60 TABLET, EXTENDED RELEASE ORAL at 11:26

## 2021-01-26 RX ADMIN — Medication 1 MILLIGRAM(S): at 11:26

## 2021-01-26 RX ADMIN — Medication 20 MILLIGRAM(S): at 17:13

## 2021-01-26 RX ADMIN — CEFTRIAXONE 100 MILLIGRAM(S): 500 INJECTION, POWDER, FOR SOLUTION INTRAMUSCULAR; INTRAVENOUS at 04:44

## 2021-01-26 RX ADMIN — Medication 250 MILLIGRAM(S): at 11:26

## 2021-01-26 RX ADMIN — Medication 20 MILLIGRAM(S): at 04:43

## 2021-01-26 RX ADMIN — Medication 6 MILLIGRAM(S): at 04:43

## 2021-01-26 RX ADMIN — Medication 1 TABLET(S): at 11:26

## 2021-01-26 NOTE — PROGRESS NOTE ADULT - ASSESSMENT
89 yo with T2D x 20 years with cataracts s/p b/l surgery and CKD 3, MVR, AVR, HTN, HLD, a. fib with ICD/PPM here with elevated INR and COVID19 pneumonia being treated with decadron (started 1/23) and remdisivir, endocrine consulted fro severe hyperglycemia: 400-500s. On basal/bolus now w/slight improvement in BG values. Slow titration given advanced age. BG goal (100-200mg/dl)

## 2021-01-26 NOTE — PROGRESS NOTE ADULT - PROBLEM SELECTOR PLAN 2
-atorvastatin 40mg po qhs    pager: [phone]   office:  [phone] (M-F 9a-5pm)               [phone] (nights/weekends)    -I spent a total time of 26 mins with the patient at bedside/on unit of which more than 50% of time was spent on counseling/coordination of care.

## 2021-01-26 NOTE — PROGRESS NOTE ADULT - PROBLEM SELECTOR PLAN 1
-test BG AC/HS  -Increase Lantus 17 units QHS  -Increase Admelog 13 units AC meals. IF BG less than 120mg/dl prior to meal and pt eating, can give 6 units w/meals (notify provider and get 1x order)  -changed Admelog to moderate correction scale AC and Mod HS scale  DISPO: TBD based on if she is going home vs. [facility]. Pt states was taking Glipizide most recently.

## 2021-01-26 NOTE — PHYSICAL THERAPY INITIAL EVALUATION ADULT - DISCHARGE DISPOSITION, PT EVAL
Sub-Acute rehab/rehabilitation facility Sub-Acute rehab. If family declines [facility] recommendation, she will need assist  and (S) for ALL functional mobility & ADL. CM/Lisa aware/rehabilitation facility

## 2021-01-26 NOTE — PROGRESS NOTE ADULT - SUBJECTIVE AND OBJECTIVE BOX
CARDIOLOGY     PROGRESS  NOTE   ________________________________________________    CHIEF COMPLAINT:Patient is a 89y old  Female who presents with a chief complaint of sent for elevated INR (25 Jan 2021 19:24)  no complain.  	  REVIEW OF SYSTEMS:  CONSTITUTIONAL: No fever, weight loss, or fatigue  EYES: No eye pain, visual disturbances, or discharge  ENT:  No difficulty hearing, tinnitus, vertigo; No sinus or throat pain  NECK: No pain or stiffness  RESPIRATORY: No cough, wheezing, chills or hemoptysis; +Shortness of Breath  CARDIOVASCULAR: No chest pain, palpitations, passing out, dizziness, or leg swelling  GASTROINTESTINAL: No abdominal or epigastric pain. No nausea, vomiting, or hematemesis; No diarrhea or constipation. No melena or hematochezia.  GENITOURINARY: No dysuria, frequency, hematuria, or incontinence  NEUROLOGICAL: No headaches, memory loss, loss of strength, numbness, or tremors  SKIN: No itching, burning, rashes, or lesions   LYMPH Nodes: No enlarged glands  ENDOCRINE: No heat or cold intolerance; No hair loss  MUSCULOSKELETAL: No joint pain or swelling; No muscle, back, or extremity pain  PSYCHIATRIC: No depression, anxiety, mood swings, or difficulty sleeping  HEME/LYMPH: No easy bruising, or bleeding gums  ALLERGY AND IMMUNOLOGIC: No hives or eczema	    [ ] All others negative	  [ ] Unable to obtain    PHYSICAL EXAM:  T(C): 36.7 (01-26-21 @ 04:40), Max: 36.8 (01-25-21 @ 17:49)  HR: 64 (01-26-21 @ 04:40) (60 - 67)  BP: 148/81 (01-26-21 @ 04:40) (105/58 - 150/80)  RR: 18 (01-26-21 @ 04:40) (16 - 18)  SpO2: 93% (01-26-21 @ 04:40) (93% - 95%)  Wt(kg): --  I&O's Summary    25 Jan 2021 07:01  -  26 Jan 2021 07:00  --------------------------------------------------------  IN: 750 mL / OUT: 1275 mL / NET: -525 mL        Appearance: Normal	  HEENT:   Normal oral mucosa, PERRL, EOMI	  Lymphatic: No lymphadenopathy  Cardiovascular: Normal S1 S2, No JVD, + murmurs, No edema  Respiratory: rhonchi	  Psychiatry: A & O x 3, Mood & affect appropriate  Gastrointestinal:  Soft, Non-tender, + BS	  Skin: No rashes, No ecchymoses, No cyanosis	  Neurologic: Non-focal  Extremities: Normal range of motion, No clubbing, cyanosis or edema  Vascular: Peripheral pulses palpable 2+ bilaterally    MEDICATIONS  (STANDING):  ascorbic acid 250 milliGRAM(s) Oral daily  atorvastatin 40 milliGRAM(s) Oral at bedtime  cefTRIAXone   IVPB 1000 milliGRAM(s) IV Intermittent every 24 hours  dexAMETHasone  Injectable 6 milliGRAM(s) IV Push daily  dextrose 40% Gel 15 Gram(s) Oral once  dextrose 5%. 1000 milliLiter(s) (50 mL/Hr) IV Continuous <Continuous>  dextrose 5%. 1000 milliLiter(s) (100 mL/Hr) IV Continuous <Continuous>  dextrose 50% Injectable 25 Gram(s) IV Push once  dextrose 50% Injectable 12.5 Gram(s) IV Push once  dextrose 50% Injectable 25 Gram(s) IV Push once  folic acid 1 milliGRAM(s) Oral daily  furosemide    Tablet 20 milliGRAM(s) Oral two times a day  glucagon  Injectable 1 milliGRAM(s) IntraMuscular once  hydrALAZINE 50 milliGRAM(s) Oral every 8 hours  insulin glargine Injectable (LANTUS) 15 Unit(s) SubCutaneous at bedtime  insulin lispro (ADMELOG) corrective regimen sliding scale   SubCutaneous three times a day before meals  insulin lispro (ADMELOG) corrective regimen sliding scale   SubCutaneous at bedtime  insulin lispro Injectable (ADMELOG) 10 Unit(s) SubCutaneous three times a day before meals  isosorbide   mononitrate ER Tablet (IMDUR) 30 milliGRAM(s) Oral daily  lactobacillus acidophilus 1 Tablet(s) Oral daily  metoprolol tartrate 25 milliGRAM(s) Oral two times a day  senna 2 Tablet(s) Oral at bedtime      TELEMETRY: 	    ECG:  	  RADIOLOGY:  OTHER: 	  	  LABS:	 	    CARDIAC MARKERS:                                9.8    12.88 )-----------( 150      ( 26 Jan 2021 06:01 )             31.0     01-26    137  |  100  |  79<H>  ----------------------------<  169<H>  4.5   |  28  |  1.49<H>    Ca    8.6      26 Jan 2021 06:01      proBNP:   Lipid Profile:   HgA1c:   TSH:   PT/INR - ( 26 Jan 2021 06:03 )   PT: 24.9 sec;   INR: 2.15 ratio    < from: CT Chest No Cont (01.25.21 @ 10:34) >  CT scan of the chest was obtained without administration of intravenous contrast.    Few small lymph nodesare present in the pretracheal space and the AP window.    Heart is enlarged in size. Patient is status post aortic and mitral valve replacement. An AICD is noted in place. No pericardial effusion is noted.    Morgagni hernia containing colon is noted.    No endobronchial lesions are noted. Patchy opacities are present in the peripheral aspect of both upper lobes and few smaller ill-defined opacities are present within the right middle and right lower lobes. Compressive atelectasis is noted involving portions of both lower lobes. This secondary to small bilateral pleural effusions.    Below the diaphragm, visualized portions of the abdomen demonstrate 5.2 cm cyst in the left hepatic lobe. Additional low-attenuation lesions in the liver are too small to be adequately characterized on this exam.    Degenerative changes of the spine are noted.    Impression: Patchy opacities noted within both lungs as described above are of uncertain etiology.    Small bilateral pleural effusions.    < from: Transthoracic Echocardiogram (05.20.20 @ 08:55) >  1. Bioprosthetic mitral valve replacement. Mild mitral  regurgitation.  Mean transmitral valve gradient equals 5 mm  Hg, which is probably normal in the setting of a prosthetic  valve.  2. Bioprosthetic aortic valve replacement. Peak transaortic  valve gradient equals 24 mm Hg, mean transaortic valve  gradient equals 14 mm Hg, which is probably normal in the  presence of a prosthetic valve. Minimal aortic  regurgitation.  3. Normal aortic root (about  3.7 cm at the sinuses of  Valsalva).  Dilated ascending aorta (about  4.7 cm).  4. Moderately dilated left atrium.  LA volume index = 46  cc/m2.  5. Normal left ventricular internal dimensions and wall  thicknesses.  6. Mild to moderate global left ventricular systolic  dysfunction. Septal motion is consistent with RV pacing and  post surgical status.  7. The right ventricle is not well visualized; grossly  normal right ventricular systolic function.  A device wire  is noted in the right heart.  8. Estimated right ventricular systolic pressure equals 54  mm Hg, assuming right atrial pressure equals 10 mm Hg,  consistent with moderate pulmonary hypertension.    < end of copied text >               Assessment and plan  ---------------------------  89 yo F w/ h/o MVR/AVR, Afib on coumadin, s/p AICD/PPM, DM type II, HTN, CHFrEF presents with elevated INR;  Patient is AAO x2, limited history obtained from patient and NH staff.  Patient was being treated for pneumonia for last 3 days with IV ceftriaxone/azithromycin and possibly IVF.  Patient however had no complaints, has been eating and drinking well (although likely unreliable historian).  Today she was found to have COVID and elevated INR (11.2), which prompted them to send patient to [facility].  Patient was given 10mg of vitamin K this morning.  Per chart review, patient was also positive for COVID on 1/7/21 which NH staff could not confirm.  Patient otherwise denies chest pain, SOB, cough.  On arrival, she had temp 97.9, HR 81, /83, RR 16, saturation 100% on 4L.     chf systolic acute on chronic  will adjust cardiac meds  check ct chest no contrast ?vascular congestion ?covid/ ct noted resulted   echo noted  tx for covid as per protocol  dvt prophylaxis  ac keep inr 2-3  will fu  add IMDUR to hydralazine for chf therapy  continue lasix for now  inr noted 2.15

## 2021-01-26 NOTE — PHYSICAL THERAPY INITIAL EVALUATION ADULT - ADDITIONAL COMMENTS
Pt lives alone in apartment complex w/ elevator access. As per son, PTA pt requires some assistance w/ ADLs & has a HHA 5d x 4H. Pnt also owns a rolling walker & wheelchair for assistance w/ ambulation.

## 2021-01-26 NOTE — PHYSICAL THERAPY INITIAL EVALUATION ADULT - GENERAL OBSERVATIONS, REHAB EVAL
Pt rec'ed supine in bed, NAD, +primafit external female catheter, penny PT eval well w/o adverse reaction.

## 2021-01-26 NOTE — PROGRESS NOTE ADULT - ASSESSMENT
_________________________________________________________________________________________  ========>>  M E D I C A L   A T T E N D I N G    F O L L O W  U P  N O T E  <<=========  -----------------------------------------------------------------------------------------------------    - Patient seen and examined by me earlier today.   - In summary,  CHARLES ADAMS is a 89y year old woman admitted with elevated INR, AMS, COVID   - Patient today overall doing ok, comfortable, eating OK. comfortable on room air     ==================>> REVIEW OF SYSTEM <<=================    GEN: no fever, no chills, no pain  RESP: no SOB, no cough, no sputum  CVS: no chest pain, no palpitations, no edema  GI: no abdominal pain, no nausea  : no dysuria, no frequency, no   Neuro: no headache, no dizziness  Derm : no itching, no rash    ==================>> PHYSICAL EXAM <<=================    GEN: A&O X 2 , NAD , comfortable in chair   HEENT: NCAT, PERRL, MMM, hearing intact  Neck: supple , no JVD appreciated  CVS: S1S2 , regular , No M/R/G appreciated  PULM: CTA B/L,  no W/R/R appreciated  ABD.: soft. non tender, non distended,  bowel sounds present  Extrem: intact pulses , no edema   PSYCH : normal mood,  not anxious      ==================>> MEDICATIONS <<====================    ascorbic acid 250 milliGRAM(s) Oral daily  atorvastatin 40 milliGRAM(s) Oral at bedtime  cefTRIAXone   IVPB 1000 milliGRAM(s) IV Intermittent every 24 hours  dexAMETHasone  Injectable 6 milliGRAM(s) IV Push daily  dextrose 40% Gel 15 Gram(s) Oral once  dextrose 5%. 1000 milliLiter(s) IV Continuous <Continuous>  dextrose 5%. 1000 milliLiter(s) IV Continuous <Continuous>  dextrose 50% Injectable 25 Gram(s) IV Push once  dextrose 50% Injectable 12.5 Gram(s) IV Push once  dextrose 50% Injectable 25 Gram(s) IV Push once  folic acid 1 milliGRAM(s) Oral daily  furosemide    Tablet 20 milliGRAM(s) Oral two times a day  glucagon  Injectable 1 milliGRAM(s) IntraMuscular once  hydrALAZINE 50 milliGRAM(s) Oral every 8 hours  insulin glargine Injectable (LANTUS) 15 Unit(s) SubCutaneous at bedtime  insulin lispro (ADMELOG) corrective regimen sliding scale   SubCutaneous three times a day before meals  insulin lispro (ADMELOG) corrective regimen sliding scale   SubCutaneous at bedtime  insulin lispro Injectable (ADMELOG) 10 Unit(s) SubCutaneous three times a day before meals  isosorbide   mononitrate ER Tablet (IMDUR) 30 milliGRAM(s) Oral daily  lactobacillus acidophilus 1 Tablet(s) Oral daily  metoprolol tartrate 25 milliGRAM(s) Oral two times a day  senna 2 Tablet(s) Oral at bedtime    MEDICATIONS  (PRN):  polyethylene glycol 3350 17 Gram(s) Oral daily PRN Constipation    ___________  Active diet:  Diet, Regular:   Consistent Carbohydrate No Snacks (CSTCHO)  DASH/TLC Sodium & Cholesterol Restricted (DASH)  Supplement Feeding Modality:  Oral  Glucerna Shake Cans or Servings Per Day:  2       Frequency:  Daily  ___________________    ==================>> VITAL SIGNS <<==================    Vital Signs Last 24 HrsT(C): 36.7 (01-26-21 @ 04:40)  T(F): 98 (01-26-21 @ 04:40), Max: 98.3 (01-25-21 @ 17:49)  HR: 77 (01-26-21 @ 08:57) (60 - 77)  BP: 119/70 (01-26-21 @ 08:57)  RR: 18 (01-26-21 @ 04:40) (16 - 18)  SpO2: 96% (01-26-21 @ 08:57) (93% - 96%)      POCT Blood Glucose.: 234 mg/dL (26 Jan 2021 08:17)  POCT Blood Glucose.: 287 mg/dL (26 Jan 2021 00:35)  POCT Blood Glucose.: 428 mg/dL (25 Jan 2021 20:53)  POCT Blood Glucose.: 408 mg/dL (25 Jan 2021 20:28)  POCT Blood Glucose.: 440 mg/dL (25 Jan 2021 17:21)  POCT Blood Glucose.: 425 mg/dL (25 Jan 2021 17:17)  POCT Blood Glucose.: 510 mg/dL (25 Jan 2021 12:20)  POCT Blood Glucose.: 485 mg/dL (25 Jan 2021 12:19)     ==================>> LAB AND IMAGING <<==================                        9.8    12.88 )-----------( 150      ( 26 Jan 2021 06:01 )             31.0        01-26    137  |  100  |  79<H>  ----------------------------<  169<H>  4.5   |  28  |  1.49<H>    Ca    8.6      26 Jan 2021 06:01      WBC count:   12.88 <<== ,  12.20 <<== ,  11.00 <<== ,  8.38 <<== ,  9.56 <<==   Hemoglobin:   9.8 <<==,  8.8 <<==,  9.2 <<==,  9.3 <<==,  9.1 <<==  platelets:  150 <==, 146 <==, 158 <==, 170 <==, 185 <==    Creatinine:  1.49  <<==, 1.65  <<==, 1.70  <<==, 1.72  <<==, 1.55  <<==, 1.85  <<==  Sodium:   137  <==, 136  <==, 137  <==, 137  <==, 145  <==, 143  <==       AST:          23 <== , 27 <==      ALT:        18  <== , 22  <==      AP:        43  <=, 48  <=     Bili:        0.6  <=, 0.4  <=    ^^^ Inflammatory markers :  ^^^  C R P :          0.33 (01-26-21)  <<--, 0.52 (01-25-21)  <<--, 0.63 (01-24-21)  <<--, 0.92 (01-23-21)  <<--  P C T :         0.07 (01-25-21) <<--, 0.07 (01-23-21) <<--    Ferritin :         446 (01-25-21) <<--, 464 (01-24-21) <<--, 511 (01-23-21) <<--    D-Dimer :          597 (01-25-21) <<--, 396 (01-24-21) <<--, 198 (01-23-21) <<--    ___________________________________________________________________________________  ===============>>  A S S E S S M E N T   A N D   P L A N <<===============  ------------------------------------------------------------------------------------------    · Assessment	  89 yo F w/ h/o MVR/AVR, Afib on coumadin, s/p AICD/PPM, DM type II, HTN, CHFrEF presents with elevated INR;     Problem/Plan - 1:  ·  Problem: Acute hypoxemic respiratory failure due to COVID-19 with Viral pneumonia from Viral syndrome.   trend inflammatory markers  finish dexamethasone per protocol   not on Remdesivir due to renal insufficiency   supportive care  monitoring off O2     Problem/Plan - 2:  ·  Problem: UTI  abx IV ceftriaxone;   follow cultures     Problem/Plan - 3:  ·  Problem: MICHELLE on CKD III, stable   monitor closely on PO lasix for CHF  monitor ins/outs and daily weights  monitor BMP  avoid nephrotoxic agents.     Problem/Plan - 4:  ·  Problem: metabolic encephalopathy due to above  appears\ improved: pt interactive and conversive, alert..   CT brain negative for acute pathology  monitor for now as improved     Problem/Plan - 5:  ·  Problem: INR abnormal.    s/p vitamin K 10mg at NH  resume coumadin  monitor iNR     I N R :  2.15  <<==, 1.69  <<==, 1.34  <<==, 1.80  <<==, 3.28  <<==    Problem/Plan - 6:  Problem: Atrial fibrillation. Plan: on coumadin  monitor INR on coumadin  ? ACID/PPM interrogation as needed   monitor cardio    Problem/Plan - 7:  ·  Problem: chronic systolic CHF   on lasix: monitor as stable   monitor ins/outs and daily weights  Continue Current medications otherwise and monitor.   cardio     Problem/Plan - 8:  ·  Problem: Hypertension.  Plan: c/w imdur and hydralazine.     Problem/Plan - 9:  ·  Problem: Diabetes mellitus, type II.    on sliding scale  adding lantus  oral meds on hold  monitor FS   A1C     -GI/DVT Prophylaxis per protocol.    CODE STATUS: DNR/DNI (MOLST in Chart).    if remains stable > Discharge planing      per case management note, pt needs to be in hospital for 10 days ( til 2/4) before being able to go to rehab as recommended    --------------------------------------------  Case discussed with pt  Education given on findings and plan of care  ___________________________  HAbhilash Rogers D.O.  Pager: [phone]

## 2021-01-26 NOTE — PROGRESS NOTE ADULT - SUBJECTIVE AND OBJECTIVE BOX
Diabetes Follow up note:    Chief complaint: T2DM w/steroid induced hyperglycemia    Interval Hx: BG values remain elevated 200-400s over past 24 hours. Pt seen at bedside. Reports tolerating POs w/fair appetite. Drinking Glucerna and eating some of her meal.     Review of Systems:  General: denies pain. Reports fatigue  GI: Tolerating POs. Denies N/V/D/Abd pain  CV: Denies CP/SOB  ENDO: No S&Sx of hypoglycemia    MEDS:  atorvastatin 40 milliGRAM(s) Oral at bedtime  dexAMETHasone  Injectable 6 milliGRAM(s) IV Push daily  insulin glargine Injectable (LANTUS) 15 Unit(s) SubCutaneous at bedtime  insulin lispro (ADMELOG) corrective regimen sliding scale   SubCutaneous three times a day before meals  insulin lispro (ADMELOG) corrective regimen sliding scale   SubCutaneous at bedtime  insulin lispro Injectable (ADMELOG) 10 Unit(s) SubCutaneous three times a day before meals    cefTRIAXone   IVPB 1000 milliGRAM(s) IV Intermittent every 24 hours    Allergies    Allergies ACEI RX: Throat swelling (Other; Swelling)  angiotensin converting enzyme inhibitors (Swelling)      PE:  General: Female lying in bed. NAD.   Vital Signs Last 24 Hrs  T(C): 36.4 (26 Jan 2021 13:25), Max: 36.8 (25 Jan 2021 17:49)  T(F): 97.5 (26 Jan 2021 13:25), Max: 98.3 (25 Jan 2021 17:49)  HR: 67 (26 Jan 2021 13:25) (60 - 77)  BP: 113/61 (26 Jan 2021 13:25) (113/61 - 150/80)  BP(mean): --  RR: 18 (26 Jan 2021 13:25) (16 - 18)  SpO2: 95% (26 Jan 2021 13:25) (93% - 96%)  Abd: Soft, NT,ND,   Extremities: Warm  Neuro: Alert. Hard of hearing    LABS:  POCT Blood Glucose.: 360 mg/dL (01-26-21 @ 12:26)  POCT Blood Glucose.: 234 mg/dL (01-26-21 @ 08:17)  POCT Blood Glucose.: 287 mg/dL (01-26-21 @ 00:35)  POCT Blood Glucose.: 428 mg/dL (01-25-21 @ 20:53)  POCT Blood Glucose.: 408 mg/dL (01-25-21 @ 20:28)  POCT Blood Glucose.: 440 mg/dL (01-25-21 @ 17:21)  POCT Blood Glucose.: 425 mg/dL (01-25-21 @ 17:17)  POCT Blood Glucose.: 510 mg/dL (01-25-21 @ 12:20)  POCT Blood Glucose.: 485 mg/dL (01-25-21 @ 12:19)  POCT Blood Glucose.: 239 mg/dL (01-25-21 @ 08:14)  POCT Blood Glucose.: 204 mg/dL (01-25-21 @ 06:40)  POCT Blood Glucose.: 257 mg/dL (01-25-21 @ 03:22)  POCT Blood Glucose.: 445 mg/dL (01-24-21 @ 21:46)  POCT Blood Glucose.: 438 mg/dL (01-24-21 @ 21:44)  POCT Blood Glucose.: 464 mg/dL (01-24-21 @ 17:25)  POCT Blood Glucose.: 511 mg/dL (01-24-21 @ 17:23)  POCT Blood Glucose.: 473 mg/dL (01-24-21 @ 17:22)  POCT Blood Glucose.: 363 mg/dL (01-24-21 @ 12:42)  POCT Blood Glucose.: 270 mg/dL (01-24-21 @ 08:04)  POCT Blood Glucose.: 273 mg/dL (01-24-21 @ 00:43)  POCT Blood Glucose.: 337 mg/dL (01-23-21 @ 22:42)  POCT Blood Glucose.: 390 mg/dL (01-23-21 @ 21:26)  POCT Blood Glucose.: 416 mg/dL (01-23-21 @ 21:11)  POCT Blood Glucose.: 398 mg/dL (01-23-21 @ 16:52)                            9.8    12.88 )-----------( 150      ( 26 Jan 2021 06:01 )             31.0       01-26    137  |  100  |  79<H>  ----------------------------<  169<H>  4.5   |  28  |  1.49<H>    Ca    8.6      26 Jan 2021 06:01    A1C with Estimated Average Glucose Result: 7.2 % (01-23-21 @ 10:27)  A1C with Estimated Average Glucose: 7.5 % (05-20-20 @ 05:30)          Contact number: gregory [phone] or [phone]

## 2021-01-26 NOTE — PHYSICAL THERAPY INITIAL EVALUATION ADULT - PERTINENT HX OF CURRENT PROBLEM, REHAB EVAL
89yoF w/ h/o MVR/AVR, Afib on coumadin, s/p AICD/PPM, DM type II, HTN, CHFrEF presents with elevated INR. Found to be Covid+ on 1/7/21 and 1/22/21.

## 2021-01-27 DIAGNOSIS — F05 DELIRIUM DUE TO KNOWN PHYSIOLOGICAL CONDITION: ICD-10-CM

## 2021-01-27 LAB
ANION GAP SERPL CALC-SCNC: 8 MMOL/L — SIGNIFICANT CHANGE UP (ref 5–17)
BUN SERPL-MCNC: 77 MG/DL — HIGH (ref 7–23)
CALCIUM SERPL-MCNC: 8.5 MG/DL — SIGNIFICANT CHANGE UP (ref 8.4–10.5)
CHLORIDE SERPL-SCNC: 103 MMOL/L — SIGNIFICANT CHANGE UP (ref 96–108)
CO2 SERPL-SCNC: 30 MMOL/L — SIGNIFICANT CHANGE UP (ref 22–31)
CREAT SERPL-MCNC: 1.37 MG/DL — HIGH (ref 0.5–1.3)
CRP SERPL-MCNC: <0.3 MG/DL — SIGNIFICANT CHANGE UP (ref 0–0.4)
FERRITIN SERPL-MCNC: 420 NG/ML — HIGH (ref 15–150)
FERRITIN SERPL-MCNC: 425 NG/ML — HIGH (ref 15–150)
GLUCOSE BLDC GLUCOMTR-MCNC: 102 MG/DL — HIGH (ref 70–99)
GLUCOSE BLDC GLUCOMTR-MCNC: 169 MG/DL — HIGH (ref 70–99)
GLUCOSE BLDC GLUCOMTR-MCNC: 210 MG/DL — HIGH (ref 70–99)
GLUCOSE BLDC GLUCOMTR-MCNC: 227 MG/DL — HIGH (ref 70–99)
GLUCOSE BLDC GLUCOMTR-MCNC: 99 MG/DL — SIGNIFICANT CHANGE UP (ref 70–99)
GLUCOSE SERPL-MCNC: 64 MG/DL — LOW (ref 70–99)
HCT VFR BLD CALC: 31.1 % — LOW (ref 34.5–45)
HGB BLD-MCNC: 10.2 G/DL — LOW (ref 11.5–15.5)
INR BLD: 3.48 RATIO — HIGH (ref 0.88–1.16)
LDH SERPL L TO P-CCNC: 374 U/L — HIGH (ref 50–242)
MCHC RBC-ENTMCNC: 27.9 PG — SIGNIFICANT CHANGE UP (ref 27–34)
MCHC RBC-ENTMCNC: 32.8 GM/DL — SIGNIFICANT CHANGE UP (ref 32–36)
MCV RBC AUTO: 85.2 FL — SIGNIFICANT CHANGE UP (ref 80–100)
NRBC # BLD: 0 /100 WBCS — SIGNIFICANT CHANGE UP (ref 0–0)
PLATELET # BLD AUTO: 131 K/UL — LOW (ref 150–400)
POTASSIUM SERPL-MCNC: 4.6 MMOL/L — SIGNIFICANT CHANGE UP (ref 3.5–5.3)
POTASSIUM SERPL-SCNC: 4.6 MMOL/L — SIGNIFICANT CHANGE UP (ref 3.5–5.3)
PROTHROM AB SERPL-ACNC: 39.4 SEC — HIGH (ref 10.6–13.6)
RBC # BLD: 3.65 M/UL — LOW (ref 3.8–5.2)
RBC # FLD: 16.1 % — HIGH (ref 10.3–14.5)
SODIUM SERPL-SCNC: 141 MMOL/L — SIGNIFICANT CHANGE UP (ref 135–145)
WBC # BLD: 14.96 K/UL — HIGH (ref 3.8–10.5)
WBC # FLD AUTO: 14.96 K/UL — HIGH (ref 3.8–10.5)

## 2021-01-27 PROCEDURE — 99232 SBSQ HOSP IP/OBS MODERATE 35: CPT

## 2021-01-27 PROCEDURE — 99222 1ST HOSP IP/OBS MODERATE 55: CPT | Mod: GC

## 2021-01-27 RX ORDER — FUROSEMIDE 40 MG
20 TABLET ORAL DAILY
Refills: 0 | Status: DISCONTINUED | OUTPATIENT
Start: 2021-01-27 | End: 2021-01-31

## 2021-01-27 RX ORDER — LANOLIN ALCOHOL/MO/W.PET/CERES
3 CREAM (GRAM) TOPICAL ONCE
Refills: 0 | Status: COMPLETED | OUTPATIENT
Start: 2021-01-27 | End: 2021-01-27

## 2021-01-27 RX ORDER — INSULIN GLARGINE 100 [IU]/ML
13 INJECTION, SOLUTION SUBCUTANEOUS AT BEDTIME
Refills: 0 | Status: DISCONTINUED | OUTPATIENT
Start: 2021-01-27 | End: 2021-01-28

## 2021-01-27 RX ORDER — INSULIN LISPRO 100/ML
15 VIAL (ML) SUBCUTANEOUS
Refills: 0 | Status: DISCONTINUED | OUTPATIENT
Start: 2021-01-28 | End: 2021-01-28

## 2021-01-27 RX ORDER — INSULIN LISPRO 100/ML
13 VIAL (ML) SUBCUTANEOUS
Refills: 0 | Status: DISCONTINUED | OUTPATIENT
Start: 2021-01-28 | End: 2021-02-01

## 2021-01-27 RX ORDER — INSULIN LISPRO 100/ML
10 VIAL (ML) SUBCUTANEOUS
Refills: 0 | Status: DISCONTINUED | OUTPATIENT
Start: 2021-01-27 | End: 2021-02-01

## 2021-01-27 RX ADMIN — Medication 250 MILLIGRAM(S): at 10:35

## 2021-01-27 RX ADMIN — Medication 1 TABLET(S): at 10:34

## 2021-01-27 RX ADMIN — Medication 10 UNIT(S): at 17:09

## 2021-01-27 RX ADMIN — Medication 50 MILLIGRAM(S): at 04:41

## 2021-01-27 RX ADMIN — ISOSORBIDE MONONITRATE 30 MILLIGRAM(S): 60 TABLET, EXTENDED RELEASE ORAL at 10:34

## 2021-01-27 RX ADMIN — INSULIN GLARGINE 13 UNIT(S): 100 INJECTION, SOLUTION SUBCUTANEOUS at 21:05

## 2021-01-27 RX ADMIN — ATORVASTATIN CALCIUM 40 MILLIGRAM(S): 80 TABLET, FILM COATED ORAL at 20:52

## 2021-01-27 RX ADMIN — Medication 1 MILLIGRAM(S): at 10:34

## 2021-01-27 RX ADMIN — Medication 3 MILLIGRAM(S): at 23:20

## 2021-01-27 RX ADMIN — Medication 13 UNIT(S): at 08:41

## 2021-01-27 RX ADMIN — Medication 6 MILLIGRAM(S): at 04:41

## 2021-01-27 RX ADMIN — Medication 4: at 12:09

## 2021-01-27 RX ADMIN — Medication 4: at 17:10

## 2021-01-27 RX ADMIN — CEFTRIAXONE 100 MILLIGRAM(S): 500 INJECTION, POWDER, FOR SOLUTION INTRAMUSCULAR; INTRAVENOUS at 04:43

## 2021-01-27 RX ADMIN — Medication 25 MILLIGRAM(S): at 17:16

## 2021-01-27 RX ADMIN — Medication 13 UNIT(S): at 12:09

## 2021-01-27 RX ADMIN — Medication 20 MILLIGRAM(S): at 04:41

## 2021-01-27 NOTE — BEHAVIORAL HEALTH ASSESSMENT NOTE - CASE SUMMARY
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  On interview pt is AOx2, off on date/hospital name but oriented to month/year.  States she is "sick of being poked and prodded like this, I don't want to live like this."  However, she clarifies that she would not kill herself, does not have the means and loves her family.  Denies AVH, paranoia, substance abuse issues.  Gives permission to contact son who states [id number].    INCOMPLETE***** 89F living alone with part time HHA, with no formal PPhx, no prior psych admissions or SA, no substance abuse, PMH significant for MVR/AVR, Afib on coumadin, s/p AICD/PPM, DM type II, HTN, CHFrEF presents with elevated INR, +COVID-19, psychiatry consulted after pt expressed passive SI to staff.  On interview pt is AOx2, off on date/hospital name but oriented to month/year.  States she is "sick of being poked and prodded like this, I don't want to live like this."  However, she clarifies that she would not kill herself, does not have the means and loves her family.  Denies AVH, paranoia, substance abuse issues.  Gives permission to contact son who states pt is not at baseline cognitively since coming to hospital although she is improving enough to "start complaining," is aware of pt's statements about not wanting to life but feels these are based in frustration, denies acute concerns for self-harm.  Dx: Delirium 2/2 [facility].  Agree with resident's assessment and plan as above.

## 2021-01-27 NOTE — BEHAVIORAL HEALTH ASSESSMENT NOTE - NSBHCHARTREVIEWLAB_PSY_A_CORE FT
10.2   14.96 )-----------( 131      ( 27 Jan 2021 06:02 )             31.1     01-27    141  |  103  |  77<H>  ----------------------------<  64<L>  4.6   |  30  |  1.37<H>    Ca    8.5      27 Jan 2021 06:01

## 2021-01-27 NOTE — PROGRESS NOTE ADULT - ASSESSMENT
_________________________________________________________________________________________  ========>>  M E D I C A L   A T T E N D I N G    F O L L O W  U P  N O T E  <<=========  -----------------------------------------------------------------------------------------------------    - Patient seen and examined by me earlier today.   - In summary,  CHARLES ADAMS is a 89y year old woman admitted with elevated INR, AMS, COVID   - Patient today overall doing ok, comfortable, eating OK. comfortable on room air     ==================>> REVIEW OF SYSTEM <<=================    GEN: no fever, no chills, no pain  RESP: no SOB, no cough, no sputum  CVS: no chest pain, no palpitations, no edema  GI: no abdominal pain, no nausea  : no dysuria, no frequency, no   Neuro: no headache, no dizziness  Derm : no itching, no rash    ==================>> PHYSICAL EXAM <<=================    GEN: A&O X 2 , NAD , comfortable in chair   HEENT: NCAT, PERRL, MMM, hearing intact  Neck: supple , no JVD appreciated  CVS: S1S2 , regular , No M/R/G appreciated  PULM: CTA B/L,  no W/R/R appreciated  ABD.: soft. non tender, non distended,  bowel sounds present  Extrem: intact pulses , no edema   PSYCH : normal mood,  not anxious      ==================>> MEDICATIONS <<====================    ascorbic acid 250 milliGRAM(s) Oral daily  atorvastatin 40 milliGRAM(s) Oral at bedtime  cefTRIAXone   IVPB 1000 milliGRAM(s) IV Intermittent every 24 hours  dexAMETHasone  Injectable 6 milliGRAM(s) IV Push daily  dextrose 40% Gel 15 Gram(s) Oral once  dextrose 5%. 1000 milliLiter(s) IV Continuous <Continuous>  dextrose 5%. 1000 milliLiter(s) IV Continuous <Continuous>  dextrose 50% Injectable 25 Gram(s) IV Push once  dextrose 50% Injectable 12.5 Gram(s) IV Push once  dextrose 50% Injectable 25 Gram(s) IV Push once  folic acid 1 milliGRAM(s) Oral daily  furosemide    Tablet 20 milliGRAM(s) Oral two times a day  glucagon  Injectable 1 milliGRAM(s) IntraMuscular once  hydrALAZINE 50 milliGRAM(s) Oral every 8 hours  insulin glargine Injectable (LANTUS) 15 Unit(s) SubCutaneous at bedtime  insulin lispro (ADMELOG) corrective regimen sliding scale   SubCutaneous three times a day before meals  insulin lispro (ADMELOG) corrective regimen sliding scale   SubCutaneous at bedtime  insulin lispro Injectable (ADMELOG) 10 Unit(s) SubCutaneous three times a day before meals  isosorbide   mononitrate ER Tablet (IMDUR) 30 milliGRAM(s) Oral daily  lactobacillus acidophilus 1 Tablet(s) Oral daily  metoprolol tartrate 25 milliGRAM(s) Oral two times a day  senna 2 Tablet(s) Oral at bedtime    MEDICATIONS  (PRN):  polyethylene glycol 3350 17 Gram(s) Oral daily PRN Constipation    ___________  Active diet:  Diet, Regular:   Consistent Carbohydrate No Snacks (CSTCHO)  DASH/TLC Sodium & Cholesterol Restricted (DASH)  Supplement Feeding Modality:  Oral  Glucerna Shake Cans or Servings Per Day:  2       Frequency:  Daily  ___________________    ==================>> VITAL SIGNS <<==================    Vital Signs Last 24 HrsT(C): 36.7 (01-26-21 @ 04:40)  T(F): 98 (01-26-21 @ 04:40), Max: 98.3 (01-25-21 @ 17:49)  HR: 77 (01-26-21 @ 08:57) (60 - 77)  BP: 119/70 (01-26-21 @ 08:57)  RR: 18 (01-26-21 @ 04:40) (16 - 18)  SpO2: 96% (01-26-21 @ 08:57) (93% - 96%)      POCT Blood Glucose.: 234 mg/dL (26 Jan 2021 08:17)  POCT Blood Glucose.: 287 mg/dL (26 Jan 2021 00:35)  POCT Blood Glucose.: 428 mg/dL (25 Jan 2021 20:53)  POCT Blood Glucose.: 408 mg/dL (25 Jan 2021 20:28)  POCT Blood Glucose.: 440 mg/dL (25 Jan 2021 17:21)  POCT Blood Glucose.: 425 mg/dL (25 Jan 2021 17:17)  POCT Blood Glucose.: 510 mg/dL (25 Jan 2021 12:20)  POCT Blood Glucose.: 485 mg/dL (25 Jan 2021 12:19)     ==================>> LAB AND IMAGING <<==================                        9.8    12.88 )-----------( 150      ( 26 Jan 2021 06:01 )             31.0        01-26    137  |  100  |  79<H>  ----------------------------<  169<H>  4.5   |  28  |  1.49<H>    Ca    8.6      26 Jan 2021 06:01      WBC count:   12.88 <<== ,  12.20 <<== ,  11.00 <<== ,  8.38 <<== ,  9.56 <<==   Hemoglobin:   9.8 <<==,  8.8 <<==,  9.2 <<==,  9.3 <<==,  9.1 <<==  platelets:  150 <==, 146 <==, 158 <==, 170 <==, 185 <==    Creatinine:  1.49  <<==, 1.65  <<==, 1.70  <<==, 1.72  <<==, 1.55  <<==, 1.85  <<==  Sodium:   137  <==, 136  <==, 137  <==, 137  <==, 145  <==, 143  <==       AST:          23 <== , 27 <==      ALT:        18  <== , 22  <==      AP:        43  <=, 48  <=     Bili:        0.6  <=, 0.4  <=    ^^^ Inflammatory markers :  ^^^  C R P :          0.33 (01-26-21)  <<--, 0.52 (01-25-21)  <<--, 0.63 (01-24-21)  <<--, 0.92 (01-23-21)  <<--  P C T :         0.07 (01-25-21) <<--, 0.07 (01-23-21) <<--    Ferritin :         446 (01-25-21) <<--, 464 (01-24-21) <<--, 511 (01-23-21) <<--    D-Dimer :          597 (01-25-21) <<--, 396 (01-24-21) <<--, 198 (01-23-21) <<--    ___________________________________________________________________________________  ===============>>  A S S E S S M E N T   A N D   P L A N <<===============  ------------------------------------------------------------------------------------------    · Assessment	  89 yo F w/ h/o MVR/AVR, Afib on coumadin, s/p AICD/PPM, DM type II, HTN, CHFrEF presents with elevated INR;     Problem/Plan - 1:  ·  Problem: Acute hypoxemic respiratory failure due to COVID-19 with Viral pneumonia from Viral syndrome.   trend inflammatory markers  finish dexamethasone per protocol   not on Remdesivir due to renal insufficiency   supportive care  monitoring off O2     Problem/Plan - 2:  ·  Problem: UTI  abx IV ceftriaxone;   follow cultures     Problem/Plan - 3:  ·  Problem: MICHELLE on CKD III, stable   monitor closely on PO lasix for CHF  monitor ins/outs and daily weights  monitor BMP  avoid nephrotoxic agents.     Problem/Plan - 4:  ·  Problem: metabolic encephalopathy due to above  appears\ improved: pt interactive and conversive, alert..   CT brain negative for acute pathology  monitor for now as improved     Problem/Plan - 5:  ·  Problem: INR abnormal.    s/p vitamin K 10mg at NH  resume coumadin  monitor iNR     I N R :  2.15  <<==, 1.69  <<==, 1.34  <<==, 1.80  <<==, 3.28  <<==    Problem/Plan - 6:  Problem: Atrial fibrillation. Plan: on coumadin  monitor INR on coumadin  ? ACID/PPM interrogation as needed   monitor cardio    Problem/Plan - 7:  ·  Problem: chronic systolic CHF   on lasix: monitor as stable   monitor ins/outs and daily weights  Continue Current medications otherwise and monitor.   cardio     Problem/Plan - 8:  ·  Problem: Hypertension.  Plan: c/w imdur and hydralazine.     Problem/Plan - 9:  ·  Problem: Diabetes mellitus, type II.    on sliding scale  adding lantus  oral meds on hold  monitor FS   A1C     -GI/DVT Prophylaxis per protocol.    CODE STATUS: DNR/DNI (MOLST in Chart).    if remains stable > Discharge planing      per case management note, pt needs to be in hospital for 10 days ( til 2/4) before being able to go to rehab as recommended    --------------------------------------------  Case discussed with pt  Education given on findings and plan of care  ___________________________  HAbhilash Rogers D.O.  Pager: [phone]     _________________________________________________________________________________________  ========>>  M E D I C A L   A T T E N D I N G    F O L L O W  U P  N O T E  <<=========  -----------------------------------------------------------------------------------------------------    - Patient seen and examined by me earlier today.   - In summary,  CHARLES ADAMS is a 89y year old woman admitted with elevated INR, AMS, COVID   - Patient today overall doing ok, comfortable, eating OK. comfortable on room air     ==================>> REVIEW OF SYSTEM <<=================    GEN: no fever, no chills, no pain, feels weak and sleepy   RESP: no SOB, no cough, no sputum  CVS: no chest pain, no palpitations, no edema  GI: no abdominal pain, no nausea  : no dysuria, no frequency, no   Neuro: no headache, no dizziness  Derm : no itching, no rash    ==================>> PHYSICAL EXAM <<=================    GEN: A&O X 2 , NAD , comfortable in chair   HEENT: NCAT, PERRL, MMM, hearing intact  Neck: supple , no JVD appreciated  CVS: S1S2 , regular , No M/R/G appreciated  PULM: CTA B/L,  no W/R/R appreciated  ABD.: soft. non tender, non distended,  bowel sounds present  Extrem: intact pulses , no edema   PSYCH : normal mood,  not anxious      ==================>> MEDICATIONS <<====================    ascorbic acid 250 milliGRAM(s) Oral daily  atorvastatin 40 milliGRAM(s) Oral at bedtime  dexAMETHasone  Injectable 6 milliGRAM(s) IV Push daily  dextrose 40% Gel 15 Gram(s) Oral once  dextrose 5%. 1000 milliLiter(s) IV Continuous <Continuous>  dextrose 5%. 1000 milliLiter(s) IV Continuous <Continuous>  dextrose 50% Injectable 25 Gram(s) IV Push once  dextrose 50% Injectable 12.5 Gram(s) IV Push once  dextrose 50% Injectable 25 Gram(s) IV Push once  folic acid 1 milliGRAM(s) Oral daily  furosemide    Tablet 20 milliGRAM(s) Oral daily  glucagon  Injectable 1 milliGRAM(s) IntraMuscular once  hydrALAZINE 50 milliGRAM(s) Oral every 8 hours  insulin glargine Injectable (LANTUS) 13 Unit(s) SubCutaneous at bedtime  insulin lispro (ADMELOG) corrective regimen sliding scale   SubCutaneous three times a day before meals  insulin lispro (ADMELOG) corrective regimen sliding scale   SubCutaneous at bedtime  insulin lispro Injectable (ADMELOG) 10 Unit(s) SubCutaneous before dinner  isosorbide   mononitrate ER Tablet (IMDUR) 30 milliGRAM(s) Oral daily  lactobacillus acidophilus 1 Tablet(s) Oral daily  metoprolol tartrate 25 milliGRAM(s) Oral two times a day  senna 2 Tablet(s) Oral at bedtime    MEDICATIONS  (PRN):  bisacodyl 5 milliGRAM(s) Oral every 12 hours PRN Constipation  polyethylene glycol 3350 17 Gram(s) Oral daily PRN Constipation    ___________  Active diet:  Diet, Regular:   Consistent Carbohydrate No Snacks (CSTCHO)  DASH/TLC Sodium & Cholesterol Restricted (DASH)  Supplement Feeding Modality:  Oral  Glucerna Shake Cans or Servings Per Day:  2       Frequency:  Daily  ___________________    ==================>> VITAL SIGNS <<==================    Vital Signs Last 24 HrsT(C): 36.4 (01-27-21 @ 12:08)  T(F): 97.6 (01-27-21 @ 12:08), Max: 97.8 (01-26-21 @ 17:19)  HR: 73 (01-27-21 @ 12:08) (57 - 87)  BP: 124/76 (01-27-21 @ 12:08)  RR: 17 (01-27-21 @ 12:08) (16 - 17)  SpO2: 91% (01-27-21 @ 12:08) (91% - 97%)      POCT Blood Glucose.: 227 mg/dL (27 Jan 2021 12:05)  POCT Blood Glucose.: 102 mg/dL (27 Jan 2021 08:36)  POCT Blood Glucose.: 99 mg/dL (27 Jan 2021 08:15)  POCT Blood Glucose.: 148 mg/dL (26 Jan 2021 20:57)     ==================>> LAB AND IMAGING <<==================                        10.2   14.96 )-----------( 131      ( 27 Jan 2021 06:02 )             31.1        01-27    141  |  103  |  77<H>  ----------------------------<  64<L>  4.6   |  30  |  1.37<H>    Ca    8.5      27 Jan 2021 06:01    WBC count:   14.96 <<== ,  12.88 <<== ,  12.20 <<== ,  11.00 <<== ,  8.38 <<== ,  9.56 <<==   Hemoglobin:   10.2 <<==,  9.8 <<==,  8.8 <<==,  9.2 <<==,  9.3 <<==,  9.1 <<==  platelets:  131 <==, 150 <==, 146 <==, 158 <==, 170 <==, 185 <==    Creatinine:  1.37  <<==, 1.49  <<==, 1.65  <<==, 1.70  <<==, 1.72  <<==, 1.55  <<==  Sodium:   141  <==, 137  <==, 136  <==, 137  <==, 137  <==, 145  <==, 143  <==       AST:          23 <== , 27 <==      ALT:        18  <== , 22  <==      AP:        43  <=, 48  <=     Bili:        0.6  <=, 0.4  <=    ___________________________________________________________________________________  ===============>>  A S S E S S M E N T   A N D   P L A N <<===============  ------------------------------------------------------------------------------------------    · Assessment	  89 yo F w/ h/o MVR/AVR, Afib on coumadin, s/p AICD/PPM, DM type II, HTN, CHFrEF presents with elevated INR;     Problem/Plan - 1:  ·  Problem: Acute hypoxemic respiratory failure due to COVID-19 with Viral pneumonia from Viral syndrome.   trend inflammatory markers  finish dexamethasone per protocol   not on Remdesivir due to renal insufficiency   supportive care  monitoring off O2   dispo planing    Problem/Plan - 2:  ·  Problem: UTI  abx IV ceftriaxone;   follow cultures     Problem/Plan - 3:  ·  Problem: MICHELLE on CKD III, stable   monitor closely on PO lasix for CHF  monitor ins/outs and daily weights  monitor BMP  avoid nephrotoxic agents.     Problem/Plan - 4:  ·  Problem: metabolic encephalopathy due to above  appears\ improved: pt interactive and conversive, alert..   CT brain negative for acute pathology  monitor for now as improved     Problem/Plan - 5:  ·  Problem: INR abnormal.    s/p vitamin K 10mg at NH  coumadin >> HOLD tonight > resume at 3 mg after INR terpentic   monitor iNR     I N R :  3.48  <<==, 2.15  <<==, 1.69  <<==, 1.34  <<==, 1.80  <<==    Problem/Plan - 6:  Problem: Atrial fibrillation. Plan: on coumadin  monitor INR on coumadin  ? ACID/PPM interrogation as needed   monitor cardio    Problem/Plan - 7:  ·  Problem: chronic systolic CHF   on lasix: monitor as stable   monitor ins/outs and daily weights  Continue Current medications otherwise and monitor.   cardio     Problem/Plan - 8:  ·  Problem: Hypertension.  Plan: c/w imdur and hydralazine.     Problem/Plan - 9:  ·  Problem: Diabetes mellitus, type II.    FS better controlled  endo follow up and mgmt appreciated   monitor FS     -GI/DVT Prophylaxis per protocol.    CODE STATUS: DNR/DNI (MOLST in Chart).    if remains stable > Discharge planing      per case management note, pt needs to be in hospital for 10 days ( til 2/4) before being able to go to rehab as recommended    --------------------------------------------  Case discussed with pt  Education given on findings and plan of care  ___________________________  H. AINSLEY Rogers.  Pager: [phone]

## 2021-01-27 NOTE — BEHAVIORAL HEALTH ASSESSMENT NOTE - SUMMARY
89 yo F w/ h/o MVR/AVR, Afib on coumadin, s/p AICD/PPM, DM type II, HTN, CHFrEF presents with elevated INR, was being treated at outside hospital for pneumonia, and then was found to have COVID and elevated INR (11.2), which prompted trasfer to [facility]. Psychiatry is consulted for SI.     Patient's comments of not wanting to live anymore directly attributable to discomforts of being in hospital, she is not actively suicidal, acknowledging that she has no plan, intent or means of killing herself. She does have some waxing and waning changes in her mental status including disorientation, concerning for delirium due to medical condition. 89 yo F w/ h/o MVR/AVR, Afib on coumadin, s/p AICD/PPM, DM type II, HTN, CHFrEF presents with elevated INR, was being treated at outside hospital for pneumonia, and then was found to have COVID and elevated INR (11.2), which prompted trasfer to [facility]. Psychiatry is consulted for SI.     Patient's comments of not wanting to live anymore directly attributable to discomforts of being in hospital, she is not actively suicidal, acknowledging that she has no plan, intent or means of killing herself. She does have some waxing and waning changes in her mental status including disorientation, concerning for delirium due to medical condition.  Son not concerned for suicidality.

## 2021-01-27 NOTE — PROGRESS NOTE ADULT - PROBLEM SELECTOR PLAN 2
-atorvastatin 40mg po qhs    pager: [phone]   office:  [phone] (M-F 9a-5pm)               [phone] (nights/weekends)    -I spent a total time of 26 mins with the patient at bedside/on unit of which more than 50% of time was spent on counseling/coordination of care.

## 2021-01-27 NOTE — PROGRESS NOTE ADULT - PROBLEM SELECTOR PLAN 1
-test BG AC/HS  -Decrease Lantus 13 units QHS  -Adjust Admelog 15-13-10 w/meals. IF BG less than 120mg/dl prior to meal and pt eating, can give 6 units w/meals (notify provider and get 1x order)  -changed Admelog to moderate correction scale AC and Mod HS scale  DISPO: TBD based on if she is going home vs. [facility]. Pt states was taking Glipizide most recently.

## 2021-01-27 NOTE — PROGRESS NOTE ADULT - SUBJECTIVE AND OBJECTIVE BOX
Diabetes Follow up note:    Chief complaint: T2DM w/steroid induced hyperglycemia    Interval Hx: Serum glucose 64mg/dl this AM. FS values 102mg/dl prior to lunch and 227mg/dl prior to lunch. Reports tolerating POs. Feels thirsty and asking for some water.     Review of Systems:  General: denies pain.   GI: Tolerating POs. Denies N/V/D/Abd pain  CV: Denies CP/SOB  ENDO: No S&Sx of hypoglycemia    MEDS:  atorvastatin 40 milliGRAM(s) Oral at bedtime  dexAMETHasone  Injectable 6 milliGRAM(s) IV Push daily  insulin glargine Injectable (LANTUS) 13 Unit(s) SubCutaneous at bedtime  insulin lispro (ADMELOG) corrective regimen sliding scale   SubCutaneous three times a day before meals  insulin lispro (ADMELOG) corrective regimen sliding scale   SubCutaneous at bedtime  insulin lispro Injectable (ADMELOG) 13 Unit(s) SubCutaneous three times a day before meals      Allergies    Allergies ACEI RX: Throat swelling (Other; Swelling)  angiotensin converting enzyme inhibitors (Swelling)        PE:  General: Female lying in bed. NAD>   Vital Signs Last 24 Hrs  T(C): 36.4 (27 Jan 2021 12:08), Max: 36.6 (26 Jan 2021 17:19)  T(F): 97.6 (27 Jan 2021 12:08), Max: 97.8 (26 Jan 2021 17:19)  HR: 73 (27 Jan 2021 12:08) (57 - 87)  BP: 124/76 (27 Jan 2021 12:08) (116/67 - 143/82)  BP(mean): --  RR: 17 (27 Jan 2021 12:08) (16 - 17)  SpO2: 91% (27 Jan 2021 12:08) (91% - 97%)  Abd: Soft, NT,ND,   Extremities: Warm. no edema  Neuro: A&O X2    LABS:  POCT Blood Glucose.: 227 mg/dL (01-27-21 @ 12:05)  POCT Blood Glucose.: 102 mg/dL (01-27-21 @ 08:36)  POCT Blood Glucose.: 99 mg/dL (01-27-21 @ 08:15)  POCT Blood Glucose.: 148 mg/dL (01-26-21 @ 20:57)  POCT Blood Glucose.: 302 mg/dL (01-26-21 @ 16:27)  POCT Blood Glucose.: 360 mg/dL (01-26-21 @ 12:26)  POCT Blood Glucose.: 234 mg/dL (01-26-21 @ 08:17)  POCT Blood Glucose.: 287 mg/dL (01-26-21 @ 00:35)  POCT Blood Glucose.: 428 mg/dL (01-25-21 @ 20:53)  POCT Blood Glucose.: 408 mg/dL (01-25-21 @ 20:28)  POCT Blood Glucose.: 440 mg/dL (01-25-21 @ 17:21)  POCT Blood Glucose.: 425 mg/dL (01-25-21 @ 17:17)  POCT Blood Glucose.: 510 mg/dL (01-25-21 @ 12:20)  POCT Blood Glucose.: 485 mg/dL (01-25-21 @ 12:19)  POCT Blood Glucose.: 239 mg/dL (01-25-21 @ 08:14)  POCT Blood Glucose.: 204 mg/dL (01-25-21 @ 06:40)  POCT Blood Glucose.: 257 mg/dL (01-25-21 @ 03:22)  POCT Blood Glucose.: 445 mg/dL (01-24-21 @ 21:46)  POCT Blood Glucose.: 438 mg/dL (01-24-21 @ 21:44)  POCT Blood Glucose.: 464 mg/dL (01-24-21 @ 17:25)  POCT Blood Glucose.: 511 mg/dL (01-24-21 @ 17:23)  POCT Blood Glucose.: 473 mg/dL (01-24-21 @ 17:22)                            10.2   14.96 )-----------( 131      ( 27 Jan 2021 06:02 )             31.1       01-27    141  |  103  |  77<H>  ----------------------------<  64<L>  4.6   |  30  |  1.37<H>    Ca    8.5      27 Jan 2021 06:01        A1C with Estimated Average Glucose Result: 7.2 % (01-23-21 @ 10:27)  A1C with Estimated Average Glucose: 7.5 % (05-20-20 @ 05:30)          Contact number: gregory [phone] or [phone]

## 2021-01-27 NOTE — PROGRESS NOTE ADULT - ASSESSMENT
89 yo with T2D x 20 years with cataracts s/p b/l surgery and CKD 3, MVR, AVR, HTN, HLD, a. fib with ICD/PPM here with elevated INR and COVID19 pneumonia being treated with decadron (started 1/23) and remdisivir, endocrine consulted fro severe hyperglycemia: 400-500s. On basal/bolus now w/improving BG values. Tolerating POs. Remains on decadron. BG goal (100-200mg/dl).

## 2021-01-27 NOTE — BEHAVIORAL HEALTH ASSESSMENT NOTE - HPI (INCLUDE ILLNESS QUALITY, SEVERITY, DURATION, TIMING, CONTEXT, MODIFYING FACTORS, ASSOCIATED SIGNS AND SYMPTOMS)
Patient seen and examined via Ipad videochat. She complains of feeling tired. She endorses feeling that she has felt that she does not want to live, which she directly attributes to the discomfort of hospital routines including "all of the poking and prodding." States that without these discomforts she would not be having thoughts of not wanting to live. Denies suicidal intent and plan. States that prior to hospitalization she had no history of suicidal ideation or depression. Endorses have some anxiety in the past, but has never sought mental health treatment or been on psychiatric medication. Is oriented to self, date (month and year) and generally to place ("[place]"). Endorses that at times in the hospital she has felt confused (unsure what the date is, confused about the TV here being different from TV at home). Denies AH/VH.     Collateral: left message for son Craig Montgomery [phone] 89 yo F w/ h/o MVR/AVR, Afib on coumadin, s/p AICD/PPM, DM type II, HTN, CHFrEF presents with elevated INR, was being treated at outside hospital for pneumonia, and then was found to have COVID and elevated INR (11.2), which prompted transfer to [facility]. Psychiatry is consulted for SI.     Patient seen and examined, complains of feeling tired. She endorses feeling that she has felt that she does not want to live, which she directly attributes to the discomfort of hospital routines including "all of the poking and prodding and pricking."  States that without these discomforts she would not be having thoughts of not wanting to live. Denies suicidal intent and plan. States that prior to hospitalization she had no history of suicidal ideation or depression. Endorses having some anxiety in the past, but has never sought mental health treatment or been on psychiatric medication. Is oriented to self, date (month and year) and generally to place ("[place]"). Endorses that at times in the hospital she has felt confused (unsure what the date is, confused about the TV here being different from TV at home). Denies AH/VH.     Collateral obtained by psychiatry attending from son Craig Montgomery [phone].  States pt has been confused in the hospital setting, disoriented, but has improved "70%" back to baseline, he knows this because "she has started complaining," which is typical for pt.  States he is aware of pt complaining about blood draws, stating she does not want to live this way, he attributes this to expressing frustration, has no concerns about pt being risk of harm to self/others, has no h/o self-harm.

## 2021-01-27 NOTE — PROGRESS NOTE ADULT - SUBJECTIVE AND OBJECTIVE BOX
CARDIOLOGY     PROGRESS  NOTE   ________________________________________________    CHIEF COMPLAINT:Patient is a 89y old  Female who presents with a chief complaint of sent for elevated INR (26 Jan 2021 15:50)  no complain.  	  REVIEW OF SYSTEMS:  CONSTITUTIONAL: No fever, weight loss, or fatigue  EYES: No eye pain, visual disturbances, or discharge  ENT:  No difficulty hearing, tinnitus, vertigo; No sinus or throat pain  NECK: No pain or stiffness  RESPIRATORY: No cough, wheezing, chills or hemoptysis; + Shortness of Breath  CARDIOVASCULAR: No chest pain, palpitations, passing out, dizziness, or leg swelling  GASTROINTESTINAL: No abdominal or epigastric pain. No nausea, vomiting, or hematemesis; No diarrhea or constipation. No melena or hematochezia.  GENITOURINARY: No dysuria, frequency, hematuria, or incontinence  NEUROLOGICAL: No headaches, memory loss, loss of strength, numbness, or tremors  SKIN: No itching, burning, rashes, or lesions   LYMPH Nodes: No enlarged glands  ENDOCRINE: No heat or cold intolerance; No hair loss  MUSCULOSKELETAL: No joint pain or swelling; No muscle, back, or extremity pain  PSYCHIATRIC: No depression, anxiety, mood swings, or difficulty sleeping  HEME/LYMPH: No easy bruising, or bleeding gums  ALLERGY AND IMMUNOLOGIC: No hives or eczema	    [ ] All others negative	  [ ] Unable to obtain    PHYSICAL EXAM:  T(C): 36.4 (01-27-21 @ 04:37), Max: 36.6 (01-26-21 @ 17:19)  HR: 57 (01-27-21 @ 04:37) (57 - 77)  BP: 143/82 (01-27-21 @ 04:37) (113/61 - 143/82)  RR: 16 (01-27-21 @ 04:37) (16 - 18)  SpO2: 96% (01-27-21 @ 04:37) (94% - 97%)  Wt(kg): --  I&O's Summary    26 Jan 2021 07:01  -  27 Jan 2021 07:00  --------------------------------------------------------  IN: 1170 mL / OUT: 950 mL / NET: 220 mL        Appearance: Normal	  HEENT:   Normal oral mucosa, PERRL, EOMI	  Lymphatic: No lymphadenopathy  Cardiovascular: Normal S1 S2, No JVD, + murmurs, No edema  Respiratory: Lungs clear to auscultation	  Psychiatry: A & O x 3, Mood & affect appropriate  Gastrointestinal:  Soft, Non-tender, + BS	  Skin: No rashes, No ecchymoses, No cyanosis	  Neurologic: Non-focal  Extremities: Normal range of motion, No clubbing, cyanosis or edema  Vascular: Peripheral pulses palpable 2+ bilaterally    MEDICATIONS  (STANDING):  ascorbic acid 250 milliGRAM(s) Oral daily  atorvastatin 40 milliGRAM(s) Oral at bedtime  dexAMETHasone  Injectable 6 milliGRAM(s) IV Push daily  dextrose 40% Gel 15 Gram(s) Oral once  dextrose 5%. 1000 milliLiter(s) (50 mL/Hr) IV Continuous <Continuous>  dextrose 5%. 1000 milliLiter(s) (100 mL/Hr) IV Continuous <Continuous>  dextrose 50% Injectable 25 Gram(s) IV Push once  dextrose 50% Injectable 12.5 Gram(s) IV Push once  dextrose 50% Injectable 25 Gram(s) IV Push once  folic acid 1 milliGRAM(s) Oral daily  furosemide    Tablet 20 milliGRAM(s) Oral two times a day  glucagon  Injectable 1 milliGRAM(s) IntraMuscular once  hydrALAZINE 50 milliGRAM(s) Oral every 8 hours  insulin glargine Injectable (LANTUS) 17 Unit(s) SubCutaneous at bedtime  insulin lispro (ADMELOG) corrective regimen sliding scale   SubCutaneous three times a day before meals  insulin lispro (ADMELOG) corrective regimen sliding scale   SubCutaneous at bedtime  insulin lispro Injectable (ADMELOG) 13 Unit(s) SubCutaneous three times a day before meals  isosorbide   mononitrate ER Tablet (IMDUR) 30 milliGRAM(s) Oral daily  lactobacillus acidophilus 1 Tablet(s) Oral daily  metoprolol tartrate 25 milliGRAM(s) Oral two times a day  senna 2 Tablet(s) Oral at bedtime      TELEMETRY: 	    ECG:  	  RADIOLOGY:  OTHER: 	  	  LABS:	 	    CARDIAC MARKERS:                                10.2   14.96 )-----------( 131      ( 27 Jan 2021 06:02 )             31.1     01-27    141  |  103  |  77<H>  ----------------------------<  64<L>  4.6   |  30  |  1.37<H>    Ca    8.5      27 Jan 2021 06:01      proBNP:   Lipid Profile:   HgA1c:   TSH:   PT/INR - ( 27 Jan 2021 06:03 )   PT: 39.4 sec;   INR: 3.48 ratio    Respiratory Viral Panel with COVID-19 by ROSETTA (01.25.21 @ 10:28)    Rapid RVP Result: Detected    SARS-CoV-2: Detected: This Respiratory Panel uses polymerase chain reaction (PCR) to detect for  adenovirus; coronavirus (HKU1, NL63, 229E, OC43); human metapneumovirus  (hMPV); human enterovirus/rhinovirus (Entero/RV); influenza A; influenza  A/H1; influenza A/H3; influenza A/H1-2009; influenza B; parainfluenza  viruses 1, 2, 3, 4; respiratory syncytial virus; Mycoplasma pneumoniae;  Chlamydophila pneumoniae; and SARS-CoV-2.       < from: CT Chest No Cont (01.25.21 @ 10:34) >  INTERPRETATION:  Clinical information: Evaluate for CHF.    CT scan of the chest was obtained without administration of intravenous contrast.    Few small lymph nodesare present in the pretracheal space and the AP window.    Heart is enlarged in size. Patient is status post aortic and mitral valve replacement. An AICD is noted in place. No pericardial effusion is noted.    Morgagni hernia containing colon is noted.    No endobronchial lesions are noted. Patchy opacities are present in the peripheral aspect of both upper lobes and few smaller ill-defined opacities are present within the right middle and right lower lobes. Compressive atelectasis is noted involving portions of both lower lobes. This secondary to small bilateral pleural effusions.    Below the diaphragm, visualized portions of the abdomen demonstrate 5.2 cm cyst in the left hepatic lobe. Additional low-attenuation lesions in the liver are too small to be adequately characterized on this exam.    Degenerative changes of the spine are noted.    Impression: Patchy opacities noted within both lungs as described above are of uncertain etiology.    Small bilateral pleural effusions.    < end of copied text >          Assessment and plan  ---------------------------  89 yo F w/ h/o MVR/AVR, Afib on coumadin, s/p AICD/PPM, DM type II, HTN, CHFrEF presents with elevated INR;  Patient is AAO x2, limited history obtained from patient and NH staff.  Patient was being treated for pneumonia for last 3 days with IV ceftriaxone/azithromycin and possibly IVF.  Patient however had no complaints, has been eating and drinking well (although likely unreliable historian).  Today she was found to have COVID and elevated INR (11.2), which prompted them to send patient to [facility].  Patient was given 10mg of vitamin K this morning.  Per chart review, patient was also positive for COVID on 1/7/21 which NH staff could not confirm.  Patient otherwise denies chest pain, SOB, cough.  On arrival, she had temp 97.9, HR 81, /83, RR 16, saturation 100% on 4L.     chf systolic acute on chronic  will adjust cardiac meds  check ct chest no contrast ?vascular congestion ?covid/ ct noted resulted   echo noted  tx for covid as per protocol  dvt prophylaxis  ac keep inr 2-3  will fu  add IMDUR to hydralazine for chf therapy  decrease lasix  inr noted 3.48

## 2021-01-27 NOTE — BEHAVIORAL HEALTH ASSESSMENT NOTE - RISK ASSESSMENT
Low acute suicide risk given denial of current or recent suicidal intent, plan and means. No chronic elevated risk, protective factors inclined lack of prior psych history/hospitalzations, SA's, and NSSIB. Low Acute Suicide Risk

## 2021-01-27 NOTE — BEHAVIORAL HEALTH ASSESSMENT NOTE - NSBHCHARTREVIEWIMAGING_PSY_A_CORE FT
< from: CT Head No Cont (01.22.21 @ 20:30) >    FINDINGS:    There is no acute intracranial hemorrhage, extra-axial fluid collection, hydrocephalus, mass effect or midline shift.    Mild cerebral volume loss with prominence of ventricles and sulci. Lateral choroid plexus at the granulomas are seen. Mild periventricular hypodensities, likely represent chronic microvascular white matter ischemic changes. Basal cisterns are patent.    Paranasal sinuses and mastoid air cells are clear. The visualized orbits are grossly unremarkable aside from bilateral ocular lens replacements. Calvarium is intact. Left parietal scalp sebaceous cyst with calcification.    IMPRESSION:    No acute intracranial hemorrhage, extra-axial fluid collection, hydrocephalus, mass effect or midline shift.    < end of copied text >

## 2021-01-27 NOTE — BEHAVIORAL HEALTH ASSESSMENT NOTE - NSBHCHARTREVIEWVS_PSY_A_CORE FT
ICU Vital Signs Last 24 Hrs  T(C): 36.4 (27 Jan 2021 12:08), Max: 36.6 (26 Jan 2021 17:19)  T(F): 97.6 (27 Jan 2021 12:08), Max: 97.8 (26 Jan 2021 17:19)  HR: 73 (27 Jan 2021 12:08) (57 - 87)  BP: 124/76 (27 Jan 2021 12:08) (116/67 - 143/82)  BP(mean): --  ABP: --  ABP(mean): --  RR: 17 (27 Jan 2021 12:08) (16 - 17)  SpO2: 91% (27 Jan 2021 12:08) (91% - 97%)

## 2021-01-27 NOTE — BEHAVIORAL HEALTH ASSESSMENT NOTE - NSBHCONSULTMEDS_PSY_A_CORE FT
Melatonin 3 mg QHS Melatonin 3 mg QHS    Depacon 125mg PO/IV q8h PRN severe agitation, monitor LFTs/PLT

## 2021-01-27 NOTE — BEHAVIORAL HEALTH ASSESSMENT NOTE - NSBHCHARTREVIEWINVESTIGATE_PSY_A_CORE FT
< from: 12 Lead ECG (01.22.21 @ 19:06) >      Ventricular Rate 62 BPM    Atrial Rate 60 BPM    QRS Duration 106 ms    Q-T Interval 506 ms    QTC Calculation(Bazett) 513 ms    R Axis -28 degrees    T Axis 157 degrees    Diagnosis Line JUNCTIONAL RHYTHM WITH FREQUENT PREMATURE ATRIAL COMPLEXES  INCOMPLETE LEFT BUNDLE BRANCH BLOCK  LEFT VENTRICULAR HYPERTROPHY WITH REPOLARIZATION ABNORMALITY  PROLONGED QT  ABNORMAL ECG  WHEN COMPARED WITH ECG OF 20-OCT-2014 14:17,  SIGNIFICANT CHANGES HAVE OCCURRED  Confirmed by MD Abner, [facility] ([id number]) on 1/23/2021 1:43:17 PM    < end of copied text >

## 2021-01-27 NOTE — BEHAVIORAL HEALTH ASSESSMENT NOTE - NSBHCONSULTFOLLOWAFTERCARE_PSY_A_CORE FT
per primary team OP psych f/u at [facility]- [phone]  [facility]- [phone]

## 2021-01-27 NOTE — CHART NOTE - NSCHARTNOTEFT_GEN_A_CORE
Nutrition Initial Assessment    Nutrition Consult Received: Yes [   ]  No [x]    Reason for Initial Nutrition Assessment: length of stay assessment on COVID unit    Source of Information: Unable to conduct a face to face interview due to limited contact restrictions related to pt's medical condition and isolation precautions. Information obtained from the EMR and ( ).     Pt is a 89 yo female with PMH of MVR/AVR, afib, s/p AICD/PPM, DM type II, HTN, CHFrEF who presented with elevated INR, admitted 1/22. Found to have COVID. Pt also found with UTI, on IV antibiotics, MICHELLE on CKD. Endocrinology following for hyperglycemia.     Subjective Information:     GI Issues:    Current Nutrition Order: Diet, Regular:   Consistent Carbohydrate {No Snacks} (CSTCHO)  DASH/TLC {Sodium & Cholesterol Restricted} (DASH)  Supplement Feeding Modality:  Oral  Glucerna Shake Cans or Servings Per Day:  2       Frequency:  Daily (01-25-21 @ 13:55) [Active]    PO Intake:   Good (%) [   ]    Fair (50-75%) [   ]    Poor (<50%) [   ]    Skin Integrity: no pressure injuries per flowsheets   Edema: 1+ generalized edema as per flowsheets    Labs: 01-27 Na141 mmol/L Glu 64 mg/dL<L> K+ 4.6 mmol/L Cr  1.37 mg/dL<H> BUN 77 mg/dL<H> Phos n/a   Alb n/a   PAB n/a       HbA1c (1/23): 7.2%    POCT Blood Glucose.: 102 mg/dL (01-27-21 @ 08:36)  POCT Blood Glucose.: 148 mg/dL (01-26-21 @ 20:57)  POCT Blood Glucose.: 302 mg/dL (01-26-21 @ 16:27)  POCT Blood Glucose.: 360 mg/dL (01-26-21 @ 12:26)    Medications:  MEDICATIONS  (STANDING):  ascorbic acid 250 milliGRAM(s) Oral daily  atorvastatin 40 milliGRAM(s) Oral at bedtime  dexAMETHasone  Injectable 6 milliGRAM(s) IV Push daily  dextrose 40% Gel 15 Gram(s) Oral once  dextrose 5%. 1000 milliLiter(s) (50 mL/Hr) IV Continuous <Continuous>  dextrose 5%. 1000 milliLiter(s) (100 mL/Hr) IV Continuous <Continuous>  dextrose 50% Injectable 25 Gram(s) IV Push once  dextrose 50% Injectable 12.5 Gram(s) IV Push once  dextrose 50% Injectable 25 Gram(s) IV Push once  folic acid 1 milliGRAM(s) Oral daily  furosemide    Tablet 20 milliGRAM(s) Oral daily  glucagon  Injectable 1 milliGRAM(s) IntraMuscular once  hydrALAZINE 50 milliGRAM(s) Oral every 8 hours  insulin glargine Injectable (LANTUS) 13 Unit(s) SubCutaneous at bedtime  insulin lispro (ADMELOG) corrective regimen sliding scale   SubCutaneous three times a day before meals  insulin lispro (ADMELOG) corrective regimen sliding scale   SubCutaneous at bedtime  insulin lispro Injectable (ADMELOG) 13 Unit(s) SubCutaneous three times a day before meals  isosorbide   mononitrate ER Tablet (IMDUR) 30 milliGRAM(s) Oral daily  lactobacillus acidophilus 1 Tablet(s) Oral daily  metoprolol tartrate 25 milliGRAM(s) Oral two times a day  senna 2 Tablet(s) Oral at bedtime    MEDICATIONS  (PRN):  bisacodyl 5 milliGRAM(s) Oral every 12 hours PRN Constipation  polyethylene glycol 3350 17 Gram(s) Oral daily PRN Constipation    Ht: 63 inches (160 cm) Wt: 149.6 pounds (68 kg)  BMI: 26.6 kg/m2  IBW: 115 pounds +/-10% %IBW: 130%    Nutrition Focused Physical Exam: Unable to complete due to limited isolation contact precautions at this time.     Estimated Energy Needs (25 kcal/kg- 30 kcal/kg): 2187-0490 kcal/day  Estimated Protein Needs (1.2 g/kg- 1.4 g/kg): 69-81 g/day  Defer fluids to team   Based on weight of: 126.5 pounds/57.5 kg (upper IBW)    [  ] Nutrition Diagnosis:  [  ] No active nutrition diagnosis at this time  [  ] Current medical condition precludes nutrition intervention    Goal:    Nutrition Interventions:     Recommendations:      RD remains available and will continue to follow-up per protocol.  Светлана Bazan, MS, RD, CDN Pager #[phone] Nutrition Initial Assessment    Nutrition Consult Received: Yes [   ]  No [x]    Reason for Initial Nutrition Assessment: length of stay assessment on COVID unit    Source of Information: Unable to conduct a face to face interview due to limited contact restrictions related to pt's medical condition and isolation precautions. Unable to reach pt/family via phone x multiple attempts. Information obtained from comprehensive chart review at this time.     Pt is a 89 yo female with PMH of MVR/AVR, afib, s/p AICD/PPM, DM type II, HTN, CHFrEF who presented with elevated INR, admitted 1/22. Found to have COVID. Pt also found with UTI, on IV antibiotics, MICHELLE on CKD. Endocrinology following for hyperglycemia.     Subjective Information: Flowsheets suggest pt with fair-good PO intake (60-95%) of documented meals. Per Patient Profile, pt with no difficulties swallowing. NKFA per chart. Per H&P, "Patient however had not complaints, has been eating and drinking well (although likely unreliable historian)." Stated dosing wt 149.9 pounds, no additional weights to assess at this time. Will continue to monitor and trend weights. Per H&P, pt taking folic acid, vitamin C, acidophilus PTA. DM managed with Humulin, glipizide, alogliptin PTA. Most recent HbA1c (7.2%) suggests good glycemic control in setting of advanced age.     GI Issues: Noted pt with fecal incontinence. No documented BMs since admission. Orders for Dulcolax PRN, Miralax PRN, senna noted. Will continue to monitor.    Current Nutrition Order: Diet, Regular:   Consistent Carbohydrate {No Snacks} (CSTCHO)  DASH/TLC {Sodium & Cholesterol Restricted} (DASH)  Supplement Feeding Modality:  Oral  Glucerna Shake Cans or Servings Per Day:  2       Frequency:  Daily (01-25-21 @ 13:55) [Active]    PO Intake:   Good (%) [   ]    Fair (50-75%) [x]    Poor (<50%) [   ]    Skin Integrity: no pressure injuries per flowsheets   Edema: 1+ generalized edema as per flowsheets    Labs: 01-27 Na141 mmol/L Glu 64 mg/dL<L> K+ 4.6 mmol/L Cr  1.37 mg/dL<H> BUN 77 mg/dL<H> Phos n/a   Alb n/a   PAB n/a       HbA1c (1/23): 7.2%    POCT Blood Glucose.: 102 mg/dL (01-27-21 @ 08:36)  POCT Blood Glucose.: 148 mg/dL (01-26-21 @ 20:57)  POCT Blood Glucose.: 302 mg/dL (01-26-21 @ 16:27)  POCT Blood Glucose.: 360 mg/dL (01-26-21 @ 12:26)    Medications:  MEDICATIONS  (STANDING):  ascorbic acid 250 milliGRAM(s) Oral daily  atorvastatin 40 milliGRAM(s) Oral at bedtime  dexAMETHasone  Injectable 6 milliGRAM(s) IV Push daily  dextrose 40% Gel 15 Gram(s) Oral once  dextrose 5%. 1000 milliLiter(s) (50 mL/Hr) IV Continuous <Continuous>  dextrose 5%. 1000 milliLiter(s) (100 mL/Hr) IV Continuous <Continuous>  dextrose 50% Injectable 25 Gram(s) IV Push once  dextrose 50% Injectable 12.5 Gram(s) IV Push once  dextrose 50% Injectable 25 Gram(s) IV Push once  folic acid 1 milliGRAM(s) Oral daily  furosemide    Tablet 20 milliGRAM(s) Oral daily  glucagon  Injectable 1 milliGRAM(s) IntraMuscular once  hydrALAZINE 50 milliGRAM(s) Oral every 8 hours  insulin glargine Injectable (LANTUS) 13 Unit(s) SubCutaneous at bedtime  insulin lispro (ADMELOG) corrective regimen sliding scale   SubCutaneous three times a day before meals  insulin lispro (ADMELOG) corrective regimen sliding scale   SubCutaneous at bedtime  insulin lispro Injectable (ADMELOG) 13 Unit(s) SubCutaneous three times a day before meals  isosorbide   mononitrate ER Tablet (IMDUR) 30 milliGRAM(s) Oral daily  lactobacillus acidophilus 1 Tablet(s) Oral daily  metoprolol tartrate 25 milliGRAM(s) Oral two times a day  senna 2 Tablet(s) Oral at bedtime    MEDICATIONS  (PRN):  bisacodyl 5 milliGRAM(s) Oral every 12 hours PRN Constipation  polyethylene glycol 3350 17 Gram(s) Oral daily PRN Constipation    Ht: 63 inches (160 cm) Wt: 149.6 pounds (68 kg)  BMI: 26.6 kg/m2  IBW: 115 pounds +/-10% %IBW: 130%    Nutrition Focused Physical Exam: Unable to complete due to limited isolation contact precautions at this time.     Estimated Energy Needs (25 kcal/kg- 30 kcal/kg): 1629-8158 kcal/day  Estimated Protein Needs (1.2 g/kg- 1.4 g/kg): 69-81 g/day  Defer fluids to team   Based on weight of: 126.5 pounds/57.5 kg (upper IBW)    [x] Nutrition Diagnosis: Altered Nutrition Related Lab Values related to suboptimal glycemic control as evidenced by hyperglycemia in-house.  [  ] No active nutrition diagnosis at this time  [  ] Current medical condition precludes nutrition intervention    Goal: Pt able to teach back 2 points of nutrition education once able to be provided.    Recommendations:  1) Continue current diet: consistent carbohydrate (no snacks), DASH/TLC. Monitor/adjust as needed.  2) Continue Glucerna 2 daily (220 kcal, 10 g protein in each) to optimize intake     Monitor PO intake, weight, labs, skin, GI status, diet  RD remains available and will continue to follow-up per protocol.  Светлана Bazan, MS, RD, CDN Pager #[phone]

## 2021-01-28 LAB
ANION GAP SERPL CALC-SCNC: 9 MMOL/L — SIGNIFICANT CHANGE UP (ref 5–17)
BUN SERPL-MCNC: 86 MG/DL — HIGH (ref 7–23)
CALCIUM SERPL-MCNC: 8.6 MG/DL — SIGNIFICANT CHANGE UP (ref 8.4–10.5)
CHLORIDE SERPL-SCNC: 101 MMOL/L — SIGNIFICANT CHANGE UP (ref 96–108)
CO2 SERPL-SCNC: 29 MMOL/L — SIGNIFICANT CHANGE UP (ref 22–31)
CREAT SERPL-MCNC: 1.31 MG/DL — HIGH (ref 0.5–1.3)
CRP SERPL-MCNC: <0.3 MG/DL — SIGNIFICANT CHANGE UP (ref 0–0.4)
D DIMER BLD IA.RAPID-MCNC: 433 NG/ML DDU — HIGH
FERRITIN SERPL-MCNC: 382 NG/ML — HIGH (ref 15–150)
GLUCOSE BLDC GLUCOMTR-MCNC: 107 MG/DL — HIGH (ref 70–99)
GLUCOSE BLDC GLUCOMTR-MCNC: 189 MG/DL — HIGH (ref 70–99)
GLUCOSE BLDC GLUCOMTR-MCNC: 201 MG/DL — HIGH (ref 70–99)
GLUCOSE BLDC GLUCOMTR-MCNC: 273 MG/DL — HIGH (ref 70–99)
GLUCOSE SERPL-MCNC: 69 MG/DL — LOW (ref 70–99)
HCT VFR BLD CALC: 30.6 % — LOW (ref 34.5–45)
HGB BLD-MCNC: 9.8 G/DL — LOW (ref 11.5–15.5)
INR BLD: 3.39 RATIO — HIGH (ref 0.88–1.16)
LDH SERPL L TO P-CCNC: 392 U/L — HIGH (ref 50–242)
MCHC RBC-ENTMCNC: 27.7 PG — SIGNIFICANT CHANGE UP (ref 27–34)
MCHC RBC-ENTMCNC: 32 GM/DL — SIGNIFICANT CHANGE UP (ref 32–36)
MCV RBC AUTO: 86.4 FL — SIGNIFICANT CHANGE UP (ref 80–100)
NRBC # BLD: 0 /100 WBCS — SIGNIFICANT CHANGE UP (ref 0–0)
PLATELET # BLD AUTO: 117 K/UL — LOW (ref 150–400)
POTASSIUM SERPL-MCNC: 5.1 MMOL/L — SIGNIFICANT CHANGE UP (ref 3.5–5.3)
POTASSIUM SERPL-SCNC: 5.1 MMOL/L — SIGNIFICANT CHANGE UP (ref 3.5–5.3)
PROTHROM AB SERPL-ACNC: 38.4 SEC — HIGH (ref 10.6–13.6)
RBC # BLD: 3.54 M/UL — LOW (ref 3.8–5.2)
RBC # FLD: 16.6 % — HIGH (ref 10.3–14.5)
SARS-COV-2 RNA SPEC QL NAA+PROBE: DETECTED
SODIUM SERPL-SCNC: 139 MMOL/L — SIGNIFICANT CHANGE UP (ref 135–145)
WBC # BLD: 13.68 K/UL — HIGH (ref 3.8–10.5)
WBC # FLD AUTO: 13.68 K/UL — HIGH (ref 3.8–10.5)

## 2021-01-28 PROCEDURE — 99232 SBSQ HOSP IP/OBS MODERATE 35: CPT

## 2021-01-28 RX ORDER — INSULIN GLARGINE 100 [IU]/ML
9 INJECTION, SOLUTION SUBCUTANEOUS AT BEDTIME
Refills: 0 | Status: DISCONTINUED | OUTPATIENT
Start: 2021-01-28 | End: 2021-01-29

## 2021-01-28 RX ORDER — LANOLIN ALCOHOL/MO/W.PET/CERES
3 CREAM (GRAM) TOPICAL AT BEDTIME
Refills: 0 | Status: COMPLETED | OUTPATIENT
Start: 2021-01-28 | End: 2021-01-28

## 2021-01-28 RX ORDER — INSULIN LISPRO 100/ML
17 VIAL (ML) SUBCUTANEOUS
Refills: 0 | Status: DISCONTINUED | OUTPATIENT
Start: 2021-01-29 | End: 2021-02-01

## 2021-01-28 RX ADMIN — Medication 6 MILLIGRAM(S): at 04:21

## 2021-01-28 RX ADMIN — SENNA PLUS 2 TABLET(S): 8.6 TABLET ORAL at 21:15

## 2021-01-28 RX ADMIN — Medication 20 MILLIGRAM(S): at 04:22

## 2021-01-28 RX ADMIN — Medication 6: at 12:31

## 2021-01-28 RX ADMIN — Medication 2: at 17:20

## 2021-01-28 RX ADMIN — Medication 13 UNIT(S): at 12:31

## 2021-01-28 RX ADMIN — Medication 1 TABLET(S): at 12:29

## 2021-01-28 RX ADMIN — Medication 15 UNIT(S): at 08:17

## 2021-01-28 RX ADMIN — ATORVASTATIN CALCIUM 40 MILLIGRAM(S): 80 TABLET, FILM COATED ORAL at 21:15

## 2021-01-28 RX ADMIN — Medication 3 MILLIGRAM(S): at 21:15

## 2021-01-28 RX ADMIN — Medication 25 MILLIGRAM(S): at 04:22

## 2021-01-28 RX ADMIN — INSULIN GLARGINE 9 UNIT(S): 100 INJECTION, SOLUTION SUBCUTANEOUS at 21:14

## 2021-01-28 RX ADMIN — ISOSORBIDE MONONITRATE 30 MILLIGRAM(S): 60 TABLET, EXTENDED RELEASE ORAL at 12:29

## 2021-01-28 RX ADMIN — Medication 10 UNIT(S): at 17:20

## 2021-01-28 RX ADMIN — Medication 25 MILLIGRAM(S): at 17:21

## 2021-01-28 RX ADMIN — Medication 250 MILLIGRAM(S): at 12:29

## 2021-01-28 RX ADMIN — Medication 1 MILLIGRAM(S): at 12:30

## 2021-01-28 NOTE — PROGRESS NOTE ADULT - ASSESSMENT
89 yo with T2D x 20 years with cataracts s/p b/l surgery and CKD 3, MVR, AVR, HTN, HLD, a. fib with ICD/PPM here with elevated INR and COVID19 pneumonia being treated with decadron (started 1/23) and remdisivir, endocrine consulted fro severe hyperglycemia: 400-500s. On basal/bolus now w/low fasting glucose but hyperglycemia during the day due to steroid effect. Tolerating POs. Remains on decadron. BG goal (100-200mg/dl).

## 2021-01-28 NOTE — PROGRESS NOTE ADULT - PROBLEM SELECTOR PLAN 2
-atorvastatin 40mg po qhs    pager: [phone]   office:  [phone] (M-F 9a-5pm)               [phone] (nights/weekends)    -I spent a total time of 26 mins with the patient at bedside/on unit of which more than 50% of time was spent on counseling/coordination of care.

## 2021-01-28 NOTE — PROGRESS NOTE ADULT - SUBJECTIVE AND OBJECTIVE BOX
CARDIOLOGY     PROGRESS  NOTE   ________________________________________________    CHIEF COMPLAINT:Patient is a 89y old  Female who presents with a chief complaint of sent for elevated INR (27 Jan 2021 16:48)  no complain.  	  REVIEW OF SYSTEMS:  CONSTITUTIONAL: No fever, weight loss, or fatigue  EYES: No eye pain, visual disturbances, or discharge  ENT:  No difficulty hearing, tinnitus, vertigo; No sinus or throat pain  NECK: No pain or stiffness  RESPIRATORY: No cough, wheezing, chills or hemoptysis; No Shortness of Breath  CARDIOVASCULAR: No chest pain, palpitations, passing out, dizziness, or leg swelling  GASTROINTESTINAL: No abdominal or epigastric pain. No nausea, vomiting, or hematemesis; No diarrhea or constipation. No melena or hematochezia.  GENITOURINARY: No dysuria, frequency, hematuria, or incontinence  NEUROLOGICAL: No headaches, memory loss, loss of strength, numbness, or tremors  SKIN: No itching, burning, rashes, or lesions   LYMPH Nodes: No enlarged glands  ENDOCRINE: No heat or cold intolerance; No hair loss  MUSCULOSKELETAL: No joint pain or swelling; No muscle, back, or extremity pain  PSYCHIATRIC: No depression, anxiety, mood swings, or difficulty sleeping  HEME/LYMPH: No easy bruising, or bleeding gums  ALLERGY AND IMMUNOLOGIC: No hives or eczema	    [ ] All others negative	  [ ] Unable to obtain    PHYSICAL EXAM:  T(C): 36.3 (01-28-21 @ 04:14), Max: 36.8 (01-27-21 @ 20:51)  HR: 63 (01-28-21 @ 04:14) (63 - 87)  BP: 130/76 (01-28-21 @ 04:14) (120/80 - 134/77)  RR: 18 (01-28-21 @ 04:14) (17 - 18)  SpO2: 98% (01-28-21 @ 04:14) (91% - 98%)  Wt(kg): --  I&O's Summary    27 Jan 2021 07:01  -  28 Jan 2021 07:00  --------------------------------------------------------  IN: 900 mL / OUT: 1100 mL / NET: -200 mL        Appearance: Normal	  HEENT:   Normal oral mucosa, PERRL, EOMI	  Lymphatic: No lymphadenopathy  Cardiovascular: Normal S1 S2, No JVD, N+ murmurs, No edema  Respiratory: Lungs clear to auscultation	  Psychiatry: A & O x 3, Mood & affect appropriate  Gastrointestinal:  Soft, Non-tender, + BS	  Skin: No rashes, No ecchymoses, No cyanosis	  Neurologic: Non-focal  Extremities: Normal range of motion, No clubbing, cyanosis or edema  Vascular: Peripheral pulses palpable 2+ bilaterally    MEDICATIONS  (STANDING):  ascorbic acid 250 milliGRAM(s) Oral daily  atorvastatin 40 milliGRAM(s) Oral at bedtime  dexAMETHasone  Injectable 6 milliGRAM(s) IV Push daily  dextrose 40% Gel 15 Gram(s) Oral once  dextrose 5%. 1000 milliLiter(s) (50 mL/Hr) IV Continuous <Continuous>  dextrose 5%. 1000 milliLiter(s) (100 mL/Hr) IV Continuous <Continuous>  dextrose 50% Injectable 25 Gram(s) IV Push once  dextrose 50% Injectable 12.5 Gram(s) IV Push once  dextrose 50% Injectable 25 Gram(s) IV Push once  folic acid 1 milliGRAM(s) Oral daily  furosemide    Tablet 20 milliGRAM(s) Oral daily  glucagon  Injectable 1 milliGRAM(s) IntraMuscular once  hydrALAZINE 50 milliGRAM(s) Oral every 8 hours  insulin glargine Injectable (LANTUS) 13 Unit(s) SubCutaneous at bedtime  insulin lispro (ADMELOG) corrective regimen sliding scale   SubCutaneous three times a day before meals  insulin lispro (ADMELOG) corrective regimen sliding scale   SubCutaneous at bedtime  insulin lispro Injectable (ADMELOG) 15 Unit(s) SubCutaneous before breakfast  insulin lispro Injectable (ADMELOG) 13 Unit(s) SubCutaneous before lunch  insulin lispro Injectable (ADMELOG) 10 Unit(s) SubCutaneous before dinner  isosorbide   mononitrate ER Tablet (IMDUR) 30 milliGRAM(s) Oral daily  lactobacillus acidophilus 1 Tablet(s) Oral daily  metoprolol tartrate 25 milliGRAM(s) Oral two times a day  senna 2 Tablet(s) Oral at bedtime      TELEMETRY: 	    ECG:  	  RADIOLOGY:  OTHER: 	  	  LABS:	 	    CARDIAC MARKERS:                                9.8    13.68 )-----------( 117      ( 28 Jan 2021 06:30 )             30.6     01-28    139  |  101  |  86<H>  ----------------------------<  69<L>  5.1   |  29  |  1.31<H>    Ca    8.6      28 Jan 2021 06:28      proBNP:   Lipid Profile:   HgA1c:   TSH:   PT/INR - ( 28 Jan 2021 06:30 )   PT: 38.4 sec;   INR: 3.39 ratio               Assessment and plan  ---------------------------  89 yo F w/ h/o MVR/AVR, Afib on coumadin, s/p AICD/PPM, DM type II, HTN, CHFrEF presents with elevated INR;  Patient is AAO x2, limited history obtained from patient and NH staff.  Patient was being treated for pneumonia for last 3 days with IV ceftriaxone/azithromycin and possibly IVF.  Patient however had no complaints, has been eating and drinking well (although likely unreliable historian).  Today she was found to have COVID and elevated INR (11.2), which prompted them to send patient to [facility].  Patient was given 10mg of vitamin K this morning.  Per chart review, patient was also positive for COVID on 1/7/21 which NH staff could not confirm.  Patient otherwise denies chest pain, SOB, cough.  On arrival, she had temp 97.9, HR 81, /83, RR 16, saturation 100% on 4L.     chf systolic acute on chronic  will adjust cardiac meds  check ct chest no contrast ?vascular congestion ?covid/ ct noted resulted   echo noted  tx for covid as per protocol  dvt prophylaxis  ac keep inr 2-3  will fu  add IMDUR to hydralazine for chf therapy  decrease lasix  inr noted 3.39  covid +, o2 sat stable

## 2021-01-28 NOTE — PROGRESS NOTE ADULT - PROBLEM SELECTOR PLAN 1
-test BG AC/HS  -consistent carb diet. Avoid juice unless to correct hypoglycemia  -Decrease Lantus 9 units QHS  -Adjust Admelog 17-13-10 w/meals. IF BG less than 120mg/dl prior to meal and pt eating, can give 6 units w/meals (notify provider and get 1x order)  -c/w Admelog moderate correction scale AC and Mod HS scale  -notify endocrine team if steroids are discontinued or changed  DISPO: TBD based on if she is going home vs. [facility].  Pt states was taking Glipizide most recently.

## 2021-01-28 NOTE — PROGRESS NOTE ADULT - SUBJECTIVE AND OBJECTIVE BOX
Diabetes Follow up note:    Chief complaint: T2DM w/steroid induced hyperglycemia    Interval Hx: Serum glucose 69mg/dl this AM w/pre-lunch glucose 270s. Pt seen at bedside. Reports tolerating POs. Has 2 empty juice containers at bedside. In good spirits and asking for lights to be turned down.     Review of Systems:  General: denies pain  GI: Tolerating POs. Denies N/V/D/Abd pain  CV: Denies CP/SOB  ENDO: No S&Sx of hypoglycemia    MEDS:  atorvastatin 40 milliGRAM(s) Oral at bedtime  dexAMETHasone  Injectable 6 milliGRAM(s) IV Push daily    insulin glargine Injectable (LANTUS) 13 Unit(s) SubCutaneous at bedtime  insulin lispro (ADMELOG) corrective regimen sliding scale   SubCutaneous three times a day before meals  insulin lispro (ADMELOG) corrective regimen sliding scale   SubCutaneous at bedtime  insulin lispro Injectable (ADMELOG) 15 Unit(s) SubCutaneous before breakfast  insulin lispro Injectable (ADMELOG) 13 Unit(s) SubCutaneous before lunch  insulin lispro Injectable (ADMELOG) 10 Unit(s) SubCutaneous before dinner      Allergies    Allergies ACEI RX: Throat swelling (Other; Swelling)  angiotensin converting enzyme inhibitors (Swelling)        PE:  General: Female lying in bed. NAD.   Vital Signs Last 24 Hrs  T(C): 36.3 (28 Jan 2021 04:14), Max: 36.8 (27 Jan 2021 20:51)  T(F): 97.3 (28 Jan 2021 04:14), Max: 98.2 (27 Jan 2021 20:51)  HR: 66 (28 Jan 2021 10:40) (63 - 74)  BP: 114/67 (28 Jan 2021 10:40) (114/67 - 134/77)  BP(mean): --  RR: 18 (28 Jan 2021 04:14) (18 - 18)  SpO2: 95% (28 Jan 2021 10:40) (92% - 98%)  Abd: Soft, NT,ND,   Extremities: Warm. no edema x 4 ext.   Neuro: A&O X2.     LABS:  POCT Blood Glucose.: 273 mg/dL (01-28-21 @ 12:11)  POCT Blood Glucose.: 107 mg/dL (01-28-21 @ 07:29)  POCT Blood Glucose.: 169 mg/dL (01-27-21 @ 21:05)  POCT Blood Glucose.: 210 mg/dL (01-27-21 @ 17:05)  POCT Blood Glucose.: 227 mg/dL (01-27-21 @ 12:05)  POCT Blood Glucose.: 102 mg/dL (01-27-21 @ 08:36)  POCT Blood Glucose.: 99 mg/dL (01-27-21 @ 08:15)  POCT Blood Glucose.: 148 mg/dL (01-26-21 @ 20:57)  POCT Blood Glucose.: 302 mg/dL (01-26-21 @ 16:27)  POCT Blood Glucose.: 360 mg/dL (01-26-21 @ 12:26)  POCT Blood Glucose.: 234 mg/dL (01-26-21 @ 08:17)  POCT Blood Glucose.: 287 mg/dL (01-26-21 @ 00:35)  POCT Blood Glucose.: 428 mg/dL (01-25-21 @ 20:53)  POCT Blood Glucose.: 408 mg/dL (01-25-21 @ 20:28)  POCT Blood Glucose.: 440 mg/dL (01-25-21 @ 17:21)  POCT Blood Glucose.: 425 mg/dL (01-25-21 @ 17:17)                            9.8    13.68 )-----------( 117      ( 28 Jan 2021 06:30 )             30.6       01-28    139  |  101  |  86<H>  ----------------------------<  69<L>  5.1   |  29  |  1.31<H>    Ca    8.6      28 Jan 2021 06:28      A1C with Estimated Average Glucose Result: 7.2 % (01-23-21 @ 10:27)  A1C with Estimated Average Glucose: 7.5 % (05-20-20 @ 05:30)          Contact number: gregory [phone] or [phone]

## 2021-01-28 NOTE — PROGRESS NOTE ADULT - ASSESSMENT
_________________________________________________________________________________________  ========>>  M E D I C A L   A T T E N D I N G    F O L L O W  U P  N O T E  <<=========  -----------------------------------------------------------------------------------------------------    - Patient seen and examined by me earlier today.   - In summary,  CHARLES ADAMS is a 89y year old woman admitted with elevated INR, AMS, COVID   - Patient today overall doing ok, comfortable, eating OK. comfortable on room air     ==================>> REVIEW OF SYSTEM <<=================    GEN: no fever, no chills, no pain, feels weak but better   RESP: no SOB, no cough, no sputum  CVS: no chest pain, no palpitations, no edema  GI: no abdominal pain, no nausea  : no dysuria, no frequency, no   Neuro: no headache, no dizziness  Derm : no itching, no rash    ==================>> PHYSICAL EXAM <<=================    GEN: A&O X 2 , NAD , comfortable in chair   HEENT: NCAT, PERRL, MMM, hearing intact  Neck: supple , no JVD appreciated  CVS: S1S2 , regular , No M/R/G appreciated  PULM: CTA B/L,  no W/R/R appreciated  ABD.: soft. non tender, non distended,  bowel sounds present  Extrem: intact pulses , no edema   PSYCH : normal mood,  not anxious      ==================>> MEDICATIONS <<====================    ascorbic acid 250 milliGRAM(s) Oral daily  atorvastatin 40 milliGRAM(s) Oral at bedtime  dexAMETHasone  Injectable 6 milliGRAM(s) IV Push daily  dextrose 40% Gel 15 Gram(s) Oral once  dextrose 5%. 1000 milliLiter(s) IV Continuous <Continuous>  dextrose 5%. 1000 milliLiter(s) IV Continuous <Continuous>  dextrose 50% Injectable 25 Gram(s) IV Push once  dextrose 50% Injectable 12.5 Gram(s) IV Push once  dextrose 50% Injectable 25 Gram(s) IV Push once  folic acid 1 milliGRAM(s) Oral daily  furosemide    Tablet 20 milliGRAM(s) Oral daily  glucagon  Injectable 1 milliGRAM(s) IntraMuscular once  hydrALAZINE 50 milliGRAM(s) Oral every 8 hours  insulin glargine Injectable (LANTUS) 9 Unit(s) SubCutaneous at bedtime  insulin lispro (ADMELOG) corrective regimen sliding scale   SubCutaneous three times a day before meals  insulin lispro (ADMELOG) corrective regimen sliding scale   SubCutaneous at bedtime  insulin lispro Injectable (ADMELOG) 13 Unit(s) SubCutaneous before lunch  insulin lispro Injectable (ADMELOG) 10 Unit(s) SubCutaneous before dinner  isosorbide   mononitrate ER Tablet (IMDUR) 30 milliGRAM(s) Oral daily  lactobacillus acidophilus 1 Tablet(s) Oral daily  metoprolol tartrate 25 milliGRAM(s) Oral two times a day  senna 2 Tablet(s) Oral at bedtime    MEDICATIONS  (PRN):  bisacodyl 5 milliGRAM(s) Oral every 12 hours PRN Constipation  polyethylene glycol 3350 17 Gram(s) Oral daily PRN Constipation    ___________  Active diet:  Diet, Regular:   Consistent Carbohydrate No Snacks (CSTCHO)  DASH/TLC Sodium & Cholesterol Restricted (DASH)  Supplement Feeding Modality:  Oral  Glucerna Shake Cans or Servings Per Day:  2       Frequency:  Daily  ___________________    ==================>> VITAL SIGNS <<==================    Vital Signs Last 24 HrsT(C): 36.7 (01-28-21 @ 13:56)  T(F): 98.1 (01-28-21 @ 13:56), Max: 98.2 (01-27-21 @ 20:51)  HR: 66 (01-28-21 @ 13:56) (63 - 74)  BP: 123/72 (01-28-21 @ 13:56)  RR: 18 (01-28-21 @ 13:56) (18 - 18)  SpO2: 94% (01-28-21 @ 13:56) (92% - 98%)      POCT Blood Glucose.: 273 mg/dL (28 Jan 2021 12:11)  POCT Blood Glucose.: 107 mg/dL (28 Jan 2021 07:29)  POCT Blood Glucose.: 169 mg/dL (27 Jan 2021 21:05)  POCT Blood Glucose.: 210 mg/dL (27 Jan 2021 17:05)     ==================>> LAB AND IMAGING <<==================                        9.8    13.68 )-----------( 117      ( 28 Jan 2021 06:30 )             30.6        01-28    139  |  101  |  86<H>  ----------------------------<  69<L>  5.1   |  29  |  1.31<H>    Ca    8.6      28 Jan 2021 06:28    WBC count:   13.68 <<== ,  14.96 <<== ,  12.88 <<== ,  12.20 <<== ,  11.00 <<==   Hemoglobin:   9.8 <<==,  10.2 <<==,  9.8 <<==,  8.8 <<==,  9.2 <<==  platelets:  117 <==, 131 <==, 150 <==, 146 <==, 158 <==, 170 <==, 185 <==    Creatinine:  1.31  <<==, 1.37  <<==, 1.49  <<==, 1.65  <<==, 1.70  <<==, 1.72  <<==  Sodium:   139  <==, 141  <==, 137  <==, 136  <==, 137  <==, 137  <==, 145  <==    ^^^ Inflammatory markers :  ^^^  C R P :          <0.30 (01-28-21)  <<--, <0.30 (01-27-21)  <<--, 0.33 (01-26-21)  <<--, 0.52 (01-25-21)  <<--, 0.63 (01-24-21)  <<--  P C T :         0.07 (01-25-21) <<--, 0.07 (01-23-21) <<--    Ferritin :         382 (01-28-21) <<--, 425 (01-27-21) <<--, 460 (01-26-21) <<--, 446 (01-25-21) <<--, 464 (01-24-21) <<--, 511 (01-23-21) <<--    D-Dimer :          433 (01-28-21) <<--, 597 (01-25-21) <<--, 396 (01-24-21) <<--, 198 (01-23-21) <<--    ___________________________________________________________________________________  ===============>>  A S S E S S M E N T   A N D   P L A N <<===============  ------------------------------------------------------------------------------------------    · Assessment	  89 yo F w/ h/o MVR/AVR, Afib on coumadin, s/p AICD/PPM, DM type II, HTN, CHFrEF presents with elevated INR;     Problem/Plan - 1:  ·  Problem: Acute hypoxemic respiratory failure due to COVID-19 with Viral pneumonia from Viral syndrome.   trend inflammatory markers  finish dexamethasone per protocol   not on Remdesivir due to renal insufficiency   supportive care  monitoring off O2   dispo planing    Problem/Plan - 2:  ·  Problem: UTI  abx IV ceftriaxone;   follow cultures     Problem/Plan - 3:  ·  Problem: MICHELLE on CKD III, stable   monitor closely on PO lasix for CHF  monitor ins/outs and daily weights  monitor BMP  avoid nephrotoxic agents.     Problem/Plan - 4:  ·  Problem: metabolic encephalopathy due to above  appears\ improved: pt interactive and conversive, alert..   CT brain negative for acute pathology  monitor for now as improved     Problem/Plan - 5:  ·  Problem: INR abnormal.    s/p vitamin K 10mg at NH  coumadin >> HOLD tonight > resume at 3 mg after INR terpentic   monitor iNR      I N R :  3.39  <<==, 3.48  <<==, 2.15  <<==, 1.69  <<==, 1.34  <<==    Problem/Plan - 6:  Problem: Atrial fibrillation. Plan: on coumadin  monitor INR on coumadin  ? ACID/PPM interrogation as needed   monitor cardio    Problem/Plan - 7:  ·  Problem: chronic systolic CHF   on lasix: monitor as stable   monitor ins/outs and daily weights  Continue Current medications otherwise and monitor.   cardio     Problem/Plan - 8:  ·  Problem: Hypertension.  Plan: c/w imdur and hydralazine.     Problem/Plan - 9:  ·  Problem: Diabetes mellitus, type II.    FS better controlled  endo follow up and mgmt appreciated   monitor FS     -GI/DVT Prophylaxis per protocol.    CODE STATUS: DNR/DNI (MOLST in Chart).    Discharge planing     --------------------------------------------  Case discussed with pt  Education given on findings and plan of care  ___________________________  H. AINSLEY Rogers.  Pager: [phone]

## 2021-01-29 LAB
CRP SERPL-MCNC: <0.3 MG/DL — SIGNIFICANT CHANGE UP (ref 0–0.4)
FERRITIN SERPL-MCNC: 394 NG/ML — HIGH (ref 15–150)
GLUCOSE BLDC GLUCOMTR-MCNC: 176 MG/DL — HIGH (ref 70–99)
GLUCOSE BLDC GLUCOMTR-MCNC: 92 MG/DL — SIGNIFICANT CHANGE UP (ref 70–99)
HISTAMINE BLD-MCNC: 0.95 NG/ML — SIGNIFICANT CHANGE UP (ref 0–1)
INR BLD: 2.62 RATIO — HIGH (ref 0.88–1.16)
LDH SERPL L TO P-CCNC: 416 U/L — HIGH (ref 50–242)
PROTHROM AB SERPL-ACNC: 30 SEC — HIGH (ref 10.6–13.6)

## 2021-01-29 PROCEDURE — 99232 SBSQ HOSP IP/OBS MODERATE 35: CPT

## 2021-01-29 RX ORDER — LANOLIN ALCOHOL/MO/W.PET/CERES
5 CREAM (GRAM) TOPICAL ONCE
Refills: 0 | Status: COMPLETED | OUTPATIENT
Start: 2021-01-29 | End: 2021-01-29

## 2021-01-29 RX ORDER — INSULIN GLARGINE 100 [IU]/ML
6 INJECTION, SOLUTION SUBCUTANEOUS AT BEDTIME
Refills: 0 | Status: DISCONTINUED | OUTPATIENT
Start: 2021-01-29 | End: 2021-01-30

## 2021-01-29 RX ORDER — WARFARIN SODIUM 2.5 MG/1
3 TABLET ORAL ONCE
Refills: 0 | Status: COMPLETED | OUTPATIENT
Start: 2021-01-29 | End: 2021-01-30

## 2021-01-29 RX ADMIN — Medication 25 MILLIGRAM(S): at 17:35

## 2021-01-29 RX ADMIN — Medication 250 MILLIGRAM(S): at 12:29

## 2021-01-29 RX ADMIN — Medication 1 MILLIGRAM(S): at 12:29

## 2021-01-29 RX ADMIN — Medication 10 UNIT(S): at 17:35

## 2021-01-29 RX ADMIN — Medication 1 TABLET(S): at 12:29

## 2021-01-29 RX ADMIN — ISOSORBIDE MONONITRATE 30 MILLIGRAM(S): 60 TABLET, EXTENDED RELEASE ORAL at 12:29

## 2021-01-29 RX ADMIN — Medication 50 MILLIGRAM(S): at 04:28

## 2021-01-29 RX ADMIN — Medication 5 MILLIGRAM(S): at 21:32

## 2021-01-29 RX ADMIN — ATORVASTATIN CALCIUM 40 MILLIGRAM(S): 80 TABLET, FILM COATED ORAL at 21:29

## 2021-01-29 RX ADMIN — Medication 25 MILLIGRAM(S): at 04:28

## 2021-01-29 RX ADMIN — SENNA PLUS 2 TABLET(S): 8.6 TABLET ORAL at 21:29

## 2021-01-29 RX ADMIN — Medication 17 UNIT(S): at 08:22

## 2021-01-29 RX ADMIN — Medication 2: at 12:30

## 2021-01-29 RX ADMIN — Medication 13 UNIT(S): at 12:31

## 2021-01-29 RX ADMIN — Medication 20 MILLIGRAM(S): at 04:28

## 2021-01-29 RX ADMIN — INSULIN GLARGINE 6 UNIT(S): 100 INJECTION, SOLUTION SUBCUTANEOUS at 21:29

## 2021-01-29 RX ADMIN — Medication 6 MILLIGRAM(S): at 04:28

## 2021-01-29 NOTE — PROGRESS NOTE ADULT - ASSESSMENT
_________________________________________________________________________________________  ========>>  M E D I C A L   A T T E N D I N G    F O L L O W  U P  N O T E  <<=========  -----------------------------------------------------------------------------------------------------    - Patient evaluated by me, chart reviewed.   - In summary,  CHARLES ADAMS is a 89y year old woman admitted with elevated INR, AMS, COVID   - Patient today overall doing ok, comfortable, eating OK. comfortable on room air     ==================>> REVIEW OF SYSTEM <<=================    GEN: no fever, no chills, no pain, feels weak but better   RESP: no SOB, no cough, no sputum  CVS: no chest pain, no palpitations, no edema  GI: no abdominal pain, no nausea  : no dysuria, no frequency, no   Neuro: no headache, no dizziness  Derm : no itching, no rash    ==================>> PHYSICAL EXAM <<=================    GEN: A&O X 2 , NAD , comfortable in chair   HEENT: NCAT, PERRL, MMM, hearing intact  Neck: supple , no JVD appreciated  CVS: S1S2 , regular , No M/R/G appreciated  PULM: CTA B/L,  no W/R/R appreciated  ABD.: soft. non tender, non distended,  bowel sounds present  Extrem: intact pulses , no edema   PSYCH : normal mood,  not anxious                                            ( Note written 01-29-21 )    ==================>> MEDICATIONS <<====================    ascorbic acid 250 milliGRAM(s) Oral daily  atorvastatin 40 milliGRAM(s) Oral at bedtime  dexAMETHasone  Injectable 6 milliGRAM(s) IV Push daily  dextrose 40% Gel 15 Gram(s) Oral once  dextrose 5%. 1000 milliLiter(s) IV Continuous <Continuous>  dextrose 5%. 1000 milliLiter(s) IV Continuous <Continuous>  dextrose 50% Injectable 25 Gram(s) IV Push once  dextrose 50% Injectable 12.5 Gram(s) IV Push once  dextrose 50% Injectable 25 Gram(s) IV Push once  folic acid 1 milliGRAM(s) Oral daily  furosemide    Tablet 20 milliGRAM(s) Oral daily  glucagon  Injectable 1 milliGRAM(s) IntraMuscular once  hydrALAZINE 50 milliGRAM(s) Oral every 8 hours  insulin glargine Injectable (LANTUS) 9 Unit(s) SubCutaneous at bedtime  insulin lispro (ADMELOG) corrective regimen sliding scale   SubCutaneous three times a day before meals  insulin lispro (ADMELOG) corrective regimen sliding scale   SubCutaneous at bedtime  insulin lispro Injectable (ADMELOG) 17 Unit(s) SubCutaneous before breakfast  insulin lispro Injectable (ADMELOG) 13 Unit(s) SubCutaneous before lunch  insulin lispro Injectable (ADMELOG) 10 Unit(s) SubCutaneous before dinner  isosorbide   mononitrate ER Tablet (IMDUR) 30 milliGRAM(s) Oral daily  lactobacillus acidophilus 1 Tablet(s) Oral daily  metoprolol tartrate 25 milliGRAM(s) Oral two times a day  senna 2 Tablet(s) Oral at bedtime    MEDICATIONS  (PRN):  bisacodyl 5 milliGRAM(s) Oral every 12 hours PRN Constipation  polyethylene glycol 3350 17 Gram(s) Oral daily PRN Constipation    ___________  Active diet:  Diet, Regular:   Consistent Carbohydrate No Snacks (CSTCHO)  DASH/TLC Sodium & Cholesterol Restricted (DASH)  Supplement Feeding Modality:  Oral  Glucerna Shake Cans or Servings Per Day:  2       Frequency:  Daily  ___________________    ==================>> VITAL SIGNS <<==================    Vital Signs Last 24 HrsT(C): 36.4 (01-29-21 @ 04:26)  T(F): 97.5 (01-29-21 @ 04:26), Max: 98.1 (01-28-21 @ 21:14)  HR: 67 (01-29-21 @ 11:42) (60 - 67)  BP: 104/69 (01-29-21 @ 11:42)  RR: 18 (01-29-21 @ 04:26) (18 - 18)  SpO2: 96% (01-29-21 @ 11:42) (95% - 96%)      POCT Blood Glucose.: 176 mg/dL (29 Jan 2021 12:23)  POCT Blood Glucose.: 92 mg/dL (29 Jan 2021 08:19)  POCT Blood Glucose.: 201 mg/dL (28 Jan 2021 21:00)  POCT Blood Glucose.: 189 mg/dL (28 Jan 2021 16:59)     ==================>> LAB AND IMAGING <<==================                        9.8    13.68 )-----------( 117      ( 28 Jan 2021 06:30 )             30.6        01-28    139  |  101  |  86<H>  ----------------------------<  69<L>  5.1   |  29  |  1.31<H>    Ca    8.6      28 Jan 2021 06:28    WBC count:   13.68 <<== ,  14.96 <<== ,  12.88 <<==   Hemoglobin:   9.8 <<==,  10.2 <<==,  9.8 <<==  platelets:  117 <==, 131 <==, 150 <==, 146 <==, 158 <==    Creatinine:  1.31  <<==, 1.37  <<==, 1.49  <<==, 1.65  <<==, 1.70  <<==, 1.72  <<==  Sodium:   139  <==, 141  <==, 137  <==, 136  <==, 137  <==, 137  <==    I N R :  2.62  <<==, 3.39  <<==, 3.48  <<==, 2.15  <<==, 1.69  <<==      ^^^ Inflammatory markers :  ^^^  C R P :          <0.30 (01-29-21)  <<--, <0.30 (01-28-21)  <<--, <0.30 (01-27-21)  <<--, 0.33 (01-26-21)  <<--, 0.52 (01-25-21)  <<--  P C T :         0.07 (01-25-21) <<--, 0.07 (01-23-21) <<--    Ferritin :         394 (01-29-21) <<--, 382 (01-28-21) <<--, 425 (01-27-21) <<--, 460 (01-26-21) <<--, 446 (01-25-21) <<--, 464 (01-24-21) <<--, 511 (01-23-21) <<--    D-Dimer :          433 (01-28-21) <<--, 597 (01-25-21) <<--, 396 (01-24-21) <<--, 198 (01-23-21) <<--          ___________________________________________________________________________________  ===============>>  A S S E S S M E N T   A N D   P L A N <<===============  ------------------------------------------------------------------------------------------    · Assessment	  89 yo F w/ h/o MVR/AVR, Afib on coumadin, s/p AICD/PPM, DM type II, HTN, CHFrEF presents with elevated INR;     Problem/Plan - 1:  ·  Problem: Acute hypoxemic respiratory failure due to COVID-19 with Viral pneumonia from Viral syndrome.   trend inflammatory markers  finish dexamethasone per protocol   not on Remdesivir due to renal insufficiency   supportive care  monitoring off O2   dispo planing    Problem/Plan - 2:  ·  Problem: UTI  abx IV ceftriaxone;   follow cultures     Problem/Plan - 3:  ·  Problem: MICHELLE on CKD III, stable   monitor closely on PO lasix for CHF  monitor ins/outs and daily weights  monitor BMP  avoid nephrotoxic agents.     Problem/Plan - 4:  ·  Problem: metabolic encephalopathy due to above  appears\ improved: pt interactive and conversive, alert..   CT brain negative for acute pathology  monitor for now as improved     Problem/Plan - 5:  ·  Problem: INR abnormal.    s/p vitamin K 10mg at NH  coumadin dosing per iNR     Problem/Plan - 6:  Problem: Atrial fibrillation. Plan: on coumadin  monitor INR on coumadin  monitor cardio    Problem/Plan - 7:  ·  Problem: chronic systolic CHF   on lasix: monitor as stable   monitor ins/outs and daily weights  Continue Current medications otherwise and monitor.   cardio     Problem/Plan - 8:  ·  Problem: Hypertension.  Plan: c/w imdur and hydralazine.     Problem/Plan - 9:  ·  Problem: Diabetes mellitus, type II.    FS better controlled  endo follow up and mgmt appreciated   monitor FS     -GI/DVT Prophylaxis per protocol.    CODE STATUS: DNR/DNI (MOLST in Chart).    Discharge planing     ___________________________  H. AINSLEY Rogers.  Pager: [phone]

## 2021-01-29 NOTE — PROGRESS NOTE ADULT - ASSESSMENT
89 yo w/h/o controlled T2DM (A1C7.2%) on "glipizide twice a day" per pt report. DM c/b CKD 3. Also h/o cataracts s/p b/l surgery and MVR, AVR, HTN, HLD, a. fib with ICD/PPM here with elevated INR and COVID19 pneumonia being treated with decadron (started 1/23) and remdisivir, endocrine consulted for severe hyperglycemia: 400-500s. On basal/bolus still w/low fasting glucose and improved BG levels during the day. Tolerating POs but needs assistance with feeding. Remains on decadron. Pt reports living alone. Will continue to decrease basal insulin to BG goal (100-200mg/dl).   Concern about  pt's ability to care for self at this time. Might benefit from rehab.     Spent 25 minutes assessing pt/labs/meds and discussing plan of care with primary team. Moderate complexity on pt with T2DM controlled but hyperglycemic in the setting of COVID infection and steroid therapy. Adjusting insulin doses.

## 2021-01-29 NOTE — PROGRESS NOTE ADULT - SUBJECTIVE AND OBJECTIVE BOX
DIABETES FOLLOW UP NOTE: Saw pt earlier today  INTERVAL HX: 89 yo w/h/o controlled T2DM (A1C7.2%) on "glipizide twice a day" per pt report. DM c/b CKD 3. Also h/o cataracts s/p b/l surgery and MVR, AVR, HTN, HLD, a. fib with ICD/PPM here with elevated INR and COVID19 pneumonia being treated with decadron (started 1/23) and remdisivir, endocrine consulted fro severe hyperglycemia: 400-500s. On basal/bolus still w/low fasting glucose with improve BG levels during the day. Tolerating POs but needs assistance with feeding. Remains on decadron. Pt reports living alone. Feels weak and tired. No SOB/cough reported.     Review of Systems:  General: As above  Cardiovascular: No chest pain, palpitations  Respiratory: No SOB, no cough  GI: No nausea, vomiting, abdominal pain  Endocrine: no polyuria, polydipsia or S&Sx of hypoglycemia    Allergies    Allergies ACEI RX: Throat swelling (Other; Swelling)  angiotensin converting enzyme inhibitors (Swelling)    Intolerances      MEDICATIONS:  atorvastatin 40 milliGRAM(s) Oral at bedtime  dexAMETHasone  Injectable 6 milliGRAM(s) IV Push daily  insulin glargine Injectable (LANTUS) 9 Unit(s) SubCutaneous at bedtime  insulin lispro (ADMELOG) corrective regimen sliding scale   SubCutaneous three times a day before meals  insulin lispro (ADMELOG) corrective regimen sliding scale   SubCutaneous at bedtime  insulin lispro Injectable (ADMELOG) 17 Unit(s) SubCutaneous before breakfast  insulin lispro Injectable (ADMELOG) 13 Unit(s) SubCutaneous before lunch  insulin lispro Injectable (ADMELOG) 10 Unit(s) SubCutaneous before dinner    PHYSICAL EXAM:  VITALS: T(C): 36.4 (01-29-21 @ 12:30)  T(F): 97.5 (01-29-21 @ 12:30), Max: 98.1 (01-28-21 @ 21:14)  HR: 60 (01-29-21 @ 12:30) (60 - 67)  BP: 132/77 (01-29-21 @ 12:30) (104/69 - 144/81)  RR:  (18 - 20)  SpO2:  (93% - 96%)  Wt(kg): --  GENERAL: Female laying on bed in NAD  Abdomen: Soft, nontender, non distended  Extremities: Warm, no edema in all 4 exts. Venodyne boots on  NEURO: Alert and able to answer simple questions. Forgetful.     LABS:  POCT Blood Glucose.: 176 mg/dL (01-29-21 @ 12:23)  POCT Blood Glucose.: 92 mg/dL (01-29-21 @ 08:19)  POCT Blood Glucose.: 201 mg/dL (01-28-21 @ 21:00)  POCT Blood Glucose.: 189 mg/dL (01-28-21 @ 16:59)  POCT Blood Glucose.: 273 mg/dL (01-28-21 @ 12:11)  POCT Blood Glucose.: 107 mg/dL (01-28-21 @ 07:29)  POCT Blood Glucose.: 169 mg/dL (01-27-21 @ 21:05)  POCT Blood Glucose.: 210 mg/dL (01-27-21 @ 17:05)  POCT Blood Glucose.: 227 mg/dL (01-27-21 @ 12:05)  POCT Blood Glucose.: 102 mg/dL (01-27-21 @ 08:36)  POCT Blood Glucose.: 99 mg/dL (01-27-21 @ 08:15)  POCT Blood Glucose.: 148 mg/dL (01-26-21 @ 20:57)                            9.8    13.68 )-----------( 117      ( 28 Jan 2021 06:30 )             30.6       01-28    139  |  101  |  86<H>  ----------------------------<  69<L>  5.1   |  29  |  1.31<H>      EGFR if non : 36<L>    Ca    8.6      01-28    A1C with Estimated Average Glucose Result: 7.2 % (01-23-21 @ 10:27)      Estimated Average Glucose: 160 mg/dL (01-23-21 @ 10:27)

## 2021-01-29 NOTE — PROGRESS NOTE ADULT - PROBLEM SELECTOR PLAN 1
-test BG AC/HS  -Decrease Lantus to 6 units QHS  -C/w Admelog 17-13-10 w/meals. IF BG less than 120mg/dl prior to meal and pt eating, can give 6 units w/meals (notify provider and get 1x order)  -c/w Admelog moderate correction scale AC and Mod HS scale  -notify endocrine team if steroids are discontinued or changed  DISPO: TBD based on if she is going home vs. [facility].  Pt states was taking Glipizide most recently but due to age sulphonylureas are not recommended in the elderly due to increase risk for hypoglycemia specially in setting of CKD.   Need to consider benefits of meds versus risk at old age. Might benefit from Tradjenta. No metformin due to old age/CKD -test BG AC/HS  -Decrease Lantus to 6 units QHS  -C/w Admelog 17-13-10 w/meals. IF BG less than 120mg/dl prior to meal and pt eating, can give 6 units w/meals (notify provider and get 1x order)  -c/w Admelog moderate correction scale AC and Mod HS scale  -notify endocrine team if steroids are discontinued or changed  DISPO: TBD based on if she is going home vs. [facility].  Pt states was taking Glipizide most recently but due to age sulphonylureas are not recommended in the elderly due to increase risk for hypoglycemia specially in setting of CKD.   Need to consider benefits of meds versus risk at old age. Might benefit from Tradjenta. No metformin due to old age/CKD/elevated lactate levels

## 2021-01-29 NOTE — PROGRESS NOTE ADULT - SUBJECTIVE AND OBJECTIVE BOX
CARDIOLOGY     PROGRESS  NOTE   ________________________________________________    CHIEF COMPLAINT:Patient is a 89y old  Female who presents with a chief complaint of sent for elevated INR (28 Jan 2021 15:33)  no complain.  	  REVIEW OF SYSTEMS:  CONSTITUTIONAL: No fever, weight loss, or fatigue  EYES: No eye pain, visual disturbances, or discharge  ENT:  No difficulty hearing, tinnitus, vertigo; No sinus or throat pain  NECK: No pain or stiffness  RESPIRATORY: No cough, wheezing, chills or hemoptysis; No Shortness of Breath  CARDIOVASCULAR: No chest pain, palpitations, passing out, dizziness, or leg swelling  GASTROINTESTINAL: No abdominal or epigastric pain. No nausea, vomiting, or hematemesis; No diarrhea or constipation. No melena or hematochezia.  GENITOURINARY: No dysuria, frequency, hematuria, or incontinence  NEUROLOGICAL: No headaches, memory loss, loss of strength, numbness, or tremors  SKIN: No itching, burning, rashes, or lesions   LYMPH Nodes: No enlarged glands  ENDOCRINE: No heat or cold intolerance; No hair loss  MUSCULOSKELETAL: No joint pain or swelling; No muscle, back, or extremity pain  PSYCHIATRIC: No depression, anxiety, mood swings, or difficulty sleeping  HEME/LYMPH: No easy bruising, or bleeding gums  ALLERGY AND IMMUNOLOGIC: No hives or eczema	    [ ] All others negative	  [x ] Unable to obtain    PHYSICAL EXAM:  T(C): 36.4 (01-29-21 @ 04:26), Max: 36.7 (01-28-21 @ 13:56)  HR: 60 (01-29-21 @ 04:26) (60 - 66)  BP: 144/81 (01-29-21 @ 04:26) (114/67 - 144/81)  RR: 18 (01-29-21 @ 04:26) (18 - 18)  SpO2: 95% (01-29-21 @ 04:26) (94% - 96%)  Wt(kg): --  I&O's Summary    28 Jan 2021 07:01  -  29 Jan 2021 07:00  --------------------------------------------------------  IN: 980 mL / OUT: 700 mL / NET: 280 mL        Appearance: Normal	  HEENT:   Normal oral mucosa, PERRL, EOMI	  Lymphatic: No lymphadenopathy  Cardiovascular: Normal S1 S2, No JVD, +murmurs, No edema  Respiratory: Lungs clear to auscultation	  Psychiatry: A & O x 3, Mood & affect appropriate  Gastrointestinal:  Soft, Non-tender, + BS	  Skin: No rashes, No ecchymoses, No cyanosis	  Neurologic: Non-focal  Extremities: Normal range of motion, No clubbing, cyanosis or edema  Vascular: Peripheral pulses palpable 2+ bilaterally    MEDICATIONS  (STANDING):  ascorbic acid 250 milliGRAM(s) Oral daily  atorvastatin 40 milliGRAM(s) Oral at bedtime  dexAMETHasone  Injectable 6 milliGRAM(s) IV Push daily  dextrose 40% Gel 15 Gram(s) Oral once  dextrose 5%. 1000 milliLiter(s) (50 mL/Hr) IV Continuous <Continuous>  dextrose 5%. 1000 milliLiter(s) (100 mL/Hr) IV Continuous <Continuous>  dextrose 50% Injectable 25 Gram(s) IV Push once  dextrose 50% Injectable 12.5 Gram(s) IV Push once  dextrose 50% Injectable 25 Gram(s) IV Push once  folic acid 1 milliGRAM(s) Oral daily  furosemide    Tablet 20 milliGRAM(s) Oral daily  glucagon  Injectable 1 milliGRAM(s) IntraMuscular once  hydrALAZINE 50 milliGRAM(s) Oral every 8 hours  insulin glargine Injectable (LANTUS) 9 Unit(s) SubCutaneous at bedtime  insulin lispro (ADMELOG) corrective regimen sliding scale   SubCutaneous three times a day before meals  insulin lispro (ADMELOG) corrective regimen sliding scale   SubCutaneous at bedtime  insulin lispro Injectable (ADMELOG) 17 Unit(s) SubCutaneous before breakfast  insulin lispro Injectable (ADMELOG) 13 Unit(s) SubCutaneous before lunch  insulin lispro Injectable (ADMELOG) 10 Unit(s) SubCutaneous before dinner  isosorbide   mononitrate ER Tablet (IMDUR) 30 milliGRAM(s) Oral daily  lactobacillus acidophilus 1 Tablet(s) Oral daily  metoprolol tartrate 25 milliGRAM(s) Oral two times a day  senna 2 Tablet(s) Oral at bedtime      TELEMETRY: 	    ECG:  	  RADIOLOGY:  OTHER: 	  	  LABS:	 	    CARDIAC MARKERS:                                9.8    13.68 )-----------( 117      ( 28 Jan 2021 06:30 )             30.6     01-28    139  |  101  |  86<H>  ----------------------------<  69<L>  5.1   |  29  |  1.31<H>    Ca    8.6      28 Jan 2021 06:28      proBNP:   Lipid Profile:   HgA1c:   TSH:   PT/INR - ( 29 Jan 2021 07:02 )   PT: 30.0 sec;   INR: 2.62 ratio               Assessment and plan  ---------------------------  89 yo F w/ h/o MVR/AVR, Afib on coumadin, s/p AICD/PPM, DM type II, HTN, CHFrEF presents with elevated INR;  Patient is AAO x2, limited history obtained from patient and NH staff.  Patient was being treated for pneumonia for last 3 days with IV ceftriaxone/azithromycin and possibly IVF.  Patient however had no complaints, has been eating and drinking well (although likely unreliable historian).  Today she was found to have COVID and elevated INR (11.2), which prompted them to send patient to [facility].  Patient was given 10mg of vitamin K this morning.  Per chart review, patient was also positive for COVID on 1/7/21 which NH staff could not confirm.  Patient otherwise denies chest pain, SOB, cough.  On arrival, she had temp 97.9, HR 81, /83, RR 16, saturation 100% on 4L.     chf systolic acute on chronic  will adjust cardiac meds  check ct chest no contrast ?vascular congestion ?covid/ ct noted resulted   echo noted  tx for covid as per protocol  dvt prophylaxis  ac keep inr 2-3  will fu  add IMDUR to hydralazine for chf therapy  dc  lasix  inr noted 2.62  covid +, o2 sat stable  will increase betya blocker as tolertaed

## 2021-01-29 NOTE — PROGRESS NOTE ADULT - PROBLEM SELECTOR PLAN 2
-atorvastatin 40mg po qhs    pager: [phone]   office:  [phone] (M-F 9a-5pm)               [phone] (nights/weekends)    -I spent a total time of 26 mins with the patient at bedside/on unit of which more than 50% of time was spent on counseling/coordination of care. -C/w atorvastatin 40mg po qhs

## 2021-01-30 LAB
ANION GAP SERPL CALC-SCNC: 7 MMOL/L — SIGNIFICANT CHANGE UP (ref 5–17)
BUN SERPL-MCNC: 80 MG/DL — HIGH (ref 7–23)
CALCIUM SERPL-MCNC: 8.4 MG/DL — SIGNIFICANT CHANGE UP (ref 8.4–10.5)
CHLORIDE SERPL-SCNC: 99 MMOL/L — SIGNIFICANT CHANGE UP (ref 96–108)
CO2 SERPL-SCNC: 31 MMOL/L — SIGNIFICANT CHANGE UP (ref 22–31)
CREAT SERPL-MCNC: 1.16 MG/DL — SIGNIFICANT CHANGE UP (ref 0.5–1.3)
CRP SERPL-MCNC: 0.74 MG/DL — HIGH (ref 0–0.4)
FERRITIN SERPL-MCNC: 372 NG/ML — HIGH (ref 15–150)
GLUCOSE BLDC GLUCOMTR-MCNC: 111 MG/DL — HIGH (ref 70–99)
GLUCOSE BLDC GLUCOMTR-MCNC: 149 MG/DL — HIGH (ref 70–99)
GLUCOSE BLDC GLUCOMTR-MCNC: 167 MG/DL — HIGH (ref 70–99)
GLUCOSE BLDC GLUCOMTR-MCNC: 211 MG/DL — HIGH (ref 70–99)
GLUCOSE SERPL-MCNC: 73 MG/DL — SIGNIFICANT CHANGE UP (ref 70–99)
HCT VFR BLD CALC: 30.7 % — LOW (ref 34.5–45)
HGB BLD-MCNC: 9.9 G/DL — LOW (ref 11.5–15.5)
INR BLD: 1.82 RATIO — HIGH (ref 0.88–1.16)
LDH SERPL L TO P-CCNC: 399 U/L — HIGH (ref 50–242)
MCHC RBC-ENTMCNC: 28 PG — SIGNIFICANT CHANGE UP (ref 27–34)
MCHC RBC-ENTMCNC: 32.2 GM/DL — SIGNIFICANT CHANGE UP (ref 32–36)
MCV RBC AUTO: 86.7 FL — SIGNIFICANT CHANGE UP (ref 80–100)
NRBC # BLD: 0 /100 WBCS — SIGNIFICANT CHANGE UP (ref 0–0)
PLATELET # BLD AUTO: 94 K/UL — LOW (ref 150–400)
POTASSIUM SERPL-MCNC: 4.9 MMOL/L — SIGNIFICANT CHANGE UP (ref 3.5–5.3)
POTASSIUM SERPL-SCNC: 4.9 MMOL/L — SIGNIFICANT CHANGE UP (ref 3.5–5.3)
PROTHROM AB SERPL-ACNC: 21.2 SEC — HIGH (ref 10.6–13.6)
RBC # BLD: 3.54 M/UL — LOW (ref 3.8–5.2)
RBC # FLD: 17.3 % — HIGH (ref 10.3–14.5)
SARS-COV-2 RNA SPEC QL NAA+PROBE: DETECTED
SODIUM SERPL-SCNC: 137 MMOL/L — SIGNIFICANT CHANGE UP (ref 135–145)
WBC # BLD: 13.86 K/UL — HIGH (ref 3.8–10.5)
WBC # FLD AUTO: 13.86 K/UL — HIGH (ref 3.8–10.5)

## 2021-01-30 RX ORDER — ENOXAPARIN SODIUM 100 MG/ML
60 INJECTION SUBCUTANEOUS ONCE
Refills: 0 | Status: COMPLETED | OUTPATIENT
Start: 2021-01-30 | End: 2021-01-30

## 2021-01-30 RX ORDER — WARFARIN SODIUM 2.5 MG/1
4 TABLET ORAL ONCE
Refills: 0 | Status: COMPLETED | OUTPATIENT
Start: 2021-01-30 | End: 2021-01-30

## 2021-01-30 RX ORDER — INSULIN GLARGINE 100 [IU]/ML
3 INJECTION, SOLUTION SUBCUTANEOUS AT BEDTIME
Refills: 0 | Status: DISCONTINUED | OUTPATIENT
Start: 2021-01-30 | End: 2021-02-03

## 2021-01-30 RX ADMIN — Medication 1 MILLIGRAM(S): at 11:31

## 2021-01-30 RX ADMIN — Medication 13 UNIT(S): at 12:42

## 2021-01-30 RX ADMIN — ENOXAPARIN SODIUM 60 MILLIGRAM(S): 100 INJECTION SUBCUTANEOUS at 21:52

## 2021-01-30 RX ADMIN — Medication 1 TABLET(S): at 11:31

## 2021-01-30 RX ADMIN — WARFARIN SODIUM 3 MILLIGRAM(S): 2.5 TABLET ORAL at 00:13

## 2021-01-30 RX ADMIN — Medication 25 MILLIGRAM(S): at 05:03

## 2021-01-30 RX ADMIN — ISOSORBIDE MONONITRATE 30 MILLIGRAM(S): 60 TABLET, EXTENDED RELEASE ORAL at 11:30

## 2021-01-30 RX ADMIN — Medication 2: at 17:17

## 2021-01-30 RX ADMIN — Medication 4: at 12:41

## 2021-01-30 RX ADMIN — ATORVASTATIN CALCIUM 40 MILLIGRAM(S): 80 TABLET, FILM COATED ORAL at 21:56

## 2021-01-30 RX ADMIN — Medication 17 UNIT(S): at 08:17

## 2021-01-30 RX ADMIN — SENNA PLUS 2 TABLET(S): 8.6 TABLET ORAL at 21:56

## 2021-01-30 RX ADMIN — Medication 6 MILLIGRAM(S): at 05:03

## 2021-01-30 RX ADMIN — Medication 250 MILLIGRAM(S): at 11:31

## 2021-01-30 RX ADMIN — WARFARIN SODIUM 4 MILLIGRAM(S): 2.5 TABLET ORAL at 21:56

## 2021-01-30 RX ADMIN — Medication 20 MILLIGRAM(S): at 05:03

## 2021-01-30 RX ADMIN — Medication 10 UNIT(S): at 17:18

## 2021-01-30 RX ADMIN — INSULIN GLARGINE 3 UNIT(S): 100 INJECTION, SOLUTION SUBCUTANEOUS at 21:52

## 2021-01-30 NOTE — PROGRESS NOTE ADULT - SUBJECTIVE AND OBJECTIVE BOX
9.9    13.86 )-----------( 94       ( 30 Jan 2021 07:01 )             30.7             PT/INR - ( 30 Jan 2021 07:01 )   PT: 21.2 sec;   INR: 1.82 ratio           137|99|80<73  4.9|31|1.16  8.4,--,--  01-30 @ 07:05  89y year old woman admitted with elevated INR, AMS, COVID   - Patient today overall doing ok, comfortable, eating OK. comfortable on room air     ==================>> REVIEW OF SYSTEM <<=================    GEN: no fever, no chills, no pain, feels weak but better   RESP: no SOB, no cough, no sputum  CVS: no chest pain, no palpitations, no edema  GI: no abdominal pain, no nausea  : no dysuria, no frequency, no   Neuro: no headache, no dizziness  Derm : no itching, no rash    ==================>> PHYSICAL EXAM <<=================    GEN: A&O X 2 , NAD , comfortable in chair   HEENT: NCAT, PERRL, MMM, hearing intact  Neck: supple , no JVD appreciated  CVS: S1S2 , regular , No M/R/G appreciated  PULM: CTA B/L,  no W/R/R appreciated  ABD.: soft. non tender, non distended,  bowel sounds present  Extrem: intact pulses , no edema   PSYCH : normal mood,  not anxious        ==================>> LAB AND IMAGING <<==================                    __________________________________________________________________________________

## 2021-01-30 NOTE — CHART NOTE - NSCHARTNOTEFT_GEN_A_CORE
Pt. with subtherapeutic INR 1.8  Discussed with cardiologist Dr. alcides Hines x1   Coumadin 4 mg dosed   Nicci Lin FNP-BC

## 2021-01-30 NOTE — CHART NOTE - NSCHARTNOTEFT_GEN_A_CORE
FS reviewed. Pt had FS 73 this AM. Recommend decrease in Lantus to 3 Units HS and c/w admelog 17-13-10 plus moderate scale.   Ira Jacobson (pager [phone])  On evenings and weekends, please call [phone] or page endocrine fellow on call.   Please note that this patient may be followed by different provider tomorrow. If no answer, contact endocrine fellow on call.

## 2021-01-30 NOTE — PROGRESS NOTE ADULT - SUBJECTIVE AND OBJECTIVE BOX
CARDIOLOGY     PROGRESS  NOTE   ________________________________________________    CHIEF COMPLAINT:Patient is a 89y old  Female who presents with a chief complaint of sent for elevated INR (29 Jan 2021 16:32)  doing better, no complain  	  REVIEW OF SYSTEMS:  CONSTITUTIONAL: No fever, weight loss, or fatigue  EYES: No eye pain, visual disturbances, or discharge  ENT:  No difficulty hearing, tinnitus, vertigo; No sinus or throat pain  NECK: No pain or stiffness  RESPIRATORY: No cough, wheezing, chills or hemoptysis; No Shortness of Breath  CARDIOVASCULAR: No chest pain, palpitations, passing out, dizziness, or leg swelling  GASTROINTESTINAL: No abdominal or epigastric pain. No nausea, vomiting, or hematemesis; No diarrhea or constipation. No melena or hematochezia.  GENITOURINARY: No dysuria, frequency, hematuria, or incontinence  NEUROLOGICAL: No headaches, memory loss, loss of strength, numbness, or tremors  SKIN: No itching, burning, rashes, or lesions   LYMPH Nodes: No enlarged glands  ENDOCRINE: No heat or cold intolerance; No hair loss  MUSCULOSKELETAL: No joint pain or swelling; No muscle, back, or extremity pain  PSYCHIATRIC: No depression, anxiety, mood swings, or difficulty sleeping  HEME/LYMPH: No easy bruising, or bleeding gums  ALLERGY AND IMMUNOLOGIC: No hives or eczema	    [ ] All others negative	  [x ] Unable to obtain    PHYSICAL EXAM:  T(C): 36.7 (01-30-21 @ 04:43), Max: 36.7 (01-30-21 @ 04:43)  HR: 60 (01-30-21 @ 04:43) (60 - 79)  BP: 139/84 (01-30-21 @ 04:43) (104/69 - 139/84)  RR: 20 (01-30-21 @ 04:43) (18 - 20)  SpO2: 94% (01-30-21 @ 04:43) (93% - 96%)  Wt(kg): --  I&O's Summary    29 Jan 2021 07:01  -  30 Jan 2021 07:00  --------------------------------------------------------  IN: 840 mL / OUT: 1150 mL / NET: -310 mL        Appearance: Normal	  HEENT:   Normal oral mucosa, PERRL, EOMI	  Lymphatic: No lymphadenopathy  Cardiovascular: Normal S1 S2, No JVD, + murmurs, No edema  Respiratory: Lungs clear to auscultation	  Psychiatry: A & O x 3, Mood & affect appropriate  Gastrointestinal:  Soft, Non-tender, + BS	  Skin: No rashes, No ecchymoses, No cyanosis	  Neurologic: Non-focal  Extremities: Normal range of motion, No clubbing, cyanosis or edema  Vascular: Peripheral pulses palpable 2+ bilaterally    MEDICATIONS  (STANDING):  ascorbic acid 250 milliGRAM(s) Oral daily  atorvastatin 40 milliGRAM(s) Oral at bedtime  dexAMETHasone  Injectable 6 milliGRAM(s) IV Push daily  dextrose 40% Gel 15 Gram(s) Oral once  dextrose 5%. 1000 milliLiter(s) (50 mL/Hr) IV Continuous <Continuous>  dextrose 5%. 1000 milliLiter(s) (100 mL/Hr) IV Continuous <Continuous>  dextrose 50% Injectable 25 Gram(s) IV Push once  dextrose 50% Injectable 12.5 Gram(s) IV Push once  dextrose 50% Injectable 25 Gram(s) IV Push once  folic acid 1 milliGRAM(s) Oral daily  furosemide    Tablet 20 milliGRAM(s) Oral daily  glucagon  Injectable 1 milliGRAM(s) IntraMuscular once  hydrALAZINE 50 milliGRAM(s) Oral every 8 hours  insulin glargine Injectable (LANTUS) 6 Unit(s) SubCutaneous at bedtime  insulin lispro (ADMELOG) corrective regimen sliding scale   SubCutaneous three times a day before meals  insulin lispro (ADMELOG) corrective regimen sliding scale   SubCutaneous at bedtime  insulin lispro Injectable (ADMELOG) 17 Unit(s) SubCutaneous before breakfast  insulin lispro Injectable (ADMELOG) 13 Unit(s) SubCutaneous before lunch  insulin lispro Injectable (ADMELOG) 10 Unit(s) SubCutaneous before dinner  isosorbide   mononitrate ER Tablet (IMDUR) 30 milliGRAM(s) Oral daily  lactobacillus acidophilus 1 Tablet(s) Oral daily  metoprolol tartrate 25 milliGRAM(s) Oral two times a day  senna 2 Tablet(s) Oral at bedtime      TELEMETRY: 	    ECG:  	  RADIOLOGY:  OTHER: 	  	  LABS:	 	    CARDIAC MARKERS:                                9.9    13.86 )-----------( 94       ( 30 Jan 2021 07:01 )             30.7     01-30    137  |  99  |  80<H>  ----------------------------<  73  4.9   |  31  |  1.16    Ca    8.4      30 Jan 2021 07:05      proBNP:   Lipid Profile:   HgA1c:   TSH:   PT/INR - ( 30 Jan 2021 07:01 )   PT: 21.2 sec;   INR: 1.82 ratio               Assessment and plan  ---------------------------  89 yo F w/ h/o MVR/AVR, Afib on coumadin, s/p AICD/PPM, DM type II, HTN, CHFrEF presents with elevated INR;  Patient is AAO x2, limited history obtained from patient and NH staff.  Patient was being treated for pneumonia for last 3 days with IV ceftriaxone/azithromycin and possibly IVF.  Patient however had no complaints, has been eating and drinking well (although likely unreliable historian).  Today she was found to have COVID and elevated INR (11.2), which prompted them to send patient to [facility].  Patient was given 10mg of vitamin K this morning.  Per chart review, patient was also positive for COVID on 1/7/21 which NH staff could not confirm.  Patient otherwise denies chest pain, SOB, cough.  On arrival, she had temp 97.9, HR 81, /83, RR 16, saturation 100% on 4L.     chf systolic acute on chronic  will adjust cardiac meds  check ct chest no contrast ?vascular congestion ?covid/ ct noted resulted   echo noted  tx for covid as per protocol  dvt prophylaxis  ac keep inr 2-3  will fu  add IMDUR to hydralazine for chf therapy  dc  lasix  inr noted 2.62  covid +, o2 sat stable  will increase betya blocker as tolertaed

## 2021-01-30 NOTE — PROGRESS NOTE ADULT - ASSESSMENT
___________________________________________________________________________________  ===============>>  A S S E S S M E N T   A N D   P L A N <<===============  ------------------------------------------------------------------------------------------    · Assessment	  89 yo F w/ h/o MVR/AVR, Afib on coumadin, s/p AICD/PPM, DM type II, HTN, CHFrEF presents with elevated INR;     Problem/Plan - 1:  ·  Problem: Acute hypoxemic respiratory failure due to COVID-19 with Viral pneumonia from Viral syndrome.   trend inflammatory markers  finish dexamethasone per protocol   not on Remdesivir due to renal insufficiency   supportive care  monitoring off O2   dispo planing    Problem/Plan - 2:  ·  Problem: UTI  abx IV ceftriaxone;   follow cultures     Problem/Plan - 3:  ·  Problem: MICHELLE on CKD III, stable   monitor closely on PO lasix for CHF  monitor ins/outs and daily weights  monitor BMP  avoid nephrotoxic agents.     Problem/Plan - 4:  ·  Problem: metabolic encephalopathy due to above  appears\ improved: pt interactive and conversive, alert..   CT brain negative for acute pathology  monitor for now as improved     Problem/Plan - 5:  ·  Problem: INR abnormal.    s/p vitamin K 10mg at NH  coumadin dosing per iNR     Problem/Plan - 6:  Problem: Atrial fibrillation. Plan: on coumadin  monitor INR on coumadin  monitor cardio    Problem/Plan - 7:  ·  Problem: chronic systolic CHF   on lasix: monitor as stable   monitor ins/outs and daily weights  Continue Current medications otherwise and monitor.   cardio     Problem/Plan - 8:  ·  Problem: Hypertension.  Plan: c/w imdur and hydralazine.     Problem/Plan - 9:  ·  Problem: Diabetes mellitus, type II.    FS better controlled  endo follow up and mgmt appreciated   monitor FS     -GI/DVT Prophylaxis per protocol.    CODE STATUS: DNR/DNI (MOLST in Chart).

## 2021-01-31 LAB
ANION GAP SERPL CALC-SCNC: 12 MMOL/L — SIGNIFICANT CHANGE UP (ref 5–17)
BUN SERPL-MCNC: 80 MG/DL — HIGH (ref 7–23)
CALCIUM SERPL-MCNC: 8.5 MG/DL — SIGNIFICANT CHANGE UP (ref 8.4–10.5)
CHLORIDE SERPL-SCNC: 102 MMOL/L — SIGNIFICANT CHANGE UP (ref 96–108)
CO2 SERPL-SCNC: 26 MMOL/L — SIGNIFICANT CHANGE UP (ref 22–31)
CREAT SERPL-MCNC: 1.06 MG/DL — SIGNIFICANT CHANGE UP (ref 0.5–1.3)
CRP SERPL-MCNC: 1.14 MG/DL — HIGH (ref 0–0.4)
D DIMER BLD IA.RAPID-MCNC: 339 NG/ML DDU — HIGH
FERRITIN SERPL-MCNC: 359 NG/ML — HIGH (ref 15–150)
GLUCOSE BLDC GLUCOMTR-MCNC: 127 MG/DL — HIGH (ref 70–99)
GLUCOSE BLDC GLUCOMTR-MCNC: 147 MG/DL — HIGH (ref 70–99)
GLUCOSE BLDC GLUCOMTR-MCNC: 181 MG/DL — HIGH (ref 70–99)
GLUCOSE BLDC GLUCOMTR-MCNC: 182 MG/DL — HIGH (ref 70–99)
GLUCOSE SERPL-MCNC: 82 MG/DL — SIGNIFICANT CHANGE UP (ref 70–99)
HCT VFR BLD CALC: 29.5 % — LOW (ref 34.5–45)
HGB BLD-MCNC: 9.3 G/DL — LOW (ref 11.5–15.5)
INR BLD: 1.7 RATIO — HIGH (ref 0.88–1.16)
LDH SERPL L TO P-CCNC: 405 U/L — HIGH (ref 50–242)
MCHC RBC-ENTMCNC: 27.4 PG — SIGNIFICANT CHANGE UP (ref 27–34)
MCHC RBC-ENTMCNC: 31.5 GM/DL — LOW (ref 32–36)
MCV RBC AUTO: 87 FL — SIGNIFICANT CHANGE UP (ref 80–100)
NRBC # BLD: 0 /100 WBCS — SIGNIFICANT CHANGE UP (ref 0–0)
PLATELET # BLD AUTO: 78 K/UL — LOW (ref 150–400)
POTASSIUM SERPL-MCNC: 5.4 MMOL/L — HIGH (ref 3.5–5.3)
POTASSIUM SERPL-MCNC: 5.5 MMOL/L — HIGH (ref 3.5–5.3)
POTASSIUM SERPL-SCNC: 5.4 MMOL/L — HIGH (ref 3.5–5.3)
POTASSIUM SERPL-SCNC: 5.5 MMOL/L — HIGH (ref 3.5–5.3)
PROTHROM AB SERPL-ACNC: 19.9 SEC — HIGH (ref 10.6–13.6)
RBC # BLD: 3.39 M/UL — LOW (ref 3.8–5.2)
RBC # FLD: 17.6 % — HIGH (ref 10.3–14.5)
SODIUM SERPL-SCNC: 140 MMOL/L — SIGNIFICANT CHANGE UP (ref 135–145)
WBC # BLD: 12.1 K/UL — HIGH (ref 3.8–10.5)
WBC # FLD AUTO: 12.1 K/UL — HIGH (ref 3.8–10.5)

## 2021-01-31 RX ORDER — WARFARIN SODIUM 2.5 MG/1
4 TABLET ORAL AT BEDTIME
Refills: 0 | Status: COMPLETED | OUTPATIENT
Start: 2021-01-31 | End: 2021-01-31

## 2021-01-31 RX ORDER — ENOXAPARIN SODIUM 100 MG/ML
70 INJECTION SUBCUTANEOUS DAILY
Refills: 0 | Status: DISCONTINUED | OUTPATIENT
Start: 2021-01-31 | End: 2021-02-01

## 2021-01-31 RX ADMIN — Medication 2: at 12:19

## 2021-01-31 RX ADMIN — SENNA PLUS 2 TABLET(S): 8.6 TABLET ORAL at 22:05

## 2021-01-31 RX ADMIN — Medication 13 UNIT(S): at 12:19

## 2021-01-31 RX ADMIN — ENOXAPARIN SODIUM 70 MILLIGRAM(S): 100 INJECTION SUBCUTANEOUS at 11:28

## 2021-01-31 RX ADMIN — Medication 25 MILLIGRAM(S): at 05:12

## 2021-01-31 RX ADMIN — Medication 1 TABLET(S): at 11:29

## 2021-01-31 RX ADMIN — INSULIN GLARGINE 3 UNIT(S): 100 INJECTION, SOLUTION SUBCUTANEOUS at 22:05

## 2021-01-31 RX ADMIN — WARFARIN SODIUM 4 MILLIGRAM(S): 2.5 TABLET ORAL at 22:05

## 2021-01-31 RX ADMIN — ATORVASTATIN CALCIUM 40 MILLIGRAM(S): 80 TABLET, FILM COATED ORAL at 22:05

## 2021-01-31 RX ADMIN — POLYETHYLENE GLYCOL 3350 17 GRAM(S): 17 POWDER, FOR SOLUTION ORAL at 05:13

## 2021-01-31 RX ADMIN — Medication 25 MILLIGRAM(S): at 17:04

## 2021-01-31 RX ADMIN — Medication 1 MILLIGRAM(S): at 11:29

## 2021-01-31 RX ADMIN — Medication 2: at 17:04

## 2021-01-31 RX ADMIN — ISOSORBIDE MONONITRATE 30 MILLIGRAM(S): 60 TABLET, EXTENDED RELEASE ORAL at 11:29

## 2021-01-31 RX ADMIN — Medication 10 UNIT(S): at 17:05

## 2021-01-31 RX ADMIN — Medication 20 MILLIGRAM(S): at 05:13

## 2021-01-31 RX ADMIN — Medication 6 MILLIGRAM(S): at 05:13

## 2021-01-31 RX ADMIN — Medication 17 UNIT(S): at 08:06

## 2021-01-31 RX ADMIN — Medication 250 MILLIGRAM(S): at 11:29

## 2021-01-31 NOTE — PROGRESS NOTE ADULT - SUBJECTIVE AND OBJECTIVE BOX
CARDIOLOGY     PROGRESS  NOTE   ________________________________________________    CHIEF COMPLAINT:Patient is a 89y old  Female who presents with a chief complaint of sent for elevated INR (30 Jan 2021 13:12)  no complain.  	  REVIEW OF SYSTEMS:  CONSTITUTIONAL: No fever, weight loss, or fatigue  EYES: No eye pain, visual disturbances, or discharge  ENT:  No difficulty hearing, tinnitus, vertigo; No sinus or throat pain  NECK: No pain or stiffness  RESPIRATORY: No cough, wheezing, chills or hemoptysis; No Shortness of Breath  CARDIOVASCULAR: No chest pain, palpitations, passing out, dizziness, or leg swelling  GASTROINTESTINAL: No abdominal or epigastric pain. No nausea, vomiting, or hematemesis; No diarrhea or constipation. No melena or hematochezia.  GENITOURINARY: No dysuria, frequency, hematuria, or incontinence  NEUROLOGICAL: No headaches, memory loss, loss of strength, numbness, or tremors  SKIN: No itching, burning, rashes, or lesions   LYMPH Nodes: No enlarged glands  ENDOCRINE: No heat or cold intolerance; No hair loss  MUSCULOSKELETAL: No joint pain or swelling; No muscle, back, or extremity pain  PSYCHIATRIC: No depression, anxiety, mood swings, or difficulty sleeping  HEME/LYMPH: No easy bruising, or bleeding gums  ALLERGY AND IMMUNOLOGIC: No hives or eczema	    [ ] All others negative	  [ ] Unable to obtain    PHYSICAL EXAM:  T(C): 36.7 (01-31-21 @ 04:30), Max: 36.7 (01-30-21 @ 21:45)  HR: 69 (01-31-21 @ 04:30) (65 - 74)  BP: 138/76 (01-31-21 @ 04:30) (114/69 - 138/76)  RR: 20 (01-31-21 @ 04:30) (20 - 20)  SpO2: 92% (01-31-21 @ 04:30) (92% - 95%)  Wt(kg): --  I&O's Summary    30 Jan 2021 07:01  -  31 Jan 2021 07:00  --------------------------------------------------------  IN: 1400 mL / OUT: 1350 mL / NET: 50 mL        Appearance: Normal	  HEENT:   Normal oral mucosa, PERRL, EOMI	  Lymphatic: No lymphadenopathy  Cardiovascular: Normal S1 S2, No JVD, +murmurs, No edema  Respiratory: Lungs clear to auscultation	  Psychiatry: A & O x 3, Mood & affect appropriate  Gastrointestinal:  Soft, Non-tender, + BS	  Skin: No rashes, No ecchymoses, No cyanosis	  Neurologic: Non-focal  Extremities: Normal range of motion, No clubbing, cyanosis or edema  Vascular: Peripheral pulses palpable 2+ bilaterally    MEDICATIONS  (STANDING):  ascorbic acid 250 milliGRAM(s) Oral daily  atorvastatin 40 milliGRAM(s) Oral at bedtime  dexAMETHasone  Injectable 6 milliGRAM(s) IV Push daily  dextrose 40% Gel 15 Gram(s) Oral once  dextrose 5%. 1000 milliLiter(s) (50 mL/Hr) IV Continuous <Continuous>  dextrose 5%. 1000 milliLiter(s) (100 mL/Hr) IV Continuous <Continuous>  dextrose 50% Injectable 25 Gram(s) IV Push once  dextrose 50% Injectable 12.5 Gram(s) IV Push once  dextrose 50% Injectable 25 Gram(s) IV Push once  folic acid 1 milliGRAM(s) Oral daily  furosemide    Tablet 20 milliGRAM(s) Oral daily  glucagon  Injectable 1 milliGRAM(s) IntraMuscular once  hydrALAZINE 50 milliGRAM(s) Oral every 8 hours  insulin glargine Injectable (LANTUS) 3 Unit(s) SubCutaneous at bedtime  insulin lispro (ADMELOG) corrective regimen sliding scale   SubCutaneous three times a day before meals  insulin lispro (ADMELOG) corrective regimen sliding scale   SubCutaneous at bedtime  insulin lispro Injectable (ADMELOG) 13 Unit(s) SubCutaneous before lunch  insulin lispro Injectable (ADMELOG) 10 Unit(s) SubCutaneous before dinner  insulin lispro Injectable (ADMELOG) 17 Unit(s) SubCutaneous before breakfast  isosorbide   mononitrate ER Tablet (IMDUR) 30 milliGRAM(s) Oral daily  lactobacillus acidophilus 1 Tablet(s) Oral daily  metoprolol tartrate 25 milliGRAM(s) Oral two times a day  senna 2 Tablet(s) Oral at bedtime      TELEMETRY: 	    ECG:  	  RADIOLOGY:  OTHER: 	  	  LABS:	 	    CARDIAC MARKERS:                                9.3    12.10 )-----------( 78       ( 31 Jan 2021 06:50 )             29.5     01-31    140  |  102  |  80<H>  ----------------------------<  82  5.5<H>   |  26  |  1.06    Ca    8.5      31 Jan 2021 06:41      proBNP:   Lipid Profile:   HgA1c:   TSH:   PT/INR - ( 30 Jan 2021 07:01 )   PT: 21.2 sec;   INR: 1.82 ratio       < from: Transthoracic Echocardiogram (05.20.20 @ 08:55) >  1. Bioprosthetic mitral valve replacement. Mild mitral  regurgitation.  Mean transmitral valve gradient equals 5 mm  Hg, which is probably normal in the setting of a prosthetic  valve.  2. Bioprosthetic aortic valve replacement. Peak transaortic  valve gradient equals 24 mm Hg, mean transaortic valve  gradient equals 14 mm Hg, which is probably normal in the  presence of a prosthetic valve. Minimal aortic  regurgitation.  3. Normal aortic root (about  3.7 cm at the sinuses of  Valsalva).  Dilated ascending aorta (about  4.7 cm).  4. Moderately dilated left atrium.  LA volume index = 46  cc/m2.  5. Normal left ventricular internal dimensions and wall  thicknesses.  6. Mild to moderate global left ventricular systolic  dysfunction. Septal motion is consistent with RV pacing and  post surgical status.  7. The right ventricle is not well visualized; grossly  normal right ventricular systolic function.  A device wire  is noted in the right heart.  8. Estimated right ventricular systolic pressure equals 54  mm Hg, assuming right atrial pressure equals 10 mm Hg,  consistent with moderate pulmonary hypertension.      Assessment and plan  ---------------------------  89 yo F w/ h/o MVR/AVR, Afib on coumadin, s/p AICD/PPM, DM type II, HTN, CHFrEF presents with elevated INR;  Patient is AAO x2, limited history obtained from patient and NH staff.  Patient was being treated for pneumonia for last 3 days with IV ceftriaxone/azithromycin and possibly IVF.  Patient however had no complaints, has been eating and drinking well (although likely unreliable historian).  Today she was found to have COVID and elevated INR (11.2), which prompted them to send patient to [facility].  Patient was given 10mg of vitamin K this morning.  Per chart review, patient was also positive for COVID on 1/7/21 which NH staff could not confirm.  Patient otherwise denies chest pain, SOB, cough.  On arrival, she had temp 97.9, HR 81, /83, RR 16, saturation 100% on 4L.     chf systolic acute on chronic  will adjust cardiac meds  check ct chest no contrast ?vascular congestion ?covid/ ct noted resulted   echo noted  tx for covid as per protocol  dvt prophylaxis  ac keep inr 2-3  will fu  add IMDUR to hydralazine for chf therapy  inr noted 2.62  covid +, o2 sat stable  will increase beta blocker as tolerated  keep inr  2-3 sec to a.fib, both valves are bioprosthetic  increase renal function ?sec to steroid  check bladder scan  mod LV dysfunction, continue hydralazine and nitrated and metoprolol ER

## 2021-01-31 NOTE — PROGRESS NOTE ADULT - ASSESSMENT
_________________________________________________________________________________________  ========>>  M E D I C A L   A T T E N D I N G    F O L L O W  U P  N O T E  <<=========  -----------------------------------------------------------------------------------------------------    - Patient evaluated by me, chart reviewed.   - In summary,  CHARLES ADAMS is a 89y year old woman admitted with elevated INR, AMS, COVID   - Patient today overall doing ok, comfortable, eating fairly     comfortable on room air     ==================>> REVIEW OF SYSTEM <<=================    GEN: no fever, no chills, no pain, feels weak but better   RESP: no SOB, no cough, no sputum  CVS: no chest pain, no palpitations, no edema  GI: no abdominal pain, no nausea  : no dysuria, no frequency  Neuro: no headache, no dizziness  Derm : no itching, no rash    ==================>> PHYSICAL EXAM <<=================    GEN: A&O X 2 , NAD , comfortable in bed   HEENT: NCAT, PERRL, MMM, hearing intact  Neck: supple , no JVD appreciated  CVS: S1S2 , regular , No M/R/G appreciated  PULM: CTA B/L,  no W/R/R appreciated  ABD.: soft. non tender, non distended,  bowel sounds present  Extrem: intact pulses , no edema   PSYCH : normal mood,  not anxious                                                 ( Note written 01-31-21 )    ==================>> MEDICATIONS <<====================    ascorbic acid 250 milliGRAM(s) Oral daily  atorvastatin 40 milliGRAM(s) Oral at bedtime  dexAMETHasone  Injectable 6 milliGRAM(s) IV Push daily  dextrose 40% Gel 15 Gram(s) Oral once  dextrose 5%. 1000 milliLiter(s) IV Continuous <Continuous>  dextrose 5%. 1000 milliLiter(s) IV Continuous <Continuous>  dextrose 50% Injectable 25 Gram(s) IV Push once  dextrose 50% Injectable 12.5 Gram(s) IV Push once  dextrose 50% Injectable 25 Gram(s) IV Push once  enoxaparin Injectable 70 milliGRAM(s) SubCutaneous daily  folic acid 1 milliGRAM(s) Oral daily  glucagon  Injectable 1 milliGRAM(s) IntraMuscular once  hydrALAZINE 50 milliGRAM(s) Oral every 8 hours  insulin glargine Injectable (LANTUS) 3 Unit(s) SubCutaneous at bedtime  insulin lispro (ADMELOG) corrective regimen sliding scale   SubCutaneous three times a day before meals  insulin lispro (ADMELOG) corrective regimen sliding scale   SubCutaneous at bedtime  insulin lispro Injectable (ADMELOG) 17 Unit(s) SubCutaneous before breakfast  insulin lispro Injectable (ADMELOG) 13 Unit(s) SubCutaneous before lunch  insulin lispro Injectable (ADMELOG) 10 Unit(s) SubCutaneous before dinner  isosorbide   mononitrate ER Tablet (IMDUR) 30 milliGRAM(s) Oral daily  lactobacillus acidophilus 1 Tablet(s) Oral daily  metoprolol tartrate 25 milliGRAM(s) Oral two times a day  senna 2 Tablet(s) Oral at bedtime  warfarin 4 milliGRAM(s) Oral at bedtime    MEDICATIONS  (PRN):  bisacodyl 5 milliGRAM(s) Oral every 12 hours PRN Constipation  polyethylene glycol 3350 17 Gram(s) Oral daily PRN Constipation    ___________  Active diet:  Diet, Soft:   Consistent Carbohydrate Evening Snack (CSTCHOSN)  DASH/TLC Sodium & Cholesterol Restricted (DASH)  Supplement Feeding Modality:  Oral  Glucerna Shake Cans or Servings Per Day:  2       Frequency:  Daily  ___________________    ==================>> VITAL SIGNS <<==================    Vital Signs Last 24 HrsT(C): 37.1 (01-31-21 @ 12:49)  T(F): 98.7 (01-31-21 @ 12:49), Max: 98.7 (01-31-21 @ 12:49)  HR: 67 (01-31-21 @ 12:49) (67 - 74)  BP: 122/58 (01-31-21 @ 12:49)  RR: 18 (01-31-21 @ 12:49) (18 - 20)  SpO2: 94% (01-31-21 @ 12:49) (92% - 95%)      POCT Blood Glucose.: 181 mg/dL (31 Jan 2021 17:01)  POCT Blood Glucose.: 182 mg/dL (31 Jan 2021 12:04)  POCT Blood Glucose.: 127 mg/dL (31 Jan 2021 07:57)  POCT Blood Glucose.: 149 mg/dL (30 Jan 2021 21:37)     ==================>> LAB AND IMAGING <<==================                        9.3    12.10 )-----------( 78       ( 31 Jan 2021 06:50 )             29.5        01-31    x   |  x   |  x   ----------------------------<  x   5.4<H>   |  x   |  x     Ca    8.5      31 Jan 2021 06:41    WBC count:   12.10 <<== ,  13.86 <<== ,  13.68 <<== ,  14.96 <<==   Hemoglobin:   9.3 <<==,  9.9 <<==,  9.8 <<==,  10.2 <<==  platelets:  78 <==, 94 <==, 117 <==, 131 <==, 150 <==    Creatinine:  1.06  <<==, 1.16  <<==, 1.31  <<==, 1.37  <<==, 1.49  <<==  Sodium:   140  <==, 137  <==, 139  <==, 141  <==, 137  <==      ^^^ Inflammatory markers :  ^^^  C R P :          1.14 (01-31-21)  <<--, 0.74 (01-30-21)  <<--, <0.30 (01-29-21)  <<--, <0.30 (01-28-21)  <<--, <0.30 (01-27-21)  <<--  P C T :         0.07 (01-25-21) <<--, 0.07 (01-23-21) <<--    Ferritin :         359 (01-31-21) <<--, 372 (01-30-21) <<--, 394 (01-29-21) <<--, 382 (01-28-21) <<--, 425 (01-27-21) <<--, 460 (01-26-21) <<--, 446 (01-25-21) <<--, 464 (01-24-21) <<--, 511 (01-23-21) <<--    D-Dimer :          339 (01-31-21) <<--, 433 (01-28-21) <<--, 597 (01-25-21) <<--, 396 (01-24-21) <<--, 198 (01-23-21) <<--    ___________________________________________________________________________________  ===============>>  A S S E S S M E N T   A N D   P L A N <<===============  ------------------------------------------------------------------------------------------    · Assessment	  89 yo F w/ h/o MVR/AVR, Afib on coumadin, s/p AICD/PPM, DM type II, HTN, CHFrEF presents with elevated INR;     Problem/Plan - 1:  ·  Problem: Acute hypoxemic respiratory failure due to COVID-19 with Viral pneumonia from Viral syndrome.   trend inflammatory markers  finish dexamethasone per protocol   not on Remdesivir due to renal insufficiency   supportive care  monitoring off O2   dispo planing    Problem/Plan - 2:  ·  Problem: UTI  abx IV ceftriaxone;   follow cultures     Problem/Plan - 3:  ·  Problem: MICHELLE on CKD III, stable   monitor closely on PO lasix for CHF  monitor ins/outs and daily weights  monitor BMP  avoid nephrotoxic agents.     Problem/Plan - 4:  ·  Problem: metabolic encephalopathy due to above  appears\ improved: pt interactive and conversive, alert..   CT brain negative for acute pathology  monitor for now as improved     Problem/Plan - 5:  ·  Problem: INR abnormal.    s/p vitamin K 10mg at NH  coumadin dosing per iNR     Problem/Plan - 6:  Problem: Atrial fibrillation. Plan: on coumadin  monitor INR on coumadin  monitor cardio    Problem/Plan - 7:  ·  Problem: chronic systolic CHF   on lasix: monitor as stable   monitor ins/outs and daily weights  Continue Current medications otherwise and monitor.   cardio     Problem/Plan - 8:  ·  Problem: Hypertension.  Plan: c/w imdur and hydralazine.     Problem/Plan - 9:  ·  Problem: Diabetes mellitus, type II.    FS better controlled  endo follow up and mgmt appreciated   monitor FS     -GI/DVT Prophylaxis per protocol.    CODE STATUS: DNR/DNI (MOLST in Chart).    Discharge planing     ___________________________  H. AINSLEY Rogers.  Pager: [phone]

## 2021-02-01 LAB
ANION GAP SERPL CALC-SCNC: 10 MMOL/L — SIGNIFICANT CHANGE UP (ref 5–17)
BUN SERPL-MCNC: 70 MG/DL — HIGH (ref 7–23)
CALCIUM SERPL-MCNC: 8.4 MG/DL — SIGNIFICANT CHANGE UP (ref 8.4–10.5)
CHLORIDE SERPL-SCNC: 101 MMOL/L — SIGNIFICANT CHANGE UP (ref 96–108)
CO2 SERPL-SCNC: 27 MMOL/L — SIGNIFICANT CHANGE UP (ref 22–31)
CREAT SERPL-MCNC: 1.17 MG/DL — SIGNIFICANT CHANGE UP (ref 0.5–1.3)
CRP SERPL-MCNC: 1.65 MG/DL — HIGH (ref 0–0.4)
FERRITIN SERPL-MCNC: 405 NG/ML — HIGH (ref 15–150)
GLUCOSE BLDC GLUCOMTR-MCNC: 106 MG/DL — HIGH (ref 70–99)
GLUCOSE BLDC GLUCOMTR-MCNC: 122 MG/DL — HIGH (ref 70–99)
GLUCOSE BLDC GLUCOMTR-MCNC: 198 MG/DL — HIGH (ref 70–99)
GLUCOSE BLDC GLUCOMTR-MCNC: 210 MG/DL — HIGH (ref 70–99)
GLUCOSE SERPL-MCNC: 186 MG/DL — HIGH (ref 70–99)
HCT VFR BLD CALC: 33 % — LOW (ref 34.5–45)
HGB BLD-MCNC: 10.5 G/DL — LOW (ref 11.5–15.5)
INR BLD: 1.59 RATIO — HIGH (ref 0.88–1.16)
LDH SERPL L TO P-CCNC: 388 U/L — HIGH (ref 50–242)
MCHC RBC-ENTMCNC: 27.5 PG — SIGNIFICANT CHANGE UP (ref 27–34)
MCHC RBC-ENTMCNC: 31.8 GM/DL — LOW (ref 32–36)
MCV RBC AUTO: 86.4 FL — SIGNIFICANT CHANGE UP (ref 80–100)
NRBC # BLD: 0 /100 WBCS — SIGNIFICANT CHANGE UP (ref 0–0)
PLATELET # BLD AUTO: 92 K/UL — LOW (ref 150–400)
POTASSIUM SERPL-MCNC: 5.3 MMOL/L — SIGNIFICANT CHANGE UP (ref 3.5–5.3)
POTASSIUM SERPL-SCNC: 5.3 MMOL/L — SIGNIFICANT CHANGE UP (ref 3.5–5.3)
PROTHROM AB SERPL-ACNC: 18.7 SEC — HIGH (ref 10.6–13.6)
RBC # BLD: 3.82 M/UL — SIGNIFICANT CHANGE UP (ref 3.8–5.2)
RBC # FLD: 18.1 % — HIGH (ref 10.3–14.5)
SODIUM SERPL-SCNC: 138 MMOL/L — SIGNIFICANT CHANGE UP (ref 135–145)
WBC # BLD: 12.88 K/UL — HIGH (ref 3.8–10.5)
WBC # FLD AUTO: 12.88 K/UL — HIGH (ref 3.8–10.5)

## 2021-02-01 PROCEDURE — 99232 SBSQ HOSP IP/OBS MODERATE 35: CPT

## 2021-02-01 RX ORDER — INSULIN LISPRO 100/ML
8 VIAL (ML) SUBCUTANEOUS
Refills: 0 | Status: DISCONTINUED | OUTPATIENT
Start: 2021-02-02 | End: 2021-02-02

## 2021-02-01 RX ORDER — INSULIN LISPRO 100/ML
10 VIAL (ML) SUBCUTANEOUS
Refills: 0 | Status: DISCONTINUED | OUTPATIENT
Start: 2021-02-02 | End: 2021-02-02

## 2021-02-01 RX ORDER — INSULIN LISPRO 100/ML
15 VIAL (ML) SUBCUTANEOUS
Refills: 0 | Status: DISCONTINUED | OUTPATIENT
Start: 2021-02-02 | End: 2021-02-02

## 2021-02-01 RX ORDER — APIXABAN 2.5 MG/1
2.5 TABLET, FILM COATED ORAL
Refills: 0 | Status: DISCONTINUED | OUTPATIENT
Start: 2021-02-01 | End: 2021-02-05

## 2021-02-01 RX ADMIN — Medication 25 MILLIGRAM(S): at 04:49

## 2021-02-01 RX ADMIN — Medication 250 MILLIGRAM(S): at 12:41

## 2021-02-01 RX ADMIN — Medication 6 MILLIGRAM(S): at 04:50

## 2021-02-01 RX ADMIN — ATORVASTATIN CALCIUM 40 MILLIGRAM(S): 80 TABLET, FILM COATED ORAL at 21:54

## 2021-02-01 RX ADMIN — Medication 13 UNIT(S): at 12:31

## 2021-02-01 RX ADMIN — Medication 25 MILLIGRAM(S): at 16:18

## 2021-02-01 RX ADMIN — Medication 4: at 08:14

## 2021-02-01 RX ADMIN — Medication 1 MILLIGRAM(S): at 12:24

## 2021-02-01 RX ADMIN — ISOSORBIDE MONONITRATE 30 MILLIGRAM(S): 60 TABLET, EXTENDED RELEASE ORAL at 12:25

## 2021-02-01 RX ADMIN — Medication 17 UNIT(S): at 08:15

## 2021-02-01 RX ADMIN — Medication 50 MILLIGRAM(S): at 21:54

## 2021-02-01 RX ADMIN — INSULIN GLARGINE 3 UNIT(S): 100 INJECTION, SOLUTION SUBCUTANEOUS at 21:54

## 2021-02-01 RX ADMIN — APIXABAN 2.5 MILLIGRAM(S): 2.5 TABLET, FILM COATED ORAL at 16:18

## 2021-02-01 RX ADMIN — POLYETHYLENE GLYCOL 3350 17 GRAM(S): 17 POWDER, FOR SOLUTION ORAL at 04:50

## 2021-02-01 RX ADMIN — SENNA PLUS 2 TABLET(S): 8.6 TABLET ORAL at 21:56

## 2021-02-01 RX ADMIN — Medication 1 TABLET(S): at 12:24

## 2021-02-01 RX ADMIN — Medication 50 MILLIGRAM(S): at 04:49

## 2021-02-01 NOTE — PROGRESS NOTE ADULT - SUBJECTIVE AND OBJECTIVE BOX
CARDIOLOGY     PROGRESS  NOTE   ________________________________________________    CHIEF COMPLAINT:Patient is a 89y old  Female who presents with a chief complaint of sent for elevated INR (31 Jan 2021 19:28)  no complain.  	  REVIEW OF SYSTEMS:  CONSTITUTIONAL: No fever, weight loss, or fatigue  EYES: No eye pain, visual disturbances, or discharge  ENT:  No difficulty hearing, tinnitus, vertigo; No sinus or throat pain  NECK: No pain or stiffness  RESPIRATORY: No cough, wheezing, chills or hemoptysis; No Shortness of Breath  CARDIOVASCULAR: No chest pain, palpitations, passing out, dizziness, or leg swelling  GASTROINTESTINAL: No abdominal or epigastric pain. No nausea, vomiting, or hematemesis; No diarrhea or constipation. No melena or hematochezia.  GENITOURINARY: No dysuria, frequency, hematuria, or incontinence  NEUROLOGICAL: No headaches, memory loss, loss of strength, numbness, or tremors  SKIN: No itching, burning, rashes, or lesions   LYMPH Nodes: No enlarged glands  ENDOCRINE: No heat or cold intolerance; No hair loss  MUSCULOSKELETAL: No joint pain or swelling; No muscle, back, or extremity pain  PSYCHIATRIC: No depression, anxiety, mood swings, or difficulty sleeping  HEME/LYMPH: No easy bruising, or bleeding gums  ALLERGY AND IMMUNOLOGIC: No hives or eczema	    [ ] All others negative	  [ ] Unable to obtain    PHYSICAL EXAM:  T(C): 37.1 (02-01-21 @ 09:00), Max: 37.1 (01-31-21 @ 12:49)  HR: 70 (02-01-21 @ 09:00) (67 - 77)  BP: 117/65 (02-01-21 @ 09:00) (117/65 - 146/84)  RR: 20 (02-01-21 @ 09:00) (18 - 20)  SpO2: 95% (02-01-21 @ 09:00) (93% - 96%)  Wt(kg): --  I&O's Summary    31 Jan 2021 07:01  -  01 Feb 2021 07:00  --------------------------------------------------------  IN: 1220 mL / OUT: 1775 mL / NET: -555 mL        Appearance: Normal	  HEENT:   Normal oral mucosa, PERRL, EOMI	  Lymphatic: No lymphadenopathy  Cardiovascular: Normal S1 S2, No JVD, + murmurs, No edema  Respiratory: Lungs clear to auscultation	  Psychiatry: A & O x 3, Mood & affect appropriate  Gastrointestinal:  Soft, Non-tender, + BS	  Skin: No rashes, No ecchymoses, No cyanosis	  Neurologic: Non-focal  Extremities: Normal range of motion, No clubbing, cyanosis or edema  Vascular: Peripheral pulses palpable 2+ bilaterally    MEDICATIONS  (STANDING):  ascorbic acid 250 milliGRAM(s) Oral daily  atorvastatin 40 milliGRAM(s) Oral at bedtime  dexAMETHasone  Injectable 6 milliGRAM(s) IV Push daily  dextrose 40% Gel 15 Gram(s) Oral once  dextrose 5%. 1000 milliLiter(s) (50 mL/Hr) IV Continuous <Continuous>  dextrose 5%. 1000 milliLiter(s) (100 mL/Hr) IV Continuous <Continuous>  dextrose 50% Injectable 25 Gram(s) IV Push once  dextrose 50% Injectable 12.5 Gram(s) IV Push once  dextrose 50% Injectable 25 Gram(s) IV Push once  enoxaparin Injectable 70 milliGRAM(s) SubCutaneous daily  folic acid 1 milliGRAM(s) Oral daily  glucagon  Injectable 1 milliGRAM(s) IntraMuscular once  hydrALAZINE 50 milliGRAM(s) Oral every 8 hours  insulin glargine Injectable (LANTUS) 3 Unit(s) SubCutaneous at bedtime  insulin lispro (ADMELOG) corrective regimen sliding scale   SubCutaneous three times a day before meals  insulin lispro (ADMELOG) corrective regimen sliding scale   SubCutaneous at bedtime  insulin lispro Injectable (ADMELOG) 13 Unit(s) SubCutaneous before lunch  insulin lispro Injectable (ADMELOG) 10 Unit(s) SubCutaneous before dinner  insulin lispro Injectable (ADMELOG) 17 Unit(s) SubCutaneous before breakfast  isosorbide   mononitrate ER Tablet (IMDUR) 30 milliGRAM(s) Oral daily  lactobacillus acidophilus 1 Tablet(s) Oral daily  metoprolol tartrate 25 milliGRAM(s) Oral two times a day  senna 2 Tablet(s) Oral at bedtime      TELEMETRY: 	    ECG:  	  RADIOLOGY:  OTHER: 	  	  LABS:	 	    CARDIAC MARKERS:                                10.5   12.88 )-----------( 92       ( 01 Feb 2021 09:33 )             33.0     02-01    138  |  101  |  70<H>  ----------------------------<  186<H>  5.3   |  27  |  1.17    Ca    8.4      01 Feb 2021 09:33      proBNP:   Lipid Profile:   HgA1c:   TSH:   PT/INR - ( 01 Feb 2021 06:17 )   PT: 18.7 sec;   INR: 1.59 ratio               Assessment and plan  ---------------------------  89 yo F w/ h/o MVR/AVR, Afib on coumadin, s/p AICD/PPM, DM type II, HTN, CHFrEF presents with elevated INR;  Patient is AAO x2, limited history obtained from patient and NH staff.  Patient was being treated for pneumonia for last 3 days with IV ceftriaxone/azithromycin and possibly IVF.  Patient however had no complaints, has been eating and drinking well (although likely unreliable historian).  Today she was found to have COVID and elevated INR (11.2), which prompted them to send patient to [facility].  Patient was given 10mg of vitamin K this morning.  Per chart review, patient was also positive for COVID on 1/7/21 which NH staff could not confirm.  Patient otherwise denies chest pain, SOB, cough.  On arrival, she had temp 97.9, HR 81, /83, RR 16, saturation 100% on 4L.     chf systolic acute on chronic  will adjust cardiac meds  check ct chest no contrast ?vascular congestion ?covid/ ct noted resulted   echo noted  tx for covid as per protocol  dvt prophylaxis  ac keep inr 2-3  will fu  add IMDUR to hydralazine for chf therapy  inr noted 2.62  covid +, o2 sat stable  will increase beta blocker as tolerated  keep inr  2-3 sec to a.fib, both valves are bioprosthetic  increase renal function ?sec to steroid  check bladder scan  mod LV dysfunction, continue hydralazine and nitrated and metoprolol ER  increase coumadin dose      	                    CARDIOLOGY     PROGRESS  NOTE   ________________________________________________    CHIEF COMPLAINT:Patient is a 89y old  Female who presents with a chief complaint of sent for elevated INR (31 Jan 2021 19:28)  no complain.  	  REVIEW OF SYSTEMS:  CONSTITUTIONAL: No fever, weight loss, or fatigue  EYES: No eye pain, visual disturbances, or discharge  ENT:  No difficulty hearing, tinnitus, vertigo; No sinus or throat pain  NECK: No pain or stiffness  RESPIRATORY: No cough, wheezing, chills or hemoptysis; No Shortness of Breath  CARDIOVASCULAR: No chest pain, palpitations, passing out, dizziness, or leg swelling  GASTROINTESTINAL: No abdominal or epigastric pain. No nausea, vomiting, or hematemesis; No diarrhea or constipation. No melena or hematochezia.  GENITOURINARY: No dysuria, frequency, hematuria, or incontinence  NEUROLOGICAL: No headaches, memory loss, loss of strength, numbness, or tremors  SKIN: No itching, burning, rashes, or lesions   LYMPH Nodes: No enlarged glands  ENDOCRINE: No heat or cold intolerance; No hair loss  MUSCULOSKELETAL: No joint pain or swelling; No muscle, back, or extremity pain  PSYCHIATRIC: No depression, anxiety, mood swings, or difficulty sleeping  HEME/LYMPH: No easy bruising, or bleeding gums  ALLERGY AND IMMUNOLOGIC: No hives or eczema	    [ ] All others negative	  [ ] Unable to obtain    PHYSICAL EXAM:  T(C): 37.1 (02-01-21 @ 09:00), Max: 37.1 (01-31-21 @ 12:49)  HR: 70 (02-01-21 @ 09:00) (67 - 77)  BP: 117/65 (02-01-21 @ 09:00) (117/65 - 146/84)  RR: 20 (02-01-21 @ 09:00) (18 - 20)  SpO2: 95% (02-01-21 @ 09:00) (93% - 96%)  Wt(kg): --  I&O's Summary    31 Jan 2021 07:01  -  01 Feb 2021 07:00  --------------------------------------------------------  IN: 1220 mL / OUT: 1775 mL / NET: -555 mL        Appearance: Normal	  HEENT:   Normal oral mucosa, PERRL, EOMI	  Lymphatic: No lymphadenopathy  Cardiovascular: Normal S1 S2, No JVD, + murmurs, No edema  Respiratory: Lungs clear to auscultation	  Psychiatry: A & O x 3, Mood & affect appropriate  Gastrointestinal:  Soft, Non-tender, + BS	  Skin: No rashes, No ecchymoses, No cyanosis	  Neurologic: Non-focal  Extremities: Normal range of motion, No clubbing, cyanosis or edema  Vascular: Peripheral pulses palpable 2+ bilaterally    MEDICATIONS  (STANDING):  ascorbic acid 250 milliGRAM(s) Oral daily  atorvastatin 40 milliGRAM(s) Oral at bedtime  dexAMETHasone  Injectable 6 milliGRAM(s) IV Push daily  dextrose 40% Gel 15 Gram(s) Oral once  dextrose 5%. 1000 milliLiter(s) (50 mL/Hr) IV Continuous <Continuous>  dextrose 5%. 1000 milliLiter(s) (100 mL/Hr) IV Continuous <Continuous>  dextrose 50% Injectable 25 Gram(s) IV Push once  dextrose 50% Injectable 12.5 Gram(s) IV Push once  dextrose 50% Injectable 25 Gram(s) IV Push once  enoxaparin Injectable 70 milliGRAM(s) SubCutaneous daily  folic acid 1 milliGRAM(s) Oral daily  glucagon  Injectable 1 milliGRAM(s) IntraMuscular once  hydrALAZINE 50 milliGRAM(s) Oral every 8 hours  insulin glargine Injectable (LANTUS) 3 Unit(s) SubCutaneous at bedtime  insulin lispro (ADMELOG) corrective regimen sliding scale   SubCutaneous three times a day before meals  insulin lispro (ADMELOG) corrective regimen sliding scale   SubCutaneous at bedtime  insulin lispro Injectable (ADMELOG) 13 Unit(s) SubCutaneous before lunch  insulin lispro Injectable (ADMELOG) 10 Unit(s) SubCutaneous before dinner  insulin lispro Injectable (ADMELOG) 17 Unit(s) SubCutaneous before breakfast  isosorbide   mononitrate ER Tablet (IMDUR) 30 milliGRAM(s) Oral daily  lactobacillus acidophilus 1 Tablet(s) Oral daily  metoprolol tartrate 25 milliGRAM(s) Oral two times a day  senna 2 Tablet(s) Oral at bedtime      TELEMETRY: 	    ECG:  	  RADIOLOGY:  OTHER: 	  	  LABS:	 	    CARDIAC MARKERS:                                10.5   12.88 )-----------( 92       ( 01 Feb 2021 09:33 )             33.0     02-01    138  |  101  |  70<H>  ----------------------------<  186<H>  5.3   |  27  |  1.17    Ca    8.4      01 Feb 2021 09:33      proBNP:   Lipid Profile:   HgA1c:   TSH:   PT/INR - ( 01 Feb 2021 06:17 )   PT: 18.7 sec;   INR: 1.59 ratio               Assessment and plan  ---------------------------  89 yo F w/ h/o MVR/AVR, Afib on coumadin, s/p AICD/PPM, DM type II, HTN, CHFrEF presents with elevated INR;  Patient is AAO x2, limited history obtained from patient and NH staff.  Patient was being treated for pneumonia for last 3 days with IV ceftriaxone/azithromycin and possibly IVF.  Patient however had no complaints, has been eating and drinking well (although likely unreliable historian).  Today she was found to have COVID and elevated INR (11.2), which prompted them to send patient to [facility].  Patient was given 10mg of vitamin K this morning.  Per chart review, patient was also positive for COVID on 1/7/21 which NH staff could not confirm.  Patient otherwise denies chest pain, SOB, cough.  On arrival, she had temp 97.9, HR 81, /83, RR 16, saturation 100% on 4L.     chf systolic acute on chronic  will adjust cardiac meds  check ct chest no contrast ?vascular congestion ?covid/ ct noted resulted   echo noted  tx for covid as per protocol  dvt prophylaxis  ac keep inr 2-3  will fu  add IMDUR to hydralazine for chf therapy  inr noted 2.62  covid +, o2 sat stable  will increase beta blocker as tolerated  keep inr  2-3 sec to a.fib, both valves are bioprosthetic  increase renal function ?sec to steroid  check bladder scan  mod LV dysfunction, continue hydralazine and nitrated and metoprolol ER  pt with high risk for coumadin with coumadin toxicity , will dc Lovenox and coumadin, start on NOAC

## 2021-02-01 NOTE — PROGRESS NOTE ADULT - ASSESSMENT
_________________________________________________________________________________________  ========>>  M E D I C A L   A T T E N D I N G    F O L L O W  U P  N O T E  <<=========  -----------------------------------------------------------------------------------------------------    - Patient evaluated by me, chart reviewed.     - In summary,  CHARLES ADAMS is a 89y year old woman admitted with elevated INR, AMS, COVID   - Patient today overall doing ok, comfortable, eating fairly     comfortable on room air     ==================>> REVIEW OF SYSTEM <<=================    GEN: no fever, no chills, no pain  RESP: no SOB, no cough, no sputum  CVS: no chest pain, no palpitations, no edema  GI: no abdominal pain, no nausea  : no dysuria, no frequency  Neuro: no headache, no dizziness  Derm : no itching, no rash    ==================>> PHYSICAL EXAM <<=================    GEN: A&O X 2 , NAD , comfortable in bed   HEENT: NCAT, PERRL, MMM, hearing intact  Neck: supple , no JVD appreciated  CVS: S1S2 , regular , No M/R/G appreciated  PULM: CTA B/L,  no W/R/R appreciated  ABD.: soft. non tender, non distended,  bowel sounds present  Extrem: intact pulses , no edema   PSYCH : normal mood,  not anxious                                                 ( Note written 02-01-21 )    ==================>> MEDICATIONS <<====================    apixaban 2.5 milliGRAM(s) Oral two times a day  ascorbic acid 250 milliGRAM(s) Oral daily  atorvastatin 40 milliGRAM(s) Oral at bedtime  dexAMETHasone  Injectable 6 milliGRAM(s) IV Push daily  dextrose 40% Gel 15 Gram(s) Oral once  dextrose 5%. 1000 milliLiter(s) IV Continuous <Continuous>  dextrose 5%. 1000 milliLiter(s) IV Continuous <Continuous>  dextrose 50% Injectable 25 Gram(s) IV Push once  dextrose 50% Injectable 12.5 Gram(s) IV Push once  dextrose 50% Injectable 25 Gram(s) IV Push once  folic acid 1 milliGRAM(s) Oral daily  glucagon  Injectable 1 milliGRAM(s) IntraMuscular once  hydrALAZINE 50 milliGRAM(s) Oral every 8 hours  insulin glargine Injectable (LANTUS) 3 Unit(s) SubCutaneous at bedtime  insulin lispro (ADMELOG) corrective regimen sliding scale   SubCutaneous three times a day before meals  insulin lispro (ADMELOG) corrective regimen sliding scale   SubCutaneous at bedtime  isosorbide   mononitrate ER Tablet (IMDUR) 30 milliGRAM(s) Oral daily  lactobacillus acidophilus 1 Tablet(s) Oral daily  metoprolol tartrate 25 milliGRAM(s) Oral two times a day  senna 2 Tablet(s) Oral at bedtime    MEDICATIONS  (PRN):  bisacodyl 5 milliGRAM(s) Oral every 12 hours PRN Constipation  polyethylene glycol 3350 17 Gram(s) Oral daily PRN Constipation    ___________  Active diet:  Diet, Soft:   Consistent Carbohydrate Evening Snack (CSTCHOSN)  DASH/TLC Sodium & Cholesterol Restricted (DASH)  Supplement Feeding Modality:  Oral  Glucerna Shake Cans or Servings Per Day:  2       Frequency:  Daily  ___________________    ==================>> VITAL SIGNS <<==================    Vital Signs Last 24 HrsT(C): 36.6 (02-01-21 @ 16:17)  T(F): 97.8 (02-01-21 @ 16:17), Max: 98.7 (01-31-21 @ 21:18)  HR: 85 (02-01-21 @ 16:17) (70 - 85)  BP: 140/76 (02-01-21 @ 16:17)  RR: 17 (02-01-21 @ 16:17) (17 - 20)  SpO2: 95% (02-01-21 @ 16:17) (93% - 96%)      POCT Blood Glucose.: 106 mg/dL (01 Feb 2021 12:01)  POCT Blood Glucose.: 210 mg/dL (01 Feb 2021 08:11)  POCT Blood Glucose.: 147 mg/dL (31 Jan 2021 21:34)     ==================>> LAB AND IMAGING <<==================                        10.5   12.88 )-----------( 92       ( 01 Feb 2021 09:33 )             33.0        02-01    138  |  101  |  70<H>  ----------------------------<  186<H>  5.3   |  27  |  1.17    Ca    8.4      01 Feb 2021 09:33    WBC count:   12.88 <<== ,  12.10 <<== ,  13.86 <<== ,  13.68 <<==   Hemoglobin:   10.5 <<==,  9.3 <<==,  9.9 <<==,  9.8 <<==  platelets:  92 <==, 78 <==, 94 <==, 117 <==, 131 <==    Creatinine:  1.17  <<==, 1.06  <<==, 1.16  <<==, 1.31  <<==, 1.37  <<==  Sodium:   138  <==, 140  <==, 137  <==, 139  <==, 141  <==    ___________________________________________________________________________________  ===============>>  A S S E S S M E N T   A N D   P L A N <<===============  ------------------------------------------------------------------------------------------    · Assessment	  89 yo F w/ h/o MVR/AVR, Afib on coumadin, s/p AICD/PPM, DM type II, HTN, CHFrEF presents with elevated INR;     Problem/Plan - 1:  ·  Problem: Acute hypoxemic respiratory failure due to COVID-19 with Viral pneumonia from Viral syndrome.   trend inflammatory markers  finish dexamethasone per protocol   not on Remdesivir due to renal insufficiency   supportive care  monitoring off O2   dispo planing    Problem/Plan - 2:  ·  Problem: UTI  abx IV ceftriaxone;   follow cultures     Problem/Plan - 3:  ·  Problem: MICHELLE on CKD III, stable   monitor closely on PO lasix for CHF  monitor ins/outs and daily weights  monitor BMP  avoid nephrotoxic agents.     Problem/Plan - 4:  ·  Problem: metabolic encephalopathy due to above  appears\ improved: pt interactive and conversive, alert..   CT brain negative for acute pathology  monitor for now as improved     Problem/Plan - 5:  ·  Problem: INR abnormal.    s/p vitamin K 10mg at NH  coumadin changed to Eliquis per cardio     Problem/Plan - 6:  Problem: Atrial fibrillation. Plan: on coumadin  Eliquis   monitor   cardio appreciated    Problem/Plan - 7:  ·  Problem: chronic systolic CHF   on lasix: monitor as stable   monitor ins/outs and daily weights  Continue Current medications otherwise and monitor.   cardio     Problem/Plan - 8:  ·  Problem: Hypertension.  Plan: c/w imdur and hydralazine.     Problem/Plan - 9:  ·  Problem: Diabetes mellitus, type II.    FS better controlled  endo follow up and mgmt appreciated   monitor FS     -GI/DVT Prophylaxis per protocol.    CODE STATUS: DNR/DNI (MOLST in Chart).    Discharge planing     discussed with NP; Pt's Dtr called, no answere will keep trying    ___________________________  H. AINSLEY Rogers.  Pager: [phone]

## 2021-02-01 NOTE — PROGRESS NOTE ADULT - ASSESSMENT
89 yo w/h/o controlled T2DM (A1C7.2%) on "glipizide twice a day" per pt report. DM c/b CKD 3. Also h/o cataracts s/p b/l surgery and MVR, AVR, HTN, HLD, a. fib with ICD/PPM here with elevated INR and COVID19 pneumonia being treated with decadron (started 1/23) and remdisivir, endocrine consulted for severe hyperglycemia: 400-500s. On basal/bolus. Tolerating POs with BG mostly at goal while on present insulin doses. Noted BG coming down today ac lunch and dinner. Will decrease premeal insulin doses. BG goal 100- low 200s due to age and on steroids   Concern about pt's ability to care for self at this time. Might benefit from rehab.     Spent 25 minutes assessing pt/labs/meds and discussing plan of care with primary team. Moderate complexity on pt with T2DM controlled but hyperglycemic in the setting of COVID infection and steroid therapy. Adjusting insulin doses.

## 2021-02-01 NOTE — PROGRESS NOTE ADULT - SUBJECTIVE AND OBJECTIVE BOX
DIABETES FOLLOW UP NOTE: Saw pt earlier today  INTERVAL HX: 89 yo w/h/o controlled T2DM (A1C7.2%) on "glipizide twice a day" per pt report. DM c/b CKD 3. Also h/o cataracts s/p b/l surgery and MVR, AVR, HTN, HLD, a. fib with ICD/PPM here with elevated INR and COVID19 pneumonia being treated with decadron (started 1/23) and remdisivir, endocrine consulted for severe hyperglycemia: 400-500s. On basal/bolus. Tolerating POs with BG mostly at goal while on present insulin doses. Noted BG coming down today ac lunch and dinner. Pt reports eating but needs assistance with meals. Feels weak and tired.       Review of Systems:  General: As above  Cardiovascular: No chest pain, palpitations  Respiratory: No SOB, no cough  GI: No nausea, vomiting, abdominal pain  Endocrine: no polyuria, polydipsia or S&Sx of hypoglycemia    Allergies    Allergies ACEI RX: Throat swelling (Other; Swelling)  angiotensin converting enzyme inhibitors (Swelling)    Intolerances      MEDICATIONS:  atorvastatin 40 milliGRAM(s) Oral at bedtime  dexAMETHasone  Injectable 6 milliGRAM(s) IV Push daily  insulin glargine Injectable (LANTUS) 3 Unit(s) SubCutaneous at bedtime  insulin lispro (ADMELOG) corrective regimen sliding scale   SubCutaneous three times a day before meals  insulin lispro (ADMELOG) corrective regimen sliding scale   SubCutaneous at bedtime  insulin lispro Injectable (ADMELOG) 13 Unit(s) SubCutaneous before lunch  insulin lispro Injectable (ADMELOG) 10 Unit(s) SubCutaneous before dinner  insulin lispro Injectable (ADMELOG) 17 Unit(s) SubCutaneous before breakfast        PHYSICAL EXAM:  VITALS: T(C): 36.6 (02-01-21 @ 16:17)  T(F): 97.8 (02-01-21 @ 16:17), Max: 98.7 (01-31-21 @ 21:18)  HR: 85 (02-01-21 @ 16:17) (70 - 85)  BP: 140/76 (02-01-21 @ 16:17) (106/73 - 146/84)  RR:  (17 - 20)  SpO2:  (93% - 96%)  Wt(kg): --  GENERAL: Female sitting in chair in NAD  Abdomen: Soft, nontender, non distended  Extremities: Warm, + edema in LEs  NEURO: Alert and able to answer questions    LABS:  POCT Blood Glucose.: 106 mg/dL (02-01-21 @ 12:01)  POCT Blood Glucose.: 210 mg/dL (02-01-21 @ 08:11)  POCT Blood Glucose.: 147 mg/dL (01-31-21 @ 21:34)  POCT Blood Glucose.: 181 mg/dL (01-31-21 @ 17:01)  POCT Blood Glucose.: 182 mg/dL (01-31-21 @ 12:04)  POCT Blood Glucose.: 127 mg/dL (01-31-21 @ 07:57)  POCT Blood Glucose.: 149 mg/dL (01-30-21 @ 21:37)  POCT Blood Glucose.: 167 mg/dL (01-30-21 @ 17:14)  POCT Blood Glucose.: 211 mg/dL (01-30-21 @ 12:39)  POCT Blood Glucose.: 111 mg/dL (01-30-21 @ 08:14)  POCT Blood Glucose.: 198 mg/dL (01-29-21 @ 21:13)                            10.5   12.88 )-----------( 92       ( 01 Feb 2021 09:33 )             33.0       02-01    138  |  101  |  70<H>  ----------------------------<  186<H>  5.3   |  27  |  1.17      EGFR if non : 41<L>    Ca    8.4      02-01     A1C with Estimated Average Glucose Result: 7.2 % (01-23-21 @ 10:27)      Estimated Average Glucose: 160 mg/dL (01-23-21 @ 10:27)

## 2021-02-01 NOTE — PROGRESS NOTE ADULT - PROBLEM SELECTOR PLAN 1
-test BG AC/HS  -C/w Lantus 3 units QHS  -C/w Admelog 15-10-8 w/meals. IF BG less than 120mg/dl prior to meal and pt eating, can give 6 units w/meals (notify provider and get 1x order)  -c/w Admelog moderate correction scale AC and Mod HS scale  -notify endocrine team if steroids are discontinued or changed!!  DISPO: TBD based on disposition> home vs. [facility].  Pt states was taking Glipizide most recently but due to age sulphonylurea are not recommended in the elderly due to increase risk for hypoglycemia specially in setting of CKD.   Need to consider benefits of meds versus risk at old age. Might benefit from Tradjenta. No metformin due to old age/CKD/elevated lactate levels  -Plan discussed with pt/team.  Contact info: [phone] (24/7). pager [phone]

## 2021-02-02 ENCOUNTER — TRANSCRIPTION ENCOUNTER (OUTPATIENT)
Age: 86
End: 2021-02-02

## 2021-02-02 LAB
ANION GAP SERPL CALC-SCNC: 11 MMOL/L — SIGNIFICANT CHANGE UP (ref 5–17)
BUN SERPL-MCNC: 62 MG/DL — HIGH (ref 7–23)
CALCIUM SERPL-MCNC: 8.3 MG/DL — LOW (ref 8.4–10.5)
CHLORIDE SERPL-SCNC: 105 MMOL/L — SIGNIFICANT CHANGE UP (ref 96–108)
CO2 SERPL-SCNC: 24 MMOL/L — SIGNIFICANT CHANGE UP (ref 22–31)
CREAT SERPL-MCNC: 1 MG/DL — SIGNIFICANT CHANGE UP (ref 0.5–1.3)
CRP SERPL-MCNC: 2.56 MG/DL — HIGH (ref 0–0.4)
FERRITIN SERPL-MCNC: 436 NG/ML — HIGH (ref 15–150)
GLUCOSE BLDC GLUCOMTR-MCNC: 129 MG/DL — HIGH (ref 70–99)
GLUCOSE BLDC GLUCOMTR-MCNC: 158 MG/DL — HIGH (ref 70–99)
GLUCOSE BLDC GLUCOMTR-MCNC: 190 MG/DL — HIGH (ref 70–99)
GLUCOSE BLDC GLUCOMTR-MCNC: 232 MG/DL — HIGH (ref 70–99)
GLUCOSE BLDC GLUCOMTR-MCNC: 292 MG/DL — HIGH (ref 70–99)
GLUCOSE BLDC GLUCOMTR-MCNC: 71 MG/DL — SIGNIFICANT CHANGE UP (ref 70–99)
GLUCOSE SERPL-MCNC: 279 MG/DL — HIGH (ref 70–99)
HCT VFR BLD CALC: 34.2 % — LOW (ref 34.5–45)
HGB BLD-MCNC: 10.7 G/DL — LOW (ref 11.5–15.5)
LDH SERPL L TO P-CCNC: 447 U/L — HIGH (ref 50–242)
MCHC RBC-ENTMCNC: 27.4 PG — SIGNIFICANT CHANGE UP (ref 27–34)
MCHC RBC-ENTMCNC: 31.3 GM/DL — LOW (ref 32–36)
MCV RBC AUTO: 87.5 FL — SIGNIFICANT CHANGE UP (ref 80–100)
NRBC # BLD: 0 /100 WBCS — SIGNIFICANT CHANGE UP (ref 0–0)
PLATELET # BLD AUTO: 72 K/UL — LOW (ref 150–400)
POTASSIUM SERPL-MCNC: 5.4 MMOL/L — HIGH (ref 3.5–5.3)
POTASSIUM SERPL-SCNC: 5.4 MMOL/L — HIGH (ref 3.5–5.3)
RBC # BLD: 3.91 M/UL — SIGNIFICANT CHANGE UP (ref 3.8–5.2)
RBC # FLD: 18.5 % — HIGH (ref 10.3–14.5)
SARS-COV-2 RNA SPEC QL NAA+PROBE: DETECTED
SODIUM SERPL-SCNC: 140 MMOL/L — SIGNIFICANT CHANGE UP (ref 135–145)
WBC # BLD: 10.48 K/UL — SIGNIFICANT CHANGE UP (ref 3.8–10.5)
WBC # FLD AUTO: 10.48 K/UL — SIGNIFICANT CHANGE UP (ref 3.8–10.5)

## 2021-02-02 PROCEDURE — 99232 SBSQ HOSP IP/OBS MODERATE 35: CPT

## 2021-02-02 RX ORDER — ASCORBIC ACID 60 MG
1 TABLET,CHEWABLE ORAL
Qty: 0 | Refills: 0 | DISCHARGE

## 2021-02-02 RX ORDER — FOLIC ACID 0.8 MG
1 TABLET ORAL
Qty: 0 | Refills: 0 | DISCHARGE
Start: 2021-02-02

## 2021-02-02 RX ORDER — INSULIN LISPRO 100/ML
5 VIAL (ML) SUBCUTANEOUS
Refills: 0 | Status: DISCONTINUED | OUTPATIENT
Start: 2021-02-02 | End: 2021-02-03

## 2021-02-02 RX ORDER — ASCORBIC ACID 60 MG
1 TABLET,CHEWABLE ORAL
Qty: 0 | Refills: 0 | DISCHARGE
Start: 2021-02-02

## 2021-02-02 RX ORDER — CEFTRIAXONE 500 MG/1
1 INJECTION, POWDER, FOR SOLUTION INTRAMUSCULAR; INTRAVENOUS
Qty: 0 | Refills: 0 | DISCHARGE

## 2021-02-02 RX ORDER — LACTOBACILLUS ACIDOPHILUS 100MM CELL
1 CAPSULE ORAL
Qty: 0 | Refills: 0 | DISCHARGE
Start: 2021-02-02

## 2021-02-02 RX ORDER — INSULIN LISPRO 100/ML
5 VIAL (ML) SUBCUTANEOUS
Refills: 0 | Status: DISCONTINUED | OUTPATIENT
Start: 2021-02-03 | End: 2021-02-03

## 2021-02-02 RX ORDER — WARFARIN SODIUM 2.5 MG/1
1 TABLET ORAL
Qty: 0 | Refills: 0 | DISCHARGE

## 2021-02-02 RX ORDER — LACTOBACILLUS ACIDOPHILUS 100MM CELL
2 CAPSULE ORAL
Qty: 0 | Refills: 0 | DISCHARGE

## 2021-02-02 RX ORDER — APIXABAN 2.5 MG/1
1 TABLET, FILM COATED ORAL
Qty: 0 | Refills: 0 | DISCHARGE
Start: 2021-02-02

## 2021-02-02 RX ORDER — LANOLIN ALCOHOL/MO/W.PET/CERES
5 CREAM (GRAM) TOPICAL AT BEDTIME
Refills: 0 | Status: COMPLETED | OUTPATIENT
Start: 2021-02-02 | End: 2021-02-04

## 2021-02-02 RX ORDER — POLYETHYLENE GLYCOL 3350 17 G/17G
17 POWDER, FOR SOLUTION ORAL
Qty: 0 | Refills: 0 | DISCHARGE
Start: 2021-02-02

## 2021-02-02 RX ORDER — SODIUM ZIRCONIUM CYCLOSILICATE 10 G/10G
5 POWDER, FOR SUSPENSION ORAL ONCE
Refills: 0 | Status: COMPLETED | OUTPATIENT
Start: 2021-02-02 | End: 2021-02-02

## 2021-02-02 RX ORDER — FUROSEMIDE 40 MG
1 TABLET ORAL
Qty: 0 | Refills: 0 | DISCHARGE

## 2021-02-02 RX ORDER — METOPROLOL TARTRATE 50 MG
1 TABLET ORAL
Qty: 0 | Refills: 0 | DISCHARGE
Start: 2021-02-02

## 2021-02-02 RX ORDER — HYDRALAZINE HCL 50 MG
1 TABLET ORAL
Qty: 0 | Refills: 0 | DISCHARGE
Start: 2021-02-02

## 2021-02-02 RX ORDER — METOPROLOL TARTRATE 50 MG
1 TABLET ORAL
Qty: 0 | Refills: 0 | DISCHARGE

## 2021-02-02 RX ORDER — ISOSORBIDE MONONITRATE 60 MG/1
1 TABLET, EXTENDED RELEASE ORAL
Qty: 0 | Refills: 0 | DISCHARGE
Start: 2021-02-02

## 2021-02-02 RX ORDER — SENNA PLUS 8.6 MG/1
2 TABLET ORAL
Qty: 0 | Refills: 0 | DISCHARGE
Start: 2021-02-02

## 2021-02-02 RX ORDER — AZITHROMYCIN 500 MG/1
1 TABLET, FILM COATED ORAL
Qty: 0 | Refills: 0 | DISCHARGE

## 2021-02-02 RX ORDER — HYDRALAZINE HCL 50 MG
1 TABLET ORAL
Qty: 0 | Refills: 0 | DISCHARGE

## 2021-02-02 RX ORDER — FOLIC ACID 0.8 MG
1 TABLET ORAL
Qty: 0 | Refills: 0 | DISCHARGE

## 2021-02-02 RX ADMIN — ATORVASTATIN CALCIUM 40 MILLIGRAM(S): 80 TABLET, FILM COATED ORAL at 21:30

## 2021-02-02 RX ADMIN — Medication 6: at 12:09

## 2021-02-02 RX ADMIN — INSULIN GLARGINE 3 UNIT(S): 100 INJECTION, SOLUTION SUBCUTANEOUS at 21:30

## 2021-02-02 RX ADMIN — Medication 25 MILLIGRAM(S): at 04:44

## 2021-02-02 RX ADMIN — Medication 6 MILLIGRAM(S): at 04:45

## 2021-02-02 RX ADMIN — Medication 2: at 17:46

## 2021-02-02 RX ADMIN — Medication 1 TABLET(S): at 11:12

## 2021-02-02 RX ADMIN — Medication 5 UNIT(S): at 17:46

## 2021-02-02 RX ADMIN — Medication 250 MILLIGRAM(S): at 11:11

## 2021-02-02 RX ADMIN — SODIUM ZIRCONIUM CYCLOSILICATE 5 GRAM(S): 10 POWDER, FOR SUSPENSION ORAL at 12:48

## 2021-02-02 RX ADMIN — Medication 15 UNIT(S): at 07:53

## 2021-02-02 RX ADMIN — Medication 25 MILLIGRAM(S): at 17:46

## 2021-02-02 RX ADMIN — APIXABAN 2.5 MILLIGRAM(S): 2.5 TABLET, FILM COATED ORAL at 05:44

## 2021-02-02 RX ADMIN — Medication 10 UNIT(S): at 12:10

## 2021-02-02 RX ADMIN — Medication 4: at 07:53

## 2021-02-02 RX ADMIN — Medication 5 MILLIGRAM(S): at 21:37

## 2021-02-02 RX ADMIN — APIXABAN 2.5 MILLIGRAM(S): 2.5 TABLET, FILM COATED ORAL at 17:46

## 2021-02-02 RX ADMIN — ISOSORBIDE MONONITRATE 30 MILLIGRAM(S): 60 TABLET, EXTENDED RELEASE ORAL at 11:11

## 2021-02-02 RX ADMIN — Medication 1 MILLIGRAM(S): at 11:11

## 2021-02-02 RX ADMIN — Medication 50 MILLIGRAM(S): at 04:44

## 2021-02-02 NOTE — DISCHARGE NOTE PROVIDER - CARE PROVIDER_API CALL
[facility],   [phone] [place], [place] [address]  Phone: [phone]  Fax: (   )    -  Follow Up Time:    Isaac Martin  CARDIOVASCULAR DISEASE  [address] [facility], [address]  [place] [address]  Phone: [phone]  Fax: [phone]  Follow Up Time:     Rigoberto Rogers (DO)  Internal Medicine  [address] [facility] [facility] [facility] [address]  [place] [address]  Phone: [phone]  Fax: [phone]  Follow Up Time:     [facility],   [address] [place] [id number]  Phone: [phone]  Fax: (   )    -  Follow Up Time:

## 2021-02-02 NOTE — DISCHARGE NOTE PROVIDER - NSDCMRMEDTOKEN_GEN_ALL_CORE_FT
Alogliptin 12.5 mg oral tablet: 1 tab(s) orally once a day  apixaban 2.5 mg oral tablet: 1 tab(s) orally 2 times a day  ascorbic acid 250 mg oral tablet: 1 tab(s) orally once a day  bisacodyl 5 mg oral delayed release tablet: 1 tab(s) orally every 12 hours, As needed, Constipation  Crestor 10 mg oral tablet: 1 tab(s) orally once a day  folic acid 1 mg oral tablet: 1 tab(s) orally once a day  glipiZIDE 5 mg oral tablet: 1 tab(s) orally 2 times a day  HumuLIN R 100 units/mL injectable solution: injectable 2 times a day  hydrALAZINE 50 mg oral tablet: 1 tab(s) orally every 8 hours  isosorbide mononitrate 30 mg oral tablet, extended release: 1 tab(s) orally once a day  lactobacillus acidophilus oral capsule: 1 tab(s) orally once a day  metoprolol tartrate 25 mg oral tablet: 1 tab(s) orally 2 times a day  polyethylene glycol 3350 oral powder for reconstitution: 17 gram(s) orally once a day, As needed, Constipation  senna oral tablet: 2 tab(s) orally once a day (at bedtime)   Admelog 100 units/mL injectable solution: 10 unit(s) subcutaneous once a day  before breakfast.Hold if not eating.  Admelog 100 units/mL injectable solution: 10 unit(s) subcutaneous once a day  before lunch. Hold if not eating.  Admelog 100 units/mL injectable solution: 10 unit(s) subcutaneous once a day before dinner. Hold if not eating.  apixaban 2.5 mg oral tablet: 1 tab(s) orally 2 times a day  ascorbic acid 250 mg oral tablet: 1 tab(s) orally once a day  bisacodyl 5 mg oral delayed release tablet: 1 tab(s) orally every 12 hours, As needed, Constipation  Crestor 10 mg oral tablet: 1 tab(s) orally once a day  folic acid 1 mg oral tablet: 1 tab(s) orally once a day  furosemide 20 mg oral tablet: 1 tab(s) orally once a day  hydrALAZINE 50 mg oral tablet: 1 tab(s) orally every 8 hours  isosorbide mononitrate 30 mg oral tablet, extended release: 1 tab(s) orally once a day  lactobacillus acidophilus oral capsule: 1 tab(s) orally once a day  Lantus 100 units/mL subcutaneous solution: 7 unit(s) subcutaneous once a day (at bedtime)  metoprolol tartrate 25 mg oral tablet: 1 tab(s) orally 2 times a day  polyethylene glycol 3350 oral powder for reconstitution: 17 gram(s) orally once a day, As needed, Constipation  senna oral tablet: 2 tab(s) orally once a day (at bedtime)

## 2021-02-02 NOTE — PROGRESS NOTE ADULT - PROBLEM SELECTOR PLAN 1
-test BG AC/HS  -C/w Lantus 3 units QHS  -C/w Admelog to 5 units ac meals.   -c/w Admelog moderate correction scale AC and Mod HS scale in case pt remains hyperglycemic tomorrow  DISPO: TBD based on disposition> home vs. [facility].  Pt states was taking Glipizide most recently but due to age sulphonylureas are not recommended in the elderly due to increase risk for hypoglycemia specially in setting of CKD.   Need to consider benefits of meds versus risk at old age. Might benefit from Tradjenta. No metformin due to old age/CKD/elevated lactate levels  -Plan discussed with pt/team.  Contact info: [phone] (24/7). pager [phone]

## 2021-02-02 NOTE — DISCHARGE NOTE PROVIDER - NSDCCPCAREPLAN_GEN_ALL_CORE_FT
PRINCIPAL DISCHARGE DIAGNOSIS  Diagnosis: INR (international normal ratio) abnormal  Assessment and Plan of Treatment: - Elevated INR, patient received vitamin K 10mg   - Coumadin changed to Eliquis per cardiology   - No need to trend INR      SECONDARY DISCHARGE DIAGNOSES  Diagnosis: UTI (urinary tract infection), uncomplicated  Assessment and Plan of Treatment: Completed IV Ceftriaxone    Diagnosis: COVID-19  Assessment and Plan of Treatment: Finish dexamethasone per protocol   Not on Remdesivir due to renal insufficiency   Patient is on room air    Diagnosis: Delirium due to another medical condition  Assessment and Plan of Treatment: Apeears improved: pt interactive and conversive, alert.  CT brain negative for acute pathology      Diagnosis: MICHELLE (acute kidney injury)  Assessment and Plan of Treatment: Avoid nephrotoxic agents.   Now Cr is back to normal    Diagnosis: Essential hypertension  Assessment and Plan of Treatment: Low salt diet  Activity as tolerated.  Take all medication as prescribed.  Follow up with your medical doctor for routine blood pressure monitoring at your next visit.  Notify your doctor if you have any of the following symptoms:   Dizziness, Lightheadedness, Blurry vision, Headache, Chest pain, Shortness of breath      Diagnosis: CHF (congestive heart failure)  Assessment and Plan of Treatment: - Off lasix  - Follow up cardiology as outpatient    Diagnosis: Type 2 diabetes mellitus with hyperglycemia, without long-term current use of insulin  Assessment and Plan of Treatment: Make sure you get your HgA1c checked every three months.  If you take oral diabetes medications, check your blood glucose two times a day.  If you take insulin, check your blood glucose before meals and at bedtime.  It's important not to skip any meals.  Keep a log of your blood glucose results and always take it with you to your doctor appointments.  Keep a list of your current medications including injectables and over the counter medications and bring this medication list with you to all your doctor appointments.  If you have not seen your ophthalmologist this year call for appointment.  Check your feet daily for redness, sores, or openings. Do not self treat. If no improvement in two days call your primary care physician for an appointment.  Low blood sugar (hypoglycemia) is a blood sugar below 70mg/dl. Check your blood sugar if you feel signs/symptoms of hypoglycemia. If your blood sugar is below 70 take 15 grams of carbohydrates (ex 4 oz of apple juice, 3-4 glucose tablets, or 4-6 oz of regular soda) wait 15 minutes and repeat blood sugar to make sure it comes up above 70.  If your blood sugar is above 70 and you are due for a meal, have a meal.  If you are not due for a meal have a snack.  This snack helps keeps your blood sugar at a safe range.       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  Will need a transesophageal echocardiogram.  FOLLOW UP with cardiology and structural heart team as an outpatient to schedule. Follow up with your cardiologist for referral to structural heart team.      Diagnosis: Type 2 diabetes mellitus with hyperglycemia, without long-term current use of insulin  Assessment and Plan of Treatment: A1c was 7.2.  While at rehab continue lantus 7 units at bedtime  and premeal admelog 10 units TID.  When discharged from rehab, start tradjenta 5 mg daily  and follow up with endocrinology as outpatient. Do not restart metformin until patient follows up with endocrinology as an outpatient.    Diagnosis: CHF (congestive heart failure)  Assessment and Plan of Treatment: - Follow up cardiology as outpatient  Weigh yourself daily.  If you gain 3lbs in 3 days, or 5lbs in a week call your Health Care Provider.  Do not eat or drink foods containing more than 2000mg of salt (sodium) in your diet every day.  Call your Health Care Provider if you have any swelling or increased swelling in your feet, ankles, and/or stomach.  The Pt was provided with CHF diet instruction (low sodium diet, daily weights, label reading, Heart Healthy Cooking Tips & Heart Healthy shopping Tips).  Take all of your medication as directed.  If you become dizzy call your Health Care Provider.      Diagnosis: Essential hypertension  Assessment and Plan of Treatment: Low salt diet  Activity as tolerated.  Take all medication as prescribed.  Follow up with your medical doctor for routine blood pressure monitoring at your next visit.  Notify your doctor if you have any of the following symptoms:   Dizziness, Lightheadedness, Blurry vision, Headache, Chest pain, Shortness of breath      Diagnosis: UTI (urinary tract infection), uncomplicated  Assessment and Plan of Treatment: Completed IV Ceftriaxone    Diagnosis: Delirium due to another medical condition  Assessment and Plan of Treatment: Appears improved: pt interactive and conversive, alert.  CT brain negative for acute pathology      Diagnosis: COVID-19  Assessment and Plan of Treatment: Finish dexamethasone per protocol   Not on Remdesivir due to renal insufficiency   Patient is on room air  You tested positive for COVID 19.  You no longer require hospitalization.  Please restrict activities outside of your home except for getting medical care.  Do not go to work, school, or public areas.  Avoid using public transportation, ride-sharing, or taxis.  Separate yourself from other people and animals in your home.  Call ahead before visiting your doctor.  Wear a facemask when you are around other people. Cover your cough and sneezes.  Clean your hands often.  Avoid sharing personal household items.  Clean all frequently touched surfaces daily.      Diagnosis: MICHELLE (acute kidney injury)  Assessment and Plan of Treatment: Avoid nephrotoxic agents.   Now Cr is back to normal  You had an acute kidney injury during this admission which means your kidney function was impaired temporarily. Most of the time, this is due to decreased oral intake and dehydration. Drinking fluids often throughout the day to stay hydrated is recommended. Your kidney function has since resolved and returned to normal. Lab tests that monitor your kidney function include BUN/creatinine. You may need to monitor these levels with your primary doctor upon discharge. For now, it is recommended that you avoid taking any nephrotoxic agents (medications that can harm the kidneys and further decrease kidney function. These medications include NSAIDs (anti-inflammatories) including (ex: Ibuprofen, Advil, Motrin, Celebrex, Naprosyn/Aleve), Intravenous contrast for diagnostic testing, certain antibiotics, certain chemotherapeutic agents, or any combination cold medications. Ask your doctor if it is okay to resume taking these medications once you are discharged and follow up in the office.   Have all medications adjusted for your renal function by your Health Care Provider if necessary.

## 2021-02-02 NOTE — DISCHARGE NOTE PROVIDER - NSDCFUADDAPPT_GEN_ALL_CORE_FT
Please follow up cardiology as outpatient.   Please follow up with your endocrinology as outpatient. [facility] endocrinology information provided as well. Please call to make an appointment.

## 2021-02-02 NOTE — PROGRESS NOTE ADULT - SUBJECTIVE AND OBJECTIVE BOX
DIABETES FOLLOW UP NOTE: Saw pt earlier today  INTERVAL HX: 89 yo w/h/o controlled T2DM (A1C7.2%) on "glipizide twice a day" per pt report. DM c/b CKD 3. Also h/o cataracts s/p b/l surgery and MVR, AVR, HTN, HLD, a. fib with ICD/PPM here with elevated INR and COVID19 pneumonia being treated with decadron (started 1/23) and remdisivir, endocrine consulted for severe hyperglycemia: 400-500s. On basal/bolus. Tolerating POs with BG levels up today in the 200s. Received last dose of dexa today. Noted pt having breakfast late today. Pt states she is very tired and needs time to eat. Denies SOB/cough but reports feeling more weak today.    Review of Systems:  General: As above  Cardiovascular: No chest pain, palpitations  Respiratory: No SOB, no cough  GI: No nausea, vomiting, abdominal pain  Endocrine: no polyuria, polydipsia or S&Sx of hypoglycemia    Allergies    Allergies    ACEI RX: Throat swelling (Other; Swelling)  angiotensin converting enzyme inhibitors (Swelling)    Intolerances      MEDICATIONS:  atorvastatin 40 milliGRAM(s) Oral at bedtime  insulin glargine Injectable (LANTUS) 3 Unit(s) SubCutaneous at bedtime  insulin lispro (ADMELOG) corrective regimen sliding scale   SubCutaneous three times a day before meals  insulin lispro (ADMELOG) corrective regimen sliding scale   SubCutaneous at bedtime  insulin lispro Injectable (ADMELOG) 15 Unit(s) SubCutaneous before breakfast  insulin lispro Injectable (ADMELOG) 10 Unit(s) SubCutaneous before lunch  insulin lispro Injectable (ADMELOG) 8 Unit(s) SubCutaneous before dinner      PHYSICAL EXAM:  VITALS: T(C): 36.3 (02-02-21 @ 11:08)  T(F): 97.3 (02-02-21 @ 11:08), Max: 98.1 (02-02-21 @ 04:39)  HR: 74 (02-02-21 @ 11:08) (69 - 78)  BP: 107/68 (02-02-21 @ 11:08) (107/68 - 145/83)  RR:  (18 - 20)  SpO2:  (90% - 96%)  Wt(kg): --  GENERAL: Female sitting in chair in NAD  Abdomen: Soft, nontender, non distended  Extremities: Warm, + edema around ankles.    NEURO: Alert and able to answer simple questions. Not talking much today    LABS:  POCT Blood Glucose.: 292 mg/dL (02-02-21 @ 12:09)  POCT Blood Glucose.: 232 mg/dL (02-02-21 @ 07:49)  POCT Blood Glucose.: 190 mg/dL (02-01-21 @ 21:51)  POCT Blood Glucose.: 71 mg/dL (02-01-21 @ 17:06)  POCT Blood Glucose.: 106 mg/dL (02-01-21 @ 12:01)  POCT Blood Glucose.: 210 mg/dL (02-01-21 @ 08:11)  POCT Blood Glucose.: 147 mg/dL (01-31-21 @ 21:34)  POCT Blood Glucose.: 181 mg/dL (01-31-21 @ 17:01)  POCT Blood Glucose.: 182 mg/dL (01-31-21 @ 12:04)  POCT Blood Glucose.: 127 mg/dL (01-31-21 @ 07:57)  POCT Blood Glucose.: 149 mg/dL (01-30-21 @ 21:37)  POCT Blood Glucose.: 167 mg/dL (01-30-21 @ 17:14)                            10.7   10.48 )-----------( 72       ( 02 Feb 2021 08:42 )             34.2       02-02    140  |  105  |  62<H>  ----------------------------<  279<H>  5.4<H>   |  24  |  1.00    EGFR if : 57<L>  EGFR if non : 49<L>    Ca    8.3<L>      02-02        Thyroid Function Tests:      A1C with Estimated Average Glucose Result: 7.2 % (01-23-21 @ 10:27)      Estimated Average Glucose: 160 mg/dL (01-23-21 @ 10:27)

## 2021-02-02 NOTE — DISCHARGE NOTE PROVIDER - NSDCFUADDINST_GEN_ALL_CORE_FT
FOLLOW UP with cardiology and structural heart team as an outpatient to schedule.   The phone number for structural heart is [phone].

## 2021-02-02 NOTE — DISCHARGE NOTE PROVIDER - HOSPITAL COURSE
89 yo F w/ h/o MVR/AVR, Afib on coumadin, s/p AICD/PPM, DM type II, HTN, CHFrEF presents with elevated INR; also found to have + COVID. For elevated INR, s/p vitamin K 10mg at NH and was on lovenox and coumadin together. However, pt with high risk for coumadin with coumadin toxicity , coumadin d/c and changed to eliquis. For Acute hypoxemic respiratory failure due to COVID-19 with Viral pneumonia, pt completed dexamethasone per protocol. Pt not placed on Remdesivir due to renal insufficiency. SpO2 stable at [facility]. Pt also found to hae UTI on UA s/p IV CTX. Pt is medically cleared by Dr. Rogers to [facility] with outpatient follow up. 89 yo F w/ h/o MVR/AVR, Afib on coumadin, s/p AICD/PPM, DM type II, HTN, CHFrEF presents with elevated INR; also found to have + COVID.   For elevated INR, s/p vitamin K 10mg at NH and was on lovenox and coumadin together. However, pt with high risk for coumadin with coumadin toxicity ,   coumadin d/c and changed to Eliquis. For Acute hypoxemic respiratory failure due to COVID-19 with Viral pneumonia, pt completed dexamethasone per protocol.   Pt not placed on Remdesivir due to renal insufficiency. SpO2 stable at [facility]. Pt also found to hae UTI on UA s/p IV CTX.   Pt is medically cleared by Dr. Rogers to [facility] with outpatient follow up. 89 yo F w/ h/o MVR/AVR, Afib on coumadin, s/p AICD/PPM, DM type II, HTN, CHFrEF presents with elevated INR; also found to have + COVID.   For elevated INR, s/p vitamin K 10mg at NH and was on lovenox and coumadin together. However, pt with high risk for coumadin with coumadin toxicity ,   coumadin d/c and changed to Eliquis. For Acute hypoxemic respiratory failure due to COVID-19 with Viral pneumonia, pt completed dexamethasone per protocol.   Pt not placed on Remdesivir due to renal insufficiency. SpO2 stable at RA. Pt also found to hae UTI on UA s/p IV CTX.   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  Pt is medically cleared by Dr. Rogers to [facility] with outpatient follow up.     Patient to be discharged on 2/5/21 to rehab.

## 2021-02-02 NOTE — PROGRESS NOTE ADULT - ASSESSMENT
_________________________________________________________________________________________  ========>>  M E D I C A L   A T T E N D I N G    F O L L O W  U P  N O T E  <<=========  -----------------------------------------------------------------------------------------------------    - Patient seen and examined by me earlier today.   - In summary,  CHARLES ADAMS is a 89y year old woman admitted with elevated INR, AMS, COVID   - Patient today overall doing ok, comfortable, eating fairly , feels tired    comfortable on room air     ==================>> REVIEW OF SYSTEM <<=================    GEN: no fever, no chills, no pain  RESP: no SOB, no cough, no sputum  CVS: no chest pain, no palpitations, no edema   GI: no abdominal pain, no nausea  : no dysuria, no frequency  Neuro: no headache, no dizziness  Derm : no itching, no rash    ==================>> PHYSICAL EXAM <<=================    GEN: A&O X 2 , NAD , comfortable in bed   HEENT: NCAT, PERRL, MMM, hearing intact  Neck: supple , no JVD appreciated  CVS: S1S2 , regular , No M/R/G appreciated  PULM: CTA B/L,  no W/R/R appreciated  ABD.: soft. non tender, non distended,  bowel sounds present  Extrem: intact pulses , no edema   PSYCH : normal mood,  not anxious                                                 ( Note written 02-02-21 )    ==================>> MEDICATIONS <<====================    apixaban 2.5 milliGRAM(s) Oral two times a day  ascorbic acid 250 milliGRAM(s) Oral daily  atorvastatin 40 milliGRAM(s) Oral at bedtime  dextrose 40% Gel 15 Gram(s) Oral once  dextrose 5%. 1000 milliLiter(s) IV Continuous <Continuous>  dextrose 5%. 1000 milliLiter(s) IV Continuous <Continuous>  dextrose 50% Injectable 25 Gram(s) IV Push once  dextrose 50% Injectable 12.5 Gram(s) IV Push once  dextrose 50% Injectable 25 Gram(s) IV Push once  folic acid 1 milliGRAM(s) Oral daily  glucagon  Injectable 1 milliGRAM(s) IntraMuscular once  hydrALAZINE 50 milliGRAM(s) Oral every 8 hours  insulin glargine Injectable (LANTUS) 3 Unit(s) SubCutaneous at bedtime  insulin lispro (ADMELOG) corrective regimen sliding scale   SubCutaneous three times a day before meals  insulin lispro (ADMELOG) corrective regimen sliding scale   SubCutaneous at bedtime  insulin lispro Injectable (ADMELOG) 5 Unit(s) SubCutaneous before dinner  isosorbide   mononitrate ER Tablet (IMDUR) 30 milliGRAM(s) Oral daily  lactobacillus acidophilus 1 Tablet(s) Oral daily  metoprolol tartrate 25 milliGRAM(s) Oral two times a day  senna 2 Tablet(s) Oral at bedtime    MEDICATIONS  (PRN):  bisacodyl 5 milliGRAM(s) Oral every 12 hours PRN Constipation  polyethylene glycol 3350 17 Gram(s) Oral daily PRN Constipation    ___________  Active diet:  Diet, Soft:   Consistent Carbohydrate Evening Snack (CSTCHOSN)  DASH/TLC Sodium & Cholesterol Restricted (DASH)  Supplement Feeding Modality:  Oral  Glucerna Shake Cans or Servings Per Day:  2       Frequency:  Daily  ___________________    ==================>> VITAL SIGNS <<==================    Vital Signs Last 24 HrsT(C): 36.3 (02-02-21 @ 11:08)  T(F): 97.3 (02-02-21 @ 11:08), Max: 98.1 (02-02-21 @ 04:39)  HR: 74 (02-02-21 @ 11:08) (69 - 78)  BP: 107/68 (02-02-21 @ 11:08)  RR: 20 (02-02-21 @ 11:08) (18 - 20)  SpO2: 90% (02-02-21 @ 11:08) (90% - 96%)    CAPILLARY BLOOD GLUCOSE  232 (02 Feb 2021 07:50)  190 (01 Feb 2021 21:51)      POCT Blood Glucose.: 292 mg/dL (02 Feb 2021 12:09)  POCT Blood Glucose.: 232 mg/dL (02 Feb 2021 07:49)  POCT Blood Glucose.: 190 mg/dL (01 Feb 2021 21:51)     ==================>> LAB AND IMAGING <<==================                        10.7   10.48 )-----------( 72       ( 02 Feb 2021 08:42 )             34.2        02-02    140  |  105  |  62<H>  ----------------------------<  279<H>  5.4<H>   |  24  |  1.00    Ca    8.3<L>      02 Feb 2021 08:42    WBC count:   10.48 <<== ,  12.88 <<== ,  12.10 <<== ,  13.86 <<==   Hemoglobin:   10.7 <<==,  10.5 <<==,  9.3 <<==,  9.9 <<==  platelets:  72 <==, 92 <==, 78 <==, 94 <==, 117 <==    Creatinine:  1.00  <<==, 1.17  <<==, 1.06  <<==, 1.16  <<==, 1.31  <<==  Sodium:   140  <==, 138  <==, 140  <==, 137  <==, 139  <==    ^^^ Inflammatory markers :  ^^^  C R P :          2.56 (02-02-21)  <<--, 1.65 (02-01-21)  <<--, 1.14 (01-31-21)  <<--, 0.74 (01-30-21)  <<--, <0.30 (01-29-21)  <<--  P C T :         0.07 (01-25-21) <<--, 0.07 (01-23-21) <<--    Ferritin :         436 (02-02-21) <<--, 405 (02-01-21) <<--, 359 (01-31-21) <<--, 372 (01-30-21) <<--, 394 (01-29-21) <<--, 382 (01-28-21) <<--, 425 (01-27-21) <<--, 460 (01-26-21) <<--, 446 (01-25-21) <<--, 464 (01-24-21) <<--    D-Dimer :          339 (01-31-21) <<--, 433 (01-28-21) <<--, 597 (01-25-21) <<--, 396 (01-24-21) <<--, 198 (01-23-21) <<--    ___________________________________________________________________________________  ===============>>  A S S E S S M E N T   A N D   P L A N <<===============  ------------------------------------------------------------------------------------------    · Assessment	  89 yo F w/ h/o MVR/AVR, Afib on coumadin, s/p AICD/PPM, DM type II, HTN, CHFrEF presents with elevated INR;     Problem/Plan - 1:  ·  Problem: Acute hypoxemic respiratory failure due to COVID-19 with Viral pneumonia from Viral syndrome.   trend inflammatory markers  finish dexamethasone per protocol   not on Remdesivir due to renal insufficiency   supportive care  monitoring off O2     Problem/Plan - 2:  ·  Problem: UTI  abx IV ceftriaxone;   follow cultures     Problem/Plan - 3:  ·  Problem: MICHELLE on CKD III, stable   monitor closely on PO lasix for CHF  monitor ins/outs and daily weights  monitor BMP  avoid nephrotoxic agents.     Problem/Plan - 4:  ·  Problem: metabolic encephalopathy due to above  appears\ improved: pt interactive and conversive, alert..   CT brain negative for acute pathology  monitor for now as improved   fatigue likely a sequelae of viral syndrome   needs rehabilitation ( awaiting negative COVID !)     Problem/Plan - 5:  ·  Problem: Atrial fibrillation. Plan: on coumadin  Eliquis   monitor   cardio appreciated    Problem/Plan - 6:  ·  Problem: chronic systolic CHF   lasix as needed   monitor ins/outs and daily weights  Continue Current medications otherwise and monitor.   cardio     ** pt with bioprosthetic valves ( AVR and MVR)      discussed with family : reportedly valves have worsened overtime     requesting repeat echo and further workup as needed  >> ordered     Problem/Plan - 7:  ·  Problem: Hypertension.  Plan: c/w imdur and hydralazine.     Problem/Plan - 8:  ·  Problem: Diabetes mellitus, type II.    FS better controlled  endo follow up and mgmt appreciated   monitor FS     -GI/DVT Prophylaxis per protocol.    CODE STATUS: DNR/DNI (MOLST in Chart).    Discharge planing pending Echo and COVID     discussed with NP;     I had the wrong number for pt's family : discussed with family representative Bailey ( DDS) in detial and all questions and concerns answered     ___________________________  H. AINSLEY Rogers.  Pager: [phone]

## 2021-02-02 NOTE — DISCHARGE NOTE PROVIDER - NSFOLLOWUPCLINICS_GEN_ALL_ED_FT
[facility] Endocrinology  Endocrinology  [address] [place] [address]  Phone: [phone]  Fax:   Follow Up Time:

## 2021-02-02 NOTE — PROGRESS NOTE ADULT - SUBJECTIVE AND OBJECTIVE BOX
CARDIOLOGY     PROGRESS  NOTE   ________________________________________________    CHIEF COMPLAINT:Patient is a 89y old  Female who presents with a chief complaint of sent for elevated INR (01 Feb 2021 17:19)  no complain.  	  REVIEW OF SYSTEMS:  CONSTITUTIONAL: No fever, weight loss, or fatigue  EYES: No eye pain, visual disturbances, or discharge  ENT:  No difficulty hearing, tinnitus, vertigo; No sinus or throat pain  NECK: No pain or stiffness  RESPIRATORY: No cough, wheezing, chills or hemoptysis; No Shortness of Breath  CARDIOVASCULAR: No chest pain, palpitations, passing out, dizziness, or leg swelling  GASTROINTESTINAL: No abdominal or epigastric pain. No nausea, vomiting, or hematemesis; No diarrhea or constipation. No melena or hematochezia.  GENITOURINARY: No dysuria, frequency, hematuria, or incontinence  NEUROLOGICAL: No headaches, memory loss, loss of strength, numbness, or tremors  SKIN: No itching, burning, rashes, or lesions   LYMPH Nodes: No enlarged glands  ENDOCRINE: No heat or cold intolerance; No hair loss  MUSCULOSKELETAL: No joint pain or swelling; No muscle, back, or extremity pain  PSYCHIATRIC: No depression, anxiety, mood swings, or difficulty sleeping  HEME/LYMPH: No easy bruising, or bleeding gums  ALLERGY AND IMMUNOLOGIC: No hives or eczema	    [ ] All others negative	  [ ] Unable to obtain    PHYSICAL EXAM:  T(C): 36.7 (02-02-21 @ 04:39), Max: 36.7 (02-02-21 @ 04:39)  HR: 69 (02-02-21 @ 04:39) (69 - 85)  BP: 145/83 (02-02-21 @ 04:39) (106/73 - 145/83)  RR: 18 (02-02-21 @ 04:39) (17 - 18)  SpO2: 92% (02-02-21 @ 04:39) (92% - 96%)  Wt(kg): --  I&O's Summary    01 Feb 2021 07:01  -  02 Feb 2021 07:00  --------------------------------------------------------  IN: 480 mL / OUT: 1000 mL / NET: -520 mL    02 Feb 2021 07:01  -  02 Feb 2021 10:36  --------------------------------------------------------  IN: 240 mL / OUT: 200 mL / NET: 40 mL        Appearance: Normal	  HEENT:   Normal oral mucosa, PERRL, EOMI	  Lymphatic: No lymphadenopathy  Cardiovascular: Normal S1 S2, No JVD, + murmurs, No edema  Respiratory: Lungs clear to auscultation	  Psychiatry: A & O x 3, Mood & affect appropriate  Gastrointestinal:  Soft, Non-tender, + BS	  Skin: No rashes, No ecchymoses, No cyanosis	  Neurologic: Non-focal  Extremities: Normal range of motion, No clubbing, cyanosis or edema  Vascular: Peripheral pulses palpable 2+ bilaterally    MEDICATIONS  (STANDING):  apixaban 2.5 milliGRAM(s) Oral two times a day  ascorbic acid 250 milliGRAM(s) Oral daily  atorvastatin 40 milliGRAM(s) Oral at bedtime  dextrose 40% Gel 15 Gram(s) Oral once  dextrose 5%. 1000 milliLiter(s) (50 mL/Hr) IV Continuous <Continuous>  dextrose 5%. 1000 milliLiter(s) (100 mL/Hr) IV Continuous <Continuous>  dextrose 50% Injectable 25 Gram(s) IV Push once  dextrose 50% Injectable 12.5 Gram(s) IV Push once  dextrose 50% Injectable 25 Gram(s) IV Push once  folic acid 1 milliGRAM(s) Oral daily  glucagon  Injectable 1 milliGRAM(s) IntraMuscular once  hydrALAZINE 50 milliGRAM(s) Oral every 8 hours  insulin glargine Injectable (LANTUS) 3 Unit(s) SubCutaneous at bedtime  insulin lispro (ADMELOG) corrective regimen sliding scale   SubCutaneous three times a day before meals  insulin lispro (ADMELOG) corrective regimen sliding scale   SubCutaneous at bedtime  insulin lispro Injectable (ADMELOG) 15 Unit(s) SubCutaneous before breakfast  insulin lispro Injectable (ADMELOG) 10 Unit(s) SubCutaneous before lunch  insulin lispro Injectable (ADMELOG) 8 Unit(s) SubCutaneous before dinner  isosorbide   mononitrate ER Tablet (IMDUR) 30 milliGRAM(s) Oral daily  lactobacillus acidophilus 1 Tablet(s) Oral daily  metoprolol tartrate 25 milliGRAM(s) Oral two times a day  senna 2 Tablet(s) Oral at bedtime      TELEMETRY: 	    ECG:  	  RADIOLOGY:  OTHER: 	  	  LABS:	 	    CARDIAC MARKERS:                                10.7   10.48 )-----------( 72       ( 02 Feb 2021 08:42 )             34.2     02-02    140  |  105  |  62<H>  ----------------------------<  279<H>  5.4<H>   |  24  |  1.00    Ca    8.3<L>      02 Feb 2021 08:42      proBNP:   Lipid Profile:   HgA1c:   TSH:   PT/INR - ( 01 Feb 2021 06:17 )   PT: 18.7 sec;   INR: 1.59 ratio               Assessment and plan  ---------------------------  89 yo F w/ h/o MVR/AVR, Afib on coumadin, s/p AICD/PPM, DM type II, HTN, CHFrEF presents with elevated INR;  Patient is AAO x2, limited history obtained from patient and NH staff.  Patient was being treated for pneumonia for last 3 days with IV ceftriaxone/azithromycin and possibly IVF.  Patient however had no complaints, has been eating and drinking well (although likely unreliable historian).  Today she was found to have COVID and elevated INR (11.2), which prompted them to send patient to [facility].  Patient was given 10mg of vitamin K this morning.  Per chart review, patient was also positive for COVID on 1/7/21 which NH staff could not confirm.  Patient otherwise denies chest pain, SOB, cough.  On arrival, she had temp 97.9, HR 81, /83, RR 16, saturation 100% on 4L.     chf systolic acute on chronic  will adjust cardiac meds  check ct chest no contrast ?vascular congestion ?covid/ ct noted resulted   echo noted  tx for covid as per protocol  dvt prophylaxis  ac keep inr 2-3  will fu  add IMDUR to hydralazine for chf therapy  inr noted 2.62  covid +, o2 sat stable  will increase beta blocker as tolerated  keep inr  2-3 sec to a.fib, both valves are bioprosthetic  increase renal function ?sec to steroid  check bladder scan  mod LV dysfunction, continue hydralazine and nitrated and metoprolol ER  pt with high risk for coumadin with coumadin toxicity , will dc Lovenox and coumadin, start on NOAC   add norvasc if increase bp

## 2021-02-02 NOTE — CHART NOTE - NSCHARTNOTEFT_GEN_A_CORE
Pt has high risk for coumadin toxicity, coumadin discontinued and switched to Eliquis per cards. No need to trend INR, ok to be dc w/ current INR. D/w Dr. Sue Carlin PA-C  [id number]

## 2021-02-02 NOTE — DISCHARGE NOTE PROVIDER - PROVIDER TOKENS
FREE:[LAST:[[facility]],PHONE:[phone]],FAX:[(   )    -],ADDRESS:[[phone] [place] [address]]] PROVIDER:[TOKEN:[[id number]]],PROVIDER:[TOKEN:[[id number]]],FREE:[LAST:[[facility]],PHONE:[phone]],FAX:[(   )    -],ADDRESS:[[phone] [place] [address]]]

## 2021-02-02 NOTE — DISCHARGE NOTE PROVIDER - NSDCCAREPROVSEEN_GEN_ALL_CORE_FT
Rigoberto Rogers Hoorbod Delshadfar  Hoorbod Delshadfar  Hoorbod Delshadfar  Balaji Garcia  Unknown Doctor  Digna Hernandez  Ad[facility] Practice[facility] Medicine  Team [facility] Endocrinology

## 2021-02-02 NOTE — PROGRESS NOTE ADULT - ASSESSMENT
89 yo w/h/o controlled T2DM (A1C7.2%) on "glipizide twice a day" per pt report. DM c/b CKD 3. Also h/o cataracts s/p b/l surgery and MVR, AVR, HTN, HLD, a. fib with ICD/PPM here with elevated INR and COVID19 pneumonia being treated with decadron (started 1/23) and remdisivir, endocrine consulted for severe hyperglycemia: 400-500s. On basal/bolus. Tolerating POs with BG levels up today in the 200s. Received last dose of dexa today so BG expected to start improving in next 24 to 48 hours. Will decrease premeal insulin doses to avoid hypoglycemia if steroid wears off fast.  BG goal 100- low 200s due to age.  Concern about pt's ability to care for self at this time. Might benefit from rehab. Pt states she lives alone    Spent 25 minutes assessing pt/labs/meds and discussing plan of care with primary team. Moderate complexity on pt with T2DM controlled but hyperglycemic in the setting of COVID infection and steroid therapy. Adjusting insulin doses since pt received last dose of steroids today

## 2021-02-03 LAB
ANION GAP SERPL CALC-SCNC: 10 MMOL/L — SIGNIFICANT CHANGE UP (ref 5–17)
BUN SERPL-MCNC: 72 MG/DL — HIGH (ref 7–23)
CALCIUM SERPL-MCNC: 8.9 MG/DL — SIGNIFICANT CHANGE UP (ref 8.4–10.5)
CHLORIDE SERPL-SCNC: 106 MMOL/L — SIGNIFICANT CHANGE UP (ref 96–108)
CO2 SERPL-SCNC: 27 MMOL/L — SIGNIFICANT CHANGE UP (ref 22–31)
CREAT SERPL-MCNC: 1.17 MG/DL — SIGNIFICANT CHANGE UP (ref 0.5–1.3)
D DIMER BLD IA.RAPID-MCNC: 185 NG/ML DDU — SIGNIFICANT CHANGE UP
GLUCOSE BLDC GLUCOMTR-MCNC: 170 MG/DL — HIGH (ref 70–99)
GLUCOSE BLDC GLUCOMTR-MCNC: 211 MG/DL — HIGH (ref 70–99)
GLUCOSE BLDC GLUCOMTR-MCNC: 248 MG/DL — HIGH (ref 70–99)
GLUCOSE BLDC GLUCOMTR-MCNC: 281 MG/DL — HIGH (ref 70–99)
GLUCOSE SERPL-MCNC: 297 MG/DL — HIGH (ref 70–99)
HCT VFR BLD CALC: 32.6 % — LOW (ref 34.5–45)
HGB BLD-MCNC: 10.1 G/DL — LOW (ref 11.5–15.5)
LDH SERPL L TO P-CCNC: 362 U/L — HIGH (ref 50–242)
MCHC RBC-ENTMCNC: 27.2 PG — SIGNIFICANT CHANGE UP (ref 27–34)
MCHC RBC-ENTMCNC: 31 GM/DL — LOW (ref 32–36)
MCV RBC AUTO: 87.6 FL — SIGNIFICANT CHANGE UP (ref 80–100)
NRBC # BLD: 0 /100 WBCS — SIGNIFICANT CHANGE UP (ref 0–0)
PLATELET # BLD AUTO: 101 K/UL — LOW (ref 150–400)
POTASSIUM SERPL-MCNC: 5.3 MMOL/L — SIGNIFICANT CHANGE UP (ref 3.5–5.3)
POTASSIUM SERPL-SCNC: 5.3 MMOL/L — SIGNIFICANT CHANGE UP (ref 3.5–5.3)
RBC # BLD: 3.72 M/UL — LOW (ref 3.8–5.2)
RBC # FLD: 18.6 % — HIGH (ref 10.3–14.5)
SODIUM SERPL-SCNC: 143 MMOL/L — SIGNIFICANT CHANGE UP (ref 135–145)
WBC # BLD: 9.54 K/UL — SIGNIFICANT CHANGE UP (ref 3.8–10.5)
WBC # FLD AUTO: 9.54 K/UL — SIGNIFICANT CHANGE UP (ref 3.8–10.5)

## 2021-02-03 PROCEDURE — 99232 SBSQ HOSP IP/OBS MODERATE 35: CPT

## 2021-02-03 RX ORDER — INSULIN GLARGINE 100 [IU]/ML
5 INJECTION, SOLUTION SUBCUTANEOUS AT BEDTIME
Refills: 0 | Status: DISCONTINUED | OUTPATIENT
Start: 2021-02-03 | End: 2021-02-04

## 2021-02-03 RX ORDER — INSULIN LISPRO 100/ML
8 VIAL (ML) SUBCUTANEOUS
Refills: 0 | Status: DISCONTINUED | OUTPATIENT
Start: 2021-02-04 | End: 2021-02-04

## 2021-02-03 RX ORDER — INSULIN LISPRO 100/ML
8 VIAL (ML) SUBCUTANEOUS
Refills: 0 | Status: DISCONTINUED | OUTPATIENT
Start: 2021-02-03 | End: 2021-02-04

## 2021-02-03 RX ADMIN — APIXABAN 2.5 MILLIGRAM(S): 2.5 TABLET, FILM COATED ORAL at 04:43

## 2021-02-03 RX ADMIN — ISOSORBIDE MONONITRATE 30 MILLIGRAM(S): 60 TABLET, EXTENDED RELEASE ORAL at 12:43

## 2021-02-03 RX ADMIN — Medication 5 UNIT(S): at 08:21

## 2021-02-03 RX ADMIN — Medication 4: at 08:21

## 2021-02-03 RX ADMIN — Medication 5 MILLIGRAM(S): at 21:49

## 2021-02-03 RX ADMIN — Medication 25 MILLIGRAM(S): at 04:42

## 2021-02-03 RX ADMIN — Medication 1 TABLET(S): at 12:43

## 2021-02-03 RX ADMIN — Medication 6: at 12:42

## 2021-02-03 RX ADMIN — Medication 250 MILLIGRAM(S): at 12:43

## 2021-02-03 RX ADMIN — Medication 4: at 17:20

## 2021-02-03 RX ADMIN — ATORVASTATIN CALCIUM 40 MILLIGRAM(S): 80 TABLET, FILM COATED ORAL at 21:49

## 2021-02-03 RX ADMIN — Medication 5 UNIT(S): at 12:42

## 2021-02-03 RX ADMIN — Medication 5 UNIT(S): at 17:20

## 2021-02-03 RX ADMIN — APIXABAN 2.5 MILLIGRAM(S): 2.5 TABLET, FILM COATED ORAL at 17:21

## 2021-02-03 RX ADMIN — SENNA PLUS 2 TABLET(S): 8.6 TABLET ORAL at 21:49

## 2021-02-03 RX ADMIN — Medication 1 MILLIGRAM(S): at 12:43

## 2021-02-03 RX ADMIN — INSULIN GLARGINE 5 UNIT(S): 100 INJECTION, SOLUTION SUBCUTANEOUS at 21:50

## 2021-02-03 NOTE — PROVIDER CONTACT NOTE (OTHER) - BACKGROUND
Pt admitted 1/22 with AMS and elevated INR
Pt admitted 1/22 with AMS and elevated INR
pt admitted on 1/22 with supratherapeutic INR of 1, generalized weakness. COVID positive. PMH of Type II diabetes, A.fib on eliquis, CHF, HTN, AICD PPM.
Pt admitted 1/22 with AMS and elevated INR

## 2021-02-03 NOTE — PROVIDER CONTACT NOTE (OTHER) - ACTION/TREATMENT ORDERED:
NP Peter aware, administer insulin as ordered, no other interventions at this time
TATYANA Green aware, no interventions at this time
No intervention needed at this time. Keep encourage pt to void.
NP made aware, no further interventions at this time. RN gave bedtime sliding scale as per order, will recheck fingerstick. Will continue to monitor.

## 2021-02-03 NOTE — PROVIDER CONTACT NOTE (OTHER) - SITUATION
Hyperglycemia, fingerstick 416, pt asymptomatic.
Pt low urine output for shift
Pt blood sugar 287 at 0050
Pt blood sugar 428

## 2021-02-03 NOTE — PROGRESS NOTE ADULT - SUBJECTIVE AND OBJECTIVE BOX
CARDIOLOGY     PROGRESS  NOTE   ________________________________________________    CHIEF COMPLAINT:Patient is a 89y old  Female who presents with a chief complaint of sent for elevated INR (02 Feb 2021 17:04)  no complain.  	  REVIEW OF SYSTEMS:  CONSTITUTIONAL: No fever, weight loss, or fatigue  EYES: No eye pain, visual disturbances, or discharge  ENT:  No difficulty hearing, tinnitus, vertigo; No sinus or throat pain  NECK: No pain or stiffness  RESPIRATORY: No cough, wheezing, chills or hemoptysis; No Shortness of Breath  CARDIOVASCULAR: No chest pain, palpitations, passing out, dizziness, or leg swelling  GASTROINTESTINAL: No abdominal or epigastric pain. No nausea, vomiting, or hematemesis; No diarrhea or constipation. No melena or hematochezia.  GENITOURINARY: No dysuria, frequency, hematuria, or incontinence  NEUROLOGICAL: No headaches, memory loss, loss of strength, numbness, or tremors  SKIN: No itching, burning, rashes, or lesions   LYMPH Nodes: No enlarged glands  ENDOCRINE: No heat or cold intolerance; No hair loss  MUSCULOSKELETAL: No joint pain or swelling; No muscle, back, or extremity pain  PSYCHIATRIC: No depression, anxiety, mood swings, or difficulty sleeping  HEME/LYMPH: No easy bruising, or bleeding gums  ALLERGY AND IMMUNOLOGIC: No hives or eczema	    [ ] All others negative	  [ ] Unable to obtain    PHYSICAL EXAM:  T(C): 36.7 (02-03-21 @ 04:22), Max: 36.7 (02-03-21 @ 04:22)  HR: 71 (02-03-21 @ 04:22) (64 - 74)  BP: 130/81 (02-03-21 @ 04:22) (107/68 - 135/81)  RR: 20 (02-03-21 @ 04:22) (20 - 20)  SpO2: 92% (02-03-21 @ 04:22) (90% - 95%)  Wt(kg): --  I&O's Summary    02 Feb 2021 07:01  -  03 Feb 2021 07:00  --------------------------------------------------------  IN: 1240 mL / OUT: 625 mL / NET: 615 mL    03 Feb 2021 07:01  -  03 Feb 2021 09:18  --------------------------------------------------------  IN: 120 mL / OUT: 100 mL / NET: 20 mL        Appearance: Normal	  HEENT:   Normal oral mucosa, PERRL, EOMI	  Lymphatic: No lymphadenopathy  Cardiovascular: Normal S1 S2, No JVD, + murmurs, No edema  Respiratory: Lungs clear to auscultation	  Psychiatry: A & O x 3, Mood & affect appropriate  Gastrointestinal:  Soft, Non-tender, + BS	  Skin: No rashes, No ecchymoses, No cyanosis	  Neurologic: Non-focal  Extremities: Normal range of motion, No clubbing, cyanosis or edema  Vascular: Peripheral pulses palpable 2+ bilaterally    MEDICATIONS  (STANDING):  apixaban 2.5 milliGRAM(s) Oral two times a day  ascorbic acid 250 milliGRAM(s) Oral daily  atorvastatin 40 milliGRAM(s) Oral at bedtime  dextrose 40% Gel 15 Gram(s) Oral once  dextrose 5%. 1000 milliLiter(s) (50 mL/Hr) IV Continuous <Continuous>  dextrose 5%. 1000 milliLiter(s) (100 mL/Hr) IV Continuous <Continuous>  dextrose 50% Injectable 25 Gram(s) IV Push once  dextrose 50% Injectable 12.5 Gram(s) IV Push once  dextrose 50% Injectable 25 Gram(s) IV Push once  folic acid 1 milliGRAM(s) Oral daily  glucagon  Injectable 1 milliGRAM(s) IntraMuscular once  hydrALAZINE 50 milliGRAM(s) Oral every 8 hours  insulin glargine Injectable (LANTUS) 3 Unit(s) SubCutaneous at bedtime  insulin lispro (ADMELOG) corrective regimen sliding scale   SubCutaneous three times a day before meals  insulin lispro (ADMELOG) corrective regimen sliding scale   SubCutaneous at bedtime  insulin lispro Injectable (ADMELOG) 5 Unit(s) SubCutaneous before breakfast  insulin lispro Injectable (ADMELOG) 5 Unit(s) SubCutaneous before lunch  insulin lispro Injectable (ADMELOG) 5 Unit(s) SubCutaneous before dinner  isosorbide   mononitrate ER Tablet (IMDUR) 30 milliGRAM(s) Oral daily  lactobacillus acidophilus 1 Tablet(s) Oral daily  melatonin 5 milliGRAM(s) Oral at bedtime  metoprolol tartrate 25 milliGRAM(s) Oral two times a day  senna 2 Tablet(s) Oral at bedtime      TELEMETRY: 	    ECG:  	  RADIOLOGY:  OTHER: 	  	  LABS:	 	    CARDIAC MARKERS:                                10.7   10.48 )-----------( 72       ( 02 Feb 2021 08:42 )             34.2     02-02    140  |  105  |  62<H>  ----------------------------<  279<H>  5.4<H>   |  24  |  1.00    Ca    8.3<L>      02 Feb 2021 08:42      proBNP:   Lipid Profile:   HgA1c:   TSH:         Assessment and plan  ---------------------------  89 yo F w/ h/o MVR/AVR, Afib on coumadin, s/p AICD/PPM, DM type II, HTN, CHFrEF presents with elevated INR;  Patient is AAO x2, limited history obtained from patient and NH staff.  Patient was being treated for pneumonia for last 3 days with IV ceftriaxone/azithromycin and possibly IVF.  Patient however had no complaints, has been eating and drinking well (although likely unreliable historian).  Today she was found to have COVID and elevated INR (11.2), which prompted them to send patient to [facility].  Patient was given 10mg of vitamin K this morning.  Per chart review, patient was also positive for COVID on 1/7/21 which NH staff could not confirm.  Patient otherwise denies chest pain, SOB, cough.  On arrival, she had temp 97.9, HR 81, /83, RR 16, saturation 100% on 4L.     chf systolic acute on chronic  will adjust cardiac meds  check ct chest no contrast ?vascular congestion ?covid/ ct noted resulted   echo noted  tx for covid as per protocol  dvt prophylaxis  ac keep inr 2-3  will fu  add IMDUR to hydralazine for chf therapy  inr noted 2.62  covid +, o2 sat stable  will increase beta blocker as tolerated  keep inr  2-3 sec to a.fib, both valves are bioprosthetic  increase renal function ?sec to steroid  check bladder scan  mod LV dysfunction, continue hydralazine and nitrated and metoprolol ER  pt with high risk for coumadin with coumadin toxicity , will dc Lovenox and coumadin, start on NOAC   add norvasc if increase bp

## 2021-02-03 NOTE — PROGRESS NOTE ADULT - SUBJECTIVE AND OBJECTIVE BOX
DIABETES FOLLOW UP NOTE: Saw pt earlier today  INTERVAL HX: 89 yo w/h/o controlled T2DM (A1C7.2%) on "glipizide twice a day" per pt report. DM c/b CKD 3. Also h/o cataracts s/p b/l surgery and MVR, AVR, HTN, HLD, a. fib with ICD/PPM here with elevated INR and COVID19 pneumonia being treated with decadron (started 1/23) and remdisivir, endocrine consulted for severe hyperglycemia: 400-500s. On basal/bolus. Tolerating POs with BG levels up today in the 200s. Received last dose of dexa yesterday but remains hyperglycemic while on present insulin doses. No hypoglycemia. Feels tired today> doesn't want to talk> wants to rest.     Review of Systems:  General: As above. Unable      Allergies    Allergies ACEI RX: Throat swelling (Other; Swelling)  angiotensin converting enzyme inhibitors (Swelling)    Intolerances      MEDICATIONS:  atorvastatin 40 milliGRAM(s) Oral at bedtime  insulin glargine Injectable (LANTUS) 5 Unit(s) SubCutaneous at bedtime  insulin lispro (ADMELOG) corrective regimen sliding scale   SubCutaneous three times a day before meals  insulin lispro (ADMELOG) corrective regimen sliding scale   SubCutaneous at bedtime  insulin lispro Injectable (ADMELOG) 5 Unit(s) SubCutaneous before breakfast  insulin lispro Injectable (ADMELOG) 5 Unit(s) SubCutaneous before lunch  insulin lispro Injectable (ADMELOG) 5 Unit(s) SubCutaneous before dinner      PHYSICAL EXAM:  VITALS: T(C): 36.7 (02-03-21 @ 11:39)  T(F): 98 (02-03-21 @ 11:39), Max: 98.1 (02-03-21 @ 04:22)  HR: 74 (02-03-21 @ 16:18) (64 - 84)  BP: 101/60 (02-03-21 @ 16:18) (101/60 - 130/81)  RR:  (20 - 20)  SpO2:  (92% - 95%)  Wt(kg): --  GENERAL: Female laying in bed in NAD  Abdomen: Soft, nontender, non distended  Extremities: Warm, + edema around ankles stable  NEURO: Sleepy at time of visit    LABS:  POCT Blood Glucose.: 248 mg/dL (02-03-21 @ 16:43)  POCT Blood Glucose.: 281 mg/dL (02-03-21 @ 12:28)  POCT Blood Glucose.: 211 mg/dL (02-03-21 @ 08:19)  POCT Blood Glucose.: 129 mg/dL (02-02-21 @ 21:24)  POCT Blood Glucose.: 158 mg/dL (02-02-21 @ 17:23)  POCT Blood Glucose.: 292 mg/dL (02-02-21 @ 12:09)  POCT Blood Glucose.: 232 mg/dL (02-02-21 @ 07:49)  POCT Blood Glucose.: 190 mg/dL (02-01-21 @ 21:51)  POCT Blood Glucose.: 71 mg/dL (02-01-21 @ 17:06)  POCT Blood Glucose.: 106 mg/dL (02-01-21 @ 12:01)  POCT Blood Glucose.: 210 mg/dL (02-01-21 @ 08:11)  POCT Blood Glucose.: 147 mg/dL (01-31-21 @ 21:34)                            10.1   9.54  )-----------( 101      ( 03 Feb 2021 09:26 )             32.6       02-03    143  |  106  |  72<H>  ----------------------------<  297<H>  5.3   |  27  |  1.17      EGFR if non : 41<L>    Ca    8.9      02-03      A1C with Estimated Average Glucose Result: 7.2 % (01-23-21 @ 10:27)      Estimated Average Glucose: 160 mg/dL (01-23-21 @ 10:27)

## 2021-02-03 NOTE — PROGRESS NOTE ADULT - ASSESSMENT
_________________________________________________________________________________________  ========>>  M E D I C A L   A T T E N D I N G    F O L L O W  U P  N O T E  <<=========  -----------------------------------------------------------------------------------------------------    - Patient seen and examined by me earlier today.   - In summary,  CHARLES ADAMS is a 89y year old woman admitted with elevated INR, AMS, COVID   - Patient today overall doing ok, comfortable, eating fairly , feels tired    comfortable on room air , feels tired     ==================>> REVIEW OF SYSTEM <<=================    GEN: no fever, no chills, no pain  RESP: no SOB, no cough, no sputum  CVS: no chest pain, no palpitations, no edema   GI: no abdominal pain, no nausea  : no dysuria, no frequency  Neuro: no headache, no dizziness  Derm : no itching, no rash    ==================>> PHYSICAL EXAM <<=================    GEN: A&O X 2 , NAD , comfortable in bed   HEENT: NCAT, PERRL, MMM, hearing intact  Neck: supple , no JVD appreciated  CVS: S1S2 , regular , No M/R/G appreciated  PULM: CTA B/L,  no W/R/R appreciated  ABD.: soft. non tender, non distended,  bowel sounds present  Extrem: intact pulses , no edema   PSYCH : normal mood,  not anxious                                                  ( Note written 02-03-21 )    ==================>> MEDICATIONS <<====================    apixaban 2.5 milliGRAM(s) Oral two times a day  ascorbic acid 250 milliGRAM(s) Oral daily  atorvastatin 40 milliGRAM(s) Oral at bedtime  dextrose 40% Gel 15 Gram(s) Oral once  dextrose 5%. 1000 milliLiter(s) IV Continuous <Continuous>  dextrose 5%. 1000 milliLiter(s) IV Continuous <Continuous>  dextrose 50% Injectable 25 Gram(s) IV Push once  dextrose 50% Injectable 12.5 Gram(s) IV Push once  dextrose 50% Injectable 25 Gram(s) IV Push once  folic acid 1 milliGRAM(s) Oral daily  glucagon  Injectable 1 milliGRAM(s) IntraMuscular once  hydrALAZINE 50 milliGRAM(s) Oral every 8 hours  insulin glargine Injectable (LANTUS) 5 Unit(s) SubCutaneous at bedtime  insulin lispro (ADMELOG) corrective regimen sliding scale   SubCutaneous three times a day before meals  insulin lispro (ADMELOG) corrective regimen sliding scale   SubCutaneous at bedtime  insulin lispro Injectable (ADMELOG) 8 Unit(s) SubCutaneous before dinner  isosorbide   mononitrate ER Tablet (IMDUR) 30 milliGRAM(s) Oral daily  lactobacillus acidophilus 1 Tablet(s) Oral daily  melatonin 5 milliGRAM(s) Oral at bedtime  metoprolol tartrate 25 milliGRAM(s) Oral two times a day  senna 2 Tablet(s) Oral at bedtime    MEDICATIONS  (PRN):  bisacodyl 5 milliGRAM(s) Oral every 12 hours PRN Constipation  polyethylene glycol 3350 17 Gram(s) Oral daily PRN Constipation    ___________  Active diet:  Diet, Soft:   Consistent Carbohydrate Evening Snack (CSTCHOSN)  DASH/TLC Sodium & Cholesterol Restricted (DASH)  Supplement Feeding Modality:  Oral  Glucerna Shake Cans or Servings Per Day:  2       Frequency:  Daily  ___________________    ==================>> VITAL SIGNS <<==================    Vital Signs Last 24 HrsT(C): 36.7 (02-03-21 @ 11:39)  T(F): 98 (02-03-21 @ 11:39), Max: 98.1 (02-03-21 @ 04:22)  HR: 74 (02-03-21 @ 16:18) (64 - 84)  BP: 101/60 (02-03-21 @ 16:18)  RR: 20 (02-03-21 @ 11:39) (20 - 20)  SpO2: 94% (02-03-21 @ 11:39) (92% - 95%)      POCT Blood Glucose.: 248 mg/dL (03 Feb 2021 16:43)  POCT Blood Glucose.: 281 mg/dL (03 Feb 2021 12:28)  POCT Blood Glucose.: 211 mg/dL (03 Feb 2021 08:19)  POCT Blood Glucose.: 129 mg/dL (02 Feb 2021 21:24)     ==================>> LAB AND IMAGING <<==================                        10.1   9.54  )-----------( 101      ( 03 Feb 2021 09:26 )             32.6        02-03    143  |  106  |  72<H>  ----------------------------<  297<H>  5.3   |  27  |  1.17    Ca    8.9      03 Feb 2021 09:14    WBC count:   9.54 <<== ,  10.48 <<== ,  12.88 <<== ,  12.10 <<== ,  13.86 <<==   Hemoglobin:   10.1 <<==,  10.7 <<==,  10.5 <<==,  9.3 <<==,  9.9 <<==  platelets:  101 <==, 72 <==, 92 <==, 78 <==, 94 <==    Creatinine:  1.17  <<==, 1.00  <<==, 1.17  <<==, 1.06  <<==, 1.16  <<==  Sodium:   143  <==, 140  <==, 138  <==, 140  <==, 137  <==    ^^^ Inflammatory markers :  ^^^  C R P :          2.82 (02-03-21)  <<--, 2.56 (02-02-21)  <<--, 1.65 (02-01-21)  <<--, 1.14 (01-31-21)  <<--, 0.74 (01-30-21)  <<--  P C T :         0.07 (01-25-21) <<--, 0.07 (01-23-21) <<--    Ferritin :         436 (02-02-21) <<--, 405 (02-01-21) <<--, 359 (01-31-21) <<--, 372 (01-30-21) <<--, 394 (01-29-21) <<--, 382 (01-28-21) <<--, 425 (01-27-21) <<--, 460 (01-26-21) <<--, 446 (01-25-21) <<--, 464 (01-24-21) <<--    D-Dimer :          185 (02-03-21) <<--, 339 (01-31-21) <<--, 433 (01-28-21) <<--, 597 (01-25-21) <<--, 396 (01-24-21) <<--      PENDING  ECHO   ___________________________________________________________________________________  ===============>>  A S S E S S M E N T   A N D   P L A N <<===============  ------------------------------------------------------------------------------------------    · Assessment	  89 yo F w/ h/o MVR/AVR, Afib on coumadin, s/p AICD/PPM, DM type II, HTN, CHFrEF presents with elevated INR;     Problem/Plan - 1:  ·  Problem: Acute hypoxemic respiratory failure due to COVID-19 with Viral pneumonia from Viral syndrome.   overall doing well  supportive care  monitoring off O2   unclear how much of pt's lethargy is from COVID viral syndrome and how much cardiac in cause.. awaiting echo     Problem/Plan - 2:  ·  Problem: UTI  post abx IV ceftriaxone;   follow cultures     Problem/Plan - 3:  ·  Problem: MICHELLE on CKD III, stable   monitor closely on PO lasix for CHF  monitor ins/outs and daily weights  monitor BMP  avoid nephrotoxic agents.     Problem/Plan - 4:  ·  Problem: metabolic encephalopathy due to above  appears\ improved: pt interactive and conversive, alert..   CT brain negative for acute pathology  monitor for now as improved   fatigue likely a sequelae of viral syndrome   needs rehabilitation ( awaiting negative COVID !)     Problem/Plan - 5:  ·  Problem: Atrial fibrillation. Plan: on coumadin  Eliquis   monitor   cardio appreciated    Problem/Plan - 6:  ·  Problem: chronic systolic CHF   lasix as needed   monitor ins/outs and daily weights  Continue Current medications otherwise and monitor.   cardio     ** pt with bioprosthetic valves ( AVR and MVR)      discussed with family yesterday : reportedly valves have worsened overtime     requesting repeat echo and further workup as needed  >> ordered     Problem/Plan - 7:  ·  Problem: Hypertension.  Plan: c/w imdur and hydralazine.     Problem/Plan - 8:  ·  Problem: Diabetes mellitus, type II.    FS better controlled  endo follow up and mgmt appreciated   monitor FS     -GI/DVT Prophylaxis per protocol.    CODE STATUS: DNR/DNI (MOLST in Chart).    Discharge planing pending Echo and COVID     discussed with NP;     I had the wrong number for pt's family : already discussed with family representative Bailey ( DDS) in detail and all questions and concerns answered     ___________________________  H. AINSLEY Rogers.  Pager: [phone]

## 2021-02-03 NOTE — PROGRESS NOTE ADULT - PROBLEM SELECTOR PLAN 1
-test BG AC/HS  -Change Lantus dose to 4 units QHS  -Change Admelog to 8 units ac meals.   -c/w Admelog moderate correction scale AC and Mod HS scale in case pt remains hyperglycemic tomorrow  DISPO: TBD based on disposition> home vs. [facility].  Pt states was taking Glipizide most recently but due to age sulphonylureas are not recommended in the elderly due to increase risk for hypoglycemia specially in setting of CKD.   Need to consider benefits of meds versus risk at old age. Might benefit from Tradjenta. No metformin due to old age/CKD/elevated lactate levels  -Plan discussed with pt/team.  Contact info: [phone] (24/7). pager [phone] -test BG AC/HS  -Change Lantus dose to 5 units QHS  -Change Admelog to 8 units ac meals.   -c/w Admelog moderate correction scale AC and Mod HS scale in case pt remains hyperglycemic tomorrow  DISPO: TBD based on disposition> home vs. [facility].  Pt states was taking Glipizide most recently but due to age sulphonylureas are not recommended in the elderly due to increase risk for hypoglycemia specially in setting of CKD.   Need to consider benefits of meds versus risk at old age. Might benefit from Tradjenta. No metformin due to old age/CKD/elevated lactate levels  -Plan discussed with pt/team.  Contact info: [phone] (24/7). pager [phone]

## 2021-02-03 NOTE — PROGRESS NOTE ADULT - ASSESSMENT
89 yo w/h/o controlled T2DM (A1C7.2%) on "glipizide twice a day" per pt report. DM c/b CKD 3. Also h/o cataracts s/p b/l surgery and MVR, AVR, HTN, HLD, a. fib with ICD/PPM here with elevated INR and COVID19 pneumonia being treated with decadron (started 1/23) and remdisivir, endocrine consulted for severe hyperglycemia: 400-500s. On basal/bolus.Tolerating POs with BG levels up today in the 200s. Received last dose of dexa yesterday but remains hyperglycemic while on present insulin doses. No hypoglycemia. Will increase insulin doses to BG goal 100- low 200s due to age.    Concern about pt's ability to care for self at this time. Might benefit from rehab. Pt states she lives alone    Spent 25 minutes assessing pt/labs/meds and discussing plan of care with primary team. Moderate complexity on pt with T2DM controlled but hyperglycemic in the setting of COVID infection and steroid therapy. Adjusting insulin doses > off steroids

## 2021-02-03 NOTE — PROVIDER CONTACT NOTE (OTHER) - REASON
Pt low urine output for shift
Hyperglycemia, fingerstick 416, pt asymptomatic.
Pt blood sugar elevated
blood sugar 287

## 2021-02-04 LAB
ANION GAP SERPL CALC-SCNC: 11 MMOL/L — SIGNIFICANT CHANGE UP (ref 5–17)
BUN SERPL-MCNC: 73 MG/DL — HIGH (ref 7–23)
CALCIUM SERPL-MCNC: 8.5 MG/DL — SIGNIFICANT CHANGE UP (ref 8.4–10.5)
CHLORIDE SERPL-SCNC: 108 MMOL/L — SIGNIFICANT CHANGE UP (ref 96–108)
CO2 SERPL-SCNC: 23 MMOL/L — SIGNIFICANT CHANGE UP (ref 22–31)
CREAT SERPL-MCNC: 1.1 MG/DL — SIGNIFICANT CHANGE UP (ref 0.5–1.3)
CRP SERPL-MCNC: 3.82 MG/DL — HIGH (ref 0–0.4)
FERRITIN SERPL-MCNC: 376 NG/ML — HIGH (ref 15–150)
FERRITIN SERPL-MCNC: 446 NG/ML — HIGH (ref 15–150)
GLUCOSE BLDC GLUCOMTR-MCNC: 169 MG/DL — HIGH (ref 70–99)
GLUCOSE BLDC GLUCOMTR-MCNC: 181 MG/DL — HIGH (ref 70–99)
GLUCOSE BLDC GLUCOMTR-MCNC: 218 MG/DL — HIGH (ref 70–99)
GLUCOSE SERPL-MCNC: 147 MG/DL — HIGH (ref 70–99)
HCT VFR BLD CALC: 28.5 % — LOW (ref 34.5–45)
HGB BLD-MCNC: 8.8 G/DL — LOW (ref 11.5–15.5)
LDH SERPL L TO P-CCNC: 386 U/L — HIGH (ref 50–242)
MCHC RBC-ENTMCNC: 27.4 PG — SIGNIFICANT CHANGE UP (ref 27–34)
MCHC RBC-ENTMCNC: 30.9 GM/DL — LOW (ref 32–36)
MCV RBC AUTO: 88.8 FL — SIGNIFICANT CHANGE UP (ref 80–100)
NRBC # BLD: 0 /100 WBCS — SIGNIFICANT CHANGE UP (ref 0–0)
PLATELET # BLD AUTO: 85 K/UL — LOW (ref 150–400)
POTASSIUM SERPL-MCNC: 4.7 MMOL/L — SIGNIFICANT CHANGE UP (ref 3.5–5.3)
POTASSIUM SERPL-SCNC: 4.7 MMOL/L — SIGNIFICANT CHANGE UP (ref 3.5–5.3)
RBC # BLD: 3.21 M/UL — LOW (ref 3.8–5.2)
RBC # FLD: 18.9 % — HIGH (ref 10.3–14.5)
SARS-COV-2 RNA SPEC QL NAA+PROBE: DETECTED
SODIUM SERPL-SCNC: 142 MMOL/L — SIGNIFICANT CHANGE UP (ref 135–145)
WBC # BLD: 7.98 K/UL — SIGNIFICANT CHANGE UP (ref 3.8–10.5)
WBC # FLD AUTO: 7.98 K/UL — SIGNIFICANT CHANGE UP (ref 3.8–10.5)

## 2021-02-04 PROCEDURE — 99232 SBSQ HOSP IP/OBS MODERATE 35: CPT

## 2021-02-04 PROCEDURE — 93306 TTE W/DOPPLER COMPLETE: CPT | Mod: 26

## 2021-02-04 RX ORDER — FUROSEMIDE 40 MG
20 TABLET ORAL DAILY
Refills: 0 | Status: DISCONTINUED | OUTPATIENT
Start: 2021-02-04 | End: 2021-02-05

## 2021-02-04 RX ORDER — INSULIN LISPRO 100/ML
10 VIAL (ML) SUBCUTANEOUS
Refills: 0 | Status: DISCONTINUED | OUTPATIENT
Start: 2021-02-04 | End: 2021-02-05

## 2021-02-04 RX ORDER — INSULIN LISPRO 100/ML
VIAL (ML) SUBCUTANEOUS AT BEDTIME
Refills: 0 | Status: DISCONTINUED | OUTPATIENT
Start: 2021-02-04 | End: 2021-02-05

## 2021-02-04 RX ORDER — INSULIN LISPRO 100/ML
VIAL (ML) SUBCUTANEOUS
Refills: 0 | Status: DISCONTINUED | OUTPATIENT
Start: 2021-02-04 | End: 2021-02-05

## 2021-02-04 RX ORDER — INSULIN GLARGINE 100 [IU]/ML
7 INJECTION, SOLUTION SUBCUTANEOUS AT BEDTIME
Refills: 0 | Status: DISCONTINUED | OUTPATIENT
Start: 2021-02-04 | End: 2021-02-05

## 2021-02-04 RX ADMIN — Medication 1 MILLIGRAM(S): at 11:01

## 2021-02-04 RX ADMIN — Medication 4: at 12:22

## 2021-02-04 RX ADMIN — Medication 250 MILLIGRAM(S): at 11:00

## 2021-02-04 RX ADMIN — ISOSORBIDE MONONITRATE 30 MILLIGRAM(S): 60 TABLET, EXTENDED RELEASE ORAL at 11:00

## 2021-02-04 RX ADMIN — Medication 5 MILLIGRAM(S): at 21:52

## 2021-02-04 RX ADMIN — INSULIN GLARGINE 7 UNIT(S): 100 INJECTION, SOLUTION SUBCUTANEOUS at 21:52

## 2021-02-04 RX ADMIN — SENNA PLUS 2 TABLET(S): 8.6 TABLET ORAL at 21:52

## 2021-02-04 RX ADMIN — APIXABAN 2.5 MILLIGRAM(S): 2.5 TABLET, FILM COATED ORAL at 16:37

## 2021-02-04 RX ADMIN — Medication 2: at 08:23

## 2021-02-04 RX ADMIN — Medication 1 TABLET(S): at 11:00

## 2021-02-04 RX ADMIN — Medication 10 UNIT(S): at 17:59

## 2021-02-04 RX ADMIN — Medication 20 MILLIGRAM(S): at 11:01

## 2021-02-04 RX ADMIN — ATORVASTATIN CALCIUM 40 MILLIGRAM(S): 80 TABLET, FILM COATED ORAL at 21:52

## 2021-02-04 RX ADMIN — Medication 5 MILLIGRAM(S): at 16:38

## 2021-02-04 RX ADMIN — APIXABAN 2.5 MILLIGRAM(S): 2.5 TABLET, FILM COATED ORAL at 05:00

## 2021-02-04 RX ADMIN — Medication 8 UNIT(S): at 08:24

## 2021-02-04 RX ADMIN — Medication 25 MILLIGRAM(S): at 05:00

## 2021-02-04 NOTE — PROGRESS NOTE ADULT - SUBJECTIVE AND OBJECTIVE BOX
CARDIOLOGY     PROGRESS  NOTE   ________________________________________________    CHIEF COMPLAINT:Patient is a 89y old  Female who presents with a chief complaint of sent for elevated INR (03 Feb 2021 18:30)  no complain.  	  REVIEW OF SYSTEMS:  CONSTITUTIONAL: No fever, weight loss, or fatigue  EYES: No eye pain, visual disturbances, or discharge  ENT:  No difficulty hearing, tinnitus, vertigo; No sinus or throat pain  NECK: No pain or stiffness  RESPIRATORY: No cough, wheezing, chills or hemoptysis; No Shortness of Breath  CARDIOVASCULAR: No chest pain, palpitations, passing out, dizziness, or leg swelling  GASTROINTESTINAL: No abdominal or epigastric pain. No nausea, vomiting, or hematemesis; No diarrhea or constipation. No melena or hematochezia.  GENITOURINARY: No dysuria, frequency, hematuria, or incontinence  NEUROLOGICAL: No headaches, memory loss, loss of strength, numbness, or tremors  SKIN: No itching, burning, rashes, or lesions   LYMPH Nodes: No enlarged glands  ENDOCRINE: No heat or cold intolerance; No hair loss  MUSCULOSKELETAL: No joint pain or swelling; No muscle, back, or extremity pain  PSYCHIATRIC: No depression, anxiety, mood swings, or difficulty sleeping  HEME/LYMPH: No easy bruising, or bleeding gums  ALLERGY AND IMMUNOLOGIC: No hives or eczema	    [ ] All others negative	  [ ] Unable to obtain    PHYSICAL EXAM:  T(C): 36.7 (02-04-21 @ 04:56), Max: 36.8 (02-03-21 @ 20:30)  HR: 75 (02-04-21 @ 04:56) (74 - 84)  BP: 111/65 (02-04-21 @ 04:56) (101/60 - 116/62)  RR: 19 (02-04-21 @ 04:56) (18 - 20)  SpO2: 95% (02-04-21 @ 04:56) (94% - 96%)  Wt(kg): --  I&O's Summary    03 Feb 2021 07:01  -  04 Feb 2021 07:00  --------------------------------------------------------  IN: 740 mL / OUT: 750 mL / NET: -10 mL        Appearance: Normal	  HEENT:   Normal oral mucosa, PERRL, EOMI	  Lymphatic: No lymphadenopathy  Cardiovascular: Normal S1 S2, No JVD, +murmurs, No edema  Respiratory: Lungs clear to auscultation	  Psychiatry: A & O x 3, Mood & affect appropriate  Gastrointestinal:  Soft, Non-tender, + BS	  Skin: No rashes, No ecchymoses, No cyanosis	  Neurologic: Non-focal  Extremities: Normal range of motion, No clubbing, cyanosis or edema  Vascular: Peripheral pulses palpable 2+ bilaterally    MEDICATIONS  (STANDING):  apixaban 2.5 milliGRAM(s) Oral two times a day  ascorbic acid 250 milliGRAM(s) Oral daily  atorvastatin 40 milliGRAM(s) Oral at bedtime  bisacodyl 5 milliGRAM(s) Oral every 12 hours  dextrose 40% Gel 15 Gram(s) Oral once  dextrose 5%. 1000 milliLiter(s) (50 mL/Hr) IV Continuous <Continuous>  dextrose 5%. 1000 milliLiter(s) (100 mL/Hr) IV Continuous <Continuous>  dextrose 50% Injectable 25 Gram(s) IV Push once  dextrose 50% Injectable 12.5 Gram(s) IV Push once  dextrose 50% Injectable 25 Gram(s) IV Push once  folic acid 1 milliGRAM(s) Oral daily  glucagon  Injectable 1 milliGRAM(s) IntraMuscular once  hydrALAZINE 50 milliGRAM(s) Oral every 8 hours  insulin glargine Injectable (LANTUS) 5 Unit(s) SubCutaneous at bedtime  insulin lispro (ADMELOG) corrective regimen sliding scale   SubCutaneous three times a day before meals  insulin lispro (ADMELOG) corrective regimen sliding scale   SubCutaneous at bedtime  insulin lispro Injectable (ADMELOG) 8 Unit(s) SubCutaneous before dinner  insulin lispro Injectable (ADMELOG) 8 Unit(s) SubCutaneous before breakfast  insulin lispro Injectable (ADMELOG) 8 Unit(s) SubCutaneous before lunch  isosorbide   mononitrate ER Tablet (IMDUR) 30 milliGRAM(s) Oral daily  lactobacillus acidophilus 1 Tablet(s) Oral daily  melatonin 5 milliGRAM(s) Oral at bedtime  metoprolol tartrate 25 milliGRAM(s) Oral two times a day  senna 2 Tablet(s) Oral at bedtime      TELEMETRY: 	    ECG:  	  RADIOLOGY:  OTHER: 	  	  LABS:	 	    CARDIAC MARKERS:                                8.8    7.98  )-----------( 85       ( 04 Feb 2021 07:44 )             28.5     02-04    142  |  108  |  73<H>  ----------------------------<  147<H>  4.7   |  23  |  1.10    Ca    8.5      04 Feb 2021 07:44      proBNP:   Lipid Profile:   HgA1c:   TSH:     < from: Transthoracic Echocardiogram (05.20.20 @ 08:55) >  1. Bioprosthetic mitral valve replacement. Mild mitral  regurgitation.  Mean transmitral valve gradient equals 5 mm  Hg, which is probably normal in the setting of a prosthetic  valve.  2. Bioprosthetic aortic valve replacement. Peak transaortic  valve gradient equals 24 mm Hg, mean transaortic valve  gradient equals 14 mm Hg, which is probably normal in the  presence of a prosthetic valve. Minimal aortic  regurgitation.  3. Normal aortic root (about  3.7 cm at the sinuses of  Valsalva).  Dilated ascending aorta (about  4.7 cm).  4. Moderately dilated left atrium.  LA volume index = 46  cc/m2.  5. Normal left ventricular internal dimensions and wall  thicknesses.  6. Mild to moderate global left ventricular systolic  dysfunction. Septal motion is consistent with RV pacing and  post surgical status.  7. The right ventricle is not well visualized; grossly  normal right ventricular systolic function.  A device wire  is noted in the right heart.  8. Estimated right ventricular systolic pressure equals 54  mm Hg, assuming right atrial pressure equals 10 mm Hg,  consistent with moderate pulmonary hypertension.    < from: CT Chest No Cont (01.25.21 @ 10:34) >  INTERPRETATION:  Clinical information: Evaluate for CHF.    CT scan of the chest was obtained without administration of intravenous contrast.    Few small lymph nodesare present in the pretracheal space and the AP window.    Heart is enlarged in size. Patient is status post aortic and mitral valve replacement. An AICD is noted in place. No pericardial effusion is noted.    Morgagni hernia containing colon is noted.    No endobronchial lesions are noted. Patchy opacities are present in the peripheral aspect of both upper lobes and few smaller ill-defined opacities are present within the right middle and right lower lobes. Compressive atelectasis is noted involving portions of both lower lobes. This secondary to small bilateral pleural effusions.    Below the diaphragm, visualized portions of the abdomen demonstrate 5.2 cm cyst in the left hepatic lobe. Additional low-attenuation lesions in the liver are too small to be adequately characterized on this exam.    Degenerative changes of the spine are noted.    Impression: Patchy opacities noted within both lungs as described above are of uncertain etiology.    Small bilateral pleural effusions.    < end of copied text >        Assessment and plan  ---------------------------  89 yo F w/ h/o MVR/AVR, Afib on coumadin, s/p AICD/PPM, DM type II, HTN, CHFrEF presents with elevated INR;  Patient is AAO x2, limited history obtained from patient and NH staff.  Patient was being treated for pneumonia for last 3 days with IV ceftriaxone/azithromycin and possibly IVF.  Patient however had no complaints, has been eating and drinking well (although likely unreliable historian).  Today she was found to have COVID and elevated INR (11.2), which prompted them to send patient to [facility].  Patient was given 10mg of vitamin K this morning.  Per chart review, patient was also positive for COVID on 1/7/21 which NH staff could not confirm.  Patient otherwise denies chest pain, SOB, cough.  On arrival, she had temp 97.9, HR 81, /83, RR 16, saturation 100% on 4L.     chf systolic acute on chronic  will adjust cardiac meds  check ct chest no contrast ?vascular congestion ?covid/ ct noted resulted   echo noted  tx for covid as per protocol  dvt prophylaxis  ac keep inr 2-3  will fu  add IMDUR to hydralazine for chf therapy  inr noted 2.62  covid +, o2 sat stable  will increase beta blocker as tolerated  keep inr  2-3 sec to a.fib, both valves are bioprosthetic  increase renal function ?sec to steroid  check bladder scan  mod LV dysfunction, continue hydralazine and nitrated and metoprolol ER  pt with high risk for coumadin with coumadin toxicity , will dc Lovenox and coumadin, start on NOAC   add lasix 20 mg daily  no objection to dc cardiac wise and fu as out pt

## 2021-02-04 NOTE — PROGRESS NOTE ADULT - ASSESSMENT
_________________________________________________________________________________________  ========>>  M E D I C A L   A T T E N D I N G    F O L L O W  U P  N O T E  <<=========  -----------------------------------------------------------------------------------------------------    - Patient seen and examined by me earlier today.   - In summary,  CHARLES ADAMS is a 89y year old woman admitted with elevated INR, AMS, COVID   - Patient today overall doing ok, comfortable, eating fairly , feels tired    comfortable on room air , tired     ==================>> REVIEW OF SYSTEM <<=================    GEN: no fever, no chills, no pain  RESP: no SOB, no cough, no sputum  CVS: no chest pain, no palpitations, no edema   GI: no abdominal pain, no nausea  : no dysuria, no frequency  Neuro: no headache, no dizziness  Derm : no itching, no rash    ==================>> PHYSICAL EXAM <<=================    GEN: A&O X 2 , NAD , comfortable in bed   HEENT: NCAT, PERRL, MMM, hearing intact  Neck: supple , no JVD appreciated  CVS: S1S2 , regular , No M/R/G appreciated  PULM: CTA B/L,  no W/R/R appreciated  ABD.: soft. non tender, non distended,  bowel sounds present  Extrem: intact pulses , no edema   PSYCH : normal mood,  not anxious                                                 ( Note written 02-04-21 )    ==================>> MEDICATIONS <<====================    apixaban 2.5 milliGRAM(s) Oral two times a day  ascorbic acid 250 milliGRAM(s) Oral daily  atorvastatin 40 milliGRAM(s) Oral at bedtime  bisacodyl 5 milliGRAM(s) Oral every 12 hours  dextrose 40% Gel 15 Gram(s) Oral once  dextrose 5%. 1000 milliLiter(s) IV Continuous <Continuous>  dextrose 5%. 1000 milliLiter(s) IV Continuous <Continuous>  dextrose 50% Injectable 25 Gram(s) IV Push once  dextrose 50% Injectable 12.5 Gram(s) IV Push once  dextrose 50% Injectable 25 Gram(s) IV Push once  folic acid 1 milliGRAM(s) Oral daily  furosemide    Tablet 20 milliGRAM(s) Oral daily  glucagon  Injectable 1 milliGRAM(s) IntraMuscular once  hydrALAZINE 50 milliGRAM(s) Oral every 8 hours  insulin glargine Injectable (LANTUS) 7 Unit(s) SubCutaneous at bedtime  insulin lispro (ADMELOG) corrective regimen sliding scale   SubCutaneous three times a day before meals  insulin lispro (ADMELOG) corrective regimen sliding scale   SubCutaneous at bedtime  insulin lispro Injectable (ADMELOG) 10 Unit(s) SubCutaneous before breakfast  insulin lispro Injectable (ADMELOG) 10 Unit(s) SubCutaneous before lunch  insulin lispro Injectable (ADMELOG) 10 Unit(s) SubCutaneous before dinner  isosorbide   mononitrate ER Tablet (IMDUR) 30 milliGRAM(s) Oral daily  lactobacillus acidophilus 1 Tablet(s) Oral daily  metoprolol tartrate 25 milliGRAM(s) Oral two times a day  senna 2 Tablet(s) Oral at bedtime    MEDICATIONS  (PRN):  polyethylene glycol 3350 17 Gram(s) Oral daily PRN Constipation    ___________  Active diet:  Diet, Soft:   Consistent Carbohydrate Evening Snack (CSTCHOSN)  DASH/TLC Sodium & Cholesterol Restricted (DASH)  Supplement Feeding Modality:  Oral  Glucerna Shake Cans or Servings Per Day:  2       Frequency:  Daily  ___________________    ==================>> VITAL SIGNS <<==================    Vital Signs Last 24 HrsT(C): 36.3 (02-04-21 @ 21:24)  T(F): 97.4 (02-04-21 @ 21:24), Max: 98.2 (02-04-21 @ 12:01)  HR: 69 (02-04-21 @ 21:24) (69 - 75)  BP: 127/69 (02-04-21 @ 21:24)  RR: 18 (02-04-21 @ 21:24) (18 - 19)  SpO2: 96% (02-04-21 @ 21:24) (95% - 97%)    CAPILLARY BLOOD GLUCOSE  101 (04 Feb 2021 17:49)      POCT Blood Glucose.: 181 mg/dL (04 Feb 2021 21:49)  POCT Blood Glucose.: 218 mg/dL (04 Feb 2021 12:19)  POCT Blood Glucose.: 169 mg/dL (04 Feb 2021 08:03)     ==================>> LAB AND IMAGING <<==================                        8.8    7.98  )-----------( 85       ( 04 Feb 2021 07:44 )             28.5        02-04    142  |  108  |  73<H>  ----------------------------<  147<H>  4.7   |  23  |  1.10    Ca    8.5      04 Feb 2021 07:44      WBC count:   7.98 <<== ,  9.54 <<== ,  10.48 <<== ,  12.88 <<== ,  12.10 <<==   Hemoglobin:   8.8 <<==,  10.1 <<==,  10.7 <<==,  10.5 <<==,  9.3 <<==  platelets:  85 <==, 101 <==, 72 <==, 92 <==, 78 <==, 94 <==    Creatinine:  1.10  <<==, 1.17  <<==, 1.00  <<==, 1.17  <<==, 1.06  <<==, 1.16  <<==  Sodium:   142  <==, 143  <==, 140  <==, 138  <==, 140  <==, 137  <==      < from: Transthoracic Echocardiogram (02.04.21 @ 12:42) >  Conclusions:  Normal left ventricular systolic function. No segmental  wall motion abnormalities.  Bioprosthetic mitral valve. Gradient (12.9 mmHg at  60/min)  is severely increased for a prosthetic mitral valve.  Bioprosthetic aortic valve. Gradient across the aortic  valve cannot be estimated from this study.  Severe right ventricular enlargement with severely  decreased right ventricular systolic function.  Severe pulmonary hypertension.  Mild dilatation of the aortic root. Moderate dilitation of  the ascending aorta.  A device wire is noted in the right heart.  ------------------------------------------------------------------------  Confirmed on  2/4/2021 - 15:03:08 by Scott Montilla MD,  SUSAN  < end of copied text >  ___________________________________________________________________________________  ===============>>  A S S E S S M E N T   A N D   P L A N <<===============  ------------------------------------------------------------------------------------------    · Assessment	  89 yo F w/ h/o MVR/AVR, Afib on coumadin, s/p AICD/PPM, DM type II, HTN, CHFrEF presents with elevated INR;     Problem/Plan - 1:  ·  Problem: Acute hypoxemic respiratory failure due to COVID-19 with Viral pneumonia from Viral syndrome.   overall doing well  supportive care  monitoring off O2   unclear how much of pt's lethargy is from COVID viral syndrome and how much cardiac in cause.. awaiting echo     Problem/Plan - 2:  ·  Problem: UTI  post abx IV ceftriaxone;   follow cultures     Problem/Plan - 3:  ·  Problem: MICHELLE on CKD III, stable   monitor closely on PO lasix for CHF  monitor ins/outs and daily weights  monitor BMP  avoid nephrotoxic agents.     Problem/Plan - 4:  ·  Problem: metabolic encephalopathy due to above  appears\ improved: pt interactive and conversive, alert..   CT brain negative for acute pathology  monitor for now as improved   fatigue likely a sequelae of viral syndrome   needs rehabilitation ( awaiting negative COVID !)     Problem/Plan - 5:  ·  Problem: Atrial fibrillation. Plan: on coumadin  Eliquis   monitor   cardio appreciated    Problem/Plan - 6:  ·  Problem: chronic systolic CHF   lasix as needed   monitor ins/outs and daily weights  Continue Current medications otherwise and monitor.   cardio     ** pt with bioprosthetic valves ( AVR and MVR)      discussed with family yesterday : reportedly valves have worsened overtime     requesting repeat echo and further workup as needed  >> ordered     Problem/Plan - 7:  ·  Problem: Hypertension.  Plan: c/w imdur and hydralazine.     Problem/Plan - 8:  ·  Problem: Diabetes mellitus, type II.    FS better controlled  endo follow up and mgmt appreciated   monitor FS     -GI/DVT Prophylaxis per protocol.    CODE STATUS: DNR/DNI (MOLST in Chart).    Discharge planing pending Echo and COVID     discussed with NP;     I had the wrong number for pt's family : already discussed with family representative Bailey ( DDS) in detail and all questions and concerns answered     ___________________________  H. AINSLEY Rogers.  Pager: [phone]     _________________________________________________________________________________________  ========>>  M E D I C A L   A T T E N D I N G    F O L L O W  U P  N O T E  <<=========  -----------------------------------------------------------------------------------------------------    - Patient seen and examined by me earlier today.   - In summary,  CHARLES ADAMS is a 89y year old woman admitted with elevated INR, AMS, COVID   - Patient today overall doing ok, comfortable, eating fairly , feels tired    comfortable on room air , tired     ==================>> REVIEW OF SYSTEM <<=================    GEN: no fever, no chills, no pain  RESP: no SOB, no cough, no sputum  CVS: no chest pain, no palpitations, no edema   GI: no abdominal pain, no nausea  : no dysuria, no frequency  Neuro: no headache, no dizziness  Derm : no itching, no rash    ==================>> PHYSICAL EXAM <<=================    GEN: A&O X 2 , NAD , comfortable in bed   HEENT: NCAT, PERRL, MMM, hearing intact  Neck: supple , no JVD appreciated  CVS: S1S2 , regular , No M/R/G appreciated  PULM: CTA B/L,  no W/R/R appreciated  ABD.: soft. non tender, non distended,  bowel sounds present  Extrem: intact pulses , no edema   PSYCH : normal mood,  not anxious                                                 ( Note written 02-04-21 )    ==================>> MEDICATIONS <<====================    apixaban 2.5 milliGRAM(s) Oral two times a day  ascorbic acid 250 milliGRAM(s) Oral daily  atorvastatin 40 milliGRAM(s) Oral at bedtime  bisacodyl 5 milliGRAM(s) Oral every 12 hours  dextrose 40% Gel 15 Gram(s) Oral once  dextrose 5%. 1000 milliLiter(s) IV Continuous <Continuous>  dextrose 5%. 1000 milliLiter(s) IV Continuous <Continuous>  dextrose 50% Injectable 25 Gram(s) IV Push once  dextrose 50% Injectable 12.5 Gram(s) IV Push once  dextrose 50% Injectable 25 Gram(s) IV Push once  folic acid 1 milliGRAM(s) Oral daily  furosemide    Tablet 20 milliGRAM(s) Oral daily  glucagon  Injectable 1 milliGRAM(s) IntraMuscular once  hydrALAZINE 50 milliGRAM(s) Oral every 8 hours  insulin glargine Injectable (LANTUS) 7 Unit(s) SubCutaneous at bedtime  insulin lispro (ADMELOG) corrective regimen sliding scale   SubCutaneous three times a day before meals  insulin lispro (ADMELOG) corrective regimen sliding scale   SubCutaneous at bedtime  insulin lispro Injectable (ADMELOG) 10 Unit(s) SubCutaneous before breakfast  insulin lispro Injectable (ADMELOG) 10 Unit(s) SubCutaneous before lunch  insulin lispro Injectable (ADMELOG) 10 Unit(s) SubCutaneous before dinner  isosorbide   mononitrate ER Tablet (IMDUR) 30 milliGRAM(s) Oral daily  lactobacillus acidophilus 1 Tablet(s) Oral daily  metoprolol tartrate 25 milliGRAM(s) Oral two times a day  senna 2 Tablet(s) Oral at bedtime    MEDICATIONS  (PRN):  polyethylene glycol 3350 17 Gram(s) Oral daily PRN Constipation    ___________  Active diet:  Diet, Soft:   Consistent Carbohydrate Evening Snack (CSTCHOSN)  DASH/TLC Sodium & Cholesterol Restricted (DASH)  Supplement Feeding Modality:  Oral  Glucerna Shake Cans or Servings Per Day:  2       Frequency:  Daily  ___________________    ==================>> VITAL SIGNS <<==================    Vital Signs Last 24 HrsT(C): 36.3 (02-04-21 @ 21:24)  T(F): 97.4 (02-04-21 @ 21:24), Max: 98.2 (02-04-21 @ 12:01)  HR: 69 (02-04-21 @ 21:24) (69 - 75)  BP: 127/69 (02-04-21 @ 21:24)  RR: 18 (02-04-21 @ 21:24) (18 - 19)  SpO2: 96% (02-04-21 @ 21:24) (95% - 97%)    CAPILLARY BLOOD GLUCOSE  101 (04 Feb 2021 17:49)      POCT Blood Glucose.: 181 mg/dL (04 Feb 2021 21:49)  POCT Blood Glucose.: 218 mg/dL (04 Feb 2021 12:19)  POCT Blood Glucose.: 169 mg/dL (04 Feb 2021 08:03)     ==================>> LAB AND IMAGING <<==================                        8.8    7.98  )-----------( 85       ( 04 Feb 2021 07:44 )             28.5        02-04    142  |  108  |  73<H>  ----------------------------<  147<H>  4.7   |  23  |  1.10    Ca    8.5      04 Feb 2021 07:44      WBC count:   7.98 <<== ,  9.54 <<== ,  10.48 <<== ,  12.88 <<== ,  12.10 <<==   Hemoglobin:   8.8 <<==,  10.1 <<==,  10.7 <<==,  10.5 <<==,  9.3 <<==  platelets:  85 <==, 101 <==, 72 <==, 92 <==, 78 <==, 94 <==    Creatinine:  1.10  <<==, 1.17  <<==, 1.00  <<==, 1.17  <<==, 1.06  <<==, 1.16  <<==  Sodium:   142  <==, 143  <==, 140  <==, 138  <==, 140  <==, 137  <==      < from: Transthoracic Echocardiogram (02.04.21 @ 12:42) >  Conclusions:  Normal left ventricular systolic function. No segmental  wall motion abnormalities.  Bioprosthetic mitral valve. Gradient (12.9 mmHg at  60/min)  is severely increased for a prosthetic mitral valve.  Bioprosthetic aortic valve. Gradient across the aortic  valve cannot be estimated from this study.  Severe right ventricular enlargement with severely  decreased right ventricular systolic function.  Severe pulmonary hypertension.  Mild dilatation of the aortic root. Moderate dilitation of  the ascending aorta.  A device wire is noted in the right heart.  ------------------------------------------------------------------------  Confirmed on  2/4/2021 - 15:03:08 by Scott Montilla MD,  SUSAN  < end of copied text >  ___________________________________________________________________________________  ===============>>  A S S E S S M E N T   A N D   P L A N <<===============  ------------------------------------------------------------------------------------------    · Assessment	  89 yo F w/ h/o MVR/AVR, Afib on coumadin, s/p AICD/PPM, DM type II, HTN, CHFrEF presents with elevated INR;     Problem/Plan - 1:  ·  Problem: Acute hypoxemic respiratory failure due to COVID-19 with Viral pneumonia from Viral syndrome.   overall doing well  supportive care  monitoring off O2   unclear how much of pt's lethargy is from COVID viral syndrome and how much cardiac in cause.. awaiting echo     Problem/Plan - 2:  ·  Problem: UTI  post abx IV ceftriaxone;   follow cultures     Problem/Plan - 3:  ·  Problem: MICHELLE on CKD III, stable   monitor closely on PO lasix for CHF  monitor ins/outs and daily weights  monitor BMP  avoid nephrotoxic agents.     Problem/Plan - 4:  ·  Problem: metabolic encephalopathy due to above  appears\ improved: pt interactive and conversive, alert..   CT brain negative for acute pathology  monitor for now as improved   fatigue likely a sequelae of viral syndrome   needs rehabilitation ( awaiting negative COVID !)     Problem/Plan - 5:  ·  Problem: Atrial fibrillation. Plan: on coumadin  Eliquis   monitor   cardio appreciated    Problem/Plan - 6:  ·  Problem: chronic systolic CHF   lasix as needed   monitor ins/outs and daily weights  Continue Current medications otherwise and monitor.   cardio     ** pt with bioprosthetic valves ( AVR and MVR)         repeat echo as above showing severe bioprosthetic Mitral valve stenosis and acute Rt hear failure with pulm HTN           cardio to review / eval and discuss with family     Problem/Plan - 7:  ·  Problem: Hypertension.  Plan: c/w imdur and hydralazine.     Problem/Plan - 8:  ·  Problem: Diabetes mellitus, type II.    FS better controlled  endo follow up and mgmt appreciated   monitor FS     -GI/DVT Prophylaxis per protocol.    CODE STATUS: DNR/DNI (MOLST in Chart).    Discharge planing pending Echo and COVID     d/w cardio  ___________________________  H. AINSLEY Rogers.  Pager: [phone]

## 2021-02-04 NOTE — PROGRESS NOTE ADULT - PROBLEM SELECTOR PROBLEM 2
Mixed hyperlipidemia

## 2021-02-04 NOTE — PROGRESS NOTE ADULT - ASSESSMENT
89 yo w/h/o controlled T2DM (A1C7.2%) on "glipizide twice a day" per pt report. DM c/b CKD 3. Also h/o cataracts s/p b/l surgery and MVR, AVR, HTN, HLD, a. fib with ICD/PPM here with elevated INR and COVID19 pneumonia being treated with decadron (started 1/23) and remdisivir, endocrine consulted for severe hyperglycemia: 400-500s. On basal/bolus.Tolerating POs with BG levels up today in the 200s. Received last dose of dexa 2/2/2021 but remains hyperglycemic while on present insulin doses. No hypoglycemia. Will increase insulin doses to BG goal 100- low 200s due to age.    1. Type 2 diabetes mellitus with hyperglycemia, without long-term current use of  -test BG AC/HS  -Change Lantus dose to 7 units QHS  -Change Admelog to 10 units ac meals.   -Change sliding scale from moderate does sliding scale to Low does sliding scale since steroid has been off.     DISPO: TBD based on disposition> home vs. [facility].  Pt states was taking Glipizide most recently but due to age sulphonylurea are not recommended in the elderly due to increase risk for hypoglycemia specially in setting of CKD.   Need to consider benefits of meds versus risk at old age. Might benefit from Tradjenta. No metformin due to old age/CKD/elevated lactate levels  -Plan discussed with pt/team.    2. Mixed hyperlipidemia.     -C/w atorvastatin 40mg po qhs.     3. HTN

## 2021-02-04 NOTE — PROGRESS NOTE ADULT - SUBJECTIVE AND OBJECTIVE BOX
Contact info:   Rafael Smith MD  pager [phone], please provide 10 digit call back number.   Please note that this patient may be followed by another provider tomorrow.   If no answer or after hours, please contact [phone]    History:    Interval History/Subjective:    MEDICATIONS  (STANDING):  apixaban 2.5 milliGRAM(s) Oral two times a day  ascorbic acid 250 milliGRAM(s) Oral daily  atorvastatin 40 milliGRAM(s) Oral at bedtime  bisacodyl 5 milliGRAM(s) Oral every 12 hours  dextrose 40% Gel 15 Gram(s) Oral once  dextrose 5%. 1000 milliLiter(s) (50 mL/Hr) IV Continuous <Continuous>  dextrose 5%. 1000 milliLiter(s) (100 mL/Hr) IV Continuous <Continuous>  dextrose 50% Injectable 25 Gram(s) IV Push once  dextrose 50% Injectable 12.5 Gram(s) IV Push once  dextrose 50% Injectable 25 Gram(s) IV Push once  folic acid 1 milliGRAM(s) Oral daily  furosemide    Tablet 20 milliGRAM(s) Oral daily  glucagon  Injectable 1 milliGRAM(s) IntraMuscular once  hydrALAZINE 50 milliGRAM(s) Oral every 8 hours  insulin glargine Injectable (LANTUS) 5 Unit(s) SubCutaneous at bedtime  insulin lispro (ADMELOG) corrective regimen sliding scale   SubCutaneous three times a day before meals  insulin lispro (ADMELOG) corrective regimen sliding scale   SubCutaneous at bedtime  insulin lispro Injectable (ADMELOG) 8 Unit(s) SubCutaneous before dinner  insulin lispro Injectable (ADMELOG) 8 Unit(s) SubCutaneous before breakfast  insulin lispro Injectable (ADMELOG) 8 Unit(s) SubCutaneous before lunch  isosorbide   mononitrate ER Tablet (IMDUR) 30 milliGRAM(s) Oral daily  lactobacillus acidophilus 1 Tablet(s) Oral daily  melatonin 5 milliGRAM(s) Oral at bedtime  metoprolol tartrate 25 milliGRAM(s) Oral two times a day  senna 2 Tablet(s) Oral at bedtime    MEDICATIONS  (PRN):  polyethylene glycol 3350 17 Gram(s) Oral daily PRN Constipation      Allergies    Allergies ACEI RX: Throat swelling (Other; Swelling)  angiotensin converting enzyme inhibitors (Swelling)    Intolerances      Review of Systems:  Constitutional: No fever  Eyes: No blurry vision  Neuro: No tremors  HEENT: No pain  Cardiovascular: No chest pain, palpitations  Respiratory: No SOB, no cough  GI: No nausea, vomiting, abdominal pain  : No dysuria  Skin: no rash  Psych: no depression  Endocrine: no polyuria, polydipsia  Hem/lymph: no swelling    ALL OTHER SYSTEMS REVIEWED AND NEGATIVE    PHYSICAL EXAM:  VITALS: T(C): 36.8 (02-04-21 @ 12:01)  T(F): 98.2 (02-04-21 @ 12:01), Max: 98.3 (02-03-21 @ 20:30)  HR: 71 (02-04-21 @ 13:28) (71 - 76)  BP: 104/64 (02-04-21 @ 13:28) (94/61 - 116/62)  RR:  (18 - 19)  SpO2:  (95% - 96%)  Wt(kg): --  GENERAL: NAD, well-groomed, well-developed  EYES: No proptosis, no lid lag, anicteric  HEENT:  Atraumatic, Normocephalic, moist mucous membranes  THYROID: Normal size, no palpable nodules  RESPIRATORY: Clear to auscultation bilaterally; No rales, rhonchi, wheezing, or rubs  CARDIOVASCULAR: Regular rate and rhythm; No murmurs; no peripheral edema  GI: Soft, nontender, non distended, normal bowel sounds  SKIN: Dry, intact, No rashes or lesions  MUSCULOSKELETAL: Full range of motion, normal strength  NEURO: sensation intact, extraocular movements intact, no tremor, normal reflexes  PSYCH: Alert and oriented x 3, normal affect, normal mood  CUSHING'S SIGNS: no striae    POCT Blood Glucose.: 218 mg/dL (02-04-21 @ 12:19)  POCT Blood Glucose.: 169 mg/dL (02-04-21 @ 08:03)  POCT Blood Glucose.: 170 mg/dL (02-03-21 @ 21:43)  POCT Blood Glucose.: 248 mg/dL (02-03-21 @ 16:43)  POCT Blood Glucose.: 281 mg/dL (02-03-21 @ 12:28)  POCT Blood Glucose.: 211 mg/dL (02-03-21 @ 08:19)  POCT Blood Glucose.: 129 mg/dL (02-02-21 @ 21:24)  POCT Blood Glucose.: 158 mg/dL (02-02-21 @ 17:23)  POCT Blood Glucose.: 292 mg/dL (02-02-21 @ 12:09)  POCT Blood Glucose.: 232 mg/dL (02-02-21 @ 07:49)  POCT Blood Glucose.: 190 mg/dL (02-01-21 @ 21:51)  POCT Blood Glucose.: 71 mg/dL (02-01-21 @ 17:06)      02-04    142  |  108  |  73<H>  ----------------------------<  147<H>  4.7   |  23  |  1.10    EGFR if : 51<L>  EGFR if non : 44<L>    Ca    8.5      02-04          Thyroid Function Tests:                     Rafael Smith MD  Pager [phone], short range [phone]  Please provide 10 digit call back number if any questions.    For after hours or weekends please call [phone] or page fellow on call.      Interval History/Subjective: No acute event overnight.  States that her appetite is good.  Last does of dexamethasone was on 2/2/2021    MEDICATIONS  (STANDING):  MEDICATIONS  (STANDING):  apixaban 2.5 milliGRAM(s) Oral two times a day  ascorbic acid 250 milliGRAM(s) Oral daily  atorvastatin 40 milliGRAM(s) Oral at bedtime  bisacodyl 5 milliGRAM(s) Oral every 12 hours  dextrose 40% Gel 15 Gram(s) Oral once  dextrose 5%. 1000 milliLiter(s) (50 mL/Hr) IV Continuous <Continuous>  dextrose 5%. 1000 milliLiter(s) (100 mL/Hr) IV Continuous <Continuous>  dextrose 50% Injectable 25 Gram(s) IV Push once  dextrose 50% Injectable 12.5 Gram(s) IV Push once  dextrose 50% Injectable 25 Gram(s) IV Push once  folic acid 1 milliGRAM(s) Oral daily  furosemide    Tablet 20 milliGRAM(s) Oral daily  glucagon  Injectable 1 milliGRAM(s) IntraMuscular once  hydrALAZINE 50 milliGRAM(s) Oral every 8 hours  insulin glargine Injectable (LANTUS) 7 Unit(s) SubCutaneous at bedtime  insulin lispro (ADMELOG) corrective regimen sliding scale   SubCutaneous three times a day before meals  insulin lispro (ADMELOG) corrective regimen sliding scale   SubCutaneous at bedtime  insulin lispro Injectable (ADMELOG) 10 Unit(s) SubCutaneous before breakfast  insulin lispro Injectable (ADMELOG) 10 Unit(s) SubCutaneous before lunch  insulin lispro Injectable (ADMELOG) 10 Unit(s) SubCutaneous before dinner  isosorbide   mononitrate ER Tablet (IMDUR) 30 milliGRAM(s) Oral daily  lactobacillus acidophilus 1 Tablet(s) Oral daily  melatonin 5 milliGRAM(s) Oral at bedtime  metoprolol tartrate 25 milliGRAM(s) Oral two times a day  senna 2 Tablet(s) Oral at bedtime    MEDICATIONS  (PRN):  polyethylene glycol 3350 17 Gram(s) Oral daily PRN Constipation    Allergies    Allergies ACEI RX: Throat swelling (Other; Swelling)  angiotensin converting enzyme inhibitors (Swelling)    Intolerances    Review of Systems:  Constitutional: No fever  Eyes: No blurry vision  Neuro: No tremors  HEENT: No pain  Cardiovascular: No chest pain, palpitations  Respiratory: No SOB, no cough  GI: No nausea, vomiting, abdominal pain  : No dysuria  Skin: no rash  Psych: no depression  Endocrine: no polyuria, polydipsia  Hem/lymph: no swelling    ALL OTHER SYSTEMS REVIEWED AND NEGATIVE    PHYSICAL EXAM:  VITALS: T(C): 36.8 (02-04-21 @ 12:01)  T(F): 98.2 (02-04-21 @ 12:01), Max: 98.3 (02-03-21 @ 20:30)  HR: 71 (02-04-21 @ 13:28) (71 - 76)  BP: 104/64 (02-04-21 @ 13:28) (94/61 - 116/62)  RR:  (18 - 19)  SpO2:  (95% - 96%)  Wt(kg): --  GENERAL: NAD, well-groomed, well-developed  EYES: No proptosis, no lid lag, anicteric  GI: Soft, nontender, non distended, normal bowel sounds  SKIN: Dry, intact, No rashes or lesions  NEURO: sensation intact, extraocular movements intact, no tremor, normal reflexes  PSYCH: Alert and oriented x 3, normal affect, normal mood    POCT Blood Glucose.: 218 mg/dL (02-04-21 @ 12:19)  POCT Blood Glucose.: 169 mg/dL (02-04-21 @ 08:03)  POCT Blood Glucose.: 170 mg/dL (02-03-21 @ 21:43)  POCT Blood Glucose.: 248 mg/dL (02-03-21 @ 16:43)  POCT Blood Glucose.: 281 mg/dL (02-03-21 @ 12:28)  POCT Blood Glucose.: 211 mg/dL (02-03-21 @ 08:19)  POCT Blood Glucose.: 129 mg/dL (02-02-21 @ 21:24)  POCT Blood Glucose.: 158 mg/dL (02-02-21 @ 17:23)  POCT Blood Glucose.: 292 mg/dL (02-02-21 @ 12:09)  POCT Blood Glucose.: 232 mg/dL (02-02-21 @ 07:49)  POCT Blood Glucose.: 190 mg/dL (02-01-21 @ 21:51)  POCT Blood Glucose.: 71 mg/dL (02-01-21 @ 17:06)      02-04    142  |  108  |  73<H>  ----------------------------<  147<H>  4.7   |  23  |  1.10    EGFR if : 51<L>  EGFR if non : 44<L>    Ca    8.5      02-04          Thyroid Function Tests:

## 2021-02-05 ENCOUNTER — TRANSCRIPTION ENCOUNTER (OUTPATIENT)
Age: 86
End: 2021-02-05

## 2021-02-05 VITALS
SYSTOLIC BLOOD PRESSURE: 107 MMHG | DIASTOLIC BLOOD PRESSURE: 92 MMHG | OXYGEN SATURATION: 93 % | RESPIRATION RATE: 18 BRPM | TEMPERATURE: 98 F | HEART RATE: 75 BPM

## 2021-02-05 LAB
ALBUMIN SERPL ELPH-MCNC: 2.5 G/DL — LOW (ref 3.3–5)
ALP SERPL-CCNC: 46 U/L — SIGNIFICANT CHANGE UP (ref 40–120)
ALT FLD-CCNC: 17 U/L — SIGNIFICANT CHANGE UP (ref 10–45)
ANION GAP SERPL CALC-SCNC: 8 MMOL/L — SIGNIFICANT CHANGE UP (ref 5–17)
AST SERPL-CCNC: 18 U/L — SIGNIFICANT CHANGE UP (ref 10–40)
BILIRUB SERPL-MCNC: 0.7 MG/DL — SIGNIFICANT CHANGE UP (ref 0.2–1.2)
BUN SERPL-MCNC: 71 MG/DL — HIGH (ref 7–23)
CALCIUM SERPL-MCNC: 8.2 MG/DL — LOW (ref 8.4–10.5)
CHLORIDE SERPL-SCNC: 107 MMOL/L — SIGNIFICANT CHANGE UP (ref 96–108)
CO2 SERPL-SCNC: 27 MMOL/L — SIGNIFICANT CHANGE UP (ref 22–31)
CREAT SERPL-MCNC: 1.17 MG/DL — SIGNIFICANT CHANGE UP (ref 0.5–1.3)
CRP SERPL-MCNC: 3.26 MG/DL — HIGH (ref 0–0.4)
D DIMER BLD IA.RAPID-MCNC: 235 NG/ML DDU — HIGH
FERRITIN SERPL-MCNC: 348 NG/ML — HIGH (ref 15–150)
GLUCOSE BLDC GLUCOMTR-MCNC: 101 MG/DL — HIGH (ref 70–99)
GLUCOSE BLDC GLUCOMTR-MCNC: 147 MG/DL — HIGH (ref 70–99)
GLUCOSE BLDC GLUCOMTR-MCNC: 221 MG/DL — HIGH (ref 70–99)
GLUCOSE SERPL-MCNC: 238 MG/DL — HIGH (ref 70–99)
HCT VFR BLD CALC: 26.8 % — LOW (ref 34.5–45)
HGB BLD-MCNC: 8.3 G/DL — LOW (ref 11.5–15.5)
LDH SERPL L TO P-CCNC: 333 U/L — HIGH (ref 50–242)
MCHC RBC-ENTMCNC: 27.5 PG — SIGNIFICANT CHANGE UP (ref 27–34)
MCHC RBC-ENTMCNC: 31 GM/DL — LOW (ref 32–36)
MCV RBC AUTO: 88.7 FL — SIGNIFICANT CHANGE UP (ref 80–100)
NRBC # BLD: 0 /100 WBCS — SIGNIFICANT CHANGE UP (ref 0–0)
PLATELET # BLD AUTO: 82 K/UL — LOW (ref 150–400)
POTASSIUM SERPL-MCNC: 4.4 MMOL/L — SIGNIFICANT CHANGE UP (ref 3.5–5.3)
POTASSIUM SERPL-SCNC: 4.4 MMOL/L — SIGNIFICANT CHANGE UP (ref 3.5–5.3)
PROT SERPL-MCNC: 5.6 G/DL — LOW (ref 6–8.3)
RBC # BLD: 3.02 M/UL — LOW (ref 3.8–5.2)
RBC # FLD: 18.6 % — HIGH (ref 10.3–14.5)
SODIUM SERPL-SCNC: 142 MMOL/L — SIGNIFICANT CHANGE UP (ref 135–145)
WBC # BLD: 7.16 K/UL — SIGNIFICANT CHANGE UP (ref 3.8–10.5)
WBC # FLD AUTO: 7.16 K/UL — SIGNIFICANT CHANGE UP (ref 3.8–10.5)

## 2021-02-05 PROCEDURE — 82947 ASSAY GLUCOSE BLOOD QUANT: CPT

## 2021-02-05 PROCEDURE — 85610 PROTHROMBIN TIME: CPT

## 2021-02-05 PROCEDURE — 80053 COMPREHEN METABOLIC PANEL: CPT

## 2021-02-05 PROCEDURE — 99285 EMERGENCY DEPT VISIT HI MDM: CPT | Mod: 25

## 2021-02-05 PROCEDURE — 84295 ASSAY OF SERUM SODIUM: CPT

## 2021-02-05 PROCEDURE — 93306 TTE W/DOPPLER COMPLETE: CPT

## 2021-02-05 PROCEDURE — 86850 RBC ANTIBODY SCREEN: CPT

## 2021-02-05 PROCEDURE — 82962 GLUCOSE BLOOD TEST: CPT

## 2021-02-05 PROCEDURE — 83735 ASSAY OF MAGNESIUM: CPT

## 2021-02-05 PROCEDURE — 83615 LACTATE (LD) (LDH) ENZYME: CPT

## 2021-02-05 PROCEDURE — 97110 THERAPEUTIC EXERCISES: CPT

## 2021-02-05 PROCEDURE — 93005 ELECTROCARDIOGRAM TRACING: CPT

## 2021-02-05 PROCEDURE — 82330 ASSAY OF CALCIUM: CPT

## 2021-02-05 PROCEDURE — 84484 ASSAY OF TROPONIN QUANT: CPT

## 2021-02-05 PROCEDURE — 82435 ASSAY OF BLOOD CHLORIDE: CPT

## 2021-02-05 PROCEDURE — U0003: CPT

## 2021-02-05 PROCEDURE — 83605 ASSAY OF LACTIC ACID: CPT

## 2021-02-05 PROCEDURE — 82728 ASSAY OF FERRITIN: CPT

## 2021-02-05 PROCEDURE — 71045 X-RAY EXAM CHEST 1 VIEW: CPT

## 2021-02-05 PROCEDURE — 85027 COMPLETE CBC AUTOMATED: CPT

## 2021-02-05 PROCEDURE — 85014 HEMATOCRIT: CPT

## 2021-02-05 PROCEDURE — 84145 PROCALCITONIN (PCT): CPT

## 2021-02-05 PROCEDURE — 84100 ASSAY OF PHOSPHORUS: CPT

## 2021-02-05 PROCEDURE — 83036 HEMOGLOBIN GLYCOSYLATED A1C: CPT

## 2021-02-05 PROCEDURE — 80048 BASIC METABOLIC PNL TOTAL CA: CPT

## 2021-02-05 PROCEDURE — 97161 PT EVAL LOW COMPLEX 20 MIN: CPT

## 2021-02-05 PROCEDURE — 86901 BLOOD TYPING SEROLOGIC RH(D): CPT

## 2021-02-05 PROCEDURE — 82803 BLOOD GASES ANY COMBINATION: CPT

## 2021-02-05 PROCEDURE — 83088 ASSAY OF HISTAMINE: CPT

## 2021-02-05 PROCEDURE — U0005: CPT

## 2021-02-05 PROCEDURE — 84132 ASSAY OF SERUM POTASSIUM: CPT

## 2021-02-05 PROCEDURE — 81001 URINALYSIS AUTO W/SCOPE: CPT

## 2021-02-05 PROCEDURE — 86900 BLOOD TYPING SEROLOGIC ABO: CPT

## 2021-02-05 PROCEDURE — 0225U NFCT DS DNA&RNA 21 SARSCOV2: CPT

## 2021-02-05 PROCEDURE — 85018 HEMOGLOBIN: CPT

## 2021-02-05 PROCEDURE — 97530 THERAPEUTIC ACTIVITIES: CPT

## 2021-02-05 PROCEDURE — 85730 THROMBOPLASTIN TIME PARTIAL: CPT

## 2021-02-05 PROCEDURE — 86140 C-REACTIVE PROTEIN: CPT

## 2021-02-05 PROCEDURE — 71250 CT THORAX DX C-: CPT

## 2021-02-05 PROCEDURE — 82550 ASSAY OF CK (CPK): CPT

## 2021-02-05 PROCEDURE — 97116 GAIT TRAINING THERAPY: CPT

## 2021-02-05 PROCEDURE — 85025 COMPLETE CBC W/AUTO DIFF WBC: CPT

## 2021-02-05 PROCEDURE — 70450 CT HEAD/BRAIN W/O DYE: CPT

## 2021-02-05 PROCEDURE — 85379 FIBRIN DEGRADATION QUANT: CPT

## 2021-02-05 RX ORDER — ALOGLIPTIN 12.5 MG/1
1 TABLET, FILM COATED ORAL
Qty: 0 | Refills: 0 | DISCHARGE

## 2021-02-05 RX ORDER — INSULIN HUMAN 100 [IU]/ML
0 INJECTION, SOLUTION SUBCUTANEOUS
Qty: 0 | Refills: 0 | DISCHARGE

## 2021-02-05 RX ORDER — INSULIN GLARGINE 100 [IU]/ML
7 INJECTION, SOLUTION SUBCUTANEOUS
Qty: 0 | Refills: 0 | DISCHARGE

## 2021-02-05 RX ORDER — INSULIN LISPRO 100/ML
10 VIAL (ML) SUBCUTANEOUS
Qty: 0 | Refills: 0 | DISCHARGE

## 2021-02-05 RX ORDER — FUROSEMIDE 40 MG
1 TABLET ORAL
Qty: 0 | Refills: 0 | DISCHARGE
Start: 2021-02-05

## 2021-02-05 RX ADMIN — Medication 10 UNIT(S): at 08:35

## 2021-02-05 RX ADMIN — ISOSORBIDE MONONITRATE 30 MILLIGRAM(S): 60 TABLET, EXTENDED RELEASE ORAL at 12:16

## 2021-02-05 RX ADMIN — Medication 2: at 08:35

## 2021-02-05 RX ADMIN — Medication 1 TABLET(S): at 12:16

## 2021-02-05 RX ADMIN — Medication 25 MILLIGRAM(S): at 05:58

## 2021-02-05 RX ADMIN — Medication 250 MILLIGRAM(S): at 12:16

## 2021-02-05 RX ADMIN — Medication 20 MILLIGRAM(S): at 05:58

## 2021-02-05 RX ADMIN — Medication 5 MILLIGRAM(S): at 05:57

## 2021-02-05 RX ADMIN — Medication 1 MILLIGRAM(S): at 12:16

## 2021-02-05 RX ADMIN — APIXABAN 2.5 MILLIGRAM(S): 2.5 TABLET, FILM COATED ORAL at 05:58

## 2021-02-05 NOTE — DISCHARGE NOTE NURSING/CASE MANAGEMENT/SOCIAL WORK - NSDCFUADDAPPT_GEN_ALL_CORE_FT
Please follow up cardiology as outpatient.   Please follow up with your endocrinology as outpatient. [facility] endocrinology information provided as well. Please call to make an appointment.

## 2021-02-05 NOTE — PROGRESS NOTE ADULT - SUBJECTIVE AND OBJECTIVE BOX
CARDIOLOGY     PROGRESS  NOTE   ________________________________________________    CHIEF COMPLAINT:Patient is a 89y old  Female who presents with a chief complaint of sent for elevated INR (04 Feb 2021 17:54)  no complain.  	  REVIEW OF SYSTEMS:  CONSTITUTIONAL: No fever, weight loss, or fatigue  EYES: No eye pain, visual disturbances, or discharge  ENT:  No difficulty hearing, tinnitus, vertigo; No sinus or throat pain  NECK: No pain or stiffness  RESPIRATORY: No cough, wheezing, chills or hemoptysis; No Shortness of Breath  CARDIOVASCULAR: No chest pain, palpitations, passing out, dizziness, or leg swelling  GASTROINTESTINAL: No abdominal or epigastric pain. No nausea, vomiting, or hematemesis; No diarrhea or constipation. No melena or hematochezia.  GENITOURINARY: No dysuria, frequency, hematuria, or incontinence  NEUROLOGICAL: No headaches, memory loss, loss of strength, numbness, or tremors  SKIN: No itching, burning, rashes, or lesions   LYMPH Nodes: No enlarged glands  ENDOCRINE: No heat or cold intolerance; No hair loss  MUSCULOSKELETAL: No joint pain or swelling; No muscle, back, or extremity pain  PSYCHIATRIC: No depression, anxiety, mood swings, or difficulty sleeping  HEME/LYMPH: No easy bruising, or bleeding gums  ALLERGY AND IMMUNOLOGIC: No hives or eczema	    [ ] All others negative	  [x ] Unable to obtain    PHYSICAL EXAM:  T(C): 36.8 (02-05-21 @ 05:54), Max: 36.8 (02-04-21 @ 12:01)  HR: 79 (02-05-21 @ 05:54) (69 - 79)  BP: 118/76 (02-05-21 @ 05:54) (94/61 - 127/69)  RR: 18 (02-05-21 @ 05:54) (18 - 18)  SpO2: 95% (02-05-21 @ 05:54) (95% - 97%)  Wt(kg): --  I&O's Summary    04 Feb 2021 07:01  -  05 Feb 2021 07:00  --------------------------------------------------------  IN: 1180 mL / OUT: 1300 mL / NET: -120 mL        Appearance: Normal	  HEENT:   Normal oral mucosa, PERRL, EOMI	  Lymphatic: No lymphadenopathy  Cardiovascular: Normal S1 S2, No JVD, + murmurs, No edema  Respiratory: Lungs clear to auscultation	  Psychiatry: A & O x 3, Mood & affect appropriate  Gastrointestinal:  Soft, Non-tender, + BS	  Skin: No rashes, No ecchymoses, No cyanosis	  Neurologic: Non-focal  Extremities: Normal range of motion, No clubbing, cyanosis or edema  Vascular: Peripheral pulses palpable 2+ bilaterally    MEDICATIONS  (STANDING):  apixaban 2.5 milliGRAM(s) Oral two times a day  ascorbic acid 250 milliGRAM(s) Oral daily  atorvastatin 40 milliGRAM(s) Oral at bedtime  bisacodyl 5 milliGRAM(s) Oral every 12 hours  dextrose 40% Gel 15 Gram(s) Oral once  dextrose 5%. 1000 milliLiter(s) (50 mL/Hr) IV Continuous <Continuous>  dextrose 5%. 1000 milliLiter(s) (100 mL/Hr) IV Continuous <Continuous>  dextrose 50% Injectable 25 Gram(s) IV Push once  dextrose 50% Injectable 12.5 Gram(s) IV Push once  dextrose 50% Injectable 25 Gram(s) IV Push once  folic acid 1 milliGRAM(s) Oral daily  furosemide    Tablet 20 milliGRAM(s) Oral daily  glucagon  Injectable 1 milliGRAM(s) IntraMuscular once  hydrALAZINE 50 milliGRAM(s) Oral every 8 hours  insulin glargine Injectable (LANTUS) 7 Unit(s) SubCutaneous at bedtime  insulin lispro (ADMELOG) corrective regimen sliding scale   SubCutaneous three times a day before meals  insulin lispro (ADMELOG) corrective regimen sliding scale   SubCutaneous at bedtime  insulin lispro Injectable (ADMELOG) 10 Unit(s) SubCutaneous before breakfast  insulin lispro Injectable (ADMELOG) 10 Unit(s) SubCutaneous before lunch  insulin lispro Injectable (ADMELOG) 10 Unit(s) SubCutaneous before dinner  isosorbide   mononitrate ER Tablet (IMDUR) 30 milliGRAM(s) Oral daily  lactobacillus acidophilus 1 Tablet(s) Oral daily  metoprolol tartrate 25 milliGRAM(s) Oral two times a day  senna 2 Tablet(s) Oral at bedtime      TELEMETRY: 	    ECG:  	  RADIOLOGY:  OTHER: 	  	  LABS:	 	    CARDIAC MARKERS:                                8.8    7.98  )-----------( 85       ( 04 Feb 2021 07:44 )             28.5     02-04    142  |  108  |  73<H>  ----------------------------<  147<H>  4.7   |  23  |  1.10    Ca    8.5      04 Feb 2021 07:44      proBNP:   Lipid Profile:   HgA1c:   TSH:   < from: Transthoracic Echocardiogram (02.04.21 @ 12:42) >  Normal left ventricular systolic function. No segmental  wall motion abnormalities.  Bioprosthetic mitral valve. Gradient (12.9 mmHg at  60/min)  is severely increased for a prosthetic mitral valve.  Bioprosthetic aortic valve. Gradient across the aortic  valve cannot be estimated from this study.  Severe right ventricular enlargement with severely  decreased right ventricular systolic function.  Severe pulmonary hypertension.  Mild dilatation of the aortic root. Moderate dilitation of  the ascending aorta.  A device wire is noted in the right heart.      Assessment and plan  ---------------------------  89 yo F w/ h/o MVR/AVR, Afib on coumadin, s/p AICD/PPM, DM type II, HTN, CHFrEF presents with elevated INR;  Patient is AAO x2, limited history obtained from patient and NH staff.  Patient was being treated for pneumonia for last 3 days with IV ceftriaxone/azithromycin and possibly IVF.  Patient however had no complaints, has been eating and drinking well (although likely unreliable historian).  Today she was found to have COVID and elevated INR (11.2), which prompted them to send patient to [facility].  Patient was given 10mg of vitamin K this morning.  Per chart review, patient was also positive for COVID on 1/7/21 which NH staff could not confirm.  Patient otherwise denies chest pain, SOB, cough.  On arrival, she had temp 97.9, HR 81, /83, RR 16, saturation 100% on 4L.     chf systolic acute on chronic  will adjust cardiac meds  check ct chest no contrast ?vascular congestion ?covid/ ct noted resulted   echo noted  tx for covid as per protocol  dvt prophylaxis  ac keep inr 2-3  will fu  add IMDUR to hydralazine for chf therapy  inr noted 2.62  covid +, o2 sat stable  will increase beta blocker as tolerated  keep inr  2-3 sec to a.fib, both valves are bioprosthetic  increase renal function ?sec to steroid  check bladder scan  mod LV dysfunction, continue hydralazine and nitrated and metoprolol ER  pt with high risk for coumadin with coumadin toxicity , will dc Lovenox and coumadin, start on NOAC   add lasix 20 mg daily  echo noted with abnormality with AV  pt needs CATE and possible valve on valve TAVR, no cate in view of covid + for now, pt can fu with structural heart as out pt

## 2021-02-05 NOTE — PROGRESS NOTE ADULT - REASON FOR ADMISSION
sent for elevated INR

## 2021-02-05 NOTE — CHART NOTE - NSCHARTNOTEFT_GEN_A_CORE
PA Medicine Discharge Note    Patient medically cleared for discharge today, discussed with Dr. Rogers.  Medications for discharge confirmed with attending.  Patient discharged to [facility]. Patient's family updated.    April Ramos PA-C  Dept of Medicine  #[id number]

## 2021-02-05 NOTE — DISCHARGE NOTE NURSING/CASE MANAGEMENT/SOCIAL WORK - PATIENT PORTAL LINK FT
You can access the FollowMyHealth Patient Portal offered by [facility] by registering at the following website: [email or web address]. By joining [employer]’s FollowMyHealth portal, you will also be able to view your health information using other applications (apps) compatible with our system.

## 2021-02-05 NOTE — PROGRESS NOTE ADULT - ASSESSMENT
M E D I C A L   A T T E N D I N G    F O L L O W    U P   N O T E  (02-05-21 )                                     ------------------------------------------------------------------------------------------------    patient evaluated by me, case discussed with team, chart, medications, and physical exam reviewed, labs / tests  and vitals reviewed by me, as bellow.   Patient is stable for discharge today to rehab... Echo reviewed with Cardiologist who is in communication with family.. pt will need a SANA when recovered and  cleared from [facility]   Patient to follow up with  PMD / cardio   See discharge document for full note.                                           ( note written   02-05-21 )    ==================>> MEDICATIONS <<====================    apixaban 2.5 milliGRAM(s) Oral two times a day  ascorbic acid 250 milliGRAM(s) Oral daily  atorvastatin 40 milliGRAM(s) Oral at bedtime  bisacodyl 5 milliGRAM(s) Oral every 12 hours  dextrose 40% Gel 15 Gram(s) Oral once  dextrose 5%. 1000 milliLiter(s) IV Continuous <Continuous>  dextrose 5%. 1000 milliLiter(s) IV Continuous <Continuous>  dextrose 50% Injectable 25 Gram(s) IV Push once  dextrose 50% Injectable 12.5 Gram(s) IV Push once  dextrose 50% Injectable 25 Gram(s) IV Push once  folic acid 1 milliGRAM(s) Oral daily  furosemide    Tablet 20 milliGRAM(s) Oral daily  glucagon  Injectable 1 milliGRAM(s) IntraMuscular once  hydrALAZINE 50 milliGRAM(s) Oral every 8 hours  insulin glargine Injectable (LANTUS) 7 Unit(s) SubCutaneous at bedtime  insulin lispro (ADMELOG) corrective regimen sliding scale   SubCutaneous three times a day before meals  insulin lispro (ADMELOG) corrective regimen sliding scale   SubCutaneous at bedtime  insulin lispro Injectable (ADMELOG) 10 Unit(s) SubCutaneous before breakfast  insulin lispro Injectable (ADMELOG) 10 Unit(s) SubCutaneous before lunch  insulin lispro Injectable (ADMELOG) 10 Unit(s) SubCutaneous before dinner  isosorbide   mononitrate ER Tablet (IMDUR) 30 milliGRAM(s) Oral daily  lactobacillus acidophilus 1 Tablet(s) Oral daily  metoprolol tartrate 25 milliGRAM(s) Oral two times a day  senna 2 Tablet(s) Oral at bedtime    MEDICATIONS  (PRN):  polyethylene glycol 3350 17 Gram(s) Oral daily PRN Constipation    ___________  Active diet:  Diet, Soft:   Consistent Carbohydrate Evening Snack (CSTCHOSN)  DASH/TLC Sodium & Cholesterol Restricted (DASH)  Supplement Feeding Modality:  Oral  Glucerna Shake Cans or Servings Per Day:  2       Frequency:  Daily  ___________________    ==================>> VITAL SIGNS <<==================    T(C): 36.7 (02-05-21 @ 12:09), Max: 36.8 (02-05-21 @ 05:54)  HR: 75 (02-05-21 @ 12:09) (69 - 79)  BP: 107/92 (02-05-21 @ 12:09) (107/92 - 127/69)  RR: 18 (02-05-21 @ 12:09) (18 - 18)  SpO2: 93% (02-05-21 @ 12:09) (93% - 96%)   CAPILLARY BLOOD GLUCOSE  101 (04 Feb 2021 17:49)      POCT Blood Glucose.: 147 mg/dL (05 Feb 2021 12:16)  POCT Blood Glucose.: 221 mg/dL (05 Feb 2021 08:01)  POCT Blood Glucose.: 181 mg/dL (04 Feb 2021 21:49)  POCT Blood Glucose.: 101 mg/dL (04 Feb 2021 17:41)    I&O's Summary    04 Feb 2021 07:01  -  05 Feb 2021 07:00  --------------------------------------------------------  IN: 1180 mL / OUT: 1300 mL / NET: -120 mL    05 Feb 2021 07:01  -  05 Feb 2021 16:40  --------------------------------------------------------  IN: 480 mL / OUT: 200 mL / NET: 280 mL       ==================>> LAB AND IMAGING <<==================                        8.3    7.16  )-----------( 82       ( 05 Feb 2021 09:58 )             26.8        02-05    142  |  107  |  71<H>  ----------------------------<  238<H>  4.4   |  27  |  1.17    Ca    8.2<L>      05 Feb 2021 09:58    TPro  5.6<L>  /  Alb  2.5<L>  /  TBili  0.7  /  DBili  x   /  AST  18  /  ALT  17  /  AlkPhos  46  02-05    HgA1C:   (01-23-21)          (01-23-21)      7.2    WBC count:   7.16 <<== ,  7.98 <<== ,  9.54 <<== ,  10.48 <<== ,  12.88 <<==   Hemoglobin:   8.3 <<==,  8.8 <<==,  10.1 <<==,  10.7 <<==,  10.5 <<==  platelets:  82 <==, 85 <==, 101 <==, 72 <==, 92 <==, 78 <==    Creatinine:  1.17  <<==, 1.10  <<==, 1.17  <<==, 1.00  <<==, 1.17  <<==, 1.06  <<==  Sodium:   142  <==, 142  <==, 143  <==, 140  <==, 138  <==, 140  <==      < from: Transthoracic Echocardiogram (02.04.21 @ 12:42) >  Conclusions:  Normal left ventricular systolic function. No segmental  wall motion abnormalities.  Bioprosthetic mitral valve. Gradient (12.9 mmHg at  60/min)  is severely increased for a prosthetic mitral valve.  Bioprosthetic aortic valve. Gradient across the aortic  valve cannot be estimated from this study.  Severe right ventricular enlargement with severely  decreased right ventricular systolic function.  Severe pulmonary hypertension.  Mild dilatation of the aortic root. Moderate dilitation of  the ascending aorta.  A device wire is noted in the right heart.  ------------------------------------------------------------------------  Confirmed on  2/4/2021 - 15:03:08 by Scott Montilla MD, FASE  --------------  < end of copied text >

## 2021-02-05 NOTE — PROGRESS NOTE ADULT - PROVIDER SPECIALTY LIST ADULT
Cardiology
Internal Medicine
Cardiology
Endocrinology
Endocrinology
Internal Medicine
Internal Medicine
Cardiology
Internal Medicine
Cardiology
Internal Medicine
Endocrinology

## 2021-02-12 ENCOUNTER — INPATIENT (INPATIENT)
Facility: HOSPITAL | Age: 86
LOS: 10 days | Discharge: HOSPICE | End: 2021-02-23
Attending: INTERNAL MEDICINE | Admitting: INTERNAL MEDICINE
Payer: MEDICARE

## 2021-02-12 VITALS
SYSTOLIC BLOOD PRESSURE: 109 MMHG | HEART RATE: 80 BPM | TEMPERATURE: 98 F | DIASTOLIC BLOOD PRESSURE: 73 MMHG | OXYGEN SATURATION: 99 % | RESPIRATION RATE: 18 BRPM

## 2021-02-12 DIAGNOSIS — E11.65 TYPE 2 DIABETES MELLITUS WITH HYPERGLYCEMIA: ICD-10-CM

## 2021-02-12 DIAGNOSIS — N17.9 ACUTE KIDNEY FAILURE, UNSPECIFIED: ICD-10-CM

## 2021-02-12 DIAGNOSIS — E78.2 MIXED HYPERLIPIDEMIA: ICD-10-CM

## 2021-02-12 DIAGNOSIS — R41.82 ALTERED MENTAL STATUS, UNSPECIFIED: ICD-10-CM

## 2021-02-12 DIAGNOSIS — E86.0 DEHYDRATION: ICD-10-CM

## 2021-02-12 DIAGNOSIS — Z95.2 PRESENCE OF PROSTHETIC HEART VALVE: Chronic | ICD-10-CM

## 2021-02-12 DIAGNOSIS — Z29.9 ENCOUNTER FOR PROPHYLACTIC MEASURES, UNSPECIFIED: ICD-10-CM

## 2021-02-12 DIAGNOSIS — D69.6 THROMBOCYTOPENIA, UNSPECIFIED: ICD-10-CM

## 2021-02-12 DIAGNOSIS — I50.32 CHRONIC DIASTOLIC (CONGESTIVE) HEART FAILURE: ICD-10-CM

## 2021-02-12 LAB
ALBUMIN SERPL ELPH-MCNC: 2 G/DL — LOW (ref 3.3–5)
ALP SERPL-CCNC: 61 U/L — SIGNIFICANT CHANGE UP (ref 40–120)
ALT FLD-CCNC: 22 U/L — SIGNIFICANT CHANGE UP (ref 12–78)
AMMONIA BLD-MCNC: 18 UMOL/L — SIGNIFICANT CHANGE UP (ref 11–32)
ANION GAP SERPL CALC-SCNC: 6 MMOL/L — SIGNIFICANT CHANGE UP (ref 5–17)
APPEARANCE UR: ABNORMAL
AST SERPL-CCNC: 18 U/L — SIGNIFICANT CHANGE UP (ref 15–37)
BACTERIA # UR AUTO: ABNORMAL
BASOPHILS # BLD AUTO: 0.01 K/UL — SIGNIFICANT CHANGE UP (ref 0–0.2)
BASOPHILS NFR BLD AUTO: 0.2 % — SIGNIFICANT CHANGE UP (ref 0–2)
BILIRUB SERPL-MCNC: 0.5 MG/DL — SIGNIFICANT CHANGE UP (ref 0.2–1.2)
BILIRUB UR-MCNC: NEGATIVE — SIGNIFICANT CHANGE UP
BUN SERPL-MCNC: 61 MG/DL — HIGH (ref 7–23)
CALCIUM SERPL-MCNC: 7.9 MG/DL — LOW (ref 8.5–10.1)
CHLORIDE SERPL-SCNC: 107 MMOL/L — SIGNIFICANT CHANGE UP (ref 96–108)
CO2 SERPL-SCNC: 26 MMOL/L — SIGNIFICANT CHANGE UP (ref 22–31)
COLOR SPEC: YELLOW — SIGNIFICANT CHANGE UP
CREAT SERPL-MCNC: 1.85 MG/DL — HIGH (ref 0.5–1.3)
DIFF PNL FLD: ABNORMAL
EOSINOPHIL # BLD AUTO: 0.01 K/UL — SIGNIFICANT CHANGE UP (ref 0–0.5)
EOSINOPHIL NFR BLD AUTO: 0.2 % — SIGNIFICANT CHANGE UP (ref 0–6)
EPI CELLS # UR: SIGNIFICANT CHANGE UP
FLUAV AG NPH QL: SIGNIFICANT CHANGE UP
FLUBV AG NPH QL: SIGNIFICANT CHANGE UP
GLUCOSE BLDC GLUCOMTR-MCNC: 270 MG/DL — HIGH (ref 70–99)
GLUCOSE BLDC GLUCOMTR-MCNC: 321 MG/DL — HIGH (ref 70–99)
GLUCOSE SERPL-MCNC: 299 MG/DL — HIGH (ref 70–99)
GLUCOSE UR QL: NEGATIVE MG/DL — SIGNIFICANT CHANGE UP
HCT VFR BLD CALC: 24.4 % — LOW (ref 34.5–45)
HGB BLD-MCNC: 7.7 G/DL — LOW (ref 11.5–15.5)
IMM GRANULOCYTES NFR BLD AUTO: 0.5 % — SIGNIFICANT CHANGE UP (ref 0–1.5)
KETONES UR-MCNC: ABNORMAL
LACTATE SERPL-SCNC: 2.7 MMOL/L — HIGH (ref 0.7–2)
LEUKOCYTE ESTERASE UR-ACNC: ABNORMAL
LYMPHOCYTES # BLD AUTO: 0.79 K/UL — LOW (ref 1–3.3)
LYMPHOCYTES # BLD AUTO: 14.2 % — SIGNIFICANT CHANGE UP (ref 13–44)
MAGNESIUM SERPL-MCNC: 3.2 MG/DL — HIGH (ref 1.6–2.6)
MCHC RBC-ENTMCNC: 27.8 PG — SIGNIFICANT CHANGE UP (ref 27–34)
MCHC RBC-ENTMCNC: 31.6 GM/DL — LOW (ref 32–36)
MCV RBC AUTO: 88.1 FL — SIGNIFICANT CHANGE UP (ref 80–100)
MONOCYTES # BLD AUTO: 0.29 K/UL — SIGNIFICANT CHANGE UP (ref 0–0.9)
MONOCYTES NFR BLD AUTO: 5.2 % — SIGNIFICANT CHANGE UP (ref 2–14)
NEUTROPHILS # BLD AUTO: 4.44 K/UL — SIGNIFICANT CHANGE UP (ref 1.8–7.4)
NEUTROPHILS NFR BLD AUTO: 79.7 % — HIGH (ref 43–77)
NITRITE UR-MCNC: NEGATIVE — SIGNIFICANT CHANGE UP
NRBC # BLD: 0 /100 WBCS — SIGNIFICANT CHANGE UP (ref 0–0)
NT-PROBNP SERPL-SCNC: HIGH PG/ML (ref 0–450)
PH UR: 5 — SIGNIFICANT CHANGE UP (ref 5–8)
PHOSPHATE SERPL-MCNC: 4.1 MG/DL — SIGNIFICANT CHANGE UP (ref 2.5–4.5)
PLATELET # BLD AUTO: 57 K/UL — LOW (ref 150–400)
POTASSIUM SERPL-MCNC: 5.2 MMOL/L — SIGNIFICANT CHANGE UP (ref 3.5–5.3)
POTASSIUM SERPL-SCNC: 5.2 MMOL/L — SIGNIFICANT CHANGE UP (ref 3.5–5.3)
PROT SERPL-MCNC: 5.8 GM/DL — LOW (ref 6–8.3)
PROT UR-MCNC: 30 MG/DL
RBC # BLD: 2.77 M/UL — LOW (ref 3.8–5.2)
RBC # FLD: 20.1 % — HIGH (ref 10.3–14.5)
RBC CASTS # UR COMP ASSIST: ABNORMAL /HPF (ref 0–4)
SARS-COV-2 RNA SPEC QL NAA+PROBE: DETECTED
SODIUM SERPL-SCNC: 139 MMOL/L — SIGNIFICANT CHANGE UP (ref 135–145)
SP GR SPEC: 1.02 — SIGNIFICANT CHANGE UP (ref 1.01–1.02)
TROPONIN I SERPL-MCNC: 0.12 NG/ML — HIGH (ref 0.01–0.04)
UROBILINOGEN FLD QL: 1 MG/DL
WBC # BLD: 5.57 K/UL — SIGNIFICANT CHANGE UP (ref 3.8–10.5)
WBC # FLD AUTO: 5.57 K/UL — SIGNIFICANT CHANGE UP (ref 3.8–10.5)
WBC UR QL: SIGNIFICANT CHANGE UP

## 2021-02-12 PROCEDURE — 99285 EMERGENCY DEPT VISIT HI MDM: CPT

## 2021-02-12 PROCEDURE — 93010 ELECTROCARDIOGRAM REPORT: CPT

## 2021-02-12 PROCEDURE — 70450 CT HEAD/BRAIN W/O DYE: CPT | Mod: 26,MA

## 2021-02-12 PROCEDURE — 99223 1ST HOSP IP/OBS HIGH 75: CPT | Mod: AI

## 2021-02-12 PROCEDURE — 71045 X-RAY EXAM CHEST 1 VIEW: CPT | Mod: 26

## 2021-02-12 RX ORDER — APIXABAN 2.5 MG/1
2.5 TABLET, FILM COATED ORAL
Refills: 0 | Status: DISCONTINUED | OUTPATIENT
Start: 2021-02-12 | End: 2021-02-13

## 2021-02-12 RX ORDER — DEXTROSE 50 % IN WATER 50 %
25 SYRINGE (ML) INTRAVENOUS ONCE
Refills: 0 | Status: DISCONTINUED | OUTPATIENT
Start: 2021-02-12 | End: 2021-02-23

## 2021-02-12 RX ORDER — DEXTROSE 50 % IN WATER 50 %
15 SYRINGE (ML) INTRAVENOUS ONCE
Refills: 0 | Status: DISCONTINUED | OUTPATIENT
Start: 2021-02-12 | End: 2021-02-23

## 2021-02-12 RX ORDER — FUROSEMIDE 40 MG
20 TABLET ORAL ONCE
Refills: 0 | Status: COMPLETED | OUTPATIENT
Start: 2021-02-12 | End: 2021-02-12

## 2021-02-12 RX ORDER — FOLIC ACID 0.8 MG
1 TABLET ORAL DAILY
Refills: 0 | Status: DISCONTINUED | OUTPATIENT
Start: 2021-02-12 | End: 2021-02-23

## 2021-02-12 RX ORDER — ATORVASTATIN CALCIUM 80 MG/1
40 TABLET, FILM COATED ORAL AT BEDTIME
Refills: 0 | Status: DISCONTINUED | OUTPATIENT
Start: 2021-02-12 | End: 2021-02-22

## 2021-02-12 RX ORDER — INSULIN LISPRO 100/ML
VIAL (ML) SUBCUTANEOUS
Refills: 0 | Status: DISCONTINUED | OUTPATIENT
Start: 2021-02-12 | End: 2021-02-23

## 2021-02-12 RX ORDER — SODIUM CHLORIDE 9 MG/ML
1000 INJECTION, SOLUTION INTRAVENOUS
Refills: 0 | Status: DISCONTINUED | OUTPATIENT
Start: 2021-02-12 | End: 2021-02-13

## 2021-02-12 RX ORDER — SODIUM CHLORIDE 9 MG/ML
1000 INJECTION, SOLUTION INTRAVENOUS
Refills: 0 | Status: DISCONTINUED | OUTPATIENT
Start: 2021-02-12 | End: 2021-02-23

## 2021-02-12 RX ORDER — CEFTRIAXONE 500 MG/1
1000 INJECTION, POWDER, FOR SOLUTION INTRAMUSCULAR; INTRAVENOUS EVERY 24 HOURS
Refills: 0 | Status: COMPLETED | OUTPATIENT
Start: 2021-02-12 | End: 2021-02-17

## 2021-02-12 RX ORDER — ACETAMINOPHEN 500 MG
650 TABLET ORAL EVERY 6 HOURS
Refills: 0 | Status: DISCONTINUED | OUTPATIENT
Start: 2021-02-12 | End: 2021-02-22

## 2021-02-12 RX ORDER — GLUCAGON INJECTION, SOLUTION 0.5 MG/.1ML
1 INJECTION, SOLUTION SUBCUTANEOUS ONCE
Refills: 0 | Status: DISCONTINUED | OUTPATIENT
Start: 2021-02-12 | End: 2021-02-23

## 2021-02-12 RX ORDER — ISOSORBIDE MONONITRATE 60 MG/1
30 TABLET, EXTENDED RELEASE ORAL DAILY
Refills: 0 | Status: DISCONTINUED | OUTPATIENT
Start: 2021-02-12 | End: 2021-02-17

## 2021-02-12 RX ORDER — SODIUM CHLORIDE 9 MG/ML
1500 INJECTION INTRAMUSCULAR; INTRAVENOUS; SUBCUTANEOUS ONCE
Refills: 0 | Status: COMPLETED | OUTPATIENT
Start: 2021-02-12 | End: 2021-02-12

## 2021-02-12 RX ORDER — METOPROLOL TARTRATE 50 MG
25 TABLET ORAL
Refills: 0 | Status: DISCONTINUED | OUTPATIENT
Start: 2021-02-12 | End: 2021-02-21

## 2021-02-12 RX ORDER — INSULIN LISPRO 100/ML
VIAL (ML) SUBCUTANEOUS AT BEDTIME
Refills: 0 | Status: DISCONTINUED | OUTPATIENT
Start: 2021-02-12 | End: 2021-02-23

## 2021-02-12 RX ORDER — DEXTROSE 50 % IN WATER 50 %
12.5 SYRINGE (ML) INTRAVENOUS ONCE
Refills: 0 | Status: DISCONTINUED | OUTPATIENT
Start: 2021-02-12 | End: 2021-02-23

## 2021-02-12 RX ADMIN — Medication 20 MILLIGRAM(S): at 18:00

## 2021-02-12 RX ADMIN — SODIUM CHLORIDE 750 MILLILITER(S): 9 INJECTION INTRAMUSCULAR; INTRAVENOUS; SUBCUTANEOUS at 17:28

## 2021-02-12 NOTE — H&P ADULT - NSHPPHYSICALEXAM_GEN_ALL_CORE
Vital Signs Last 24 Hrs  T(C): 36.7 (12 Feb 2021 19:02), Max: 36.9 (12 Feb 2021 16:16)  T(F): 98.1 (12 Feb 2021 19:02), Max: 98.4 (12 Feb 2021 16:16)  HR: 73 (12 Feb 2021 19:02) (73 - 80)  BP: 127/68 (12 Feb 2021 19:02) (109/73 - 127/68)  BP(mean): --  RR: 26 (12 Feb 2021 19:02) (18 - 26)  SpO2: 100% (12 Feb 2021 19:02) (99% - 100%)    PHYSICAL EXAM:    GENERAL: NAD, well-groomed, well-developed elderly female   HEAD:  Atraumatic, Normocephalic  EYES: EOMI, PERRLA, conjunctiva and sclera clear  ENMT: No tonsillar erythema, exudates, or enlargement; Moist mucous membranes, No lesions  NECK: Supple, No JVD, Normal thyroid  NERVOUS SYSTEM:  Alert & Oriented X1, Motor Strength 5/5 B/L upper and lower extremities;  CHEST/LUNG: Clear to percussion bilaterally; No rales, rhonchi, wheezing, or rubs  HEART: Regular rate and rhythm; No rubs, or gallops, +S1,S2  ABDOMEN: Soft, Nontender, Nondistended; Bowel sounds present  EXTREMITIES:  2+ Peripheral Pulses, No clubbing, cyanosis, or edema  LYMPH: No cervical adenopathy  RECTAL: deferred  BREAST: deferred  : deferred  SKIN: No rashes or lesions    IMPROVE VTE Individual Risk Assessment        RISK                                                          Points  [  ] Previous VTE                                                3  [  ] Thrombophilia                                             2  [  ] Lower limb paralysis                                    2        (unable to hold up >15 seconds)    [  ] Current Cancer                                             2         (within 6 months)  [ x ] Immobilization > 24 hrs                              1  [  ] ICU/CCU stay > 24 hours                            1  [  x] Age > 60                                                    1  IMPROVE VTE Score ______2___

## 2021-02-12 NOTE — H&P ADULT - NSICDXPASTMEDICALHX_GEN_ALL_CORE_FT
PAST MEDICAL HISTORY:  Acute respiratory failure with hypoxia     Congestive heart failure     Folate deficiency anemia     Hyperlipidemia     Metabolic encephalopathy     Pneumonia due to Covid 19    Type 2 diabetes mellitus

## 2021-02-12 NOTE — H&P ADULT - PROBLEM SELECTOR PLAN 5
cont statin pt on prior admission plts  range  will monitor closely and dc apixaban if persists or any lower

## 2021-02-12 NOTE — ED PROVIDER NOTE - OBJECTIVE STATEMENT
89 year old female with PMH of metabolic encephalopathy, Acute respiratory failure due to Covid - improved and sent to [facility] for rehab, HLD, DMII, HTN, CHF - systolic, presenting to ED due to change in mental status as per family member who was facetiming her. Staff at [facility] states that her mental state has been same for past 9 days. 89 year old female with PMH of metabolic encephalopathy, Acute respiratory failure due to Covid - improved and sent to [facility] for rehab, HLD, DMII, HTN, CHF - systolic, Mitral/aortic valve replacements presenting to ED due to change in mental status as per family member who was facetiming her. Staff at [facility] states that her mental state has been same for past 9 days.

## 2021-02-12 NOTE — H&P ADULT - PROBLEM SELECTOR PLAN 1
admit to medicine  gently iv hydration  follow renal function  pt still not at baseline from covid and likely poor po intake

## 2021-02-12 NOTE — H&P ADULT - ASSESSMENT
89 y/o female w/pmhx of HTN, HLD, DM2,MVR/AVR, Afib was on coumadin, s/p AICD/PPM,  CHFrEF w/recent covid admitted at [facility] w/covid and elevated inr and was at [facility] for rehab sent in w/increased ams w/dehydration.

## 2021-02-12 NOTE — ED PROVIDER NOTE - CONSTITUTIONAL, MLM
Well appearing, awake, alert, oriented to person, place, time/situation and in no apparent distress. normal... - - -

## 2021-02-12 NOTE — ED ADULT NURSE NOTE - CHIEF COMPLAINT QUOTE
as per RN from [facility] pt had face time conversation with family member who feels pt is altered from her baseline. staff states from their perspective pt is at baseline mental status. unknown last normal. confused, bedbound at baseline. was in [facility] for covid infection. fs [phone]

## 2021-02-12 NOTE — ED ADULT TRIAGE NOTE - CHIEF COMPLAINT QUOTE
as per RN from [facility] pt had face time conversation with family member who feels pt is altered from her baseline. staff states from their perspective pt is at baseline mental status. unknown last normal. confused, bedbound at baseline. was in [facility] for covid infection. fs 321

## 2021-02-12 NOTE — ED ADULT NURSE NOTE - PMH
Acute respiratory failure with hypoxia    Congestive heart failure    Folate deficiency anemia    Hyperlipidemia    Metabolic encephalopathy    Pneumonia  due to Covid 19  Type 2 diabetes mellitus

## 2021-02-12 NOTE — H&P ADULT - NSHPLABSRESULTS_GEN_ALL_CORE
LABS:                        7.7    5.57  )-----------( 57       ( 2021 17:13 )             24.4     02-12    139  |  107  |  61<H>  ----------------------------<  299<H>  5.2   |  26  |  1.85<H>    Ca    7.9<L>      2021 17:13  Phos  4.1       Mg     3.2         TPro  5.8<L>  /  Alb  2.0<L>  /  TBili  0.5  /  DBili  x   /  AST  18  /  ALT  22  /  AlkPhos  61  02-12      Urinalysis Basic - ( 2021 17:32 )    Color: Yellow / Appearance: Slightly Turbid / S.020 / pH: x  Gluc: x / Ketone: Trace  / Bili: Negative / Urobili: 1 mg/dL   Blood: x / Protein: 30 mg/dL / Nitrite: Negative   Leuk Esterase: Trace / RBC: 11-25 /HPF / WBC 0-2   Sq Epi: x / Non Sq Epi: Few / Bacteria: Many      CAPILLARY BLOOD GLUCOSE      POCT Blood Glucose.: 321 mg/dL (2021 16:23)        RADIOLOGY & ADDITIONAL TESTS:    Imaging Personally Reviewed:  [ X] YES  [ ] NO

## 2021-02-12 NOTE — H&P ADULT - HISTORY OF PRESENT ILLNESS
Pt is a 89 y/o female w/pmhx of HTN, HLD, DM2,MVR/AVR, Afib was on coumadin, s/p AICD/PPM,  CHFrEF was admitted at [facility] on 1/22 for covid and elevated pna then transferred to [facility] for rehab, where family reported that pt has been more lethargic and confused when they were facetiming her and sent in.  pt just states not feeling well, but not able to offer specific sx's.  no reported fever, chills, no changes in breathing, no cp,n/v/dc.    TTE 2/4/21  Normal left ventricular systolic function. No segmental  wall motion abnormalities.  Bioprosthetic mitral valve. Gradient (12.9 mmHg at  60/min)  is severely increased for a prosthetic mitral valve.  Bioprosthetic aortic valve. Gradient across the aortic  valve cannot be estimated from this study.  Severe right ventricular enlargement with severely  decreased right ventricular systolic function.  Severe pulmonary hypertension.  Mild dilatation of the aortic root. Moderate dilitation of  the ascending aorta.  A device wire is noted in the right heart. Pt is a 89 y/o female w/pmhx of HTN, HLD, DM2,MVR/AVR, Afib was on coumadin now on eliquis, s/p AICD/PPM,  CHFrEF was admitted at [facility] on 1/22 for covid and elevated pna then transferred to [facility] for rehab, where family reported that pt has been more lethargic and confused when they were facetiming her and sent in.  pt just states not feeling well, but not able to offer specific sx's.  no reported fever, chills, no changes in breathing, no cp,n/v/dc.    TTE 2/4/21  Normal left ventricular systolic function. No segmental  wall motion abnormalities.  Bioprosthetic mitral valve. Gradient (12.9 mmHg at  60/min)  is severely increased for a prosthetic mitral valve.  Bioprosthetic aortic valve. Gradient across the aortic  valve cannot be estimated from this study.  Severe right ventricular enlargement with severely  decreased right ventricular systolic function.  Severe pulmonary hypertension.  Mild dilatation of the aortic root. Moderate dilitation of  the ascending aorta.  A device wire is noted in the right heart. Pt is a 89 y/o female w/pmhx of HTN, HLD, DM2,MVR/AVR,thrombocytopenia,  Afib was on coumadin now on eliquis, s/p AICD/PPM,  CHFrEF was admitted at [facility] on 1/22 for covid and elevated pna then transferred to [facility] for rehab, where family reported that pt has been more lethargic and confused when they were facetiming her and sent in.  pt just states not feeling well, but not able to offer specific sx's.  no reported fever, chills, no changes in breathing, no cp,n/v/dc.    TTE 2/4/21  Normal left ventricular systolic function. No segmental  wall motion abnormalities.  Bioprosthetic mitral valve. Gradient (12.9 mmHg at  60/min)  is severely increased for a prosthetic mitral valve.  Bioprosthetic aortic valve. Gradient across the aortic  valve cannot be estimated from this study.  Severe right ventricular enlargement with severely  decreased right ventricular systolic function.  Severe pulmonary hypertension.  Mild dilatation of the aortic root. Moderate dilitation of  the ascending aorta.  A device wire is noted in the right heart.

## 2021-02-12 NOTE — ED PROVIDER NOTE - UNABLE TO OBTAIN
somnolent, opens eyes to verbal stimulus, moans and mumbles but not speaking full sentences Unresponsive

## 2021-02-13 LAB
A1C WITH ESTIMATED AVERAGE GLUCOSE RESULT: 7.5 % — HIGH (ref 4–5.6)
ABO RH CONFIRMATION: SIGNIFICANT CHANGE UP
ANION GAP SERPL CALC-SCNC: 6 MMOL/L — SIGNIFICANT CHANGE UP (ref 5–17)
ANION GAP SERPL CALC-SCNC: 7 MMOL/L — SIGNIFICANT CHANGE UP (ref 5–17)
BUN SERPL-MCNC: 64 MG/DL — HIGH (ref 7–23)
BUN SERPL-MCNC: 65 MG/DL — HIGH (ref 7–23)
CALCIUM SERPL-MCNC: 8.1 MG/DL — LOW (ref 8.5–10.1)
CALCIUM SERPL-MCNC: 8.2 MG/DL — LOW (ref 8.5–10.1)
CHLORIDE SERPL-SCNC: 106 MMOL/L — SIGNIFICANT CHANGE UP (ref 96–108)
CHLORIDE SERPL-SCNC: 107 MMOL/L — SIGNIFICANT CHANGE UP (ref 96–108)
CK MB BLD-MCNC: 1.9 % — SIGNIFICANT CHANGE UP (ref 0–3.5)
CK MB CFR SERPL CALC: 1.2 NG/ML — SIGNIFICANT CHANGE UP (ref 0.5–3.6)
CK SERPL-CCNC: 31 U/L — SIGNIFICANT CHANGE UP (ref 26–192)
CK SERPL-CCNC: 35 U/L — SIGNIFICANT CHANGE UP (ref 26–192)
CK SERPL-CCNC: 62 U/L — SIGNIFICANT CHANGE UP (ref 26–192)
CO2 SERPL-SCNC: 25 MMOL/L — SIGNIFICANT CHANGE UP (ref 22–31)
CO2 SERPL-SCNC: 25 MMOL/L — SIGNIFICANT CHANGE UP (ref 22–31)
CREAT SERPL-MCNC: 1.77 MG/DL — HIGH (ref 0.5–1.3)
CREAT SERPL-MCNC: 1.87 MG/DL — HIGH (ref 0.5–1.3)
CRP SERPL-MCNC: 13.31 MG/DL — HIGH (ref 0–0.4)
CULTURE RESULTS: SIGNIFICANT CHANGE UP
ESTIMATED AVERAGE GLUCOSE: 169 MG/DL — HIGH (ref 68–114)
GLUCOSE BLDC GLUCOMTR-MCNC: 207 MG/DL — HIGH (ref 70–99)
GLUCOSE BLDC GLUCOMTR-MCNC: 214 MG/DL — HIGH (ref 70–99)
GLUCOSE BLDC GLUCOMTR-MCNC: 228 MG/DL — HIGH (ref 70–99)
GLUCOSE BLDC GLUCOMTR-MCNC: 257 MG/DL — HIGH (ref 70–99)
GLUCOSE BLDC GLUCOMTR-MCNC: 277 MG/DL — HIGH (ref 70–99)
GLUCOSE SERPL-MCNC: 236 MG/DL — HIGH (ref 70–99)
GLUCOSE SERPL-MCNC: 245 MG/DL — HIGH (ref 70–99)
HCT VFR BLD CALC: 25.1 % — LOW (ref 34.5–45)
HCT VFR BLD CALC: 25.2 % — LOW (ref 34.5–45)
HGB BLD-MCNC: 7.7 G/DL — LOW (ref 11.5–15.5)
HGB BLD-MCNC: 8.2 G/DL — LOW (ref 11.5–15.5)
INR BLD: 1.85 RATIO — HIGH (ref 0.88–1.16)
MCHC RBC-ENTMCNC: 27.5 PG — SIGNIFICANT CHANGE UP (ref 27–34)
MCHC RBC-ENTMCNC: 28.3 PG — SIGNIFICANT CHANGE UP (ref 27–34)
MCHC RBC-ENTMCNC: 30.6 GM/DL — LOW (ref 32–36)
MCHC RBC-ENTMCNC: 32.7 GM/DL — SIGNIFICANT CHANGE UP (ref 32–36)
MCV RBC AUTO: 86.6 FL — SIGNIFICANT CHANGE UP (ref 80–100)
MCV RBC AUTO: 90 FL — SIGNIFICANT CHANGE UP (ref 80–100)
NRBC # BLD: 0 /100 WBCS — SIGNIFICANT CHANGE UP (ref 0–0)
NRBC # BLD: 0 /100 WBCS — SIGNIFICANT CHANGE UP (ref 0–0)
NT-PROBNP SERPL-SCNC: HIGH PG/ML (ref 0–450)
NT-PROBNP SERPL-SCNC: HIGH PG/ML (ref 0–450)
PLATELET # BLD AUTO: 48 K/UL — LOW (ref 150–400)
PLATELET # BLD AUTO: 55 K/UL — LOW (ref 150–400)
POTASSIUM SERPL-MCNC: 5.5 MMOL/L — HIGH (ref 3.5–5.3)
POTASSIUM SERPL-MCNC: 5.6 MMOL/L — HIGH (ref 3.5–5.3)
POTASSIUM SERPL-SCNC: 5.5 MMOL/L — HIGH (ref 3.5–5.3)
POTASSIUM SERPL-SCNC: 5.6 MMOL/L — HIGH (ref 3.5–5.3)
PROTHROM AB SERPL-ACNC: 20.8 SEC — HIGH (ref 10.6–13.6)
RBC # BLD: 2.8 M/UL — LOW (ref 3.8–5.2)
RBC # BLD: 2.9 M/UL — LOW (ref 3.8–5.2)
RBC # FLD: 20 % — HIGH (ref 10.3–14.5)
RBC # FLD: 20.2 % — HIGH (ref 10.3–14.5)
SARS-COV-2 IGG SERPL QL IA: POSITIVE
SARS-COV-2 IGM SERPL IA-ACNC: 6.09 INDEX — HIGH
SODIUM SERPL-SCNC: 137 MMOL/L — SIGNIFICANT CHANGE UP (ref 135–145)
SODIUM SERPL-SCNC: 139 MMOL/L — SIGNIFICANT CHANGE UP (ref 135–145)
SPECIMEN SOURCE: SIGNIFICANT CHANGE UP
T3 SERPL-MCNC: 43 NG/DL — LOW (ref 80–200)
T4 AB SER-ACNC: 4.2 UG/DL — LOW (ref 4.6–12)
TROPONIN I SERPL-MCNC: 0.14 NG/ML — HIGH (ref 0.01–0.04)
TROPONIN I SERPL-MCNC: 0.14 NG/ML — HIGH (ref 0.01–0.04)
TROPONIN I SERPL-MCNC: 0.15 NG/ML — HIGH (ref 0.01–0.04)
TSH SERPL-MCNC: 1.78 UU/ML — SIGNIFICANT CHANGE UP (ref 0.36–3.74)
WBC # BLD: 5.73 K/UL — SIGNIFICANT CHANGE UP (ref 3.8–10.5)
WBC # BLD: 5.9 K/UL — SIGNIFICANT CHANGE UP (ref 3.8–10.5)
WBC # FLD AUTO: 5.73 K/UL — SIGNIFICANT CHANGE UP (ref 3.8–10.5)
WBC # FLD AUTO: 5.9 K/UL — SIGNIFICANT CHANGE UP (ref 3.8–10.5)

## 2021-02-13 PROCEDURE — 99223 1ST HOSP IP/OBS HIGH 75: CPT

## 2021-02-13 RX ORDER — MIDODRINE HYDROCHLORIDE 2.5 MG/1
10 TABLET ORAL THREE TIMES A DAY
Refills: 0 | Status: DISCONTINUED | OUTPATIENT
Start: 2021-02-13 | End: 2021-02-23

## 2021-02-13 RX ORDER — FUROSEMIDE 40 MG
40 TABLET ORAL ONCE
Refills: 0 | Status: DISCONTINUED | OUTPATIENT
Start: 2021-02-13 | End: 2021-02-14

## 2021-02-13 RX ORDER — ENOXAPARIN SODIUM 100 MG/ML
80 INJECTION SUBCUTANEOUS DAILY
Refills: 0 | Status: DISCONTINUED | OUTPATIENT
Start: 2021-02-13 | End: 2021-02-23

## 2021-02-13 RX ORDER — ACETAMINOPHEN 500 MG
650 TABLET ORAL EVERY 8 HOURS
Refills: 0 | Status: DISCONTINUED | OUTPATIENT
Start: 2021-02-13 | End: 2021-02-23

## 2021-02-13 RX ORDER — SODIUM CHLORIDE 9 MG/ML
1000 INJECTION, SOLUTION INTRAVENOUS
Refills: 0 | Status: DISCONTINUED | OUTPATIENT
Start: 2021-02-13 | End: 2021-02-13

## 2021-02-13 RX ADMIN — Medication 4: at 16:40

## 2021-02-13 RX ADMIN — Medication 650 MILLIGRAM(S): at 07:28

## 2021-02-13 RX ADMIN — Medication 6: at 10:11

## 2021-02-13 RX ADMIN — CEFTRIAXONE 100 MILLIGRAM(S): 500 INJECTION, POWDER, FOR SOLUTION INTRAMUSCULAR; INTRAVENOUS at 07:36

## 2021-02-13 RX ADMIN — SODIUM CHLORIDE 50 MILLILITER(S): 9 INJECTION, SOLUTION INTRAVENOUS at 09:43

## 2021-02-13 RX ADMIN — Medication 6: at 08:35

## 2021-02-13 RX ADMIN — MIDODRINE HYDROCHLORIDE 10 MILLIGRAM(S): 2.5 TABLET ORAL at 21:19

## 2021-02-13 RX ADMIN — MIDODRINE HYDROCHLORIDE 10 MILLIGRAM(S): 2.5 TABLET ORAL at 13:56

## 2021-02-13 RX ADMIN — ATORVASTATIN CALCIUM 40 MILLIGRAM(S): 80 TABLET, FILM COATED ORAL at 21:19

## 2021-02-13 RX ADMIN — SODIUM CHLORIDE 50 MILLILITER(S): 9 INJECTION, SOLUTION INTRAVENOUS at 05:45

## 2021-02-13 RX ADMIN — ENOXAPARIN SODIUM 80 MILLIGRAM(S): 100 INJECTION SUBCUTANEOUS at 12:12

## 2021-02-13 RX ADMIN — Medication 1 MILLIGRAM(S): at 11:58

## 2021-02-13 NOTE — PROGRESS NOTE ADULT - SUBJECTIVE AND OBJECTIVE BOX
INTERVAL HPI/OVERNIGHT EVENTS:    ASKED  TO  ADMIT  PT  COMING  FROM  [facility] DISCUSSED  WITH  ER MD    PT  ADMITTED  TO MY  SERVICE  BY  HOSPITALIST  AS  PER  LVS  PROTOCOL  Pt is a 89 y/o female w/pmhx of HTN, HLD, DM2,MVR/AVR,thrombocytopenia,  Afib was on coumadin now on eliquis, s/p AICD/PPM,  CHFrEF was admitted at [facility] on  for covid and elevated bnp then transferred to [facility] for rehab, where family reported that pt has been more lethargic and confused when they were face timing her and sent in.  pt just states not feeling well, but not able to offer specific sx's.  , feels  comfortable   continues   confused  discussed  with  Pt's  son  Pt  was  living  alone  and  able  to  do  crossword  puzzles  before  being hospitalized  for congestive  heart  failure subsequent  discharge rehab ( [facility]) was  subsquently  rehosptailzed  with  Covid  19 treated  at  [facility]  and  discharged  again to  rehab ([facility] ) patient  reported  with worsening  confusion and  set  to  ER given  Recephin  empirically  for  possible  UTI first  given  lasix  then  started on  slow  hydratio    REVIEW OF SYSTEMS:  CONSTITUTIONAL:    comfortable        MEDICATION:  acetaminophen   Tablet .. 650 milliGRAM(s) Oral every 6 hours PRN  acetaminophen  Suppository .. 650 milliGRAM(s) Rectal every 8 hours PRN  atorvastatin 40 milliGRAM(s) Oral at bedtime  cefTRIAXone   IVPB 1000 milliGRAM(s) IV Intermittent every 24 hours  dextrose 40% Gel 15 Gram(s) Oral once  dextrose 5%. 1000 milliLiter(s) IV Continuous <Continuous>  dextrose 5%. 1000 milliLiter(s) IV Continuous <Continuous>  dextrose 50% Injectable 25 Gram(s) IV Push once  dextrose 50% Injectable 12.5 Gram(s) IV Push once  dextrose 50% Injectable 25 Gram(s) IV Push once  folic acid 1 milliGRAM(s) Oral daily  glucagon  Injectable 1 milliGRAM(s) IntraMuscular once  insulin lispro (ADMELOG) corrective regimen sliding scale   SubCutaneous three times a day before meals  insulin lispro (ADMELOG) corrective regimen sliding scale   SubCutaneous at bedtime  isosorbide   mononitrate ER Tablet (IMDUR) 30 milliGRAM(s) Oral daily  lactated ringers. 1000 milliLiter(s) IV Continuous <Continuous>  metoprolol tartrate 25 milliGRAM(s) Oral two times a day    Vital Signs Last 24 Hrs  T(C): 38 (2021 04:55), Max: 38 (2021 04:55)  T(F): 100.4 (2021 04:55), Max: 100.4 (2021 04:55)  HR: 84 (2021 04:55) (73 - 93)  BP: 100/65 (2021 04:55) (100/65 - 138/63)  BP(mean): --  RR: 16 (2021 04:55) (16 - 26)  SpO2: 92% (2021 04:55) (92% - 100%)    PHYSICAL EXAM:  GENERAL: NAD, well-groomed, well-developed  HEENT : Conjuntivae  clear sclerae anicteric  NECK: Supple, No JVD, Normal thyroid  NERVOUS SYSTEM:  Alert oriented x1    no  focal  deficits;   CHEST/LUNG:   decreased  bs  bases   HEART: Regular rate and rhythm; No murmurs, rubs, or gallops  ABDOMEN: Soft, Nontender, Nondistended; Bowel sounds present  EXTREMITIES:  no  edema no  tenderness  SKIN: No rashes   LABS:                        7.7    5.73  )-----------( 48       ( 2021 07:33 )             25.2     02-13    139  |  107  |  64<H>  ----------------------------<  245<H>  5.6<H>   |  25  |  1.87<H>    Ca    8.2<L>      2021 03:05  Phos  4.1     02-12  Mg     3.2     02-12    TPro  5.8<L>  /  Alb  2.0<L>  /  TBili  0.5  /  DBili  x   /  AST  18  /  ALT  22  /  AlkPhos  61  02-12    PT/INR - ( 2021 07:33 )   PT: 20.8 sec;   INR: 1.85 ratio           Urinalysis Basic - ( 2021 17:32 )    Color: Yellow / Appearance: Slightly Turbid / S.020 / pH: x  Gluc: x / Ketone: Trace  / Bili: Negative / Urobili: 1 mg/dL   Blood: x / Protein: 30 mg/dL / Nitrite: Negative   Leuk Esterase: Trace / RBC: 11-25 /HPF / WBC 0-2   Sq Epi: x / Non Sq Epi: Few / Bacteria: Many      CAPILLARY BLOOD GLUCOSE      POCT Blood Glucose.: 257 mg/dL (2021 07:48)  POCT Blood Glucose.: 270 mg/dL (2021 21:39)  POCT Blood Glucose.: 321 mg/dL (2021 16:23)      RADIOLOGY & ADDITIONAL TESTS:    Imaging reports  Personally Reviewed:  [ ] YES  [ ] NO    Consultant(s) Notes Reviewed:  [ ] YES  [ ] NO    Care Discussed with Consultants/Other Providers [x ] YES  [ ] NO  Problem/Plan - 1:  ·  Problem: Dehydration.   hx  CHF  gentleiv hydration  follow renal function  pt still not at baseline from covid and likely poor po intake. for  cardiology  renal and  pulmonary  evauations    Problem/Plan - 2:  ·  Problem: Altered mental status, unspecified altered mental status type.  Plan: appears metabolic  iv hydration and monitor.     Problem/Plan - 3:  ·  Problem: Chronic diastolic congestive heart failure.  Plan: currently not in failure  monitor.  renal  eval    Problem/Plan - 4:  ·  Problem: Type 2 diabetes mellitus with hyperglycemia, without long-term current use of insulin.  Plan: ss insulin coverage.     Problem/Plan - 5:  ·  Problem: Thrombocytopenia. Plan: pt on prior admission plts  range  will monitor closely   will change to  lovenox as  patient  inconstant  with  oral  intake    Problem/Plan - 6:  Problem: Mixed hyperlipidemia.Plan: cont statin.    Problem/Plan - 7:  ·  Problem: MICHELLE (acute kidney injury). Plan: liekly prerenal  gentle hydration and monitor. renal  eval    Problem/Plan - 8:  ·  Problem: Preventive measure.  Plan: on ac.   SEVERE  PULMONARY  HTN  ON  ECHO  AT  [facility]  O2  FOR  PULMONARY  EVAL      INTERVAL HPI/OVERNIGHT EVENTS:    ASKED  TO  ADMIT  PT  COMING  FROM  [facility] DISCUSSED  WITH  ER MD    PT  ADMITTED  TO MY  SERVICE  BY  HOSPITALIST  AS  PER  LVS  PROTOCOL  Pt is a 89 y/o female w/pmhx of HTN, HLD, DM2,MVR/AVR,thrombocytopenia,  Afib was on coumadin now on eliquis, s/p AICD/PPM,  CHFrEF was admitted at [facility] on  for covid and elevated bnp then transferred to [facility] for rehab, where family reported that pt has been more lethargic and confused when they were face timing her and sent in.  pt just states not feeling well, but not able to offer specific sx's.  , feels  comfortable   continues   confused  discussed  with  Pt's  son  Pt  was  living  alone  and  able  to  do  crossword  puzzles  before  being hospitalized  for congestive  heart  failure subsequent  discharge rehab ( [facility]) was  subsquently  rehosptailzed  with  Covid  19 treated  at  [facility]  and  discharged  again to  rehab ([facility] ) patient  reported  with worsening  confusion and  set  to  ER given  Recephin  empirically  for  possible  UTI first  given  lasix  then  started on  slow  hydratio    REVIEW OF SYSTEMS:  CONSTITUTIONAL:    comfortable        MEDICATION:  acetaminophen   Tablet .. 650 milliGRAM(s) Oral every 6 hours PRN  acetaminophen  Suppository .. 650 milliGRAM(s) Rectal every 8 hours PRN  atorvastatin 40 milliGRAM(s) Oral at bedtime  cefTRIAXone   IVPB 1000 milliGRAM(s) IV Intermittent every 24 hours  dextrose 40% Gel 15 Gram(s) Oral once  dextrose 5%. 1000 milliLiter(s) IV Continuous <Continuous>  dextrose 5%. 1000 milliLiter(s) IV Continuous <Continuous>  dextrose 50% Injectable 25 Gram(s) IV Push once  dextrose 50% Injectable 12.5 Gram(s) IV Push once  dextrose 50% Injectable 25 Gram(s) IV Push once  folic acid 1 milliGRAM(s) Oral daily  glucagon  Injectable 1 milliGRAM(s) IntraMuscular once  insulin lispro (ADMELOG) corrective regimen sliding scale   SubCutaneous three times a day before meals  insulin lispro (ADMELOG) corrective regimen sliding scale   SubCutaneous at bedtime  isosorbide   mononitrate ER Tablet (IMDUR) 30 milliGRAM(s) Oral daily  lactated ringers. 1000 milliLiter(s) IV Continuous <Continuous>  metoprolol tartrate 25 milliGRAM(s) Oral two times a day    Vital Signs Last 24 Hrs  T(C): 38 (2021 04:55), Max: 38 (2021 04:55)  T(F): 100.4 (2021 04:55), Max: 100.4 (2021 04:55)  HR: 84 (2021 04:55) (73 - 93)  BP: 100/65 (2021 04:55) (100/65 - 138/63)  BP(mean): --  RR: 16 (2021 04:55) (16 - 26)  SpO2: 92% (2021 04:55) (92% - 100%)    PHYSICAL EXAM:  GENERAL: NAD, well-groomed, well-developed  HEENT : Conjuntivae  clear sclerae anicteric  NECK: Supple, No JVD, Normal thyroid  NERVOUS SYSTEM:  Alert oriented x1    no  focal  deficits;   CHEST/LUNG:   decreased  bs  bases   HEART: Regular rate and rhythm; No murmurs, rubs, or gallops  ABDOMEN: Soft, Nontender, Nondistended; Bowel sounds present  EXTREMITIES:  no  edema no  tenderness  SKIN: No rashes   LABS:                        7.7    5.73  )-----------( 48       ( 2021 07:33 )             25.2     02-13    139  |  107  |  64<H>  ----------------------------<  245<H>  5.6<H>   |  25  |  1.87<H>    Ca    8.2<L>      2021 03:05  Phos  4.1     02-12  Mg     3.2     02-12    TPro  5.8<L>  /  Alb  2.0<L>  /  TBili  0.5  /  DBili  x   /  AST  18  /  ALT  22  /  AlkPhos  61  02-12    PT/INR - ( 2021 07:33 )   PT: 20.8 sec;   INR: 1.85 ratio           Urinalysis Basic - ( 2021 17:32 )    Color: Yellow / Appearance: Slightly Turbid / S.020 / pH: x  Gluc: x / Ketone: Trace  / Bili: Negative / Urobili: 1 mg/dL   Blood: x / Protein: 30 mg/dL / Nitrite: Negative   Leuk Esterase: Trace / RBC: 11-25 /HPF / WBC 0-2   Sq Epi: x / Non Sq Epi: Few / Bacteria: Many      CAPILLARY BLOOD GLUCOSE      POCT Blood Glucose.: 257 mg/dL (2021 07:48)  POCT Blood Glucose.: 270 mg/dL (2021 21:39)  POCT Blood Glucose.: 321 mg/dL (2021 16:23)      RADIOLOGY & ADDITIONAL TESTS:    Imaging reports  Personally Reviewed:  [ ] YES  [ ] NO    Consultant(s) Notes Reviewed:  [ ] YES  [ ] NO    Care Discussed with Consultants/Other Providers [x ] YES  [ ] NO  Problem/Plan - 1:  ·  Problem: Dehydration.   hx  CHF  gentleiv hydration  follow renal function  pt still not at baseline from covid and likely poor po intake. for  cardiology  renal and  pulmonary  evauations    Problem/Plan - 2:  ·  Problem: Altered mental status, unspecified altered mental status type.  Plan: appears metabolic  iv hydration and monitor.     Problem/Plan - 3:  ·  Problem: Chronic diastolic congestive heart failure.  Plan: currently not in failure  monitor.  renal  eval    Problem/Plan - 4:  ·  Problem: Type 2 diabetes mellitus with hyperglycemia, without long-term current use of insulin.  Plan: ss insulin coverage.     Problem/Plan - 5:  ·  Problem: Thrombocytopenia. Plan: pt on prior admission plts  range  will monitor closely   will change to  lovenox as  patient  inconstant  with  oral  intake    Problem/Plan - 6:  Problem: Mixed hyperlipidemia.Plan: cont statin.    Problem/Plan - 7:  ·  Problem: MICHELLE (acute kidney injury). Plan: liekly prerenal  gentle hydration and monitor. renal  eval    Problem/Plan - 8:  ·  Problem: Preventive measure.  Plan: on ac.   SEVERE  PULMONARY  HTN  ON  ECHO  AT  [facility]  O2  FOR  PULMONARY  EVAL   ANEMIA  CHECK IRON  B12  FOLATE  MONITOR  LABS   FURTHER  W/U  AND  RX  AS PER  CLINICAL  COURSE  DISCUSSED  WITH  PT'S  SON  AGREES  WITH  PLAN

## 2021-02-13 NOTE — RAPID RESPONSE TEAM SUMMARY - NSADDTLFINDINGSRRT_GEN_ALL_CORE
very high BNP which pt was ordered for Lasix 40mg IVP, however it was not given to patient based on hypotension.

## 2021-02-13 NOTE — RAPID RESPONSE TEAM SUMMARY - NSSITUATIONBACKGROUNDRRT_GEN_ALL_CORE
This 89yo female presented to RN with asymptomatic hypotension 90/58 and repeated 88/53, HR: 81 RR: 20 on 5L NC saturating 100%, BS: 214, pt is lethargic, however she does respond to verbal stimuli (not appropriate responses). Patient appears to be at baseline since this admission to hospital. Pt's pmhx of HTN, HLD, DM2,MVR/AVR, thrombocytopenia, and Afib. Pt was on coumadin now on Eliquis, s/p AICD/PPM,  CHFrEF who was admitted at [facility] on 1/22/21 for Covid and elevated BNP. She was then transferred to [facility] for rehab, where family reported that pt had become more lethargic and confused. Pt was previously hospitalized for congestive heart failure subsequently discharged to rehab( [facility]). Pt was again rehospitalized  with  Covid-19 treated at [facility]  and  discharged  again to  rehab ([facility] ) patient reported with worsening confusion and set to ER.  DNR/DNI  This 89yo female presented to RN with asymptomatic hypotension 90/58 and repeated 88/53, HR: 81 RR: 20 on 5L NC saturating 100%, BS: 214, pt is lethargic, however she does respond to verbal stimuli (not appropriate responses). Patient appears to be at baseline since this admission to hospital.   Pt's pmhx of HTN, HLD, DM2,MVR/AVR, thrombocytopenia, and Afib. Pt was on coumadin now on Eliquis, s/p AICD/PPM,  CHFrEF who was admitted at [facility] on 1/22/21 for Covid and elevated BNP. She was then transferred to [facility] for rehab, where family reported that pt had become more lethargic and confused. Pt was previously hospitalized for congestive heart failure subsequently discharged to rehab( [facility]). Pt was again rehospitalized  with  Covid-19 treated at [facility]  and discharged  again to  rehab ([facility] ).

## 2021-02-14 LAB
ALBUMIN SERPL ELPH-MCNC: 1.9 G/DL — LOW (ref 3.3–5)
ALP SERPL-CCNC: 77 U/L — SIGNIFICANT CHANGE UP (ref 40–120)
ALT FLD-CCNC: 26 U/L — SIGNIFICANT CHANGE UP (ref 12–78)
ANION GAP SERPL CALC-SCNC: 9 MMOL/L — SIGNIFICANT CHANGE UP (ref 5–17)
AST SERPL-CCNC: 27 U/L — SIGNIFICANT CHANGE UP (ref 15–37)
BILIRUB SERPL-MCNC: 0.6 MG/DL — SIGNIFICANT CHANGE UP (ref 0.2–1.2)
BUN SERPL-MCNC: 75 MG/DL — HIGH (ref 7–23)
CALCIUM SERPL-MCNC: 7.9 MG/DL — LOW (ref 8.5–10.1)
CHLORIDE SERPL-SCNC: 110 MMOL/L — HIGH (ref 96–108)
CK SERPL-CCNC: 30 U/L — SIGNIFICANT CHANGE UP (ref 26–192)
CO2 SERPL-SCNC: 23 MMOL/L — SIGNIFICANT CHANGE UP (ref 22–31)
CREAT SERPL-MCNC: 1.9 MG/DL — HIGH (ref 0.5–1.3)
FERRITIN SERPL-MCNC: 516 NG/ML — HIGH (ref 15–150)
FLUAV AG NPH QL: SIGNIFICANT CHANGE UP
FLUBV AG NPH QL: SIGNIFICANT CHANGE UP
FOLATE SERPL-MCNC: >20 NG/ML — SIGNIFICANT CHANGE UP
GLUCOSE BLDC GLUCOMTR-MCNC: 256 MG/DL — HIGH (ref 70–99)
GLUCOSE BLDC GLUCOMTR-MCNC: 292 MG/DL — HIGH (ref 70–99)
GLUCOSE BLDC GLUCOMTR-MCNC: 71 MG/DL — SIGNIFICANT CHANGE UP (ref 70–99)
GLUCOSE BLDC GLUCOMTR-MCNC: 75 MG/DL — SIGNIFICANT CHANGE UP (ref 70–99)
GLUCOSE SERPL-MCNC: 212 MG/DL — HIGH (ref 70–99)
HCT VFR BLD CALC: 27.9 % — LOW (ref 34.5–45)
HGB BLD-MCNC: 9 G/DL — LOW (ref 11.5–15.5)
IRON SATN MFR SERPL: 19 % — SIGNIFICANT CHANGE UP (ref 14–50)
IRON SATN MFR SERPL: 28 UG/DL — LOW (ref 30–160)
MCHC RBC-ENTMCNC: 27.7 PG — SIGNIFICANT CHANGE UP (ref 27–34)
MCHC RBC-ENTMCNC: 32.3 GM/DL — SIGNIFICANT CHANGE UP (ref 32–36)
MCV RBC AUTO: 85.8 FL — SIGNIFICANT CHANGE UP (ref 80–100)
NRBC # BLD: 0 /100 WBCS — SIGNIFICANT CHANGE UP (ref 0–0)
PLATELET # BLD AUTO: 51 K/UL — LOW (ref 150–400)
POTASSIUM SERPL-MCNC: 5.9 MMOL/L — HIGH (ref 3.5–5.3)
POTASSIUM SERPL-SCNC: 5.9 MMOL/L — HIGH (ref 3.5–5.3)
PROT SERPL-MCNC: 6 GM/DL — SIGNIFICANT CHANGE UP (ref 6–8.3)
RBC # BLD: 3.25 M/UL — LOW (ref 3.8–5.2)
RBC # FLD: 19.4 % — HIGH (ref 10.3–14.5)
SARS-COV-2 RNA SPEC QL NAA+PROBE: DETECTED
SODIUM SERPL-SCNC: 142 MMOL/L — SIGNIFICANT CHANGE UP (ref 135–145)
TIBC SERPL-MCNC: 151 UG/DL — LOW (ref 220–430)
TROPONIN I SERPL-MCNC: 0.12 NG/ML — HIGH (ref 0.01–0.04)
UIBC SERPL-MCNC: 123 UG/DL — SIGNIFICANT CHANGE UP (ref 110–370)
VIT B12 SERPL-MCNC: 694 PG/ML — SIGNIFICANT CHANGE UP (ref 232–1245)
WBC # BLD: 6.38 K/UL — SIGNIFICANT CHANGE UP (ref 3.8–10.5)
WBC # FLD AUTO: 6.38 K/UL — SIGNIFICANT CHANGE UP (ref 3.8–10.5)

## 2021-02-14 PROCEDURE — 99233 SBSQ HOSP IP/OBS HIGH 50: CPT

## 2021-02-14 RX ORDER — FUROSEMIDE 40 MG
40 TABLET ORAL DAILY
Refills: 0 | Status: DISCONTINUED | OUTPATIENT
Start: 2021-02-14 | End: 2021-02-16

## 2021-02-14 RX ORDER — SODIUM ZIRCONIUM CYCLOSILICATE 10 G/10G
10 POWDER, FOR SUSPENSION ORAL DAILY
Refills: 0 | Status: DISCONTINUED | OUTPATIENT
Start: 2021-02-14 | End: 2021-02-14

## 2021-02-14 RX ORDER — SODIUM ZIRCONIUM CYCLOSILICATE 10 G/10G
10 POWDER, FOR SUSPENSION ORAL DAILY
Refills: 0 | Status: COMPLETED | OUTPATIENT
Start: 2021-02-14 | End: 2021-02-16

## 2021-02-14 RX ORDER — SODIUM CHLORIDE 0.65 %
1 AEROSOL, SPRAY (ML) NASAL
Refills: 0 | Status: DISCONTINUED | OUTPATIENT
Start: 2021-02-14 | End: 2021-02-19

## 2021-02-14 RX ORDER — POLYETHYLENE GLYCOL 3350 17 G/17G
17 POWDER, FOR SOLUTION ORAL DAILY
Refills: 0 | Status: DISCONTINUED | OUTPATIENT
Start: 2021-02-14 | End: 2021-02-23

## 2021-02-14 RX ADMIN — Medication 1 MILLIGRAM(S): at 12:17

## 2021-02-14 RX ADMIN — Medication 6: at 08:55

## 2021-02-14 RX ADMIN — MIDODRINE HYDROCHLORIDE 10 MILLIGRAM(S): 2.5 TABLET ORAL at 13:29

## 2021-02-14 RX ADMIN — ENOXAPARIN SODIUM 80 MILLIGRAM(S): 100 INJECTION SUBCUTANEOUS at 12:17

## 2021-02-14 RX ADMIN — MIDODRINE HYDROCHLORIDE 10 MILLIGRAM(S): 2.5 TABLET ORAL at 22:45

## 2021-02-14 RX ADMIN — Medication 6: at 12:50

## 2021-02-14 RX ADMIN — ATORVASTATIN CALCIUM 40 MILLIGRAM(S): 80 TABLET, FILM COATED ORAL at 22:45

## 2021-02-14 RX ADMIN — Medication 650 MILLIGRAM(S): at 08:55

## 2021-02-14 RX ADMIN — CEFTRIAXONE 100 MILLIGRAM(S): 500 INJECTION, POWDER, FOR SOLUTION INTRAMUSCULAR; INTRAVENOUS at 05:40

## 2021-02-14 RX ADMIN — SODIUM ZIRCONIUM CYCLOSILICATE 10 GRAM(S): 10 POWDER, FOR SUSPENSION ORAL at 10:00

## 2021-02-14 RX ADMIN — Medication 40 MILLIGRAM(S): at 17:15

## 2021-02-14 RX ADMIN — MIDODRINE HYDROCHLORIDE 10 MILLIGRAM(S): 2.5 TABLET ORAL at 05:10

## 2021-02-14 NOTE — PROGRESS NOTE ADULT - SUBJECTIVE AND OBJECTIVE BOX
INTERVAL HPI:  89 y/o female with HTN, HLD, DM2,MVR/AVR, Thrombocytopenia,  Afib was on coumadin now on Eliquis s/p AICD/PPM,  CHFrEF.   Was admitted at [facility] on 1/22 for covid and elevated pna then transferred to [facility] for rehab, where family reported that pt has been more lethargic and confused when they were face timing her and sent in.  pt just states not feeling well, but not able to offer specific sx's.  no reported fever, chills, no changes in breathing, no cp,n/v/dc. At times says she was SOB.    OVERNIGHT EVENTS:  Comfortable      Vital Signs Last 24 Hrs  T(C): 36.2 (14 Feb 2021 15:29), Max: 37.5 (14 Feb 2021 02:05)  T(F): 97.2 (14 Feb 2021 15:29), Max: 99.5 (14 Feb 2021 02:05)  HR: 63 (14 Feb 2021 17:06) (63 - 105)  BP: 108/66 (14 Feb 2021 17:06) (92/65 - 126/67)  BP(mean): --  RR: 18 (14 Feb 2021 15:29) (18 - 19)  SpO2: 99% (14 Feb 2021 15:29) (95% - 99%)        PHYSICAL EXAM:  GEN:         Awake, responsive and comfortable.  HEENT:    Normal.    RESP:      no distress  CVS:             Regular rate and rhythm.   ABD:         Soft, non-tender, non-distended;     MEDICATIONS  (STANDING):  atorvastatin 40 milliGRAM(s) Oral at bedtime  cefTRIAXone   IVPB 1000 milliGRAM(s) IV Intermittent every 24 hours  dextrose 40% Gel 15 Gram(s) Oral once  dextrose 5%. 1000 milliLiter(s) (50 mL/Hr) IV Continuous <Continuous>  dextrose 5%. 1000 milliLiter(s) (100 mL/Hr) IV Continuous <Continuous>  dextrose 50% Injectable 25 Gram(s) IV Push once  dextrose 50% Injectable 12.5 Gram(s) IV Push once  dextrose 50% Injectable 25 Gram(s) IV Push once  enoxaparin Injectable 80 milliGRAM(s) SubCutaneous daily  folic acid 1 milliGRAM(s) Oral daily  furosemide    Tablet 40 milliGRAM(s) Oral daily  glucagon  Injectable 1 milliGRAM(s) IntraMuscular once  insulin lispro (ADMELOG) corrective regimen sliding scale   SubCutaneous three times a day before meals  insulin lispro (ADMELOG) corrective regimen sliding scale   SubCutaneous at bedtime  isosorbide   mononitrate ER Tablet (IMDUR) 30 milliGRAM(s) Oral daily  metoprolol tartrate 25 milliGRAM(s) Oral two times a day  midodrine. 10 milliGRAM(s) Oral three times a day  sodium zirconium cyclosilicate 10 Gram(s) Oral daily    MEDICATIONS  (PRN):  acetaminophen   Tablet .. 650 milliGRAM(s) Oral every 6 hours PRN Temp greater or equal to 38C (100.4F), Mild Pain (1 - 3)  acetaminophen  Suppository .. 650 milliGRAM(s) Rectal every 8 hours PRN Temp greater or equal to 38C (100.4F)  polyethylene glycol 3350 17 Gram(s) Oral daily PRN Constipation  sodium chloride 0.65% Nasal 1 Spray(s) Both Nostrils four times a day PRN Nasal Congestion    LABS:                        9.0    6.38  )-----------( 51       ( 14 Feb 2021 06:37 )             27.9     02-14    142  |  110<H>  |  75<H>  ----------------------------<  212<H>  5.9<H>   |  23  |  1.90<H>    Ca    7.9<L>      14 Feb 2021 06:37    TPro  6.0  /  Alb  1.9<L>  /  TBili  0.6  /  DBili  x   /  AST  27  /  ALT  26  /  AlkPhos  77  02-14    PT/INR - ( 13 Feb 2021 07:33 )   PT: 20.8 sec;   INR: 1.85 ratio       ASSESSMENT AND PLAN:  ·	SOB.  ·	Pulmonary HTN.  ·	Acute metabolic Encephalopathy.  ·	Chronic Systolic/diastolic CHF.  ·	Chronic A Fib.  ·	S/P PPM/AICD.  ·	S/P AVR/MVR.  ·	HTN.  ·	HLD.  ·	DM.  ·	Anemia.  ·	Thrombocytopenia  ·	COVID 19.  ·	Renal Insuffiencey.    SPO2 in high 90s on nasal O2.  Follow Echo

## 2021-02-14 NOTE — PROGRESS NOTE ADULT - SUBJECTIVE AND OBJECTIVE BOX
Subjective: mildly dyspneic. On 4LNC O2 saturating 95%.       MEDICATIONS  (STANDING):  atorvastatin 40 milliGRAM(s) Oral at bedtime  cefTRIAXone   IVPB 1000 milliGRAM(s) IV Intermittent every 24 hours  dextrose 40% Gel 15 Gram(s) Oral once  dextrose 5%. 1000 milliLiter(s) (50 mL/Hr) IV Continuous <Continuous>  dextrose 5%. 1000 milliLiter(s) (100 mL/Hr) IV Continuous <Continuous>  dextrose 50% Injectable 25 Gram(s) IV Push once  dextrose 50% Injectable 12.5 Gram(s) IV Push once  dextrose 50% Injectable 25 Gram(s) IV Push once  enoxaparin Injectable 80 milliGRAM(s) SubCutaneous daily  folic acid 1 milliGRAM(s) Oral daily  glucagon  Injectable 1 milliGRAM(s) IntraMuscular once  insulin lispro (ADMELOG) corrective regimen sliding scale   SubCutaneous three times a day before meals  insulin lispro (ADMELOG) corrective regimen sliding scale   SubCutaneous at bedtime  isosorbide   mononitrate ER Tablet (IMDUR) 30 milliGRAM(s) Oral daily  metoprolol tartrate 25 milliGRAM(s) Oral two times a day  midodrine. 10 milliGRAM(s) Oral three times a day  sodium zirconium cyclosilicate 10 Gram(s) Oral daily    MEDICATIONS  (PRN):  acetaminophen   Tablet .. 650 milliGRAM(s) Oral every 6 hours PRN Temp greater or equal to 38C (100.4F), Mild Pain (1 - 3)  acetaminophen  Suppository .. 650 milliGRAM(s) Rectal every 8 hours PRN Temp greater or equal to 38C (100.4F)          T(C): 36.2 (21 @ 04:53), Max: 37.5 (21 @ 02:05)  HR: 78 (21 @ 04:53) (72 - 105)  BP: 100/65 (21 @ 04:53) (88/60 - 126/67)  RR: 18 (21 @ 09:35) (18 - 20)  SpO2: 95% (21 @ 09:35) (95% - 98%)  Wt(kg): --        I&O's Detail    2021 07:01  -  2021 07:00  --------------------------------------------------------  IN:    Oral Fluid: 120 mL  Total IN: 120 mL    OUT:  Total OUT: 0 mL    Total NET: 120 mL               PHYSICAL EXAM:    GENERAL: mildly agitated  NECK: Supple, no inc in JVP  CHEST/LUNG: crackles  HEART: S1S2  ABDOMEN: Soft  EXTREMITIES:  pos edema  NEURO: no asterixis      LABS:  CBC Full  -  ( 2021 06:37 )  WBC Count : 6.38 K/uL  RBC Count : 3.25 M/uL  Hemoglobin : 9.0 g/dL  Hematocrit : 27.9 %  Platelet Count - Automated : 51 K/uL  Mean Cell Volume : 85.8 fl  Mean Cell Hemoglobin : 27.7 pg  Mean Cell Hemoglobin Concentration : 32.3 gm/dL  Auto Neutrophil # : x  Auto Lymphocyte # : x  Auto Monocyte # : x  Auto Eosinophil # : x  Auto Basophil # : x  Auto Neutrophil % : x  Auto Lymphocyte % : x  Auto Monocyte % : x  Auto Eosinophil % : x  Auto Basophil % : x    02-14    142  |  110<H>  |  75<H>  ----------------------------<  212<H>  5.9<H>   |  23  |  1.90<H>    Ca    7.9<L>      2021 06:37  Phos  4.1     02-12  Mg     3.2     02-12    TPro  6.0  /  Alb  1.9<L>  /  TBili  0.6  /  DBili  x   /  AST  27  /  ALT  26  /  AlkPhos  77  02-14    PT/INR - ( 2021 07:33 )   PT: 20.8 sec;   INR: 1.85 ratio           Urinalysis Basic - ( 2021 17:32 )    Color: Yellow / Appearance: Slightly Turbid / S.020 / pH: x  Gluc: x / Ketone: Trace  / Bili: Negative / Urobili: 1 mg/dL   Blood: x / Protein: 30 mg/dL / Nitrite: Negative   Leuk Esterase: Trace / RBC: 11-25 /HPF / WBC 0-2   Sq Epi: x / Non Sq Epi: Few / Bacteria: Many      Culture Results:   <10,000 CFU/mL Normal Urogenital Niki ( @ 00:28)        Impression:  * MICHELLE -- DDx: post-renal azotemia, CRS, SARS CoV 2 related tubular virotoxicity.  * HyperK -- mild  * Diastolic HF  * COVID 19 PNA    Recommendations:   * Renal US  * Consider Lasix 40mg q to BID  * D/c IVFs  * Start Lokelma 10mg daily for 3 days  * Cardio input    Thank you!

## 2021-02-14 NOTE — PROGRESS NOTE ADULT - SUBJECTIVE AND OBJECTIVE BOX
24H hour events:   confused at times overnight, this morning more awake and alert.   currently denies cp, sob, weakness, lethargy    MEDICATIONS:  enoxaparin Injectable 80 milliGRAM(s) SubCutaneous daily  furosemide    Tablet 40 milliGRAM(s) Oral daily  isosorbide   mononitrate ER Tablet (IMDUR) 30 milliGRAM(s) Oral daily  metoprolol tartrate 25 milliGRAM(s) Oral two times a day  midodrine. 10 milliGRAM(s) Oral three times a day    cefTRIAXone   IVPB 1000 milliGRAM(s) IV Intermittent every 24 hours      acetaminophen   Tablet .. 650 milliGRAM(s) Oral every 6 hours PRN  acetaminophen  Suppository .. 650 milliGRAM(s) Rectal every 8 hours PRN      atorvastatin 40 milliGRAM(s) Oral at bedtime  dextrose 40% Gel 15 Gram(s) Oral once  dextrose 50% Injectable 25 Gram(s) IV Push once  dextrose 50% Injectable 12.5 Gram(s) IV Push once  dextrose 50% Injectable 25 Gram(s) IV Push once  glucagon  Injectable 1 milliGRAM(s) IntraMuscular once  insulin lispro (ADMELOG) corrective regimen sliding scale   SubCutaneous three times a day before meals  insulin lispro (ADMELOG) corrective regimen sliding scale   SubCutaneous at bedtime    dextrose 5%. 1000 milliLiter(s) IV Continuous <Continuous>  dextrose 5%. 1000 milliLiter(s) IV Continuous <Continuous>  folic acid 1 milliGRAM(s) Oral daily          PHYSICAL EXAM:  T(C): 36.2 (02-14-21 @ 04:53), Max: 37.5 (02-14-21 @ 02:05)  HR: 78 (02-14-21 @ 04:53) (72 - 105)  BP: 100/65 (02-14-21 @ 04:53) (88/60 - 126/67)  RR: 18 (02-14-21 @ 09:35) (18 - 20)  SpO2: 95% (02-14-21 @ 09:35) (95% - 98%)  Wt(kg): --  I&O's Summary    13 Feb 2021 07:01  -  14 Feb 2021 07:00  --------------------------------------------------------  IN: 120 mL / OUT: 0 mL / NET: 120 mL        Appearance: Normal	  HEENT:   Normal oral mucosa, PERRL, EOMI	  Lymphatic: No lymphadenopathy  Cardiovascular: Normal S1 S2, No JVD, No murmurs, No edema  Respiratory: coarse bs b/l  Psychiatry: A & O x 3  Gastrointestinal:  Soft, Non-tender, + BS	  Skin: No rashes, No ecchymoses, No cyanosis	  Neurologic: Non-focal  Extremities: Normal range of motion, No clubbing, cyanosis       LABS:	 	    CBC Full  -  ( 14 Feb 2021 06:37 )  WBC Count : 6.38 K/uL  Hemoglobin : 9.0 g/dL  Hematocrit : 27.9 %  Platelet Count - Automated : 51 K/uL  Mean Cell Volume : 85.8 fl  Mean Cell Hemoglobin : 27.7 pg  Mean Cell Hemoglobin Concentration : 32.3 gm/dL  Auto Neutrophil # : x  Auto Lymphocyte # : x  Auto Monocyte # : x  Auto Eosinophil # : x  Auto Basophil # : x  Auto Neutrophil % : x  Auto Lymphocyte % : x  Auto Monocyte % : x  Auto Eosinophil % : x  Auto Basophil % : x    02-14    142  |  110<H>  |  75<H>  ----------------------------<  212<H>  5.9<H>   |  23  |  1.90<H>  02-13    137  |  106  |  65<H>  ----------------------------<  236<H>  5.5<H>   |  25  |  1.77<H>    Ca    7.9<L>      14 Feb 2021 06:37  Ca    8.1<L>      13 Feb 2021 07:33  Phos  4.1     02-12  Mg     3.2     02-12    TPro  6.0  /  Alb  1.9<L>  /  TBili  0.6  /  DBili  x   /  AST  27  /  ALT  26  /  AlkPhos  77  02-14  TPro  5.8<L>  /  Alb  2.0<L>  /  TBili  0.5  /  DBili  x   /  AST  18  /  ALT  22  /  AlkPhos  61  02-12      proBNP: Serum Pro-Brain Natriuretic Peptide: 83352 pg/mL (02-13-21 @ 11:10)  Serum Pro-Brain Natriuretic Peptide: 67396 pg/mL (02-13-21 @ 07:33)  Serum Pro-Brain Natriuretic Peptide: 40705 pg/mL (02-12-21 @ 17:13)    Lipid Profile:   HgA1c:   TSH:       CARDIAC MARKERS:  Troponin I, Serum: .121 ng/mL (02-14 @ 06:37)  Troponin I, Serum: .140 ng/mL (02-13 @ 17:54)  Troponin I, Serum: .143 ng/mL (02-13 @ 11:10)  Troponin I, Serum: .152 ng/mL (02-13 @ 00:41)  Troponin I, Serum: .120 ng/mL (02-12 @ 17:13)          PREVIOUS DIAGNOSTIC TESTING:    [ ] Echocardiogram:   [ ]  Catheterization:  [ ] Stress Test:  	  	  ASSESSMENT/PLAN:

## 2021-02-14 NOTE — PROGRESS NOTE ADULT - ASSESSMENT
89F w/ pmhx of HTN, HLD, DM2,MVR/AVR, thrombocytopenia,  Afib was on coumadin now on eliquis, s/p AICD/PPM, recent admission at [facility] on 1/22 for covid pna now p/w lethargy from rehab    1. lethargy  -pt found to have UTI, persisted covid positive  -suspect AMS 2/2 metabolic encephalopathy in setting of sepsis, worsening renal function  -cont supportive care per primary team, renal  -yesterday pt with hypotensive episode, lethargy, pt now DNR    2. hx of CHF  -per documentation, pt with hx of CHF  -on exam pt without evidence of gross volume overload, without crackles, w/o LE edema.   -cont imdur and lopresor  -will cont to follow with you, no objection to lasix for tx of hyperkalemia      Sudarshan Aguiar MD

## 2021-02-14 NOTE — PROGRESS NOTE ADULT - SUBJECTIVE AND OBJECTIVE BOX
INTERVAL HPI/OVERNIGHT EVENTS:  HAD  RAPID  RESPONSE  21  STARTED  ON  MIDODRINE  s/p  transfusion  2 uprbcs    REVIEW OF SYSTEMS:  CONSTITUTIONAL:  feels  much  netter  no  complaints    NECK: No pain or stiffnes  RESPIRATORY: No SOB   CARDIOVASCULAR: No chest pain, palpitations, dizziness,   GASTROINTESTINAL: No abdominal pain. No nausea, vomiting,   NEUROLOGICAL: No headaches, no  blurry  vision no  dizziness  SKIN: No itching,   MUSCULOSKELETAL: No pain    MEDICATION:  acetaminophen   Tablet .. 650 milliGRAM(s) Oral every 6 hours PRN  acetaminophen  Suppository .. 650 milliGRAM(s) Rectal every 8 hours PRN  atorvastatin 40 milliGRAM(s) Oral at bedtime  cefTRIAXone   IVPB 1000 milliGRAM(s) IV Intermittent every 24 hours  dextrose 40% Gel 15 Gram(s) Oral once  dextrose 5%. 1000 milliLiter(s) IV Continuous <Continuous>  dextrose 5%. 1000 milliLiter(s) IV Continuous <Continuous>  dextrose 50% Injectable 25 Gram(s) IV Push once  dextrose 50% Injectable 12.5 Gram(s) IV Push once  dextrose 50% Injectable 25 Gram(s) IV Push once  enoxaparin Injectable 80 milliGRAM(s) SubCutaneous daily  folic acid 1 milliGRAM(s) Oral daily  furosemide    Tablet 40 milliGRAM(s) Oral daily  glucagon  Injectable 1 milliGRAM(s) IntraMuscular once  insulin lispro (ADMELOG) corrective regimen sliding scale   SubCutaneous three times a day before meals  insulin lispro (ADMELOG) corrective regimen sliding scale   SubCutaneous at bedtime  isosorbide   mononitrate ER Tablet (IMDUR) 30 milliGRAM(s) Oral daily  metoprolol tartrate 25 milliGRAM(s) Oral two times a day  midodrine. 10 milliGRAM(s) Oral three times a day  sodium zirconium cyclosilicate 10 Gram(s) Oral daily    Vital Signs Last 24 Hrs  T(C): 36.2 (2021 04:53), Max: 37.5 (2021 02:05)  T(F): 97.2 (2021 04:53), Max: 99.5 (2021 02:05)  HR: 78 (2021 04:53) (72 - 105)  BP: 100/65 (2021 04:53) (88/60 - 126/67)  BP(mean): --  RR: 18 (2021 09:35) (18 - 20)  SpO2: 95% (2021 09:35) (95% - 98%)    PHYSICAL EXAM:  GENERAL: NAD, well-groomed, well-developed  HEENT : Conjuntivae  clear sclerae anicteric  NECK: Supple, No JVD, Normal thyroid  NERVOUS SYSTEM:  Alert oriented  x2  no  focal  deficits;   CHEST/LUNG:  deceresed  bs    HEART: IRRegular rate and rhythm; , rubs, + s3 gallop  ABDOMEN: Soft, Nontender, Nondistended; Bowel sounds present  EXTREMITIES:  no  edema no  tenderness  SKIN: No rashes   LABS:                        9.0    6.38  )-----------( 51       ( 2021 06:37 )             27.9     02-14    142  |  110<H>  |  75<H>  ----------------------------<  212<H>  5.9<H>   |  23  |  1.90<H>    Ca    7.9<L>      2021 06:37  Phos  4.1     02-12  Mg     3.2     02-12    TPro  6.0  /  Alb  1.9<L>  /  TBili  0.6  /  DBili  x   /  AST  27  /  ALT  26  /  AlkPhos  77  02-14    PT/INR - ( 2021 07:33 )   PT: 20.8 sec;   INR: 1.85 ratio           Urinalysis Basic - ( 2021 17:32 )    Color: Yellow / Appearance: Slightly Turbid / S.020 / pH: x  Gluc: x / Ketone: Trace  / Bili: Negative / Urobili: 1 mg/dL   Blood: x / Protein: 30 mg/dL / Nitrite: Negative   Leuk Esterase: Trace / RBC: 11-25 /HPF / WBC 0-2   Sq Epi: x / Non Sq Epi: Few / Bacteria: Many      CAPILLARY BLOOD GLUCOSE      POCT Blood Glucose.: 256 mg/dL (2021 08:44)  POCT Blood Glucose.: 228 mg/dL (2021 21:47)  POCT Blood Glucose.: 207 mg/dL (2021 16:38)  POCT Blood Glucose.: 214 mg/dL (2021 12:39)      RADIOLOGY & ADDITIONAL TESTS:    Imaging reports  Personally Reviewed:  [ ] YES  [ ] NO    Consultant(s) Notes Reviewed:  [x ] YES  [ ] NO    Care Discussed with Consultants/Other Providers [x ] YES  [ ] NO  Problem/Plan - 1:  ·  Problem: Dehydration.   hx  CHF   LASIX  TRIA L  WITH  CLOSE  CLINCAL MONITORING  follow renal function  pt still not at baseline from covid and likely poor po intake. for  cardiology  renal and  pulmonary  evauations    Problem/Plan - 2:  ·  Problem: Altered mental status, unspecified altered mental status type.  Plan: appears metabolic  iv hydration and monitor.     Problem/Plan - 3:  ·  Problem: Chronic diastolic congestive heart failure.  Plan: currently not in failure  monitor.  renal  eval    Problem/Plan - 4:  ·  Problem: Type 2 diabetes mellitus with hyperglycemia, without long-term current use of insulin.  Plan: ss insulin coverage.     Problem/Plan - 5:  ·  Problem: Thrombocytopenia. Plan: pt on prior admission plts  range  will monitor closely   will change to  lovenox as  patient  inconstant  with  oral  intake    Problem/Plan - 6:  Problem: Mixed hyperlipidemia.Plan: cont statin.    Problem/Plan - 7:  ·  Problem: MICHELLE (acute kidney injury). Plan: liekly prerenal  gentle hydration and monitor. renal  eval    Problem/Plan - 8:  ·  Problem: Preventive measure.  Plan: on ac.   AS  OBSERVATION  SEVERE  PULMONARY  HTN  ON  ECHO  AT  [facility]  O2  FOR  PULMONARY  EVAL   ANEMIA  CHECK IRON  B12  FOLATE  MONITOR  LABS   FURTHER  W/U  AND  RX  AS PER  CLINICAL  COURSE  DISCUSSED  WITH  PT'S  NIECE  WHO  IS  A DOCTOR

## 2021-02-15 LAB
ANION GAP SERPL CALC-SCNC: 8 MMOL/L — SIGNIFICANT CHANGE UP (ref 5–17)
BUN SERPL-MCNC: 77 MG/DL — HIGH (ref 7–23)
CALCIUM SERPL-MCNC: 7.8 MG/DL — LOW (ref 8.5–10.1)
CHLORIDE SERPL-SCNC: 112 MMOL/L — HIGH (ref 96–108)
CO2 SERPL-SCNC: 25 MMOL/L — SIGNIFICANT CHANGE UP (ref 22–31)
CREAT SERPL-MCNC: 1.92 MG/DL — HIGH (ref 0.5–1.3)
GLUCOSE BLDC GLUCOMTR-MCNC: 104 MG/DL — HIGH (ref 70–99)
GLUCOSE BLDC GLUCOMTR-MCNC: 123 MG/DL — HIGH (ref 70–99)
GLUCOSE BLDC GLUCOMTR-MCNC: 134 MG/DL — HIGH (ref 70–99)
GLUCOSE BLDC GLUCOMTR-MCNC: 187 MG/DL — HIGH (ref 70–99)
GLUCOSE BLDC GLUCOMTR-MCNC: 240 MG/DL — HIGH (ref 70–99)
GLUCOSE BLDC GLUCOMTR-MCNC: 73 MG/DL — SIGNIFICANT CHANGE UP (ref 70–99)
GLUCOSE SERPL-MCNC: 90 MG/DL — SIGNIFICANT CHANGE UP (ref 70–99)
HCT VFR BLD CALC: 30 % — LOW (ref 34.5–45)
HGB BLD-MCNC: 9.7 G/DL — LOW (ref 11.5–15.5)
MCHC RBC-ENTMCNC: 27.9 PG — SIGNIFICANT CHANGE UP (ref 27–34)
MCHC RBC-ENTMCNC: 32.3 GM/DL — SIGNIFICANT CHANGE UP (ref 32–36)
MCV RBC AUTO: 86.2 FL — SIGNIFICANT CHANGE UP (ref 80–100)
NRBC # BLD: 0 /100 WBCS — SIGNIFICANT CHANGE UP (ref 0–0)
PLATELET # BLD AUTO: 61 K/UL — LOW (ref 150–400)
POTASSIUM SERPL-MCNC: 4.7 MMOL/L — SIGNIFICANT CHANGE UP (ref 3.5–5.3)
POTASSIUM SERPL-SCNC: 4.7 MMOL/L — SIGNIFICANT CHANGE UP (ref 3.5–5.3)
RBC # BLD: 3.48 M/UL — LOW (ref 3.8–5.2)
RBC # FLD: 20 % — HIGH (ref 10.3–14.5)
SODIUM SERPL-SCNC: 145 MMOL/L — SIGNIFICANT CHANGE UP (ref 135–145)
WBC # BLD: 8.1 K/UL — SIGNIFICANT CHANGE UP (ref 3.8–10.5)
WBC # FLD AUTO: 8.1 K/UL — SIGNIFICANT CHANGE UP (ref 3.8–10.5)

## 2021-02-15 PROCEDURE — 99232 SBSQ HOSP IP/OBS MODERATE 35: CPT

## 2021-02-15 RX ADMIN — MIDODRINE HYDROCHLORIDE 10 MILLIGRAM(S): 2.5 TABLET ORAL at 06:30

## 2021-02-15 RX ADMIN — Medication 25 MILLIGRAM(S): at 06:30

## 2021-02-15 RX ADMIN — MIDODRINE HYDROCHLORIDE 10 MILLIGRAM(S): 2.5 TABLET ORAL at 22:07

## 2021-02-15 RX ADMIN — ATORVASTATIN CALCIUM 40 MILLIGRAM(S): 80 TABLET, FILM COATED ORAL at 22:07

## 2021-02-15 RX ADMIN — ENOXAPARIN SODIUM 80 MILLIGRAM(S): 100 INJECTION SUBCUTANEOUS at 12:10

## 2021-02-15 RX ADMIN — Medication 650 MILLIGRAM(S): at 12:09

## 2021-02-15 RX ADMIN — Medication 4: at 16:42

## 2021-02-15 RX ADMIN — Medication 1 MILLIGRAM(S): at 12:10

## 2021-02-15 RX ADMIN — Medication 40 MILLIGRAM(S): at 06:30

## 2021-02-15 RX ADMIN — SODIUM ZIRCONIUM CYCLOSILICATE 10 GRAM(S): 10 POWDER, FOR SUSPENSION ORAL at 16:12

## 2021-02-15 RX ADMIN — MIDODRINE HYDROCHLORIDE 10 MILLIGRAM(S): 2.5 TABLET ORAL at 13:15

## 2021-02-15 RX ADMIN — CEFTRIAXONE 100 MILLIGRAM(S): 500 INJECTION, POWDER, FOR SOLUTION INTRAMUSCULAR; INTRAVENOUS at 06:30

## 2021-02-15 NOTE — SWALLOW BEDSIDE ASSESSMENT ADULT - SLP GENERAL OBSERVATIONS
Pt seen bedside, A&Ox1-2 (grossly to time, able to state the month) with confusion noted. [facility]; however, informal observation revealed oral musculature to be grossly WFL. Pt able to communicate wants/needs clearly, refusing advanced solid consistency trials.

## 2021-02-15 NOTE — PROGRESS NOTE ADULT - SUBJECTIVE AND OBJECTIVE BOX
24H hour events:   pt resting  no acute events overnight  no complaints this am    MEDICATIONS:  enoxaparin Injectable 80 milliGRAM(s) SubCutaneous daily  furosemide    Tablet 40 milliGRAM(s) Oral daily  isosorbide   mononitrate ER Tablet (IMDUR) 30 milliGRAM(s) Oral daily  metoprolol tartrate 25 milliGRAM(s) Oral two times a day  midodrine. 10 milliGRAM(s) Oral three times a day    cefTRIAXone   IVPB 1000 milliGRAM(s) IV Intermittent every 24 hours      acetaminophen   Tablet .. 650 milliGRAM(s) Oral every 6 hours PRN  acetaminophen  Suppository .. 650 milliGRAM(s) Rectal every 8 hours PRN    polyethylene glycol 3350 17 Gram(s) Oral daily PRN    atorvastatin 40 milliGRAM(s) Oral at bedtime  dextrose 40% Gel 15 Gram(s) Oral once  dextrose 50% Injectable 25 Gram(s) IV Push once  dextrose 50% Injectable 12.5 Gram(s) IV Push once  dextrose 50% Injectable 25 Gram(s) IV Push once  glucagon  Injectable 1 milliGRAM(s) IntraMuscular once  insulin lispro (ADMELOG) corrective regimen sliding scale   SubCutaneous three times a day before meals  insulin lispro (ADMELOG) corrective regimen sliding scale   SubCutaneous at bedtime    dextrose 5%. 1000 milliLiter(s) IV Continuous <Continuous>  dextrose 5%. 1000 milliLiter(s) IV Continuous <Continuous>  folic acid 1 milliGRAM(s) Oral daily  sodium chloride 0.65% Nasal 1 Spray(s) Both Nostrils four times a day PRN          PHYSICAL EXAM:  T(C): 36.3 (02-15-21 @ 10:55), Max: 36.4 (02-15-21 @ 05:16)  HR: 86 (02-15-21 @ 13:57) (63 - 86)  BP: 98/62 (02-15-21 @ 13:57) (98/62 - 117/75)  RR: 19 (02-15-21 @ 10:55) (18 - 19)  SpO2: 97% (02-15-21 @ 10:55) (93% - 99%)  Wt(kg): --  I&O's Summary    14 Feb 2021 07:01  -  15 Feb 2021 07:00  --------------------------------------------------------  IN: 240 mL / OUT: 0 mL / NET: 240 mL        Appearance: Normal	  HEENT:   Normal oral mucosa, PERRL, EOMI	  Lymphatic: No lymphadenopathy  Cardiovascular: Normal S1 S2, No JVD, No murmurs, No edema  Respiratory: Lungs clear to auscultation	  Psychiatry: A & O x 3, Mood & affect appropriate  Gastrointestinal:  Soft, Non-tender, + BS	  Skin: No rashes, No ecchymoses, No cyanosis	  Neurologic: Non-focal  Extremities: Normal range of motion, No clubbing, cyanosis       LABS:	 	    CBC Full  -  ( 15 Feb 2021 07:30 )  WBC Count : 8.10 K/uL  Hemoglobin : 9.7 g/dL  Hematocrit : 30.0 %  Platelet Count - Automated : 61 K/uL  Mean Cell Volume : 86.2 fl  Mean Cell Hemoglobin : 27.9 pg  Mean Cell Hemoglobin Concentration : 32.3 gm/dL  Auto Neutrophil # : x  Auto Lymphocyte # : x  Auto Monocyte # : x  Auto Eosinophil # : x  Auto Basophil # : x  Auto Neutrophil % : x  Auto Lymphocyte % : x  Auto Monocyte % : x  Auto Eosinophil % : x  Auto Basophil % : x    02-15    145  |  112<H>  |  77<H>  ----------------------------<  90  4.7   |  25  |  1.92<H>  02-14    142  |  110<H>  |  75<H>  ----------------------------<  212<H>  5.9<H>   |  23  |  1.90<H>    Ca    7.8<L>      15 Feb 2021 07:30  Ca    7.9<L>      14 Feb 2021 06:37    TPro  6.0  /  Alb  1.9<L>  /  TBili  0.6  /  DBili  x   /  AST  27  /  ALT  26  /  AlkPhos  77  02-14      proBNP: Serum Pro-Brain Natriuretic Peptide: 25484 pg/mL (02-13-21 @ 11:10)  Serum Pro-Brain Natriuretic Peptide: 42677 pg/mL (02-13-21 @ 07:33)  Serum Pro-Brain Natriuretic Peptide: 90224 pg/mL (02-12-21 @ 17:13)    Lipid Profile:   HgA1c:   TSH:       CARDIAC MARKERS:  Troponin I, Serum: .121 ng/mL (02-14 @ 06:37)  Troponin I, Serum: .140 ng/mL (02-13 @ 17:54)  Troponin I, Serum: .143 ng/mL (02-13 @ 11:10)  Troponin I, Serum: .152 ng/mL (02-13 @ 00:41)  Troponin I, Serum: .120 ng/mL (02-12 @ 17:13)            TELEMETRY: 	    ECG:  	  RADIOLOGY:  OTHER: 	    PREVIOUS DIAGNOSTIC TESTING:    [ ] Echocardiogram:  [ ]  Catheterization:  [ ] Stress Test:  	  	  ASSESSMENT/PLAN:

## 2021-02-15 NOTE — SWALLOW BEDSIDE ASSESSMENT ADULT - SWALLOW EVAL: DIAGNOSIS
Pt presented grossly WFL oral and pharyngeal swallow skills across puree solid/thin liquid via cup sip trials. Pt with decreased respiration/swallowing coordination with thin liquids via straw and suspected delay in pharyngeal swallow trigger, demonstrating reflexive cough s/p trials. Pt with adequate bolus formation/manipulation, AP transport and oral transit time across puree solid/thin liquid via cup sip trials with no overt s/s of aspiration/penetration noted. Advanced solid consistencies attempted; however, pt refused despite encouragement. Pt also with limited dentition, some natural teeth.

## 2021-02-15 NOTE — SWALLOW BEDSIDE ASSESSMENT ADULT - COMMENTS
No overt s/s of aspiration/penetration across thin liquids via cup sips CT brain no cont 2/12: FINDINGS: The ventricles and sulci are prominent, compatible with age-related generalized cerebral volume loss. There is no CT evidence for acute cerebral cortical infarct. There is no evidence of hemorrhage. Small chronic lacunar infarct in the right cerebellum. There is periventricular and subcortical white matter hypoattenuation,  most compatible with chronic microvascular ischemic changes.   No mass effect is found in the brain.  There is no midline shift or herniation pattern.     CXR 2/12: FINDINGS: The lungs show moderate pulmonary congestion with small pleural effusions and there is no evidence of pneumothorax.

## 2021-02-15 NOTE — SWALLOW BEDSIDE ASSESSMENT ADULT - PHARYNGEAL PHASE
with thin liquids via straw/Delayed pharyngeal swallow/Cough post oral intake/Within functional limits Within functional limits

## 2021-02-15 NOTE — PROGRESS NOTE ADULT - SUBJECTIVE AND OBJECTIVE BOX
[facility] NEPHROLOGY SERVICES, [facility]  NEPHROLOGY AND HYPERTENSION  [address]  [address]  [place] [address]  [phone]    MD ABRIL OLIVA MD ANDREY GONCHARUK, MD MADHU KORRAPATI, MD YELENA ROSENBERG, MD BINNY KOSHY, MD CHRISTOPHER CAPUTO, MD EDWARD BOVER, MD          Patient events noted; feels well no distress    MEDICATIONS  (STANDING):  atorvastatin 40 milliGRAM(s) Oral at bedtime  cefTRIAXone   IVPB 1000 milliGRAM(s) IV Intermittent every 24 hours  dextrose 40% Gel 15 Gram(s) Oral once  dextrose 5%. 1000 milliLiter(s) (50 mL/Hr) IV Continuous <Continuous>  dextrose 5%. 1000 milliLiter(s) (100 mL/Hr) IV Continuous <Continuous>  dextrose 50% Injectable 25 Gram(s) IV Push once  dextrose 50% Injectable 12.5 Gram(s) IV Push once  dextrose 50% Injectable 25 Gram(s) IV Push once  enoxaparin Injectable 80 milliGRAM(s) SubCutaneous daily  folic acid 1 milliGRAM(s) Oral daily  furosemide    Tablet 40 milliGRAM(s) Oral daily  glucagon  Injectable 1 milliGRAM(s) IntraMuscular once  insulin lispro (ADMELOG) corrective regimen sliding scale   SubCutaneous three times a day before meals  insulin lispro (ADMELOG) corrective regimen sliding scale   SubCutaneous at bedtime  isosorbide   mononitrate ER Tablet (IMDUR) 30 milliGRAM(s) Oral daily  metoprolol tartrate 25 milliGRAM(s) Oral two times a day  midodrine. 10 milliGRAM(s) Oral three times a day  sodium zirconium cyclosilicate 10 Gram(s) Oral daily    MEDICATIONS  (PRN):  acetaminophen   Tablet .. 650 milliGRAM(s) Oral every 6 hours PRN Temp greater or equal to 38C (100.4F), Mild Pain (1 - 3)  acetaminophen  Suppository .. 650 milliGRAM(s) Rectal every 8 hours PRN Temp greater or equal to 38C (100.4F)  polyethylene glycol 3350 17 Gram(s) Oral daily PRN Constipation  sodium chloride 0.65% Nasal 1 Spray(s) Both Nostrils four times a day PRN Nasal Congestion      02-14-21 @ 07:01  -  02-15-21 @ 07:00  --------------------------------------------------------  IN: 240 mL / OUT: 0 mL / NET: 240 mL    02-15-21 @ 07:01  -  02-15-21 @ 20:53  --------------------------------------------------------  IN: 100 mL / OUT: 0 mL / NET: 100 mL      PHYSICAL EXAM:      T(C): 36.6 (02-15-21 @ 15:57), Max: 36.6 (02-15-21 @ 15:57)  HR: 88 (02-15-21 @ 16:34) (74 - 88)  BP: 101/62 (02-15-21 @ 16:34) (85/60 - 113/74)  RR: 18 (02-15-21 @ 15:57) (18 - 19)  SpO2: 96% (02-15-21 @ 15:57) (93% - 97%)  Wt(kg): --  Lungs clear  Heart S1S2  Abd soft NT ND  Extremities:   tr edema                                    9.7    8.10  )-----------( 61       ( 15 Feb 2021 07:30 )             30.0     02-15    145  |  112<H>  |  77<H>  ----------------------------<  90  4.7   |  25  |  1.92<H>    Ca    7.8<L>      15 Feb 2021 07:30    TPro  6.0  /  Alb  1.9<L>  /  TBili  0.6  /  DBili  x   /  AST  27  /  ALT  26  /  AlkPhos  77  02-14      LIVER FUNCTIONS - ( 14 Feb 2021 06:37 )  Alb: 1.9 g/dL / Pro: 6.0 gm/dL / ALK PHOS: 77 U/L / ALT: 26 U/L / AST: 27 U/L / GGT: x           Creatinine Trend: 1.92<--, 1.90<--, 1.77<--, 1.87<--, 1.85<--      Assessment   MICHELLE, CKD 3-4; CRS; risk for underlying  COVID related injury     Plan:  Continue current rx   Will follow course.      Kevon Deshpande MD

## 2021-02-15 NOTE — PROGRESS NOTE ADULT - ASSESSMENT
89F w/ pmhx of HTN, HLD, DM2,MVR/AVR, thrombocytopenia,  Afib was on coumadin now on eliquis, s/p AICD/PPM, recent admission at [facility] on 1/22 for covid pna now p/w lethargy from rehab    1. lethargy  -pt found to have UTI, persisted covid positive  -suspect prev AMS 2/2 metabolic encephalopathy in setting of sepsis, worsening renal function, now improved.   -cont supportive care per primary team, renal  -pt DNR    2. hx of CHF  -per documentation, pt with hx of CHF  -on exam pt without evidence of gross volume overload, without crackles, w/o LE edema.   -cont imdur and lopresor  -no objection to lasix for tx of hyperkalemia    no plans for further cardiology w/u, please call back with questions      Sudarshan Aguiar MD

## 2021-02-15 NOTE — PROGRESS NOTE ADULT - SUBJECTIVE AND OBJECTIVE BOX
INTERVAL HPI/OVERNIGHT EVENTS:        REVIEW OF SYSTEMS:  CONSTITUTIONAL: alert  no  complaints    NECK: No pain or stiffnes  RESPIRATORY: No SOB   CARDIOVASCULAR: No chest pain, palpitations, dizziness,   GASTROINTESTINAL: No abdominal pain. No nausea, vomiting,   NEUROLOGICAL: No headaches, no  blurry  vision no  dizziness  SKIN: No itching,   MUSCULOSKELETAL: No pain    MEDICATION:  acetaminophen   Tablet .. 650 milliGRAM(s) Oral every 6 hours PRN  acetaminophen  Suppository .. 650 milliGRAM(s) Rectal every 8 hours PRN  atorvastatin 40 milliGRAM(s) Oral at bedtime  cefTRIAXone   IVPB 1000 milliGRAM(s) IV Intermittent every 24 hours  dextrose 40% Gel 15 Gram(s) Oral once  dextrose 5%. 1000 milliLiter(s) IV Continuous <Continuous>  dextrose 5%. 1000 milliLiter(s) IV Continuous <Continuous>  dextrose 50% Injectable 25 Gram(s) IV Push once  dextrose 50% Injectable 12.5 Gram(s) IV Push once  dextrose 50% Injectable 25 Gram(s) IV Push once  enoxaparin Injectable 80 milliGRAM(s) SubCutaneous daily  folic acid 1 milliGRAM(s) Oral daily  furosemide    Tablet 40 milliGRAM(s) Oral daily  glucagon  Injectable 1 milliGRAM(s) IntraMuscular once  insulin lispro (ADMELOG) corrective regimen sliding scale   SubCutaneous three times a day before meals  insulin lispro (ADMELOG) corrective regimen sliding scale   SubCutaneous at bedtime  isosorbide   mononitrate ER Tablet (IMDUR) 30 milliGRAM(s) Oral daily  metoprolol tartrate 25 milliGRAM(s) Oral two times a day  midodrine. 10 milliGRAM(s) Oral three times a day  polyethylene glycol 3350 17 Gram(s) Oral daily PRN  sodium chloride 0.65% Nasal 1 Spray(s) Both Nostrils four times a day PRN  sodium zirconium cyclosilicate 10 Gram(s) Oral daily    Vital Signs Last 24 Hrs  T(C): 36.4 (15 Feb 2021 05:16), Max: 36.9 (14 Feb 2021 11:35)  T(F): 97.5 (15 Feb 2021 05:16), Max: 98.4 (14 Feb 2021 11:35)  HR: 80 (15 Feb 2021 05:16) (63 - 101)  BP: 111/78 (15 Feb 2021 05:16) (98/60 - 117/75)  BP(mean): --  RR: 18 (15 Feb 2021 05:16) (18 - 19)  SpO2: 93% (15 Feb 2021 05:16) (93% - 99%)    PHYSICAL EXAM:  GENERAL: NAD, well-groomed, well-developed  HEENT : Conjuntivae  clear sclerae anicteric  NECK: Supple, No JVD, Normal thyroid  NERVOUS SYSTEM:  Alert oriented  x2 no  focal  deficits;   CHEST/LUNG:   decreased bs  bases  HEART: Regular rate and rhythm; No murmurs, rubs, or gallops  ABDOMEN: Soft, Nontender, Nondistended; Bowel sounds present  EXTREMITIES:  no  edema no  tenderness  SKIN: No rashes   LABS:                        9.7    8.10  )-----------( x        ( 15 Feb 2021 07:30 )             30.0     02-14    142  |  110<H>  |  75<H>  ----------------------------<  212<H>  5.9<H>   |  23  |  1.90<H>    Ca    7.9<L>      14 Feb 2021 06:37    TPro  6.0  /  Alb  1.9<L>  /  TBili  0.6  /  DBili  x   /  AST  27  /  ALT  26  /  AlkPhos  77  02-14        CAPILLARY BLOOD GLUCOSE      POCT Blood Glucose.: 104 mg/dL (15 Feb 2021 03:06)  POCT Blood Glucose.: 73 mg/dL (15 Feb 2021 00:13)  POCT Blood Glucose.: 75 mg/dL (14 Feb 2021 22:25)  POCT Blood Glucose.: 71 mg/dL (14 Feb 2021 22:21)  POCT Blood Glucose.: 292 mg/dL (14 Feb 2021 12:24)  POCT Blood Glucose.: 256 mg/dL (14 Feb 2021 08:44)      RADIOLOGY & ADDITIONAL TESTS:    Imaging reports  Personally Reviewed:  [ ] YES  [ ] NO    Consultant(s) Notes Reviewed:  [ x] YES  [ ] NO    Care Discussed with Consultants/Other Providers [x ] YES  [ ] NO  Problem/Plan - 1:  ·  Problem: Dehydration.   hx  CHF   LASIX  TRIAL  WITH  CLOSE  CLINCAL MONITORING  follow renal function    Problem/Plan - 2:  ·  Problem: Altered mental status, unspecified altered mental status type.  Plan: appears metabolic  iv hydration and monitor.     Problem/Plan - 3:  ·  Problem: Chronic diastolic congestive heart failure.  Plan: currently not in failure  monitor.  renal  eval    Problem/Plan - 4:  ·  Problem: Type 2 diabetes mellitus with hyperglycemia, without long-term current use of insulin.  Plan: ss insulin coverage.     Problem/Plan - 5:  ·  Problem: Thrombocytopenia. Plan: pt on prior admission plts  range  will monitor closely   will change to  lovenox as  patient  inconstant  with  oral  intake    Problem/Plan - 6:  Problem: Mixed hyperlipidemia.Plan: cont statin.    Problem/Plan - 7:  ·  Problem: MICHELLE (acute kidney injury). Plan: liekly prerenal  gentle hydration and monitor. renal  eval    Problem/Plan - 8:  ·  Problem: Preventive measure.  Plan: on ac.   WILL  CONTINUE  ROECPHIN  EMPIRCALLY  AS  OBSERVATION  SEVERE  PULMONARY  HTN  ON  ECHO  AT  [facility]  O2   ANEMIA  CHECK IRON  B12  FOLATE  MONITOR  LABS   FURTHER  W/U  AND  RX  AS PER  CLINICAL  COURSE

## 2021-02-15 NOTE — SWALLOW BEDSIDE ASSESSMENT ADULT - H & P REVIEW
Pt is a 89 y/o female w/pmhx of HTN, HLD, DM2,MVR/AVR,thrombocytopenia,  Afib was on coumadin now on eliquis, s/p AICD/PPM,  CHFrEF was admitted at [facility] on 1/22 for covid and elevated pna then transferred to [facility] for rehab, where family reported that pt has been more lethargic and confused when they were facetiming her and sent in.  pt just states not feeling well, but not able to offer specific sx's.  no reported fever, chills, no changes in breathing, no cp,n/v/dc./yes

## 2021-02-16 ENCOUNTER — TRANSCRIPTION ENCOUNTER (OUTPATIENT)
Age: 86
End: 2021-02-16

## 2021-02-16 DIAGNOSIS — E43 UNSPECIFIED SEVERE PROTEIN-CALORIE MALNUTRITION: ICD-10-CM

## 2021-02-16 DIAGNOSIS — D63.8 ANEMIA IN OTHER CHRONIC DISEASES CLASSIFIED ELSEWHERE: ICD-10-CM

## 2021-02-16 DIAGNOSIS — Z51.5 ENCOUNTER FOR PALLIATIVE CARE: ICD-10-CM

## 2021-02-16 DIAGNOSIS — R53.2 FUNCTIONAL QUADRIPLEGIA: ICD-10-CM

## 2021-02-16 DIAGNOSIS — G93.49 OTHER ENCEPHALOPATHY: ICD-10-CM

## 2021-02-16 LAB
ALBUMIN SERPL ELPH-MCNC: 1.8 G/DL — LOW (ref 3.3–5)
ALP SERPL-CCNC: 94 U/L — SIGNIFICANT CHANGE UP (ref 40–120)
ALT FLD-CCNC: 30 U/L — SIGNIFICANT CHANGE UP (ref 12–78)
ANION GAP SERPL CALC-SCNC: 9 MMOL/L — SIGNIFICANT CHANGE UP (ref 5–17)
AST SERPL-CCNC: 39 U/L — HIGH (ref 15–37)
BILIRUB SERPL-MCNC: 0.7 MG/DL — SIGNIFICANT CHANGE UP (ref 0.2–1.2)
BUN SERPL-MCNC: 80 MG/DL — HIGH (ref 7–23)
CALCIUM SERPL-MCNC: 7.9 MG/DL — LOW (ref 8.5–10.1)
CHLORIDE SERPL-SCNC: 110 MMOL/L — HIGH (ref 96–108)
CO2 SERPL-SCNC: 26 MMOL/L — SIGNIFICANT CHANGE UP (ref 22–31)
CREAT SERPL-MCNC: 2 MG/DL — HIGH (ref 0.5–1.3)
GLUCOSE BLDC GLUCOMTR-MCNC: 139 MG/DL — HIGH (ref 70–99)
GLUCOSE BLDC GLUCOMTR-MCNC: 146 MG/DL — HIGH (ref 70–99)
GLUCOSE BLDC GLUCOMTR-MCNC: 194 MG/DL — HIGH (ref 70–99)
GLUCOSE BLDC GLUCOMTR-MCNC: 198 MG/DL — HIGH (ref 70–99)
GLUCOSE SERPL-MCNC: 158 MG/DL — HIGH (ref 70–99)
HCT VFR BLD CALC: 30.9 % — LOW (ref 34.5–45)
HGB BLD-MCNC: 9.4 G/DL — LOW (ref 11.5–15.5)
MCHC RBC-ENTMCNC: 27.2 PG — SIGNIFICANT CHANGE UP (ref 27–34)
MCHC RBC-ENTMCNC: 30.4 GM/DL — LOW (ref 32–36)
MCV RBC AUTO: 89.3 FL — SIGNIFICANT CHANGE UP (ref 80–100)
NRBC # BLD: 0 /100 WBCS — SIGNIFICANT CHANGE UP (ref 0–0)
NT-PROBNP SERPL-SCNC: HIGH PG/ML (ref 0–450)
PLATELET # BLD AUTO: 68 K/UL — LOW (ref 150–400)
POTASSIUM SERPL-MCNC: 4.5 MMOL/L — SIGNIFICANT CHANGE UP (ref 3.5–5.3)
POTASSIUM SERPL-SCNC: 4.5 MMOL/L — SIGNIFICANT CHANGE UP (ref 3.5–5.3)
PROT SERPL-MCNC: 5.8 GM/DL — LOW (ref 6–8.3)
RAPID RVP RESULT: DETECTED
RBC # BLD: 3.46 M/UL — LOW (ref 3.8–5.2)
RBC # FLD: 20.1 % — HIGH (ref 10.3–14.5)
SARS-COV-2 RNA SPEC QL NAA+PROBE: DETECTED
SODIUM SERPL-SCNC: 145 MMOL/L — SIGNIFICANT CHANGE UP (ref 135–145)
WBC # BLD: 8.11 K/UL — SIGNIFICANT CHANGE UP (ref 3.8–10.5)
WBC # FLD AUTO: 8.11 K/UL — SIGNIFICANT CHANGE UP (ref 3.8–10.5)

## 2021-02-16 PROCEDURE — 99223 1ST HOSP IP/OBS HIGH 75: CPT

## 2021-02-16 PROCEDURE — 76775 US EXAM ABDO BACK WALL LIM: CPT | Mod: 26

## 2021-02-16 RX ORDER — CHLORHEXIDINE GLUCONATE 213 G/1000ML
1 SOLUTION TOPICAL DAILY
Refills: 0 | Status: DISCONTINUED | OUTPATIENT
Start: 2021-02-16 | End: 2021-02-23

## 2021-02-16 RX ADMIN — Medication 650 MILLIGRAM(S): at 13:20

## 2021-02-16 RX ADMIN — MIDODRINE HYDROCHLORIDE 10 MILLIGRAM(S): 2.5 TABLET ORAL at 06:05

## 2021-02-16 RX ADMIN — ENOXAPARIN SODIUM 80 MILLIGRAM(S): 100 INJECTION SUBCUTANEOUS at 11:49

## 2021-02-16 RX ADMIN — MIDODRINE HYDROCHLORIDE 10 MILLIGRAM(S): 2.5 TABLET ORAL at 16:33

## 2021-02-16 RX ADMIN — ATORVASTATIN CALCIUM 40 MILLIGRAM(S): 80 TABLET, FILM COATED ORAL at 22:43

## 2021-02-16 RX ADMIN — Medication 1 MILLIGRAM(S): at 11:50

## 2021-02-16 RX ADMIN — SODIUM ZIRCONIUM CYCLOSILICATE 10 GRAM(S): 10 POWDER, FOR SUSPENSION ORAL at 11:50

## 2021-02-16 RX ADMIN — CEFTRIAXONE 100 MILLIGRAM(S): 500 INJECTION, POWDER, FOR SOLUTION INTRAMUSCULAR; INTRAVENOUS at 06:29

## 2021-02-16 RX ADMIN — ISOSORBIDE MONONITRATE 30 MILLIGRAM(S): 60 TABLET, EXTENDED RELEASE ORAL at 11:50

## 2021-02-16 RX ADMIN — Medication 2: at 08:04

## 2021-02-16 RX ADMIN — Medication 2: at 13:09

## 2021-02-16 NOTE — DIETITIAN INITIAL EVALUATION ADULT. - DIET TYPE
dysphagia 1, pureed, thin liquids/plus Glucerna Shakes 2x day ( 440 calories 20 grams of protein)/low sodium/consistent carbohydrate (evening snack)

## 2021-02-16 NOTE — PHYSICAL THERAPY INITIAL EVALUATION ADULT - GENERAL OBSERVATIONS, REHAB EVAL
Pt encountered supine in bed +PIV, +cardiac monitor, nasal cannula doffed 75% O2, immediately donned NC 4 LPM and O2 returned to 92% Pt A&Ox1 Pt encountered supine in bed +PIV, +cardiac monitor, Pt A&Ox1

## 2021-02-16 NOTE — DIETITIAN INITIAL EVALUATION ADULT. - PHYSCIAL ASSESSMENT
BMI 29.6 (02/12), 2/15 1+ generalized adema legs (2/13) 2+ edema right arm, both ankles and feet, 3+ left hand and left wrist/other (specify)

## 2021-02-16 NOTE — DISCHARGE NOTE PROVIDER - NSDCCPCAREPLAN_GEN_ALL_CORE_FT
PRINCIPAL DISCHARGE DIAGNOSIS  Diagnosis: Dehydration  Assessment and Plan of Treatment: 89 y/o female w/pmhx of HTN, HLD, DM2,MVR/AVR,thrombocytopenia,  Afib was on coumadin now on eliquis, s/p AICD/PPM,  CHFrEF was admitted at [facility] on 1/22 for covid and elevated pna then transferred to [facility] for rehab, where family reported that pt has been more lethargic and confused when they were facetiming her and sent in.    ***Pt has 2 N- [facility] [id number]***  Pt was diagnosed with covid pna 1/22, d/c to rehab nut was dehydrated and feeling weak so brought back to the hospital. Seen by Cardio for CHF, cardio cleared for d/c. Pt not in fluid overload currently, doing baseline. Pt reswabed for possible d./c to rehab. However family not interested to d/c anytime soon.      SECONDARY DISCHARGE DIAGNOSES  Diagnosis: Altered mental status  Assessment and Plan of Treatment:      PRINCIPAL DISCHARGE DIAGNOSIS  Diagnosis: Dehydration  Assessment and Plan of Treatment: 89 y/o female w/pmhx of HTN, HLD, DM2,MVR/AVR,thrombocytopenia,  Afib was on coumadin now on eliquis, s/p AICD/PPM,  CHFrEF was admitted at [facility] on 1/22 for covid and elevated pna then transferred to [facility] for rehab, where family reported that pt has been more lethargic and confused when they were facetiming her and sent in.    ***Pt has 2 N- [facility] [id number]***  Pt was diagnosed with covid pna 1/22, d/c to rehab nut was dehydrated and feeling weak so brought back to the hospital. Seen by Cardio for CHF, cardio cleared for d/c. Pt not in fluid overload currently, doing baseline. Family requested hospice care.  Pt accepted to inpatient hospice.  Comfort measures only.      SECONDARY DISCHARGE DIAGNOSES  Diagnosis: Altered mental status  Assessment and Plan of Treatment:

## 2021-02-16 NOTE — CONSULT NOTE ADULT - PROBLEM SELECTOR RECOMMENDATION 9
per H+P pt was more confused and lethargic prompting hospitalization from rehab  pt is awake alert oriented to self

## 2021-02-16 NOTE — CONSULT NOTE ADULT - PROBLEM SELECTOR RECOMMENDATION 5
likely due to underlying comorbidities including renal disease, frequent phlebotomy while hospitalized    received one unit of blood on admission   counts stable

## 2021-02-16 NOTE — CONSULT NOTE ADULT - PROBLEM SELECTOR RECOMMENDATION 8
pt with documented proxy her son Craig Singh. [phone] MOLST on chart. copy of HCP is in [facility] records in [place] there is no listed alternate agent on that form.

## 2021-02-16 NOTE — DIETITIAN INITIAL EVALUATION ADULT. - PERTINENT LABORATORY DATA
MEDICATIONS  (STANDING):  atorvastatin 40 milliGRAM(s) Oral at bedtime  cefTRIAXone   IVPB 1000 milliGRAM(s) IV Intermittent every 24 hours  chlorhexidine 4% Liquid 1 Application(s) Topical daily  dextrose 40% Gel 15 Gram(s) Oral once  dextrose 5%. 1000 milliLiter(s) (50 mL/Hr) IV Continuous <Continuous>  dextrose 5%. 1000 milliLiter(s) (100 mL/Hr) IV Continuous <Continuous>  dextrose 50% Injectable 25 Gram(s) IV Push once  dextrose 50% Injectable 12.5 Gram(s) IV Push once  dextrose 50% Injectable 25 Gram(s) IV Push once  enoxaparin Injectable 80 milliGRAM(s) SubCutaneous daily  folic acid 1 milliGRAM(s) Oral daily  glucagon  Injectable 1 milliGRAM(s) IntraMuscular once  insulin lispro (ADMELOG) corrective regimen sliding scale   SubCutaneous three times a day before meals  insulin lispro (ADMELOG) corrective regimen sliding scale   SubCutaneous at bedtime  isosorbide   mononitrate ER Tablet (IMDUR) 30 milliGRAM(s) Oral daily  metoprolol tartrate 25 milliGRAM(s) Oral two times a day  midodrine. 10 milliGRAM(s) Oral three times a day    MEDICATIONS  (PRN):  acetaminophen   Tablet .. 650 milliGRAM(s) Oral every 6 hours PRN Temp greater or equal to 38C (100.4F), Mild Pain (1 - 3)  acetaminophen  Suppository .. 650 milliGRAM(s) Rectal every 8 hours PRN Temp greater or equal to 38C (100.4F)  polyethylene glycol 3350 17 Gram(s) Oral daily PRN Constipation  sodium chloride 0.65% Nasal 1 Spray(s) Both Nostrils four times a day PRN Nasal Congestion

## 2021-02-16 NOTE — PHYSICAL THERAPY INITIAL EVALUATION ADULT - FOLLOWS COMMANDS/ANSWERS QUESTIONS, REHAB EVAL
25% of the time/unable to follow commands/unable to follow multi-step instructions/unable to answer questions

## 2021-02-16 NOTE — CONSULT NOTE ADULT - ASSESSMENT
89 y/o female w/pmhx of HTN, HLD, DM2,MVR/AVR,thrombocytopenia,  Afib recently at [facility] for COVID, after which was in rehab at [facility]- pt admitted at [facility] for lethargy/AMS. Palliative care consulted for assistance with symptoms and ongoing GOC, Pt has MOLST DNR/I in chart.

## 2021-02-16 NOTE — DIETITIAN INITIAL EVALUATION ADULT. - ADD RECOMMEND
Pt may need basal insulin regimen for glycemic control (was on Basalagar insulin PTA), consider Endocrine consult

## 2021-02-16 NOTE — PHYSICAL THERAPY INITIAL EVALUATION ADULT - PLANNED THERAPY INTERVENTIONS, PT EVAL
bed mobility training/strengthening balance training/bed mobility training/gait training/strengthening/transfer training

## 2021-02-16 NOTE — CONSULT NOTE ADULT - PROBLEM SELECTOR RECOMMENDATION 2
BNP elevated could be related to renal disease, right heart strain from pulm hypertension   clinically does not appear wet  echo is done approx 2 weeks ago at [facility]

## 2021-02-16 NOTE — PHYSICAL THERAPY INITIAL EVALUATION ADULT - PERTINENT HX OF CURRENT PROBLEM, REHAB EVAL
Pt admitted 2/12 from rehab, pt has been +covid since 1/5 Pt admitted 2/12 from rehab due to change in mental status and dehydration, pt has been +covid since 1/5.

## 2021-02-16 NOTE — DIETITIAN INITIAL EVALUATION ADULT. - PERTINENT MEDS FT
02-16 Na145 mmol/L Glu 158 mg/dL<H> K+ 4.5 mmol/L Cr  2.00 mg/dL<H> BUN 80 mg/dL<H> 02-12 Phos 4.1 mg/dL 02-16 Alb 1.8 g/dL<L>02-16 ALT 30 U/L AST 39 U/L<H> Alkaline Phosphatase 94 U/L  02-13-21 @ 11:33 a1c 7.5 <H - acceptable elderly goal>

## 2021-02-16 NOTE — PHYSICAL THERAPY INITIAL EVALUATION ADULT - ADDITIONAL COMMENTS
As per previous PT eval 1/26 by Enma Puckett at rehab facility. Pt lives alone in an apartment with elevator access, pt owns rolling walker and wheelchair. HHA is present 5 days a week for 4 hours a day. Pt is unable to confirm this information at this time due to cognition status. As per previous PT eval 1/26 by Enma Puckett. Pt lives alone in an apartment with elevator access, pt owns rolling walker and wheelchair. HHA is present 5 days a week for 4 hours a day. Pt is unable to confirm this information at this time due to cognition status.

## 2021-02-16 NOTE — DISCHARGE NOTE PROVIDER - NSDCMRMEDTOKEN_GEN_ALL_CORE_FT
acetaminophen 325 mg oral tablet:   Acidophilus oral capsule: 1 cap(s) orally once a day  Admelog 100 units/mL injectable solution:   Basaglar KwikPen 100 units/mL subcutaneous solution:   Calcium 500+D oral tablet, chewable: 1 tab(s) orally 2 times a day  Crestor 10 mg oral tablet: 1 tab(s) orally once a day  Eliquis 2.5 mg oral tablet: 1 tab(s) orally 2 times a day  folic acid 1 mg oral tablet: 1 tab(s) orally once a day  furosemide 20 mg oral tablet: 1 tab(s) orally once a day  hydrALAZINE 50 mg oral tablet: tab(s) orally 4 times a day  isosorbide mononitrate 30 mg oral tablet, extended release: 1 tab(s) orally once a day (in the morning)  metoprolol tartrate 25 mg oral tablet: 1 tab(s) orally 2 times a day  Senna 8.6 mg oral tablet: 1 tab(s) orally once a day (at bedtime)   acetaminophen 650 mg rectal suppository: 1 suppository(ies) rectal every 8 hours, As needed, Temp greater or equal to 38C (100.4F)  Dextrose 5% with 0.45% NaCl intravenous solution: 1000 milliliter(s) intravenous   HYDROmorphone: 0.5 milligram(s) intravenous every 6 hours  HYDROmorphone: 1 milligram(s) intravenous every 1 to 2 hours, As Needed  insulin lispro 100 units/mL injectable solution:  injectable   insulin lispro 100 units/mL injectable solution:  injectable   LORazepam: 1 milligram(s) intravenous every 2 hours, As Needed  polyethylene glycol 3350 oral powder for reconstitution: 17 gram(s) orally once a day, As needed, Constipation  scopolamine:   sodium chloride 0.65% nasal spray:  nasal

## 2021-02-16 NOTE — DISCHARGE NOTE PROVIDER - HOSPITAL COURSE
89 y/o female w/pmhx of HTN, HLD, DM2,MVR/AVR,thrombocytopenia,  Afib was on coumadin now on eliquis, s/p AICD/PPM,  CHFrEF was admitted at [facility] on 1/22 for covid and elevated pna then transferred to [facility] for rehab, where family reported that pt has been more lethargic and confused when they were facetiming her and sent in.    ***Pt has 2 MRN- [facility] [id number]***  Pt was diagnosed with covid pna 1/22, d/c to rehab nut was dehydrated and feeling weak so brought back to the hospital. Seen by Cardio for CHF, cardio cleared for d/c. Pt not in fluid overload currently, doing baseline. Pt reswabed for possible d./c to rehab. However family not interested to d/c anytime soon. 89 y/o female w/pmhx of HTN, HLD, DM2,MVR/AVR,thrombocytopenia,  Afib was on coumadin now on eliquis, s/p AICD/PPM,  CHFrEF was admitted at [facility] on 1/22 for covid and elevated pna then transferred to [facility] for rehab, where family reported that pt has been more lethargic and confused when they were facetiming her and sent in.    ***Pt has 2 MRN- [facility] [id number]***  Pt was diagnosed with covid pna 1/22, d/c to rehab nut was dehydrated and feeling weak so brought back to the hospital. Seen by Cardio for CHF, cardio cleared for d/c.    Pt continue to decline despite optimal medical management.  Pt is DNR/DNI, HCP son Craig Singh requested Hospice care.  Pt accepted for inpatient hospice on 2/23, attending Dr. Jackson.  Comfort measures only.  No blood work.  Discharge to hospice.

## 2021-02-16 NOTE — PROGRESS NOTE ADULT - SUBJECTIVE AND OBJECTIVE BOX
INTERVAL HPI/OVERNIGHT EVENTS:        REVIEW OF SYSTEMS:  CONSTITUTIONAL:   c/o  Back  pain with  movement    NECK: No pain or stiffnes  RESPIRATORY: No SOB   CARDIOVASCULAR: No chest pain, palpitations, dizziness,   GASTROINTESTINAL: No abdominal pain. No nausea, vomiting,   NEUROLOGICAL: No headaches, no  blurry  vision no  dizziness  SKIN: No itching,   MUSCULOSKELETAL: back pain    MEDICATION:  acetaminophen   Tablet .. 650 milliGRAM(s) Oral every 6 hours PRN  acetaminophen  Suppository .. 650 milliGRAM(s) Rectal every 8 hours PRN  atorvastatin 40 milliGRAM(s) Oral at bedtime  cefTRIAXone   IVPB 1000 milliGRAM(s) IV Intermittent every 24 hours  dextrose 40% Gel 15 Gram(s) Oral once  dextrose 5%. 1000 milliLiter(s) IV Continuous <Continuous>  dextrose 5%. 1000 milliLiter(s) IV Continuous <Continuous>  dextrose 50% Injectable 25 Gram(s) IV Push once  dextrose 50% Injectable 12.5 Gram(s) IV Push once  dextrose 50% Injectable 25 Gram(s) IV Push once  enoxaparin Injectable 80 milliGRAM(s) SubCutaneous daily  folic acid 1 milliGRAM(s) Oral daily  furosemide    Tablet 40 milliGRAM(s) Oral daily  glucagon  Injectable 1 milliGRAM(s) IntraMuscular once  insulin lispro (ADMELOG) corrective regimen sliding scale   SubCutaneous three times a day before meals  insulin lispro (ADMELOG) corrective regimen sliding scale   SubCutaneous at bedtime  isosorbide   mononitrate ER Tablet (IMDUR) 30 milliGRAM(s) Oral daily  metoprolol tartrate 25 milliGRAM(s) Oral two times a day  midodrine. 10 milliGRAM(s) Oral three times a day  polyethylene glycol 3350 17 Gram(s) Oral daily PRN  sodium chloride 0.65% Nasal 1 Spray(s) Both Nostrils four times a day PRN  sodium zirconium cyclosilicate 10 Gram(s) Oral daily    Vital Signs Last 24 Hrs  T(C): 36.5 (16 Feb 2021 04:41), Max: 36.6 (15 Feb 2021 15:57)  T(F): 97.7 (16 Feb 2021 04:41), Max: 97.9 (15 Feb 2021 15:57)  HR: 85 (16 Feb 2021 04:41) (74 - 88)  BP: 92/49 (16 Feb 2021 04:41) (85/60 - 105/71)  BP(mean): 68 (15 Feb 2021 15:57) (68 - 82)  RR: 18 (16 Feb 2021 04:41) (18 - 19)  SpO2: 93% (16 Feb 2021 04:41) (93% - 97%)    PHYSICAL EXAM:  GENERAL: NAD, well-groomed, well-developed  HEENT : Conjuntivae  clear sclerae anicteric  NECK: Supple, No JVD, Normal thyroid  NERVOUS SYSTEM:  Alert oriented   no  focal  deficits;   CHEST/LUNG: Clear    HEART: Regular rate and rhythm; No murmurs, rubs, or gallops  ABDOMEN: Soft, Nontender, Nondistended; Bowel sounds present  EXTREMITIES:  no  edema no  tenderness  SKIN: No rashes   BACK  MILD  RT  PARVERTEBRAL  TENDERNESS  LUMBAR  AREA  LABS:                        9.4    8.11  )-----------( 68       ( 16 Feb 2021 06:24 )             30.9     02-16    145  |  110<H>  |  80<H>  ----------------------------<  158<H>  4.5   |  26  |  2.00<H>    Ca    7.9<L>      16 Feb 2021 06:24    TPro  5.8<L>  /  Alb  1.8<L>  /  TBili  0.7  /  DBili  x   /  AST  39<H>  /  ALT  30  /  AlkPhos  94  02-16        CAPILLARY BLOOD GLUCOSE      POCT Blood Glucose.: 194 mg/dL (16 Feb 2021 07:42)  POCT Blood Glucose.: 187 mg/dL (15 Feb 2021 22:26)  POCT Blood Glucose.: 240 mg/dL (15 Feb 2021 16:18)  POCT Blood Glucose.: 123 mg/dL (15 Feb 2021 11:10)      RADIOLOGY & ADDITIONAL TESTS:    Imaging reports  Personally Reviewed:  [ ] YES  [ ] NO    Consultant(s) Notes Reviewed:  [ X] YES  [ ] NO    Care Discussed with Consultants/Other Providers [X ] YES  [ ] NO  Problem/Plan - 1:  ·  Problem: Dehydration.   hx  CHF   WILL D/C LASIX  AS  RENAL  FUNCTION  WORSENING PT  APPEARS  EUVOLEMIC  CONTINUE  TO  MONITOR   follow renal function    Problem/Plan - 2:  ·  Problem: Altered mental status, unspecified altered mental status type.  Plan: IMPROVED      Problem/Plan - 3:  ·  Problem: Chronic diastolic congestive heart failure.  Plan: currently not in failure  monitor.  BNP  MOST  LIKELY  2/2  SEVERE  PULMONARY  HTN WITH  DILATED  RT  ATRIUM    Problem/Plan - 4:  ·  Problem: Type 2 diabetes mellitus with hyperglycemia, without long-term current use of insulin.  Plan: ss insulin coverage.     Problem/Plan - 5:  ·  Problem: Thrombocytopenia. Plan: pt on prior admission plts  range  will monitor closely   will change to  lovenox as  patient  inconstant  with  oral  intake    Problem/Plan - 6:  Problem: Mixed hyperlipidemia.Plan: cont statin.    Problem/Plan - 7:  ·  Problem: MICHELLE (acute kidney injury). Plan:   OBSERVE  WITH  DECREASED  LASIX  Problem/Plan - 8:  ·  Problem: Preventive measure.  Plan: on ac.   WILL  CONTINUE  ROECPHIN  EMPIRCALLY  AVR  MVR  OBSERVATION  SEVERE  PULMONARY  HTN  ON  ECHO  AT  [facility]  O2   ANEMIA  C/W  CHRONIC  DISEASE  PROCESS

## 2021-02-16 NOTE — DIETITIAN INITIAL EVALUATION ADULT. - FACTORS AFF FOOD INTAKE
02/15 swallow eval recommended pureed with thin liquids/change in mental status/difficulty swallowing/other (specify)

## 2021-02-16 NOTE — CONSULT NOTE ADULT - SUBJECTIVE AND OBJECTIVE BOX
CHARLES ADAMS  [id number]    Date of Consult: 2/13/2021    CHIEF COMPLAINT: lethargy    HISTORY OF PRESENT ILLNESS:  89F w/ pmhx of HTN, HLD, DM2,MVR/AVR, thrombocytopenia,  Afib was on coumadin now on eliquis, s/p AICD/PPM, recent admission at [facility] on 1/22 for covid pna then transferred to [facility] for rehab, where family reported that pt has been more lethargic and confused when they were facetiming her and sent in.  pt just states not feeling well, but not able to offer specific sx's.  no reported fever, chills, no changes in breathing, no cp,n/v/dc. pt denies cp, sob, LE edema, orthopnea, syncope, palpitations.   pt found to have UTI, remains covid positive. ct head here negative for acute pathology.      Allergies    No Known Allergies    Intolerances    	    MEDICATIONS:  enoxaparin Injectable 80 milliGRAM(s) SubCutaneous daily  furosemide   Injectable 40 milliGRAM(s) IV Push once  isosorbide   mononitrate ER Tablet (IMDUR) 30 milliGRAM(s) Oral daily  metoprolol tartrate 25 milliGRAM(s) Oral two times a day    cefTRIAXone   IVPB 1000 milliGRAM(s) IV Intermittent every 24 hours      acetaminophen   Tablet .. 650 milliGRAM(s) Oral every 6 hours PRN  acetaminophen  Suppository .. 650 milliGRAM(s) Rectal every 8 hours PRN      atorvastatin 40 milliGRAM(s) Oral at bedtime  dextrose 40% Gel 15 Gram(s) Oral once  dextrose 50% Injectable 25 Gram(s) IV Push once  dextrose 50% Injectable 12.5 Gram(s) IV Push once  dextrose 50% Injectable 25 Gram(s) IV Push once  glucagon  Injectable 1 milliGRAM(s) IntraMuscular once  insulin lispro (ADMELOG) corrective regimen sliding scale   SubCutaneous three times a day before meals  insulin lispro (ADMELOG) corrective regimen sliding scale   SubCutaneous at bedtime    dextrose 5%. 1000 milliLiter(s) IV Continuous <Continuous>  dextrose 5%. 1000 milliLiter(s) IV Continuous <Continuous>  folic acid 1 milliGRAM(s) Oral daily      PAST MEDICAL & SURGICAL HISTORY:  Folate deficiency anemia    Congestive heart failure    Type 2 diabetes mellitus    Hyperlipidemia    Pneumonia  due to Covid 19    Acute respiratory failure with hypoxia    Metabolic encephalopathy    S/P AVR        FAMILY HISTORY:  No pertinent family history in first degree relatives        SOCIAL HISTORY:    now residing in rehab, denies tob/etoh/drugs      REVIEW OF SYSTEMS:  See HPI. Otherwise, 10 point ROS done and otherwise negative.    PHYSICAL EXAM:  T(C): 36.9 (02-13-21 @ 10:01), Max: 38 (02-13-21 @ 04:55)  HR: 84 (02-13-21 @ 12:31) (73 - 93)  BP: 88/60 (02-13-21 @ 12:31) (88/60 - 138/63)  RR: 18 (02-13-21 @ 10:01) (16 - 26)  SpO2: 97% (02-13-21 @ 10:01) (92% - 100%)  Wt(kg): --  I&O's Summary      Appearance: Normal	  HEENT:   Normal oral mucosa, PERRL, EOMI	  Lymphatic: No lymphadenopathy  Cardiovascular: Normal S1 S2, No JVD, No murmurs, No edema  Respiratory: Lungs grossly clear to auscultation	  Psychiatry: A & O x 1-2, lethargic  Gastrointestinal:  Soft, Non-tender, + BS	  Skin: No rashes, No ecchymoses, No cyanosis	  Neurologic: Non-focal  Extremities: Normal range of motion, No clubbing, cyanosis       LABS:	 	    CBC Full  -  ( 13 Feb 2021 07:33 )  WBC Count : 5.73 K/uL  Hemoglobin : 7.7 g/dL  Hematocrit : 25.2 %  Platelet Count - Automated : 48 K/uL  Mean Cell Volume : 90.0 fl  Mean Cell Hemoglobin : 27.5 pg  Mean Cell Hemoglobin Concentration : 30.6 gm/dL  Auto Neutrophil # : x  Auto Lymphocyte # : x  Auto Monocyte # : x  Auto Eosinophil # : x  Auto Basophil # : x  Auto Neutrophil % : x  Auto Lymphocyte % : x  Auto Monocyte % : x  Auto Eosinophil % : x  Auto Basophil % : x    02-13    137  |  106  |  65<H>  ----------------------------<  236<H>  5.5<H>   |  25  |  1.77<H>  02-13    139  |  107  |  64<H>  ----------------------------<  245<H>  5.6<H>   |  25  |  1.87<H>    Ca    8.1<L>      13 Feb 2021 07:33  Ca    8.2<L>      13 Feb 2021 03:05  Phos  4.1     02-12  Mg     3.2     02-12    TPro  5.8<L>  /  Alb  2.0<L>  /  TBili  0.5  /  DBili  x   /  AST  18  /  ALT  22  /  AlkPhos  61  02-12      proBNP: Serum Pro-Brain Natriuretic Peptide: 87306 pg/mL (02-13 @ 11:10)  Serum Pro-Brain Natriuretic Peptide: 43503 pg/mL (02-13 @ 07:33)  Serum Pro-Brain Natriuretic Peptide: 79651 pg/mL (02-12 @ 17:13)    Lipid Profile:   HgA1c:   TSH: Thyroid Stimulating Hormone, Serum: 1.780 uU/mL (02-13 @ 11:10)        CARDIAC MARKERS:  Troponin I, Serum: .143 ng/mL (02-13 @ 11:10)  Troponin I, Serum: .152 ng/mL (02-13 @ 00:41)  Troponin I, Serum: .120 ng/mL (02-12 @ 17:13)          PREVIOUS DIAGNOSTIC TESTING:    [ ] Echocardiogram:  per notes:  TTE 2/4/21  Normal left ventricular systolic function. No segmental  wall motion abnormalities.  Bioprosthetic mitral valve. Gradient (12.9 mmHg at  60/min)  is severely increased for a prosthetic mitral valve.  Bioprosthetic aortic valve. Gradient across the aortic  valve cannot be estimated from this study.  Severe right ventricular enlargement with severely  decreased right ventricular systolic function.  Severe pulmonary hypertension.  Mild dilatation of the aortic root. Moderate dilitation of  the ascending aorta.  A device wire is noted in the right heart.  [ ]  Catheterization:  [ ] Stress Test:  	  	  ASSESSMENT/PLAN: 	    89F w/ pmhx of HTN, HLD, DM2,MVR/AVR, thrombocytopenia,  Afib was on coumadin now on eliquis, s/p AICD/PPM, recent admission at [facility] on 1/22 for covid pna now p/w lethargy from rehab    1. lethargy  -pt found to have UTI, persisted covid positive  -suspect AMS 2/2 metabolic encephalopathy in setting of sepsis, worsening renal function  -cont supportive care per primary team, renal    2. hx of CHF  -per documentation, pt with hx of CHF, per h and P, pt with recent TTE from 2/4/21 showing grossyl preserved LV systolic function, poor RV function.  -on exam pt without evidence of gross volume overload, without crackles, w/o LE edema.   -cont imdur and lopresor  -will cont to follow with you, no objection to lasix for tx of hyperkalemia    please call with any questions or concerns    Sudarshan Aguiar MD    
Patient is a 89y old  Female who was transferred from [facility] fro evaluation of lethargy, altered sensorium. Recent admission to [facility] COVID 19 PNA.   BW on  revealed Cr 1.85. Cr was previously "NL". In addition, low grade hypoK noted.   Renal evaluation requested to address MICHELLE, hyperK.   She is lethargic, moaning. Unable to provide hx.           TTE 21  Normal left ventricular systolic function. No segmental  wall motion abnormalities.  Bioprosthetic mitral valve. Gradient (12.9 mmHg at  60/min)  is severely increased for a prosthetic mitral valve.  Bioprosthetic aortic valve. Gradient across the aortic  valve cannot be estimated from this study.  Severe right ventricular enlargement with severely  decreased right ventricular systolic function.  Severe pulmonary hypertension.  Mild dilatation of the aortic root. Moderate dilitation of  the ascending aorta.  A device wire is noted in the right heart. (2021 20:05)      PAST MEDICAL & SURGICAL HISTORY:  Folate deficiency anemia    Congestive heart failure    Type 2 diabetes mellitus    Hyperlipidemia    Pneumonia  due to Covid 19    Acute respiratory failure with hypoxia    Metabolic encephalopathy    S/P AVR        Social Hx: not a smoker    FAMILY HISTORY:  No pertinent family history in first degree relatives        Allergies    No Known Allergies            MEDICATIONS  (STANDING):  atorvastatin 40 milliGRAM(s) Oral at bedtime  cefTRIAXone   IVPB 1000 milliGRAM(s) IV Intermittent every 24 hours  dextrose 40% Gel 15 Gram(s) Oral once  dextrose 5% + sodium chloride 0.9%. 1000 milliLiter(s) (50 mL/Hr) IV Continuous <Continuous>  dextrose 5%. 1000 milliLiter(s) (50 mL/Hr) IV Continuous <Continuous>  dextrose 5%. 1000 milliLiter(s) (100 mL/Hr) IV Continuous <Continuous>  dextrose 50% Injectable 25 Gram(s) IV Push once  dextrose 50% Injectable 12.5 Gram(s) IV Push once  dextrose 50% Injectable 25 Gram(s) IV Push once  enoxaparin Injectable 80 milliGRAM(s) SubCutaneous daily  folic acid 1 milliGRAM(s) Oral daily  glucagon  Injectable 1 milliGRAM(s) IntraMuscular once  insulin lispro (ADMELOG) corrective regimen sliding scale   SubCutaneous three times a day before meals  insulin lispro (ADMELOG) corrective regimen sliding scale   SubCutaneous at bedtime  isosorbide   mononitrate ER Tablet (IMDUR) 30 milliGRAM(s) Oral daily  lactated ringers. 1000 milliLiter(s) (50 mL/Hr) IV Continuous <Continuous>  metoprolol tartrate 25 milliGRAM(s) Oral two times a day    MEDICATIONS  (PRN):  acetaminophen   Tablet .. 650 milliGRAM(s) Oral every 6 hours PRN Temp greater or equal to 38C (100.4F), Mild Pain (1 - 3)  acetaminophen  Suppository .. 650 milliGRAM(s) Rectal every 8 hours PRN Temp greater or equal to 38C (100.4F)      Daily Height in cm: 162.56 (2021 09:34)    Daily Weight in k.3 (2021 23:12)    Drug Dosing Weight  Height (cm): 162.6 (2021 09:34)  Weight (kg): 78.3 (2021 23:12)  BMI (kg/m2): 29.6 (2021 09:34)  BSA (m2): 1.84 (2021 09:34)      REVIEW OF SYSTEMS:    Altered sensorium, lethargy  MICHELLE, CHF          I&O's Detail        PHYSICAL EXAM:    NECK: Supple. No increase in JVP  NERVOUS SYSTEM:  Alert & Oriented X3. Motor Strength 5/5 B/L upper and lower extremities; DTRs 2+ intact and symmetric  CHEST/LUNG: crackles  HEART: Regular rate and rhythm. No murmurs, rubs, or gallops  ABDOMEN: Soft, SP distension  EXTREMITIES:  pos edema      LABS:  CBC Full  -  ( 2021 07:33 )  WBC Count : 5.73 K/uL  RBC Count : 2.80 M/uL  Hemoglobin : 7.7 g/dL  Hematocrit : 25.2 %  Platelet Count - Automated : 48 K/uL  Mean Cell Volume : 90.0 fl  Mean Cell Hemoglobin : 27.5 pg  Mean Cell Hemoglobin Concentration : 30.6 gm/dL  Auto Neutrophil # : x  Auto Lymphocyte # : x  Auto Monocyte # : x  Auto Eosinophil # : x  Auto Basophil # : x  Auto Neutrophil % : x  Auto Lymphocyte % : x  Auto Monocyte % : x  Auto Eosinophil % : x  Auto Basophil % : x    02-13    137  |  106  |  65<H>  ----------------------------<  236<H>  5.5<H>   |  25  |  1.77<H>    Ca    8.1<L>      2021 07:33  Phos  4.1     02-12  Mg     3.2     02-12    TPro  5.8<L>  /  Alb  2.0<L>  /  TBili  0.5  /  DBili  x   /  AST  18  /  ALT  22  /  AlkPhos  61  02-12    CAPILLARY BLOOD GLUCOSE      POCT Blood Glucose.: 277 mg/dL (2021 09:48)    PT/INR - ( 2021 07:33 )   PT: 20.8 sec;   INR: 1.85 ratio           Urinalysis Basic - ( 2021 17:32 )    Color: Yellow / Appearance: Slightly Turbid / S.020 / pH: x  Gluc: x / Ketone: Trace  / Bili: Negative / Urobili: 1 mg/dL   Blood: x / Protein: 30 mg/dL / Nitrite: Negative   Leuk Esterase: Trace / RBC: 11-25 /HPF / WBC 0-2   Sq Epi: x / Non Sq Epi: Few / Bacteria: Many      CARDIAC MARKERS ( 2021 00:41 )  .152 ng/mL / x     / 62 U/L / x     / 1.2 ng/mL  CARDIAC MARKERS ( 2021 17:13 )  .120 ng/mL / x     / x     / x     / x            Impression:  * MICHELLE -- DDx: post-renal azotemia, CRS, SARS CoV 2 related tubular virotoxicity.  * HyperK -- mild  * Diastolic HF  * COVID 19 PNA    Recommendations:   * Bladder scan, Renal US  * Lasix 40mg IV once for volume excess, kaliuresis  * D/c IVFs  * If hyperK persists, start Lokelma 5mg daily for 3 days  * Cardio input    Thank you!
 Patient is a 89y old  Female who presents with a chief complaint of fatigue (2021 13:00)    HPI:  89 y/o female with HTN, HLD, DM2,MVR/AVR, Thrombocytopenia,  Afib was on coumadin now on Eliquis s/p AICD/PPM,  CHFrEF.   Was admitted at [facility] on  for covid and elevated pna then transferred to [facility] for rehab, where family reported that pt has been more lethargic and confused when they were face timing her and sent in.  pt just states not feeling well, but not able to offer specific sx's.  no reported fever, chills, no changes in breathing, no cp,n/v/dc. At times says she was SOB.    TTE 21  Normal left ventricular systolic function. No segmental  wall motion abnormalities.  Bioprosthetic mitral valve. Gradient (12.9 mmHg at  60/min)  is severely increased for a prosthetic mitral valve.  Bioprosthetic aortic valve. Gradient across the aortic  valve cannot be estimated from this study.  Severe right ventricular enlargement with severely  decreased right ventricular systolic function.  Severe pulmonary hypertension.  Mild dilatation of the aortic root. Moderate dilitation of  the ascending aorta.  A device wire is noted in the right heart. (2021 20:05)      PAST MEDICAL & SURGICAL HISTORY:  Folate deficiency anemia    Congestive heart failure    Type 2 diabetes mellitus    Hyperlipidemia    Pneumonia  due to Covid 19    Acute respiratory failure with hypoxia    Metabolic encephalopathy    S/P AVR      FAMILY HISTORY:  No pertinent family history in first degree relatives      SOCIAL HISTORY: BMI (kg/m2): 29.6 (02-13 @ 09:34). not available    Allergies  No Known Allergies    MEDICATIONS  (STANDING):  atorvastatin 40 milliGRAM(s) Oral at bedtime  cefTRIAXone   IVPB 1000 milliGRAM(s) IV Intermittent every 24 hours  dextrose 40% Gel 15 Gram(s) Oral once  dextrose 5%. 1000 milliLiter(s) (50 mL/Hr) IV Continuous <Continuous>  dextrose 5%. 1000 milliLiter(s) (100 mL/Hr) IV Continuous <Continuous>  dextrose 50% Injectable 25 Gram(s) IV Push once  dextrose 50% Injectable 12.5 Gram(s) IV Push once  dextrose 50% Injectable 25 Gram(s) IV Push once  enoxaparin Injectable 80 milliGRAM(s) SubCutaneous daily  folic acid 1 milliGRAM(s) Oral daily  furosemide   Injectable 40 milliGRAM(s) IV Push once  glucagon  Injectable 1 milliGRAM(s) IntraMuscular once  insulin lispro (ADMELOG) corrective regimen sliding scale   SubCutaneous three times a day before meals  insulin lispro (ADMELOG) corrective regimen sliding scale   SubCutaneous at bedtime  isosorbide   mononitrate ER Tablet (IMDUR) 30 milliGRAM(s) Oral daily  metoprolol tartrate 25 milliGRAM(s) Oral two times a day  midodrine. 10 milliGRAM(s) Oral three times a day    MEDICATIONS  (PRN):  acetaminophen   Tablet .. 650 milliGRAM(s) Oral every 6 hours PRN Temp greater or equal to 38C (100.4F), Mild Pain (1 - 3)  acetaminophen  Suppository .. 650 milliGRAM(s) Rectal every 8 hours PRN Temp greater or equal to 38C (100.4F)    REVIEW OF SYSTEMS:  not able to provide.    Vital Signs Last 24 Hrs  T(C): 36.4 (2021 15:50), Max: 38 (2021 04:55)  T(F): 97.5 (2021 15:50), Max: 100.4 (2021 04:55)  HR: 79 (2021 18:24) (76 - 93)  BP: 98/56 (2021 18:24) (88/60 - 138/63)  BP(mean): --  RR: 19 (2021 15:50) (16 - 22)  SpO2: 97% (2021 15:50) (92% - 100%)    PHYSICAL EXAM:  GEN:         Awake, responsive and comfortable.  HEENT:    Normal.    RESP:       no distress  CVS:          Regular rate and rhythm.   ABD:         Soft, non-tender, non-distended;   SKIN:           Warm and dry.  EXTR:            No clubbing, cyanosis or edema  CNS:              Intact sensory and motor function.  PSYCH:        cooperative, no anxiety or depression    LABS:                        7.7    5.73  )-----------( 48       ( 2021 07:33 )             25.2     02-13    137  |  106  |  65<H>  ----------------------------<  236<H>  5.5<H>   |  25  |  1.77<H>    Ca    8.1<L>      2021 07:33  Phos  4.1     02-12  Mg     3.2     02-12    TPro  5.8<L>  /  Alb  2.0<L>  /  TBili  0.5  /  DBili  x   /  AST  18  /  ALT  22  /  AlkPhos  61  02-12    PT/INR - ( 2021 07:33 )   PT: 20.8 sec;   INR: 1.85 ratio      Urinalysis Basic - ( 2021 17:32 )    Color: Yellow / Appearance: Slightly Turbid / S.020 / pH: x  Gluc: x / Ketone: Trace  / Bili: Negative / Urobili: 1 mg/dL   Blood: x / Protein: 30 mg/dL / Nitrite: Negative   Leuk Esterase: Trace / RBC: 11-25 /HPF / WBC 0-2   Sq Epi: x / Non Sq Epi: Few / Bacteria: Many    EKG: A Fib    RADIOLOGY & ADDITIONAL STUDIES:  < from: Xray Chest 1 View- PORTABLE-Urgent (Xray Chest 1 View- PORTABLE-Urgent .) (21 @ 17:34) >  EXAM:  XR CHEST PORTABLE URGENT 1V                          PROCEDURE DATE:  2021      INTERPRETATION:  Chest one view    HISTORY: Change in mental status    COMPARISON STUDY: None available    Frontal expiratory view of the chest showsthe heart to be enlarged in size. Left cardiac defibrillator, mitral valve and aortic valve rings are noted.    The lungs show moderate pulmonary congestion with small pleural effusions and there is no evidence of pneumothorax.    IMPRESSION:  Congestive changes as noted.    Thank you for the courtesy of this referral.    ROBBY DE JESUS MD; Attending Interventional Radiologist  This document has been electronically signed. 2021  8:25AM    ASSESSMENT AND PLAN:  ·	SOB.  ·	Pulmonary HTN.  ·	Acute metabolic Encephalopathy.  ·	Chronic Systolic/diastolic CHF.  ·	Chronic A Fib.  ·	S/P PPM/AICD.  ·	S/P AVR/MVR.  ·	HTN.  ·	HLD.  ·	DM.  ·	Anemia.  ·	Thrombocytopenia  ·	COVID 19.  ·	Renal Insuffiencey.    SPO2 97% on nasal O2.  I could not find Echo from [facility]  Will order Echo.  Obtain SPO2 on room air.
HPI:  Pt is a 89 y/o female w/pmhx of HTN, HLD, DM2,MVR/AVR,thrombocytopenia,  Afib was on coumadin now on eliquis, s/p AICD/PPM,  CHFrEF was admitted at [facility] on 1/22 for covid and elevated pna then transferred to [facility] for rehab, where family reported that pt has been more lethargic and confused when they were facetiming her and sent in.  pt just states not feeling well, but not able to offer specific sx's.  no reported fever, chills, no changes in breathing, no cp,n/v/dc.  ***Pt has 2 MRN- [facility] [id number]***  TTE 2/4/21  Normal left ventricular systolic function. No segmental  wall motion abnormalities.  Bioprosthetic mitral valve. Gradient (12.9 mmHg at  60/min)  is severely increased for a prosthetic mitral valve.  Bioprosthetic aortic valve. Gradient across the aortic  valve cannot be estimated from this study.  Severe right ventricular enlargement with severely  decreased right ventricular systolic function.  Severe pulmonary hypertension.  Mild dilatation of the aortic root. Moderate dilitation of  the ascending aorta.  A device wire is noted in the right heart. (12 Feb 2021 20:05)    PERTINENT PM/SXH:   Folate deficiency anemia    Congestive heart failure    Type 2 diabetes mellitus    Hyperlipidemia    Pneumonia    Acute respiratory failure with hypoxia    Metabolic encephalopathy      S/P AVR      FAMILY HISTORY:  No pertinent family history in first degree relatives        SOCIAL HISTORY:   Significant other/partner: Yes [ ]  No [x ] Children:  Yes [ x]  No [ ] Zoroastrian/Spirituality: Jew  Substance hx: Yes[ ]  No [ x]   Tobacco hx:  Yes [ ] No [ x]   Alcohol hx: Yes [ ] No [x ]   Home Opioid hx:  Yes [ ] No [x ]  [ ] I-Stop Reference No:[id number]  Living Situation: [ ]Home  [ ]Long term care  [x ]Rehab [ ]Other    ADVANCE DIRECTIVES:    DNR  Yes    MOLST  Yes [ x] No [ ]  Living Will  Yes [ ]  No [x ]     [ x] Health Care Proxy(s)  [ ] Surrogate(s)  [ ] Guardian           Name(s): Phone Number(s): Craig Singh     BASELINE (I)ADL(s) (prior to admission):  Cochise: [ ]Total  [ ] Moderate [x ]Dependent    Allergies    No Known Allergies    Intolerances    MEDICATIONS  (STANDING):  atorvastatin 40 milliGRAM(s) Oral at bedtime  cefTRIAXone   IVPB 1000 milliGRAM(s) IV Intermittent every 24 hours  chlorhexidine 4% Liquid 1 Application(s) Topical daily  dextrose 40% Gel 15 Gram(s) Oral once  dextrose 5%. 1000 milliLiter(s) (100 mL/Hr) IV Continuous <Continuous>  dextrose 5%. 1000 milliLiter(s) (50 mL/Hr) IV Continuous <Continuous>  dextrose 50% Injectable 25 Gram(s) IV Push once  dextrose 50% Injectable 12.5 Gram(s) IV Push once  dextrose 50% Injectable 25 Gram(s) IV Push once  enoxaparin Injectable 80 milliGRAM(s) SubCutaneous daily  folic acid 1 milliGRAM(s) Oral daily  glucagon  Injectable 1 milliGRAM(s) IntraMuscular once  insulin lispro (ADMELOG) corrective regimen sliding scale   SubCutaneous three times a day before meals  insulin lispro (ADMELOG) corrective regimen sliding scale   SubCutaneous at bedtime  isosorbide   mononitrate ER Tablet (IMDUR) 30 milliGRAM(s) Oral daily  metoprolol tartrate 25 milliGRAM(s) Oral two times a day  midodrine. 10 milliGRAM(s) Oral three times a day    MEDICATIONS  (PRN):  acetaminophen   Tablet .. 650 milliGRAM(s) Oral every 6 hours PRN Temp greater or equal to 38C (100.4F), Mild Pain (1 - 3)  acetaminophen  Suppository .. 650 milliGRAM(s) Rectal every 8 hours PRN Temp greater or equal to 38C (100.4F)  polyethylene glycol 3350 17 Gram(s) Oral daily PRN Constipation  sodium chloride 0.65% Nasal 1 Spray(s) Both Nostrils four times a day PRN Nasal Congestion    PRESENT SYMPTOMS: [ ]Unable to obtain due to poor mentation   Source if other than patient:  [ ]Family   [ ]Team     Pain (Impact on QOL):  denies pain  Location -   Severity -        Minimal acceptable level (0-10 scale):  Quality:   Onset:   Duration:                 Aggravating factors -  Relieving factors -  Radiation -    PAIN AD Score:     http://[email or web address]/cog/painad.pdf (press ctrl +  left click to view)    Dyspnea:  Yes [ ] No [ x] - [ ]Mild [ ]Moderate [ ]Severe  Anxiety:    Yes [ ] No [ x] - [ ]Mild [ ]Moderate [ ]Severe  Fatigue:    Yes [ ] No [x ] - [ ]Mild [ ]Moderate [ ]Severe  Nausea:    Yes [ ] No [ x] - [ ]Mild [ ]Moderate [ ]Severe                         Loss of appetite: Yes [ ] No [x ] - [ ]Mild [ ]Moderate [ ]Severe             Constipation:  Yes [ ] No [x ] - [ ]Mild [ ]Moderate [ ]Severe  Grief: Yes [ ] No [x ]     Other Symptoms:  [ ]All other review of systems negative     Karnofsky Performance Score/Palliative Performance Status Version 2:       30  %    http://palliative.info/resource_material/[email or web address].pdf    PHYSICAL EXAM:  Vital Signs Last 24 Hrs  T(C): 36.9 (16 Feb 2021 16:20), Max: 36.9 (16 Feb 2021 11:08)  T(F): 98.4 (16 Feb 2021 16:20), Max: 98.4 (16 Feb 2021 11:08)  HR: 73 (16 Feb 2021 16:20) (73 - 100)  BP: 86/51 (16 Feb 2021 16:20) (86/51 - 117/74)  BP(mean): --  RR: 17 (16 Feb 2021 16:20) (17 - 18)  SpO2: 91% (16 Feb 2021 16:20) (91% - 96%) I&O's Summary    15 Feb 2021 07:01  -  16 Feb 2021 07:00  --------------------------------------------------------  IN: 100 mL / OUT: 0 mL / NET: 100 mL        GENERAL:  [x ]Alert  [x ]Oriented x 2   [ ]Lethargic  [ ]Cachexia  [ ]Unarousable  [ x]Verbal  [ ]Non-Verbal  Behavioral:   [ ] Anxiety  [ ] Delirium [ ] Agitation [ ] Other  HEENT:  [ ]Normal   [x ]Dry mouth   [ ]ET Tube/Trach  [ ]Oral lesions  PULMONARY:   [ x]Clear  [ ]Tachypnea  [ ]Audible excessive secretions   [ ]Rhonchi        [ ]Right [ ]Left [ ]Bilateral  [ ]Crackles        [ ]Right [ ]Left [ ]Bilateral  [ ]Wheezing     [ ]Right [ ]Left [ ]Bilateral  CARDIOVASCULAR:    [ ]Regular [ x]Irregular [ ]Tachy  [ ]Vladimir [ ]Murmur [ ]Other  GASTROINTESTINAL:  [ x]Soft  [ ]Distended   [x ]+BS  [ x]Non tender [ ]Tender  [ ]PEG [ ]OGT/ NGT  Last BM: 2/16    GENITOURINARY:  [ ]Normal [x ] Incontinent   [ ]Oliguria/Anuria   [ ]Arenas  MUSCULOSKELETAL:   [ ]Normal   [ ]Weakness  [x ]Bed/Wheelchair bound [x ]Edema  NEUROLOGIC:   [ ]No focal deficits  [ x] Cognitive impairment  [ ] Dysphagia [ ]Dysarthria [ ] Paresis [ ]Other   SKIN:   [ ]Normal   [ x]Pressure ulcer(s) R buttock stage II [ ]Rash    LABS:                        9.4    8.11  )-----------( 68       ( 16 Feb 2021 06:24 )             30.9   02-16    145  |  110<H>  |  80<H>  ----------------------------<  158<H>  4.5   |  26  |  2.00<H>    Ca    7.9<L>      16 Feb 2021 06:24    TPro  5.8<L>  /  Alb  1.8<L>  /  TBili  0.7  /  DBili  x   /  AST  39<H>  /  ALT  30  /  AlkPhos  94  02-16        RADIOLOGY & ADDITIONAL STUDIES: < from: Xray Chest 1 View- PORTABLE-Urgent (Xray Chest 1 View- PORTABLE-Urgent .) (02.12.21 @ 17:34) >    EXAM:  XR CHEST PORTABLE URGENT 1V                            PROCEDURE DATE:  02/12/2021          INTERPRETATION:  Chest one view    HISTORY: Change in mental status    COMPARISON STUDY: None available    Frontal expiratory view of the chest showsthe heart to be enlarged in size. Left cardiac defibrillator, mitral valve and aortic valve rings are noted.    The lungs show moderate pulmonary congestion with small pleural effusions and there is no evidence of pneumothorax.    IMPRESSION:  Congestive changes as noted.    Thank you for the courtesy of this referral.            ROBBY DE JESUS MD; Attending Interventional Radiologist  This document has been electronically signed. Feb 13 2021  8:25AM    < end of copied text >  < from: CT Head No Cont (02.12.21 @ 17:58) >    EXAM:  CT BRAIN                            PROCEDURE DATE:  02/12/2021          INTERPRETATION:  Exam Date: 2/12/2021 4:52 PM    CT head without IV contrast    CLINICAL INFORMATION: confusion AMS    TECHNIQUE: Contiguous axial sections were obtained through the head.   Coronal and sagittal reformats were obtained.    COMPARISON: None    FINDINGS:  The ventricles and sulci are prominent, compatible with age-related generalized cerebral volume loss.   There is no CT evidence for acute cerebral cortical infarct. There is no evidence of hemorrhage. Small chronic lacunar infarct in the right cerebellum. There is periventricular and subcortical white matter hypoattenuation,  most compatible with chronic microvascular ischemic changes.   No mass effect is found in the brain.  There is no midline shift or herniation pattern.    Very small amount of mucosal inflammation in the left sphenoid sinus.    Partially calcified soft tissue cyst in the left posterior scalp.    IMPRESSION:    No evidence of acute intracranial abnormality.  No evidence of hemorrhage.    Chronic changes as above.              KATELYNN BARRAGAN MD; Attending Radiologist  This document has been electronically signed. Feb 12 2021  5:56PM    < end of copied text >      PROTEIN CALORIE MALNUTRITION PRESENT: [ x] Yes [ ] No  [x ] PPSV2 < or = to 30% [x ] significant weight loss  [x ] poor nutritional intake [ x] catabolic state [ x] anasarca     Albumin, Serum: 1.8 g/dL (02-16-21 @ 06:24)      REFERRALS:   [ ]Chaplaincy  [ ] Hospice  [ ]Child Life  [ x]Social Work  [x ]Case management [ ]Holistic Therapy   Goals of Care Discussion Document:

## 2021-02-16 NOTE — DIETITIAN INITIAL EVALUATION ADULT. - ORAL INTAKE PTA/DIET HISTORY
Pt was transfer from [facility]; pt c poor oral intake as per [facility] records <50% >1 week PTA;   Diet PTA; Mechanical Soft c Glucerna Shake 2x day

## 2021-02-16 NOTE — PROGRESS NOTE ADULT - SUBJECTIVE AND OBJECTIVE BOX
INTERVAL HPI:  89 y/o female with HTN, HLD, DM2,MVR/AVR, Thrombocytopenia,  Afib was on coumadin now on Eliquis s/p AICD/PPM,  CHFrEF.   Was admitted at [facility] on 1/22 for covid and elevated pna then transferred to [facility] for rehab, where family reported that pt has been more lethargic and confused when they were face timing her and sent in.  pt just states not feeling well, but not able to offer specific sx's.  no reported fever, chills, no changes in breathing, no cp,n/v/dc. At times says she was SOB.    OVERNIGHT EVENTS:  Responsive and comfortable      Vital Signs Last 24 Hrs  T(C): 36.9 (16 Feb 2021 11:08), Max: 36.9 (16 Feb 2021 11:08)  T(F): 98.4 (16 Feb 2021 11:08), Max: 98.4 (16 Feb 2021 11:08)  HR: 100 (16 Feb 2021 11:08) (74 - 100)  BP: 117/74 (16 Feb 2021 11:08) (85/60 - 117/74)  BP(mean): 68 (15 Feb 2021 15:57) (68 - 68)  RR: 18 (16 Feb 2021 11:08) (18 - 18)  SpO2: 94% (16 Feb 2021 11:08) (93% - 96%)        PHYSICAL EXAM:  GEN:         Awake, responsive and comfortable.  HEENT:    Normal.    RESP:       no distress  CVS:             Regular rate and rhythm.   ABD:         Soft, non-tender, non-distended;           MEDICATIONS  (STANDING):  atorvastatin 40 milliGRAM(s) Oral at bedtime  cefTRIAXone   IVPB 1000 milliGRAM(s) IV Intermittent every 24 hours  chlorhexidine 4% Liquid 1 Application(s) Topical daily  dextrose 40% Gel 15 Gram(s) Oral once  dextrose 5%. 1000 milliLiter(s) (50 mL/Hr) IV Continuous <Continuous>  dextrose 5%. 1000 milliLiter(s) (100 mL/Hr) IV Continuous <Continuous>  dextrose 50% Injectable 25 Gram(s) IV Push once  dextrose 50% Injectable 12.5 Gram(s) IV Push once  dextrose 50% Injectable 25 Gram(s) IV Push once  enoxaparin Injectable 80 milliGRAM(s) SubCutaneous daily  folic acid 1 milliGRAM(s) Oral daily  glucagon  Injectable 1 milliGRAM(s) IntraMuscular once  insulin lispro (ADMELOG) corrective regimen sliding scale   SubCutaneous three times a day before meals  insulin lispro (ADMELOG) corrective regimen sliding scale   SubCutaneous at bedtime  isosorbide   mononitrate ER Tablet (IMDUR) 30 milliGRAM(s) Oral daily  metoprolol tartrate 25 milliGRAM(s) Oral two times a day  midodrine. 10 milliGRAM(s) Oral three times a day    MEDICATIONS  (PRN):  acetaminophen   Tablet .. 650 milliGRAM(s) Oral every 6 hours PRN Temp greater or equal to 38C (100.4F), Mild Pain (1 - 3)  acetaminophen  Suppository .. 650 milliGRAM(s) Rectal every 8 hours PRN Temp greater or equal to 38C (100.4F)  polyethylene glycol 3350 17 Gram(s) Oral daily PRN Constipation  sodium chloride 0.65% Nasal 1 Spray(s) Both Nostrils four times a day PRN Nasal Congestion        LABS:                        9.4    8.11  )-----------( 68       ( 16 Feb 2021 06:24 )             30.9     02-16    145  |  110<H>  |  80<H>  ----------------------------<  158<H>  4.5   |  26  |  2.00<H>    Ca    7.9<L>      16 Feb 2021 06:24    TPro  5.8<L>  /  Alb  1.8<L>  /  TBili  0.7  /  DBili  x   /  AST  39<H>  /  ALT  30  /  AlkPhos  94  02-16     ASSESSMENT AND PLAN:  ·	SOB.  ·	Pulmonary HTN.  ·	Acute metabolic Encephalopathy.  ·	Chronic Systolic/diastolic CHF.  ·	Chronic A Fib.  ·	S/P PPM/AICD.  ·	S/P AVR/MVR.  ·	HTN.  ·	HLD.  ·	DM.  ·	Anemia.  ·	Thrombocytopenia  ·	COVID 19.  ·	Renal Insuffiencey.    Oxygenation stable.  Awaiting Echo.

## 2021-02-17 LAB
ANION GAP SERPL CALC-SCNC: 10 MMOL/L — SIGNIFICANT CHANGE UP (ref 5–17)
BUN SERPL-MCNC: 92 MG/DL — HIGH (ref 7–23)
CALCIUM SERPL-MCNC: 8 MG/DL — LOW (ref 8.5–10.1)
CHLORIDE SERPL-SCNC: 110 MMOL/L — HIGH (ref 96–108)
CO2 SERPL-SCNC: 24 MMOL/L — SIGNIFICANT CHANGE UP (ref 22–31)
CREAT SERPL-MCNC: 2.27 MG/DL — HIGH (ref 0.5–1.3)
GLUCOSE BLDC GLUCOMTR-MCNC: 213 MG/DL — HIGH (ref 70–99)
GLUCOSE BLDC GLUCOMTR-MCNC: 238 MG/DL — HIGH (ref 70–99)
GLUCOSE BLDC GLUCOMTR-MCNC: 249 MG/DL — HIGH (ref 70–99)
GLUCOSE BLDC GLUCOMTR-MCNC: 289 MG/DL — HIGH (ref 70–99)
GLUCOSE SERPL-MCNC: 173 MG/DL — HIGH (ref 70–99)
HCT VFR BLD CALC: 32.8 % — LOW (ref 34.5–45)
HGB BLD-MCNC: 10.2 G/DL — LOW (ref 11.5–15.5)
MCHC RBC-ENTMCNC: 27.4 PG — SIGNIFICANT CHANGE UP (ref 27–34)
MCHC RBC-ENTMCNC: 31.1 GM/DL — LOW (ref 32–36)
MCV RBC AUTO: 88.2 FL — SIGNIFICANT CHANGE UP (ref 80–100)
NRBC # BLD: 0 /100 WBCS — SIGNIFICANT CHANGE UP (ref 0–0)
PLATELET # BLD AUTO: 79 K/UL — LOW (ref 150–400)
POTASSIUM SERPL-MCNC: 4.7 MMOL/L — SIGNIFICANT CHANGE UP (ref 3.5–5.3)
POTASSIUM SERPL-SCNC: 4.7 MMOL/L — SIGNIFICANT CHANGE UP (ref 3.5–5.3)
RBC # BLD: 3.72 M/UL — LOW (ref 3.8–5.2)
RBC # FLD: 21 % — HIGH (ref 10.3–14.5)
SODIUM SERPL-SCNC: 144 MMOL/L — SIGNIFICANT CHANGE UP (ref 135–145)
WBC # BLD: 10.1 K/UL — SIGNIFICANT CHANGE UP (ref 3.8–10.5)
WBC # FLD AUTO: 10.1 K/UL — SIGNIFICANT CHANGE UP (ref 3.8–10.5)

## 2021-02-17 PROCEDURE — 99233 SBSQ HOSP IP/OBS HIGH 50: CPT

## 2021-02-17 RX ORDER — ZOLPIDEM TARTRATE 10 MG/1
5 TABLET ORAL ONCE
Refills: 0 | Status: DISCONTINUED | OUTPATIENT
Start: 2021-02-17 | End: 2021-02-17

## 2021-02-17 RX ADMIN — MIDODRINE HYDROCHLORIDE 10 MILLIGRAM(S): 2.5 TABLET ORAL at 20:05

## 2021-02-17 RX ADMIN — ATORVASTATIN CALCIUM 40 MILLIGRAM(S): 80 TABLET, FILM COATED ORAL at 20:05

## 2021-02-17 RX ADMIN — Medication 4: at 08:00

## 2021-02-17 RX ADMIN — MIDODRINE HYDROCHLORIDE 10 MILLIGRAM(S): 2.5 TABLET ORAL at 06:00

## 2021-02-17 RX ADMIN — Medication 4: at 16:30

## 2021-02-17 RX ADMIN — ZOLPIDEM TARTRATE 5 MILLIGRAM(S): 10 TABLET ORAL at 20:56

## 2021-02-17 RX ADMIN — Medication 6: at 11:21

## 2021-02-17 RX ADMIN — ENOXAPARIN SODIUM 80 MILLIGRAM(S): 100 INJECTION SUBCUTANEOUS at 12:33

## 2021-02-17 RX ADMIN — CEFTRIAXONE 100 MILLIGRAM(S): 500 INJECTION, POWDER, FOR SOLUTION INTRAMUSCULAR; INTRAVENOUS at 06:00

## 2021-02-17 RX ADMIN — Medication 1 MILLIGRAM(S): at 12:33

## 2021-02-17 RX ADMIN — MIDODRINE HYDROCHLORIDE 10 MILLIGRAM(S): 2.5 TABLET ORAL at 12:33

## 2021-02-17 NOTE — PROGRESS NOTE ADULT - SUBJECTIVE AND OBJECTIVE BOX
INTERVAL HPI:  89 y/o female with HTN, HLD, DM2,MVR/AVR, Thrombocytopenia,  Afib was on coumadin now on Eliquis s/p AICD/PPM,  CHFrEF.   Was admitted at [facility] on 1/22 for covid and elevated pna then transferred to [facility] for rehab, where family reported that pt has been more lethargic and confused when they were face timing her and sent in.  pt just states not feeling well, but not able to offer specific sx's.  no reported fever, chills, no changes in breathing, no cp,n/v/dc. At times says she was SOB.    OVERNIGHT EVENTS:  Restless at times.    Vital Signs Last 24 Hrs  T(C): 36.3 (17 Feb 2021 16:33), Max: 36.6 (17 Feb 2021 00:35)  T(F): 97.3 (17 Feb 2021 16:33), Max: 97.9 (17 Feb 2021 00:35)  HR: 83 (17 Feb 2021 16:33) (63 - 83)  BP: 96/61 (17 Feb 2021 16:33) (95/63 - 137/75)  BP(mean): --  RR: 18 (17 Feb 2021 16:33) (17 - 18)  SpO2: 91% (17 Feb 2021 16:33) (91% - 96%)    PHYSICAL EXAM:  GEN:         comfortable.  HEENT:    Normal.    RESP:        no distress reported  CVS:             Regular rate and rhythm.   ABD:         Soft, non-tender, non-distended;     MEDICATIONS  (STANDING):  atorvastatin 40 milliGRAM(s) Oral at bedtime  chlorhexidine 4% Liquid 1 Application(s) Topical daily  dextrose 40% Gel 15 Gram(s) Oral once  dextrose 5%. 1000 milliLiter(s) (50 mL/Hr) IV Continuous <Continuous>  dextrose 5%. 1000 milliLiter(s) (100 mL/Hr) IV Continuous <Continuous>  dextrose 50% Injectable 25 Gram(s) IV Push once  dextrose 50% Injectable 12.5 Gram(s) IV Push once  dextrose 50% Injectable 25 Gram(s) IV Push once  enoxaparin Injectable 80 milliGRAM(s) SubCutaneous daily  folic acid 1 milliGRAM(s) Oral daily  glucagon  Injectable 1 milliGRAM(s) IntraMuscular once  insulin lispro (ADMELOG) corrective regimen sliding scale   SubCutaneous three times a day before meals  insulin lispro (ADMELOG) corrective regimen sliding scale   SubCutaneous at bedtime  metoprolol tartrate 25 milliGRAM(s) Oral two times a day  midodrine. 10 milliGRAM(s) Oral three times a day    MEDICATIONS  (PRN):  acetaminophen   Tablet .. 650 milliGRAM(s) Oral every 6 hours PRN Temp greater or equal to 38C (100.4F), Mild Pain (1 - 3)  acetaminophen  Suppository .. 650 milliGRAM(s) Rectal every 8 hours PRN Temp greater or equal to 38C (100.4F)  polyethylene glycol 3350 17 Gram(s) Oral daily PRN Constipation  sodium chloride 0.65% Nasal 1 Spray(s) Both Nostrils four times a day PRN Nasal Congestion    LABS:                        10.2   10.10 )-----------( 79       ( 17 Feb 2021 07:16 )             32.8     02-17    144  |  110<H>  |  92<H>  ----------------------------<  173<H>  4.7   |  24  |  2.27<H>    Ca    8.0<L>      17 Feb 2021 07:16    TPro  5.8<L>  /  Alb  1.8<L>  /  TBili  0.7  /  DBili  x   /  AST  39<H>  /  ALT  30  /  AlkPhos  94  02-16     ASSESSMENT AND PLAN:  ·	SOB.  ·	Pulmonary HTN.  ·	Acute metabolic Encephalopathy.  ·	Chronic Systolic/diastolic CHF.  ·	Chronic A Fib.  ·	S/P PPM/AICD.  ·	S/P AVR/MVR.  ·	HTN.  ·	HLD.  ·	DM.  ·	Anemia.  ·	Thrombocytopenia  ·	COVID 19.  ·	Renal Insuffiencey.    Echo report from [facility] reviewed( copy in shadow chart).   May benefit from Sildenafil but BP is towards lower side.  Ambien for sleep.

## 2021-02-17 NOTE — PROGRESS NOTE ADULT - SUBJECTIVE AND OBJECTIVE BOX
[facility] NEPHROLOGY SERVICES, [facility]  NEPHROLOGY AND HYPERTENSION  [address]  [address]  [place] [address]  [phone]    MD ABRIL OLIVA MD ANDREY GONCHARUK, MD MADHU KORRAPATI, MD YELENA ROSENBERG, MD BINNY KOSHY, MD CHRISTOPHER CAPUTO, MD EDWARD BOVER, MD          Patient events noted  No distress confused     MEDICATIONS  (STANDING):  atorvastatin 40 milliGRAM(s) Oral at bedtime  chlorhexidine 4% Liquid 1 Application(s) Topical daily  dextrose 40% Gel 15 Gram(s) Oral once  dextrose 5%. 1000 milliLiter(s) (50 mL/Hr) IV Continuous <Continuous>  dextrose 5%. 1000 milliLiter(s) (100 mL/Hr) IV Continuous <Continuous>  dextrose 50% Injectable 25 Gram(s) IV Push once  dextrose 50% Injectable 12.5 Gram(s) IV Push once  dextrose 50% Injectable 25 Gram(s) IV Push once  enoxaparin Injectable 80 milliGRAM(s) SubCutaneous daily  folic acid 1 milliGRAM(s) Oral daily  glucagon  Injectable 1 milliGRAM(s) IntraMuscular once  insulin lispro (ADMELOG) corrective regimen sliding scale   SubCutaneous three times a day before meals  insulin lispro (ADMELOG) corrective regimen sliding scale   SubCutaneous at bedtime  metoprolol tartrate 25 milliGRAM(s) Oral two times a day  midodrine. 10 milliGRAM(s) Oral three times a day    MEDICATIONS  (PRN):  acetaminophen   Tablet .. 650 milliGRAM(s) Oral every 6 hours PRN Temp greater or equal to 38C (100.4F), Mild Pain (1 - 3)  acetaminophen  Suppository .. 650 milliGRAM(s) Rectal every 8 hours PRN Temp greater or equal to 38C (100.4F)  polyethylene glycol 3350 17 Gram(s) Oral daily PRN Constipation  sodium chloride 0.65% Nasal 1 Spray(s) Both Nostrils four times a day PRN Nasal Congestion      02-17-21 @ 07:01  -  02-17-21 @ 16:23  --------------------------------------------------------  IN: 240 mL / OUT: 0 mL / NET: 240 mL      PHYSICAL EXAM:      T(C): 36.6 (02-17-21 @ 11:30), Max: 36.6 (02-17-21 @ 00:35)  HR: 79 (02-17-21 @ 11:30) (63 - 82)  BP: 96/53 (02-17-21 @ 11:30) (95/63 - 137/75)  RR: 17 (02-17-21 @ 11:30) (17 - 17)  SpO2: 95% (02-17-21 @ 11:30) (95% - 96%)  Wt(kg): --  Lungs clear  Heart S1S2  Abd soft NT ND  Extremities:   tr edema                                    10.2   10.10 )-----------( 79       ( 17 Feb 2021 07:16 )             32.8     02-17    144  |  110<H>  |  92<H>  ----------------------------<  173<H>  4.7   |  24  |  2.27<H>    Ca    8.0<L>      17 Feb 2021 07:16    TPro  5.8<L>  /  Alb  1.8<L>  /  TBili  0.7  /  DBili  x   /  AST  39<H>  /  ALT  30  /  AlkPhos  94  02-16      LIVER FUNCTIONS - ( 16 Feb 2021 06:24 )  Alb: 1.8 g/dL / Pro: 5.8 gm/dL / ALK PHOS: 94 U/L / ALT: 30 U/L / AST: 39 U/L / GGT: x           Creatinine Trend: 2.27<--, 2.00<--, 1.92<--, 1.90<--, 1.77<--, 1.87<--      Assessment   MICHELLE CKD 3-4; lower MAP; pre renal suspected     Plan:  Judicious hypotonic fluid     Kevon Deshpande MD [facility] NEPHROLOGY SERVICES, [facility]  NEPHROLOGY AND HYPERTENSION  [address]  [address]  [place] [address]  [phone]    MD ABRIL OLIVA MD ANDREY GONCHARUK, MD MADHU KORRAPATI, MD YELENA ROSENBERG, MD BINNY KOSHY, MD CHRISTOPHER CAPUTO, MD EDWARD BOVER, MD          Patient events noted  No distress confused     MEDICATIONS  (STANDING):  atorvastatin 40 milliGRAM(s) Oral at bedtime  chlorhexidine 4% Liquid 1 Application(s) Topical daily  dextrose 40% Gel 15 Gram(s) Oral once  dextrose 5%. 1000 milliLiter(s) (50 mL/Hr) IV Continuous <Continuous>  dextrose 5%. 1000 milliLiter(s) (100 mL/Hr) IV Continuous <Continuous>  dextrose 50% Injectable 25 Gram(s) IV Push once  dextrose 50% Injectable 12.5 Gram(s) IV Push once  dextrose 50% Injectable 25 Gram(s) IV Push once  enoxaparin Injectable 80 milliGRAM(s) SubCutaneous daily  folic acid 1 milliGRAM(s) Oral daily  glucagon  Injectable 1 milliGRAM(s) IntraMuscular once  insulin lispro (ADMELOG) corrective regimen sliding scale   SubCutaneous three times a day before meals  insulin lispro (ADMELOG) corrective regimen sliding scale   SubCutaneous at bedtime  metoprolol tartrate 25 milliGRAM(s) Oral two times a day  midodrine. 10 milliGRAM(s) Oral three times a day    MEDICATIONS  (PRN):  acetaminophen   Tablet .. 650 milliGRAM(s) Oral every 6 hours PRN Temp greater or equal to 38C (100.4F), Mild Pain (1 - 3)  acetaminophen  Suppository .. 650 milliGRAM(s) Rectal every 8 hours PRN Temp greater or equal to 38C (100.4F)  polyethylene glycol 3350 17 Gram(s) Oral daily PRN Constipation  sodium chloride 0.65% Nasal 1 Spray(s) Both Nostrils four times a day PRN Nasal Congestion      02-17-21 @ 07:01  -  02-17-21 @ 16:23  --------------------------------------------------------  IN: 240 mL / OUT: 0 mL / NET: 240 mL      PHYSICAL EXAM:      T(C): 36.6 (02-17-21 @ 11:30), Max: 36.6 (02-17-21 @ 00:35)  HR: 79 (02-17-21 @ 11:30) (63 - 82)  BP: 96/53 (02-17-21 @ 11:30) (95/63 - 137/75)  RR: 17 (02-17-21 @ 11:30) (17 - 17)  SpO2: 95% (02-17-21 @ 11:30) (95% - 96%)  Wt(kg): --  Lungs clear  Heart S1S2  Abd soft NT ND  Extremities:   tr edema                                    10.2   10.10 )-----------( 79       ( 17 Feb 2021 07:16 )             32.8     02-17    144  |  110<H>  |  92<H>  ----------------------------<  173<H>  4.7   |  24  |  2.27<H>    Ca    8.0<L>      17 Feb 2021 07:16    TPro  5.8<L>  /  Alb  1.8<L>  /  TBili  0.7  /  DBili  x   /  AST  39<H>  /  ALT  30  /  AlkPhos  94  02-16      LIVER FUNCTIONS - ( 16 Feb 2021 06:24 )  Alb: 1.8 g/dL / Pro: 5.8 gm/dL / ALK PHOS: 94 U/L / ALT: 30 U/L / AST: 39 U/L / GGT: x           Creatinine Trend: 2.27<--, 2.00<--, 1.92<--, 1.90<--, 1.77<--, 1.87<--      Assessment   MICHELLE CKD 3-4; lower MAP; CRS/ pre renal suspected     Plan:  Holding diuretic regimen    Kevon Deshpande MD

## 2021-02-17 NOTE — PROGRESS NOTE ADULT - ASSESSMENT
89 y/o female w/pmhx of HTN, HLD, DM2,MVR/AVR,thrombocytopenia,  Afib recently at [facility] for COVID, after which was in rehab at [facility]- pt admitted at [facility] for lethargy/AMS. Palliative care consulted for assistance with symptoms and ongoing GOC, Pt has MOLST DNR/I in chart.  89 y/o female w/pmhx of HTN, HLD, DM2,MVR/AVR,thrombocytopenia,  Afib recently at [facility] for COVID, after which was in rehab at [facility]- pt admitted at [facility] for lethargy/AMS. Palliative care consulted for assistance with symptoms and ongoing GOC, Pt has MOLST DNR/I in chart.   ***Pt has 2 N- [facility] [id number]***

## 2021-02-17 NOTE — PROGRESS NOTE ADULT - SUBJECTIVE AND OBJECTIVE BOX
INTERVAL HPI/OVERNIGHT EVENTS: none    Code Status:  DNR/I  Allergies    No Known Allergies    Intolerances    MEDICATIONS  (STANDING):  atorvastatin 40 milliGRAM(s) Oral at bedtime  chlorhexidine 4% Liquid 1 Application(s) Topical daily  dextrose 40% Gel 15 Gram(s) Oral once  dextrose 5%. 1000 milliLiter(s) (50 mL/Hr) IV Continuous <Continuous>  dextrose 5%. 1000 milliLiter(s) (100 mL/Hr) IV Continuous <Continuous>  dextrose 50% Injectable 25 Gram(s) IV Push once  dextrose 50% Injectable 12.5 Gram(s) IV Push once  dextrose 50% Injectable 25 Gram(s) IV Push once  enoxaparin Injectable 80 milliGRAM(s) SubCutaneous daily  folic acid 1 milliGRAM(s) Oral daily  glucagon  Injectable 1 milliGRAM(s) IntraMuscular once  insulin lispro (ADMELOG) corrective regimen sliding scale   SubCutaneous three times a day before meals  insulin lispro (ADMELOG) corrective regimen sliding scale   SubCutaneous at bedtime  metoprolol tartrate 25 milliGRAM(s) Oral two times a day  midodrine. 10 milliGRAM(s) Oral three times a day    MEDICATIONS  (PRN):  acetaminophen   Tablet .. 650 milliGRAM(s) Oral every 6 hours PRN Temp greater or equal to 38C (100.4F), Mild Pain (1 - 3)  acetaminophen  Suppository .. 650 milliGRAM(s) Rectal every 8 hours PRN Temp greater or equal to 38C (100.4F)  polyethylene glycol 3350 17 Gram(s) Oral daily PRN Constipation  sodium chloride 0.65% Nasal 1 Spray(s) Both Nostrils four times a day PRN Nasal Congestion      PRESENT SYMPTOMS: [ ]Unable to obtain due to poor mentation   Source if other than patient:  [ ]Family   [ ]Team     Pain (Impact on QOL):  denies pain   Location:  Severity:  Minimal acceptable level (0-10 scale):       Quality:       Onset:  Duration:  Aggravating factors:  Relieving Factors  Radiation:    Dyspnea:  Yes [ ] No [x ] - [ ]Mild [ ]Moderate [ ]Severe  Anxiety:    Yes [ ] No [ x] - [ ]Mild [ ]Moderate [ ]Severe  Fatigue:    Yes [ ] No [x ] - [ ]Mild [ ]Moderate [ ]Severe  Nausea:    Yes [ ] No [x ] - [ ]Mild [ ]Moderate [ ]Severe                         Loss of appetite: Yes [x ] No [ ] - [ ]Mild [x ]Moderate [ ]Severe             Constipation:  Yes [ x] No [ ] - [ ]Mild [ ]Moderate [ x]Severe  Grief: Yes [ ] No [x ]     PAIN AD Score:	  [email or web address].[email or web address] (Ctrl + left click to view)    Other Symptoms:  [ x]All other review of systems negative     Karnofsky Performance Score/Palliative Performance Status Version 2:      30  %    http://palliative.info/resource_material/[email or web address].pdf    PHYSICAL EXAM:  Vital Signs Last 24 Hrs  T(C): 36.6 (17 Feb 2021 11:30), Max: 36.9 (16 Feb 2021 16:20)  T(F): 97.9 (17 Feb 2021 11:30), Max: 98.4 (16 Feb 2021 16:20)  HR: 79 (17 Feb 2021 11:30) (63 - 91)  BP: 96/53 (17 Feb 2021 11:30) (86/51 - 137/75)  BP(mean): --  RR: 17 (17 Feb 2021 11:30) (17 - 18)  SpO2: 95% (17 Feb 2021 11:30) (91% - 96%) I&O's Summary       GENERAL:  [ ]Alert  [ x]Oriented x2   [ x]Lethargic  [ ]Cachexia  [ ]Unarousable  [ x]Verbal  [ ]Non-Verbal  Behavioral:   [ ] Anxiety  [ ] Delirium [ ] Agitation [ ] Other  HEENT:  [ ]Normal   [x ]Dry mouth   [ ]ET Tube/Trach  [ ]Oral lesions  PULMONARY:   [x ]Clear [ ]Tachypnea  [ ]Audible excessive secretions   [ ]Rhonchi        [ ]Right [ ]Left [ ]Bilateral  [ ]Crackles        [ ]Right [ ]Left [ ]Bilateral  [ ]Wheezing     [ ]Right [ ]Left [ ]Bilateral  CARDIOVASCULAR:    [ x]Regular [ ]Irregular [ ]Tachy  [ ]Vladimir [ ]Murmur [ ]Other  GASTROINTESTINAL:  [x ]Soft  [ ]Distended   [x]+BS  [x ]Non tender [ ]Tender  [ ]PEG [ ]OGT/ NGT   Last BM: 2/17     GENITOURINARY:  [ ]Normal [x ] Incontinent   [ ]Oliguria/Anuria   [ ][place]  MUSCULOSKELETAL:   [ ]Normal   [ ]Weakness  [x ]Bed/Wheelchair bound [x ]Edema  NEUROLOGIC:   [ ]No focal deficits  [ ] Cognitive impairment  [x Dysphagia [ ]Dysarthria [ ] Paresis [ ]Other   SKIN:   [ ]Normal   [x ]Pressure ulcer(s)- R buttock stage II [ ]Rash    CRITICAL CARE:  [ ] Shock Present  [ ]Septic [ ]Cardiogenic [ ]Neurologic [ ]Hypovolemic  [ ]  Vasopressors [ ]  Inotropes   [ ] Respiratory failure present  [ ] Acute  [ ] Chronic [ ] Hypoxic  [ ] Hypercarbic [ ] Other  [ ] Other organ failure     LABS:                        10.2   10.10 )-----------( 79       ( 17 Feb 2021 07:16 )             32.8   02-17    144  |  110<H>  |  92<H>  ----------------------------<  173<H>  4.7   |  24  |  2.27<H>    Ca    8.0<L>      17 Feb 2021 07:16    TPro  5.8<L>  /  Alb  1.8<L>  /  TBili  0.7  /  DBili  x   /  AST  39<H>  /  ALT  30  /  AlkPhos  94  02-16        RADIOLOGY & ADDITIONAL STUDIES:    Protein Calorie Malnutrition Present: [ x] yes [ ] no  [x ] PPSV2 < or = 30%  [x] significant weight loss [ x] poor nutritional intake [ x] anasarca [x ] catabolic state Albumin, Serum: 1.8 g/dL (02-16-21 @ 06:24)      REFERRALS:   [ ]Chaplaincy  [ ] Hospice  [ ]Child Life  [ ]Social Work  [ ]Case management [ ]Holistic Therapy   Goals of Care Document:

## 2021-02-17 NOTE — PROGRESS NOTE ADULT - PROBLEM SELECTOR PLAN 1
mentally pt is up and down - was alert yesterday - today I had to wake her but she came around after a short minute or 2  this is multifactorial- dehydration, renal dysfunction, COVID encephalopathy, and delirium could all play a role as pt has been moved to several different facilities in the last month or so

## 2021-02-18 LAB
ALBUMIN SERPL ELPH-MCNC: 2.1 G/DL — LOW (ref 3.3–5)
ALP SERPL-CCNC: 248 U/L — HIGH (ref 40–120)
ALT FLD-CCNC: 105 U/L — HIGH (ref 12–78)
ANION GAP SERPL CALC-SCNC: 9 MMOL/L — SIGNIFICANT CHANGE UP (ref 5–17)
AST SERPL-CCNC: 129 U/L — HIGH (ref 15–37)
BILIRUB SERPL-MCNC: 0.8 MG/DL — SIGNIFICANT CHANGE UP (ref 0.2–1.2)
BUN SERPL-MCNC: 103 MG/DL — HIGH (ref 7–23)
CALCIUM SERPL-MCNC: 7.9 MG/DL — LOW (ref 8.5–10.1)
CHLORIDE SERPL-SCNC: 111 MMOL/L — HIGH (ref 96–108)
CO2 SERPL-SCNC: 24 MMOL/L — SIGNIFICANT CHANGE UP (ref 22–31)
CREAT SERPL-MCNC: 2.61 MG/DL — HIGH (ref 0.5–1.3)
GLUCOSE BLDC GLUCOMTR-MCNC: 100 MG/DL — HIGH (ref 70–99)
GLUCOSE BLDC GLUCOMTR-MCNC: 258 MG/DL — HIGH (ref 70–99)
GLUCOSE BLDC GLUCOMTR-MCNC: 316 MG/DL — HIGH (ref 70–99)
GLUCOSE BLDC GLUCOMTR-MCNC: 76 MG/DL — SIGNIFICANT CHANGE UP (ref 70–99)
GLUCOSE SERPL-MCNC: 274 MG/DL — HIGH (ref 70–99)
HCT VFR BLD CALC: 31.7 % — LOW (ref 34.5–45)
HGB BLD-MCNC: 9.6 G/DL — LOW (ref 11.5–15.5)
MCHC RBC-ENTMCNC: 27.4 PG — SIGNIFICANT CHANGE UP (ref 27–34)
MCHC RBC-ENTMCNC: 30.3 GM/DL — LOW (ref 32–36)
MCV RBC AUTO: 90.6 FL — SIGNIFICANT CHANGE UP (ref 80–100)
NRBC # BLD: 0 /100 WBCS — SIGNIFICANT CHANGE UP (ref 0–0)
PLATELET # BLD AUTO: 66 K/UL — LOW (ref 150–400)
POTASSIUM SERPL-MCNC: 4.7 MMOL/L — SIGNIFICANT CHANGE UP (ref 3.5–5.3)
POTASSIUM SERPL-SCNC: 4.7 MMOL/L — SIGNIFICANT CHANGE UP (ref 3.5–5.3)
PROT SERPL-MCNC: 6.2 GM/DL — SIGNIFICANT CHANGE UP (ref 6–8.3)
RBC # BLD: 3.5 M/UL — LOW (ref 3.8–5.2)
RBC # FLD: 21.5 % — HIGH (ref 10.3–14.5)
SODIUM SERPL-SCNC: 144 MMOL/L — SIGNIFICANT CHANGE UP (ref 135–145)
WBC # BLD: 11.01 K/UL — HIGH (ref 3.8–10.5)
WBC # FLD AUTO: 11.01 K/UL — HIGH (ref 3.8–10.5)

## 2021-02-18 RX ORDER — DIPHENHYDRAMINE HCL 50 MG
25 CAPSULE ORAL ONCE
Refills: 0 | Status: COMPLETED | OUTPATIENT
Start: 2021-02-18 | End: 2021-02-18

## 2021-02-18 RX ORDER — ACETAMINOPHEN 500 MG
1000 TABLET ORAL ONCE
Refills: 0 | Status: COMPLETED | OUTPATIENT
Start: 2021-02-18 | End: 2021-02-18

## 2021-02-18 RX ADMIN — ATORVASTATIN CALCIUM 40 MILLIGRAM(S): 80 TABLET, FILM COATED ORAL at 21:40

## 2021-02-18 RX ADMIN — ENOXAPARIN SODIUM 80 MILLIGRAM(S): 100 INJECTION SUBCUTANEOUS at 11:36

## 2021-02-18 RX ADMIN — Medication 25 MILLIGRAM(S): at 17:12

## 2021-02-18 RX ADMIN — Medication 6: at 08:53

## 2021-02-18 RX ADMIN — Medication 400 MILLIGRAM(S): at 21:30

## 2021-02-18 RX ADMIN — Medication 8: at 11:36

## 2021-02-18 RX ADMIN — Medication 25 MILLIGRAM(S): at 04:18

## 2021-02-18 RX ADMIN — Medication 1 MILLIGRAM(S): at 11:37

## 2021-02-18 RX ADMIN — MIDODRINE HYDROCHLORIDE 10 MILLIGRAM(S): 2.5 TABLET ORAL at 21:40

## 2021-02-18 RX ADMIN — Medication 25 MILLIGRAM(S): at 02:28

## 2021-02-18 RX ADMIN — MIDODRINE HYDROCHLORIDE 10 MILLIGRAM(S): 2.5 TABLET ORAL at 13:27

## 2021-02-18 NOTE — PROGRESS NOTE ADULT - SUBJECTIVE AND OBJECTIVE BOX
NEPHROLOGY PROGRESS NOTE    CHIEF COMPLAINT:  MICHELLE/CKD    HPI:  Renal function slowly worse.  No fevers, oxygenating well.  BP sometimes only in 90's on midodrine.     EXAM:  T(F): 97.7 (02-18-21 @ 11:17)  HR: 64 (02-18-21 @ 11:17)  BP: 112/78 (02-18-21 @ 11:17)  RR: 18 (02-18-21 @ 11:17)  SpO2: 96% (02-18-21 @ 11:17)    deferred      LABS                             9.6    11.01 )-----------( 66       ( 18 Feb 2021 10:22 )             31.7          02-18    144  |  111<H>  |  103<H>  ----------------------------<  274<H>  4.7   |  24  |  2.61<H>    Ca    7.9<L>      18 Feb 2021 10:22    TPro  6.2  /  Alb  2.1<L>  /  TBili  0.8  /  DBili  x   /  AST  129<H>  /  ALT  105<H>  /  AlkPhos  248<H>  02-18    proBNP 90,000        Assessment   MICHELLE CKD 3-4; lower MAP; CRS/ pre renal suspected;  no nephrotoxic exposures observed;  virotoxic ATN possible but would be delayed as her acute covid was one month ago    Plan:  Holding diuretic regimen  Check urinalysis and urine electrolytes

## 2021-02-18 NOTE — PROGRESS NOTE ADULT - SUBJECTIVE AND OBJECTIVE BOX
INTERVAL HPI/OVERNIGHT EVENTS:        REVIEW OF SYSTEMS:  CONSTITUTIONAL:  alert  poorly  communicative      MEDICATION:  acetaminophen   Tablet .. 650 milliGRAM(s) Oral every 6 hours PRN  acetaminophen  Suppository .. 650 milliGRAM(s) Rectal every 8 hours PRN  atorvastatin 40 milliGRAM(s) Oral at bedtime  chlorhexidine 4% Liquid 1 Application(s) Topical daily  dextrose 40% Gel 15 Gram(s) Oral once  dextrose 5%. 1000 milliLiter(s) IV Continuous <Continuous>  dextrose 5%. 1000 milliLiter(s) IV Continuous <Continuous>  dextrose 50% Injectable 25 Gram(s) IV Push once  dextrose 50% Injectable 12.5 Gram(s) IV Push once  dextrose 50% Injectable 25 Gram(s) IV Push once  enoxaparin Injectable 80 milliGRAM(s) SubCutaneous daily  folic acid 1 milliGRAM(s) Oral daily  glucagon  Injectable 1 milliGRAM(s) IntraMuscular once  insulin lispro (ADMELOG) corrective regimen sliding scale   SubCutaneous three times a day before meals  insulin lispro (ADMELOG) corrective regimen sliding scale   SubCutaneous at bedtime  LORazepam   Injectable 1 milliGRAM(s) IV Push once  metoprolol tartrate 25 milliGRAM(s) Oral two times a day  midodrine. 10 milliGRAM(s) Oral three times a day  polyethylene glycol 3350 17 Gram(s) Oral daily PRN  sodium chloride 0.65% Nasal 1 Spray(s) Both Nostrils four times a day PRN    Vital Signs Last 24 Hrs  T(C): 36.4 (18 Feb 2021 04:16), Max: 36.9 (18 Feb 2021 00:23)  T(F): 97.5 (18 Feb 2021 04:16), Max: 98.4 (18 Feb 2021 00:23)  HR: 77 (18 Feb 2021 04:16) (77 - 83)  BP: 132/81 (18 Feb 2021 04:16) (96/53 - 149/87)  BP(mean): --  RR: 18 (18 Feb 2021 04:16) (17 - 18)  SpO2: 96% (18 Feb 2021 04:16) (91% - 96%)    PHYSICAL EXAM:  GENERAL: NAD, well-groomed, well-developed  HEENT : Conjuntivae  clear sclerae anicteric  NECK: Supple, No JVD, Normal thyroid  NERVOUS SYSTEM:  Alert dyasrthric     CHEST/LUNG:   decreased  bs    HEART: Regular rate and rhythm; No murmurs, rubs, or gallops  ABDOMEN: Soft, Nontender, Nondistended; Bowel sounds present  EXTREMITIES:  no  edema no  tenderness  SKIN: No rashes   LABS:                        10.2   10.10 )-----------( 79       ( 17 Feb 2021 07:16 )             32.8     02-17    144  |  110<H>  |  92<H>  ----------------------------<  173<H>  4.7   |  24  |  2.27<H>    Ca    8.0<L>      17 Feb 2021 07:16          CAPILLARY BLOOD GLUCOSE      POCT Blood Glucose.: 258 mg/dL (18 Feb 2021 08:18)  POCT Blood Glucose.: 238 mg/dL (17 Feb 2021 20:51)  POCT Blood Glucose.: 249 mg/dL (17 Feb 2021 16:24)  POCT Blood Glucose.: 289 mg/dL (17 Feb 2021 10:38)      RADIOLOGY & ADDITIONAL TESTS:    Imaging reports  Personally Reviewed:  [ ] YES  [ ] NO    Consultant(s) Notes Reviewed:  [x ] YES  [ ] NO    Care Discussed with Consultants/Other Providers [ x] YES  [ ] NO  Problem/Plan - 1:  ·  Problem: Dehydration.   hx  CHF   WILL D/C LASIX  AS  RENAL  FUNCTION  WORSENING PT  APPEARS  EUVOLEMIC  CONTINUE  TO  MONITOR   follow renal function    Problem/Plan - 2:  ·  Problem: Altered mental status, unspecified altered mental status type.  Plan: IMPROVED      Problem/Plan - 3:  ·  Problem: Chronic diastolic congestive heart failure.  Plan: currently not in failure  monitor.  BNP  MOST  LIKELY  2/2  SEVERE  PULMONARY  HTN WITH  DILATED  RT  ATRIUM    Problem/Plan - 4:  ·  Problem: Type 2 diabetes mellitus with hyperglycemia, without long-term current use of insulin.  Plan: ss insulin coverage.     Problem/Plan - 5:  ·  Problem: Thrombocytopenia. Plan: pt on prior admission plts  range  will monitor closely   will change to  lovenox as  patient  inconstant  with  oral  intake    Problem/Plan - 6:  Problem: Mixed hyperlipidemia.Plan: cont statin.    Problem/Plan - 7:  ·  Problem: MICHELLE (acute kidney injury). Plan:   OBSERVE  WITH  DECREASED  LASIX  Problem/Plan - 8:  ·  Problem: Preventive measure.  Plan: on ac.   WILL  CONTINUE  ROECPHIN  EMPIRCALLY  AVR  MVR  OBSERVATION  SEVERE  PULMONARY  HTN  ON  ECHO  AT  [facility]  O2  will  discuss  with  pulmonary benfits pili Blackmon in  this  pt  ANEMIA  C/W  CHRONIC  DISEASE  PROCESS   AMS  CT  OF BRAIN  NEURO  EVAL

## 2021-02-18 NOTE — PROGRESS NOTE ADULT - SUBJECTIVE AND OBJECTIVE BOX
INTERVAL HPI:   89 y/o female with HTN, HLD, DM2,MVR/AVR, Thrombocytopenia,  Afib was on coumadin now on Eliquis s/p AICD/PPM,  CHFrEF.   Was admitted at [facility] on 1/22 for covid and elevated pna then transferred to [facility] for rehab, where family reported that pt has been more lethargic and confused when they were face timing her and sent in.  pt just states not feeling well, but not able to offer specific sx's.  no reported fever, chills, no changes in breathing, no cp,n/v/dc. At times says she was SOB.    OVERNIGHT EVENTS:  Awake, comfortable, confused .    Vital Signs Last 24 Hrs  T(C): 36.3 (18 Feb 2021 16:30), Max: 36.9 (18 Feb 2021 00:23)  T(F): 97.3 (18 Feb 2021 16:30), Max: 98.4 (18 Feb 2021 00:23)  HR: 63 (18 Feb 2021 16:30) (63 - 81)  BP: 128/83 (18 Feb 2021 16:30) (112/78 - 149/87)  BP(mean): --  RR: 18 (18 Feb 2021 16:30) (18 - 18)  SpO2: 94% (18 Feb 2021 16:30) (94% - 96%)    PHYSICAL EXAM:  GEN:         Awake, responsive and comfortable.  HEENT:      Normal.    RESP:          no distress  CVS:           Regular rate and rhythm.   ABD:         Soft, non-tender, non-distended;     MEDICATIONS  (STANDING):  atorvastatin 40 milliGRAM(s) Oral at bedtime  chlorhexidine 4% Liquid 1 Application(s) Topical daily  dextrose 40% Gel 15 Gram(s) Oral once  dextrose 5%. 1000 milliLiter(s) (50 mL/Hr) IV Continuous <Continuous>  dextrose 5%. 1000 milliLiter(s) (100 mL/Hr) IV Continuous <Continuous>  dextrose 50% Injectable 25 Gram(s) IV Push once  dextrose 50% Injectable 12.5 Gram(s) IV Push once  dextrose 50% Injectable 25 Gram(s) IV Push once  enoxaparin Injectable 80 milliGRAM(s) SubCutaneous daily  folic acid 1 milliGRAM(s) Oral daily  glucagon  Injectable 1 milliGRAM(s) IntraMuscular once  insulin lispro (ADMELOG) corrective regimen sliding scale   SubCutaneous three times a day before meals  insulin lispro (ADMELOG) corrective regimen sliding scale   SubCutaneous at bedtime  LORazepam   Injectable 1 milliGRAM(s) IV Push once  metoprolol tartrate 25 milliGRAM(s) Oral two times a day  midodrine. 10 milliGRAM(s) Oral three times a day    MEDICATIONS  (PRN):  acetaminophen   Tablet .. 650 milliGRAM(s) Oral every 6 hours PRN Temp greater or equal to 38C (100.4F), Mild Pain (1 - 3)  acetaminophen  Suppository .. 650 milliGRAM(s) Rectal every 8 hours PRN Temp greater or equal to 38C (100.4F)  polyethylene glycol 3350 17 Gram(s) Oral daily PRN Constipation  sodium chloride 0.65% Nasal 1 Spray(s) Both Nostrils four times a day PRN Nasal Congestion    LABS:                        9.6    11.01 )-----------( 66       ( 18 Feb 2021 10:22 )             31.7     02-18    144  |  111<H>  |  103<H>  ----------------------------<  274<H>  4.7   |  24  |  2.61<H>    Ca    7.9<L>      18 Feb 2021 10:22    TPro  6.2  /  Alb  2.1<L>  /  TBili  0.8  /  DBili  x   /  AST  129<H>  /  ALT  105<H>  /  AlkPhos  248<H>  02-18       ASSESSMENT AND PLAN:  ·	SOB.  ·	Pulmonary HTN.  ·	Acute metabolic Encephalopathy.  ·	Chronic Systolic/diastolic CHF.  ·	Chronic A Fib.  ·	S/P PPM/AICD.  ·	S/P AVR/MVR.  ·	HTN.  ·	HLD.  ·	DM.  ·	Anemia.  ·	Thrombocytopenia  ·	COVID 19.  ·	Renal Insuffiencey.    SPO2 in high 90s  Continue supportive care.

## 2021-02-19 LAB
ALBUMIN SERPL ELPH-MCNC: 2 G/DL — LOW (ref 3.3–5)
ALP SERPL-CCNC: 257 U/L — HIGH (ref 40–120)
ALT FLD-CCNC: 93 U/L — HIGH (ref 12–78)
ANION GAP SERPL CALC-SCNC: 8 MMOL/L — SIGNIFICANT CHANGE UP (ref 5–17)
APPEARANCE UR: CLEAR — SIGNIFICANT CHANGE UP
AST SERPL-CCNC: 87 U/L — HIGH (ref 15–37)
BACTERIA # UR AUTO: ABNORMAL
BILIRUB SERPL-MCNC: 0.7 MG/DL — SIGNIFICANT CHANGE UP (ref 0.2–1.2)
BILIRUB UR-MCNC: NEGATIVE — SIGNIFICANT CHANGE UP
BUN SERPL-MCNC: 108 MG/DL — HIGH (ref 7–23)
CALCIUM SERPL-MCNC: 7.8 MG/DL — LOW (ref 8.5–10.1)
CHLORIDE SERPL-SCNC: 114 MMOL/L — HIGH (ref 96–108)
CO2 SERPL-SCNC: 25 MMOL/L — SIGNIFICANT CHANGE UP (ref 22–31)
COLOR SPEC: YELLOW — SIGNIFICANT CHANGE UP
CREAT SERPL-MCNC: 2.45 MG/DL — HIGH (ref 0.5–1.3)
DIFF PNL FLD: ABNORMAL
EPI CELLS # UR: ABNORMAL
FLUAV AG NPH QL: SIGNIFICANT CHANGE UP
FLUBV AG NPH QL: SIGNIFICANT CHANGE UP
GLUCOSE BLDC GLUCOMTR-MCNC: 120 MG/DL — HIGH (ref 70–99)
GLUCOSE BLDC GLUCOMTR-MCNC: 128 MG/DL — HIGH (ref 70–99)
GLUCOSE BLDC GLUCOMTR-MCNC: 203 MG/DL — HIGH (ref 70–99)
GLUCOSE BLDC GLUCOMTR-MCNC: 225 MG/DL — HIGH (ref 70–99)
GLUCOSE SERPL-MCNC: 109 MG/DL — HIGH (ref 70–99)
GLUCOSE UR QL: NEGATIVE MG/DL — SIGNIFICANT CHANGE UP
HCT VFR BLD CALC: 32.2 % — LOW (ref 34.5–45)
HGB BLD-MCNC: 9.9 G/DL — LOW (ref 11.5–15.5)
KETONES UR-MCNC: NEGATIVE — SIGNIFICANT CHANGE UP
LEUKOCYTE ESTERASE UR-ACNC: ABNORMAL
MCHC RBC-ENTMCNC: 27.9 PG — SIGNIFICANT CHANGE UP (ref 27–34)
MCHC RBC-ENTMCNC: 30.7 GM/DL — LOW (ref 32–36)
MCV RBC AUTO: 90.7 FL — SIGNIFICANT CHANGE UP (ref 80–100)
NITRITE UR-MCNC: NEGATIVE — SIGNIFICANT CHANGE UP
NRBC # BLD: 0 /100 WBCS — SIGNIFICANT CHANGE UP (ref 0–0)
PH UR: 5 — SIGNIFICANT CHANGE UP (ref 5–8)
PLATELET # BLD AUTO: 44 K/UL — LOW (ref 150–400)
POTASSIUM SERPL-MCNC: 4.2 MMOL/L — SIGNIFICANT CHANGE UP (ref 3.5–5.3)
POTASSIUM SERPL-SCNC: 4.2 MMOL/L — SIGNIFICANT CHANGE UP (ref 3.5–5.3)
PROT SERPL-MCNC: 6 GM/DL — SIGNIFICANT CHANGE UP (ref 6–8.3)
PROT UR-MCNC: 30 MG/DL
RBC # BLD: 3.55 M/UL — LOW (ref 3.8–5.2)
RBC # FLD: 21.8 % — HIGH (ref 10.3–14.5)
RBC CASTS # UR COMP ASSIST: SIGNIFICANT CHANGE UP /HPF (ref 0–4)
SARS-COV-2 RNA SPEC QL NAA+PROBE: DETECTED
SODIUM SERPL-SCNC: 147 MMOL/L — HIGH (ref 135–145)
SP GR SPEC: 1.02 — SIGNIFICANT CHANGE UP (ref 1.01–1.02)
UROBILINOGEN FLD QL: NEGATIVE MG/DL — SIGNIFICANT CHANGE UP
UUN UR-MCNC: 977 MG/DL — SIGNIFICANT CHANGE UP
WBC # BLD: 9.53 K/UL — SIGNIFICANT CHANGE UP (ref 3.8–10.5)
WBC # FLD AUTO: 9.53 K/UL — SIGNIFICANT CHANGE UP (ref 3.8–10.5)
WBC UR QL: SIGNIFICANT CHANGE UP

## 2021-02-19 PROCEDURE — 70450 CT HEAD/BRAIN W/O DYE: CPT | Mod: 26

## 2021-02-19 RX ORDER — SODIUM CHLORIDE 0.65 %
1 AEROSOL, SPRAY (ML) NASAL
Refills: 0 | Status: DISCONTINUED | OUTPATIENT
Start: 2021-02-19 | End: 2021-02-23

## 2021-02-19 RX ORDER — HALOPERIDOL DECANOATE 100 MG/ML
1 INJECTION INTRAMUSCULAR ONCE
Refills: 0 | Status: COMPLETED | OUTPATIENT
Start: 2021-02-19 | End: 2021-02-19

## 2021-02-19 RX ORDER — SODIUM CHLORIDE 9 MG/ML
1000 INJECTION, SOLUTION INTRAVENOUS
Refills: 0 | Status: DISCONTINUED | OUTPATIENT
Start: 2021-02-19 | End: 2021-02-22

## 2021-02-19 RX ORDER — DIPHENHYDRAMINE HCL 50 MG
25 CAPSULE ORAL ONCE
Refills: 0 | Status: COMPLETED | OUTPATIENT
Start: 2021-02-19 | End: 2021-02-19

## 2021-02-19 RX ADMIN — Medication 25 MILLIGRAM(S): at 00:53

## 2021-02-19 RX ADMIN — HALOPERIDOL DECANOATE 1 MILLIGRAM(S): 100 INJECTION INTRAMUSCULAR at 20:58

## 2021-02-19 RX ADMIN — Medication 4: at 16:45

## 2021-02-19 RX ADMIN — ENOXAPARIN SODIUM 80 MILLIGRAM(S): 100 INJECTION SUBCUTANEOUS at 11:33

## 2021-02-19 RX ADMIN — ATORVASTATIN CALCIUM 40 MILLIGRAM(S): 80 TABLET, FILM COATED ORAL at 20:58

## 2021-02-19 RX ADMIN — Medication 25 MILLIGRAM(S): at 05:45

## 2021-02-19 RX ADMIN — MIDODRINE HYDROCHLORIDE 10 MILLIGRAM(S): 2.5 TABLET ORAL at 20:58

## 2021-02-19 RX ADMIN — MIDODRINE HYDROCHLORIDE 10 MILLIGRAM(S): 2.5 TABLET ORAL at 14:07

## 2021-02-19 RX ADMIN — CHLORHEXIDINE GLUCONATE 1 APPLICATION(S): 213 SOLUTION TOPICAL at 11:33

## 2021-02-19 RX ADMIN — Medication 1 MILLIGRAM(S): at 11:35

## 2021-02-19 RX ADMIN — Medication 4: at 11:42

## 2021-02-19 RX ADMIN — MIDODRINE HYDROCHLORIDE 10 MILLIGRAM(S): 2.5 TABLET ORAL at 05:45

## 2021-02-19 RX ADMIN — Medication 1 SPRAY(S): at 17:11

## 2021-02-19 NOTE — PROGRESS NOTE ADULT - SUBJECTIVE AND OBJECTIVE BOX
INTERVAL HPI/OVERNIGHT EVENTS:    patient  at  CT    REVIEW OF SYSTEMS:  CONSTITUTIONAL:  fatigue      MEDICATION:  acetaminophen   Tablet .. 650 milliGRAM(s) Oral every 6 hours PRN  acetaminophen  Suppository .. 650 milliGRAM(s) Rectal every 8 hours PRN  atorvastatin 40 milliGRAM(s) Oral at bedtime  chlorhexidine 4% Liquid 1 Application(s) Topical daily  dextrose 40% Gel 15 Gram(s) Oral once  dextrose 5%. 1000 milliLiter(s) IV Continuous <Continuous>  dextrose 5%. 1000 milliLiter(s) IV Continuous <Continuous>  dextrose 50% Injectable 25 Gram(s) IV Push once  dextrose 50% Injectable 12.5 Gram(s) IV Push once  dextrose 50% Injectable 25 Gram(s) IV Push once  enoxaparin Injectable 80 milliGRAM(s) SubCutaneous daily  folic acid 1 milliGRAM(s) Oral daily  glucagon  Injectable 1 milliGRAM(s) IntraMuscular once  insulin lispro (ADMELOG) corrective regimen sliding scale   SubCutaneous three times a day before meals  insulin lispro (ADMELOG) corrective regimen sliding scale   SubCutaneous at bedtime  LORazepam   Injectable 1 milliGRAM(s) IV Push once  metoprolol tartrate 25 milliGRAM(s) Oral two times a day  midodrine. 10 milliGRAM(s) Oral three times a day  polyethylene glycol 3350 17 Gram(s) Oral daily PRN  sodium chloride 0.65% Nasal 1 Spray(s) Both Nostrils four times a day PRN    Vital Signs Last 24 Hrs  T(C): 36.4 (2021 10:50), Max: 36.4 (2021 10:50)  T(F): 97.5 (2021 10:50), Max: 97.5 (2021 10:50)  HR: 56 (2021 10:50) (56 - 63)  BP: 112/69 (2021 10:50) (100/66 - 128/90)  BP(mean): 83 (2021 10:50) (83 - 83)  RR: 19 (2021 10:50) (18 - 19)  SpO2: 96% (2021 10:50) (94% - 98%)    LABS:                        9.9    9.53  )-----------( 44       ( 2021 06:45 )             32.2         147<H>  |  114<H>  |  108<H>  ----------------------------<  109<H>  4.2   |  25  |  2.45<H>    Ca    7.8<L>      2021 06:45    TPro  6.0  /  Alb  2.0<L>  /  TBili  0.7  /  DBili  x   /  AST  87<H>  /  ALT  93<H>  /  AlkPhos  257<H>        Urinalysis Basic - ( 2021 14:15 )    Color: Yellow / Appearance: Clear / S.020 / pH: x  Gluc: x / Ketone: Negative  / Bili: Negative / Urobili: Negative mg/dL   Blood: x / Protein: 30 mg/dL / Nitrite: Negative   Leuk Esterase: Moderate / RBC: x / WBC x   Sq Epi: x / Non Sq Epi: x / Bacteria: x      CAPILLARY BLOOD GLUCOSE      POCT Blood Glucose.: 225 mg/dL (2021 11:38)  POCT Blood Glucose.: 128 mg/dL (2021 07:50)  POCT Blood Glucose.: 76 mg/dL (2021 21:28)  POCT Blood Glucose.: 100 mg/dL (2021 16:59)      RADIOLOGY & ADDITIONAL TESTS:    Imaging reports  Personally Reviewed:  [ ] YES  [ ] NO    Consultant(s) Notes Reviewed:  [x ] YES  [ ] NO    Care Discussed with Consultants/Other Providers [ x] YES  [ ] NO  Problem/Plan - 1:  ·  Problem: Dehydration.   hx  CHF   WILL D/C LASIX  AS  RENAL  FUNCTION  WORSENING PT  APPEARS  EUVOLEMIC  CONTINUE  TO  MONITOR   follow renal function    Problem/Plan - 2:  ·  Problem: Altered mental status, unspecified altered mental status type.  Plan: IMPROVED      Problem/Plan - 3:  ·  Problem: Chronic diastolic congestive heart failure.  Plan: currently not in failure  monitor.  BNP  MOST  LIKELY  2/2  SEVERE  PULMONARY  HTN WITH  DILATED  RT  ATRIUM    Problem/Plan - 4:  ·  Problem: Type 2 diabetes mellitus with hyperglycemia, without long-term current use of insulin.  Plan: ss insulin coverage.     Problem/Plan - 5:  ·  Problem: Thrombocytopenia. Plan: pt on prior admission plts  range  will monitor closely   will change to  lovenox as  patient  inconstant  with  oral  intake    Problem/Plan - 6:  Problem: Mixed hyperlipidemia.Plan: cont statin.    Problem/Plan - 7:  ·  Problem: MICHELLE (acute kidney injury). Plan:   OBSERVE  WITH  DECREASED  LASIX  Problem/Plan - 8:  ·  Problem: Preventive measure.  Plan: on ac.   WILL  CONTINUE  ROECPHIN  EMPIRCALLY  AVR  MVR  OBSERVATION  SEVERE  PULMONARY  HTN  ON  ECHO  AT  [facility]  O2  will  discuss  with  pulmonary benfits of  Lorri in  this  pt  ANEMIA  C/W  CHRONIC  DISEASE  PROCESS   AMS  CT  OF BRAIN  NEURO  EVAL

## 2021-02-19 NOTE — PROGRESS NOTE ADULT - SUBJECTIVE AND OBJECTIVE BOX
NEPHROLOGY PROGRESS NOTE    CHIEF COMPLAINT:  MICHELLE/CKD    HPI:  Renal function a little better.  No fever, hypotension.  Oxygenation OK with nasal canula.      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

## 2021-02-19 NOTE — PROGRESS NOTE ADULT - SUBJECTIVE AND OBJECTIVE BOX
INTERVAL HPI:  89 y/o female with HTN, HLD, DM2,MVR/AVR, Thrombocytopenia,  Afib was on coumadin now on Eliquis s/p AICD/PPM,  CHFrEF.   Was admitted at [facility] on  for covid and elevated pna then transferred to [facility] for rehab, where family reported that pt has been more lethargic and confused when they were face timing her and sent in.  pt just states not feeling well, but not able to offer specific sx's.  no reported fever, chills, no changes in breathing, no cp,n/v/dc. At times says she was SOB.    OVERNIGHT EVENTS:  Comfortable, on nasal O2.    Vital Signs Last 24 Hrs  T(C): 36.3 (2021 15:58), Max: 36.4 (2021 10:50)  T(F): 97.4 (2021 15:58), Max: 97.5 (2021 10:50)  HR: 67 (2021 15:58) (56 - 67)  BP: 114/68 (2021 15:58) (100/66 - 128/90)  BP(mean): 83 (2021 10:50) (83 - 83)  RR: 18 (2021 15:58) (18 - 19)  SpO2: 97% (2021 15:58) (95% - 98%)    PHYSICAL EXAM:  GEN:         Awake, responsive and comfortable.  HEENT:    Normal.    RESP:       no distress  CVS:          Regular rate and rhythm.   ABD:         Soft, non-tender, non-distended;     MEDICATIONS  (STANDING):  atorvastatin 40 milliGRAM(s) Oral at bedtime  chlorhexidine 4% Liquid 1 Application(s) Topical daily  dextrose 40% Gel 15 Gram(s) Oral once  dextrose 5% + sodium chloride 0.45%. 1000 milliLiter(s) (45 mL/Hr) IV Continuous <Continuous>  dextrose 5%. 1000 milliLiter(s) (50 mL/Hr) IV Continuous <Continuous>  dextrose 5%. 1000 milliLiter(s) (100 mL/Hr) IV Continuous <Continuous>  dextrose 50% Injectable 25 Gram(s) IV Push once  dextrose 50% Injectable 12.5 Gram(s) IV Push once  dextrose 50% Injectable 25 Gram(s) IV Push once  enoxaparin Injectable 80 milliGRAM(s) SubCutaneous daily  folic acid 1 milliGRAM(s) Oral daily  glucagon  Injectable 1 milliGRAM(s) IntraMuscular once  insulin lispro (ADMELOG) corrective regimen sliding scale   SubCutaneous three times a day before meals  insulin lispro (ADMELOG) corrective regimen sliding scale   SubCutaneous at bedtime  LORazepam   Injectable 1 milliGRAM(s) IV Push once  metoprolol tartrate 25 milliGRAM(s) Oral two times a day  midodrine. 10 milliGRAM(s) Oral three times a day  sodium chloride 0.65% Nasal 1 Spray(s) Nasal two times a day    MEDICATIONS  (PRN):  acetaminophen   Tablet .. 650 milliGRAM(s) Oral every 6 hours PRN Temp greater or equal to 38C (100.4F), Mild Pain (1 - 3)  acetaminophen  Suppository .. 650 milliGRAM(s) Rectal every 8 hours PRN Temp greater or equal to 38C (100.4F)  polyethylene glycol 3350 17 Gram(s) Oral daily PRN Constipation    LABS:                        9.9    9.53  )-----------( 44       ( 2021 06:45 )             32.2         147<H>  |  114<H>  |  108<H>  ----------------------------<  109<H>  4.2   |  25  |  2.45<H>    Ca    7.8<L>      2021 06:45    TPro  6.0  /  Alb  2.0<L>  /  TBili  0.7  /  DBili  x   /  AST  87<H>  /  ALT  93<H>  /  AlkPhos  257<H>      Urinalysis Basic - ( 2021 14:15 )    Color: Yellow / Appearance: Clear / S.020 / pH: x  Gluc: x / Ketone: Negative  / Bili: Negative / Urobili: Negative mg/dL   Blood: x / Protein: 30 mg/dL / Nitrite: Negative   Leuk Esterase: Moderate / RBC: 0-2 /HPF / WBC 3-5   Sq Epi: x / Non Sq Epi: Moderate / Bacteria: Moderate       ASSESSMENT AND PLAN:  ·	SOB.  ·	Pulmonary HTN.  ·	Acute metabolic Encephalopathy.  ·	Chronic Systolic/diastolic CHF.  ·	Chronic A Fib.  ·	S/P PPM/AICD.  ·	S/P AVR/MVR.  ·	HTN.  ·	HLD.  ·	DM.  ·	Anemia.  ·	Thrombocytopenia  ·	COVID 19.  ·	Renal Insuffiencey.    SPO2 97% on nasal O2.  on full dose Lovenox.

## 2021-02-20 LAB
ALBUMIN SERPL ELPH-MCNC: 1.9 G/DL — LOW (ref 3.3–5)
ALP SERPL-CCNC: 225 U/L — HIGH (ref 40–120)
ALT FLD-CCNC: 74 U/L — SIGNIFICANT CHANGE UP (ref 12–78)
ANION GAP SERPL CALC-SCNC: 9 MMOL/L — SIGNIFICANT CHANGE UP (ref 5–17)
APTT BLD: 39.1 SEC — HIGH (ref 27.5–35.5)
AST SERPL-CCNC: 49 U/L — HIGH (ref 15–37)
BILIRUB SERPL-MCNC: 0.7 MG/DL — SIGNIFICANT CHANGE UP (ref 0.2–1.2)
BUN SERPL-MCNC: 105 MG/DL — HIGH (ref 7–23)
CALCIUM SERPL-MCNC: 7.8 MG/DL — LOW (ref 8.5–10.1)
CHLORIDE SERPL-SCNC: 114 MMOL/L — HIGH (ref 96–108)
CO2 SERPL-SCNC: 25 MMOL/L — SIGNIFICANT CHANGE UP (ref 22–31)
CREAT SERPL-MCNC: 2.2 MG/DL — HIGH (ref 0.5–1.3)
GLUCOSE BLDC GLUCOMTR-MCNC: 159 MG/DL — HIGH (ref 70–99)
GLUCOSE BLDC GLUCOMTR-MCNC: 196 MG/DL — HIGH (ref 70–99)
GLUCOSE BLDC GLUCOMTR-MCNC: 301 MG/DL — HIGH (ref 70–99)
GLUCOSE BLDC GLUCOMTR-MCNC: 340 MG/DL — HIGH (ref 70–99)
GLUCOSE SERPL-MCNC: 181 MG/DL — HIGH (ref 70–99)
HCT VFR BLD CALC: 31 % — LOW (ref 34.5–45)
HGB BLD-MCNC: 9.6 G/DL — LOW (ref 11.5–15.5)
INR BLD: 1.13 RATIO — SIGNIFICANT CHANGE UP (ref 0.88–1.16)
MAGNESIUM UR-MCNC: 5.3 MG/DL — SIGNIFICANT CHANGE UP
MCHC RBC-ENTMCNC: 27.7 PG — SIGNIFICANT CHANGE UP (ref 27–34)
MCHC RBC-ENTMCNC: 31 GM/DL — LOW (ref 32–36)
MCV RBC AUTO: 89.3 FL — SIGNIFICANT CHANGE UP (ref 80–100)
NRBC # BLD: 0 /100 WBCS — SIGNIFICANT CHANGE UP (ref 0–0)
NT-PROBNP SERPL-SCNC: HIGH PG/ML (ref 0–450)
PLATELET # BLD AUTO: 34 K/UL — LOW (ref 150–400)
POTASSIUM SERPL-MCNC: 4.5 MMOL/L — SIGNIFICANT CHANGE UP (ref 3.5–5.3)
POTASSIUM SERPL-SCNC: 4.5 MMOL/L — SIGNIFICANT CHANGE UP (ref 3.5–5.3)
POTASSIUM UR-SCNC: 67.3 MMOL/L — SIGNIFICANT CHANGE UP
PROT ?TM UR-MCNC: 46 MG/DL — HIGH (ref 0–12)
PROT SERPL-MCNC: 5.9 GM/DL — LOW (ref 6–8.3)
PROTHROM AB SERPL-ACNC: 13 SEC — SIGNIFICANT CHANGE UP (ref 10.6–13.6)
RBC # BLD: 3.47 M/UL — LOW (ref 3.8–5.2)
RBC # FLD: 22.5 % — HIGH (ref 10.3–14.5)
SARS-COV-2 IGG SERPL QL IA: POSITIVE
SARS-COV-2 IGM SERPL IA-ACNC: 73.9 INDEX — HIGH
SODIUM SERPL-SCNC: 148 MMOL/L — HIGH (ref 135–145)
SODIUM UR-SCNC: <20 MMOL/L — SIGNIFICANT CHANGE UP
URATE UR-MCNC: 61 MG/DL — SIGNIFICANT CHANGE UP
WBC # BLD: 9.35 K/UL — SIGNIFICANT CHANGE UP (ref 3.8–10.5)
WBC # FLD AUTO: 9.35 K/UL — SIGNIFICANT CHANGE UP (ref 3.8–10.5)

## 2021-02-20 RX ADMIN — Medication 1 MILLIGRAM(S): at 11:45

## 2021-02-20 RX ADMIN — ENOXAPARIN SODIUM 80 MILLIGRAM(S): 100 INJECTION SUBCUTANEOUS at 11:46

## 2021-02-20 RX ADMIN — Medication 8: at 16:50

## 2021-02-20 RX ADMIN — Medication 25 MILLIGRAM(S): at 05:30

## 2021-02-20 RX ADMIN — Medication 650 MILLIGRAM(S): at 20:17

## 2021-02-20 RX ADMIN — CHLORHEXIDINE GLUCONATE 1 APPLICATION(S): 213 SOLUTION TOPICAL at 05:31

## 2021-02-20 RX ADMIN — Medication 8: at 12:08

## 2021-02-20 RX ADMIN — Medication 2: at 08:53

## 2021-02-20 RX ADMIN — MIDODRINE HYDROCHLORIDE 10 MILLIGRAM(S): 2.5 TABLET ORAL at 05:30

## 2021-02-20 RX ADMIN — Medication 1 SPRAY(S): at 05:30

## 2021-02-20 RX ADMIN — MIDODRINE HYDROCHLORIDE 10 MILLIGRAM(S): 2.5 TABLET ORAL at 21:01

## 2021-02-20 RX ADMIN — MIDODRINE HYDROCHLORIDE 10 MILLIGRAM(S): 2.5 TABLET ORAL at 12:09

## 2021-02-20 RX ADMIN — SODIUM CHLORIDE 45 MILLILITER(S): 9 INJECTION, SOLUTION INTRAVENOUS at 18:37

## 2021-02-20 RX ADMIN — Medication 1 SPRAY(S): at 16:50

## 2021-02-20 RX ADMIN — ATORVASTATIN CALCIUM 40 MILLIGRAM(S): 80 TABLET, FILM COATED ORAL at 21:01

## 2021-02-20 NOTE — PROGRESS NOTE ADULT - SUBJECTIVE AND OBJECTIVE BOX
INTERVAL HPI/OVERNIGHT EVENTS:        REVIEW OF SYSTEMS:  CONSTITUTIONAL:  more  alert appetite  remains  poor    NECK: No pain or stiffnes  RESPIRATORY: No SOB   CARDIOVASCULAR: No chest pain, palpitations, dizziness,   GASTROINTESTINAL: No abdominal pain. No nausea, vomiting,   NEUROLOGICAL: No headaches, no  blurry  vision no  dizziness  SKIN: No itching,   MUSCULOSKELETAL: No pain    MEDICATION:  acetaminophen   Tablet .. 650 milliGRAM(s) Oral every 6 hours PRN  acetaminophen  Suppository .. 650 milliGRAM(s) Rectal every 8 hours PRN  atorvastatin 40 milliGRAM(s) Oral at bedtime  chlorhexidine 4% Liquid 1 Application(s) Topical daily  dextrose 40% Gel 15 Gram(s) Oral once  dextrose 5% + sodium chloride 0.45%. 1000 milliLiter(s) IV Continuous <Continuous>  dextrose 5%. 1000 milliLiter(s) IV Continuous <Continuous>  dextrose 5%. 1000 milliLiter(s) IV Continuous <Continuous>  dextrose 50% Injectable 25 Gram(s) IV Push once  dextrose 50% Injectable 12.5 Gram(s) IV Push once  dextrose 50% Injectable 25 Gram(s) IV Push once  enoxaparin Injectable 80 milliGRAM(s) SubCutaneous daily  folic acid 1 milliGRAM(s) Oral daily  glucagon  Injectable 1 milliGRAM(s) IntraMuscular once  insulin lispro (ADMELOG) corrective regimen sliding scale   SubCutaneous three times a day before meals  insulin lispro (ADMELOG) corrective regimen sliding scale   SubCutaneous at bedtime  LORazepam   Injectable 1 milliGRAM(s) IV Push once  metoprolol tartrate 25 milliGRAM(s) Oral two times a day  midodrine. 10 milliGRAM(s) Oral three times a day  polyethylene glycol 3350 17 Gram(s) Oral daily PRN  sodium chloride 0.65% Nasal 1 Spray(s) Nasal two times a day    Vital Signs Last 24 Hrs  T(C): 36.4 (2021 04:39), Max: 36.4 (2021 10:50)  T(F): 97.5 (2021 04:39), Max: 97.5 (2021 10:50)  HR: 62 (2021 04:39) (56 - 67)  BP: 119/60 (2021 04:39) (112/69 - 119/82)  BP(mean): 83 (2021 10:50) (83 - 83)  RR: 18 (2021 04:39) (18 - 19)  SpO2: 95% (2021 04:39) (95% - 99%)    PHYSICAL EXAM:  GENERAL: NAD, well-groomed, well-developed  HEENT : Conjuntivae  clear sclerae anicteric  NECK: Supple, No JVD, Normal thyroid  NERVOUS SYSTEM:  Alert confused  no  focal  deficits;   CHEST/LUNG: Clear    HEART: Regular rate and rhythm; No murmurs, rubs, or gallops  ABDOMEN: Soft, Nontender, Nondistended; Bowel sounds present  EXTREMITIES:  no  edema no  tenderness  SKIN: No rashes   LABS:                        9.6    9.35  )-----------( x        ( 2021 08:21 )             31.0         147<H>  |  114<H>  |  108<H>  ----------------------------<  109<H>  4.2   |  25  |  2.45<H>    Ca    7.8<L>      2021 06:45    TPro  6.0  /  Alb  2.0<L>  /  TBili  0.7  /  DBili  x   /  AST  87<H>  /  ALT  93<H>  /  AlkPhos  257<H>      PT/INR - ( 2021 08:21 )   PT: 13.0 sec;   INR: 1.13 ratio         PTT - ( 2021 08:21 )  PTT:39.1 sec  Urinalysis Basic - ( 2021 14:15 )    Color: Yellow / Appearance: Clear / S.020 / pH: x  Gluc: x / Ketone: Negative  / Bili: Negative / Urobili: Negative mg/dL   Blood: x / Protein: 30 mg/dL / Nitrite: Negative   Leuk Esterase: Moderate / RBC: 0-2 /HPF / WBC 3-5   Sq Epi: x / Non Sq Epi: Moderate / Bacteria: Moderate      CAPILLARY BLOOD GLUCOSE      POCT Blood Glucose.: 196 mg/dL (2021 08:06)  POCT Blood Glucose.: 120 mg/dL (2021 21:04)  POCT Blood Glucose.: 203 mg/dL (2021 16:39)  POCT Blood Glucose.: 225 mg/dL (2021 11:38)      RADIOLOGY & ADDITIONAL TESTS:    Imaging reports  Personally Reviewed:  [ ] YES  [ ] NO    Consultant(s) Notes Reviewed:  [ x] YES  [ ] NO    Care Discussed with Consultants/Other Providers [x ] YES  [ ] NO  Problem/Plan - 1:  ·  Problem: Dehydration.   hx  CHF     slow  hydration as  RENAL  FUNCTION  WORSENING   continue  to  monitor  closely  follow renal function    Problem/Plan - 2:  ·  Problem: Altered mental status, unspecified altered mental status type.  Plan: IMPROVED      Problem/Plan - 3:  ·  Problem: Chronic diastolic congestive heart failure.  Plan: currently not in failure  monitor.  BNP  MOST  LIKELY  2/2  SEVERE  PULMONARY  HTN WITH  DILATED  RT  ATRIUM    Problem/Plan - 4:  ·  Problem: Type 2 diabetes mellitus with hyperglycemia, without long-term current use of insulin.  Plan: ss insulin coverage.     Problem/Plan - 5:  ·  Problem: Thrombocytopenia. Plan: pt on prior admission plts  range  will monitor closely   will change to  lovenox as  patient  inconstant  with  oral  intake    Problem/Plan - 6:  Problem: Mixed hyperlipidemia.Plan: cont statin.    Problem/Plan - 7:  ·  Problem: MICHELLE (acute kidney injury). Plan:   OBSERVE  WITH  DECREASED  LASIX  Problem/Plan - 8:  ·  Problem: Preventive measure.  Plan: on ac.     TTE  2021  AVR  MVR  OBSERVATION  SEVERE  PULMONARY  HTN  ON  ECHO  AT  [facility]  O2  will  discuss  with  pulmonary benfits of  Lorri in  this  pt  ANEMIA  C/W  CHRONIC  DISEASE  PROCESS   AMS  CT  OF BRAIN  NEURO  EVAL  Patient name: CHARLES ADAMS  YOB: 1929   Age: 89 (F)   MR#: [id number]  Study Date: 2021  Location: [address] [place], [place] [id number]: Faiza Garner RDCS  Study quality: Technically difficult  Referring Physician: Rigoberto Rogers MD  Blood Pressure: 111/65 mmHg  Height: 160 cm  Weight: 68 kg  BSA: 1.7 m2  ------------------------------------------------------------------------  PROCEDURE: Transthoracic echocardiogram with 2-D, M-Mode  and complete spectral and color flow Doppler.  INDICATION: Other nonrheumatic mitral valve disorders  (I34.8), Nonrheumatic mitral valve disorder, unspecified  (I34.9)  ------------------------------------------------------------------------  Dimensions:    Normal Values:  LA:     3.9    2.0 - 4.0 cm  Ao:     4.0    2.0 - 3.8 cm  SEPTUM: 0.8    0.6 - 1.2 cm  PWT:    0.8    0.6 - 1.1 cm  LVIDd:  4.6    3.0 - 5.6 cm  LVIDs:  3.2    1.8 - 4.0 cm  Derived variables:  LVMI: 69 g/m2  RWT: 0.34  EF (Visual Estimate): 65 %  Doppler Peak Velocity (m/sec): MV=1.9 AoV=1.9 TV=3.8  ------------------------------------------------------------------------  Observations:  Mitral Valve: Bioprosthetic mitral valve. Calcified mitral  chordae.   Gradient (12.9 mmHg at  60/min) is severely increased for a  prosthetic mitral valve.  No mitral valve regurgitation  seen.  Aortic Valve/Aorta: Bioprosthetic aortic valve. Gradient  across the aortic valve cannot be estimated from this  study.  Minimal aortic regurgitation.   Mild dilatation of the aortic root. Moderate dilitation of  the ascending aorta.   Left Atrium: Normal left atrium.  Left Ventricle: Normal left ventricular internal dimensions  and wall thicknesses.  Normal left ventricular systolic function. No segmental  wall motion abnormalities.  Right Heart: Severe right atrial enlargement.   Severe right ventricular enlargement with severely  decreased right ventricular systolic function.  A device wire is noted in the right heart.   Tricuspid regurgitation is present, but its severity cannot  be assessed in this study.    Normal pulmonic valve. Mild pulmonic regurgitation.  Pericardium/Pleura: Normal pericardium with no pericardial  effusion.  Hemodynamic: Severe pulmonary hypertension. Estimated PASP  > 65 mmHg.  ------------------------------------------------------------------------  Conclusions:   Normal left ventricular systolic function. No segmental  wall motion abnormalities.  Bioprosthetic mitral valve. Gradient (12.9 mmHg at  60/min)  is severely increased for a prosthetic mitral valve.  Bioprosthetic aortic valve. Gradient across the aortic  valve cannot be estimated from this study.   Severe right ventricular enlargement with severely  decreased right ventricular systolic function.  Severe pulmonary hypertension.   Mild dilatation of the aortic root. Moderate dilitation of  the ascending aorta.   A device wire is noted in the right heart.  ------------------------------------------------------------------------  Confirmed on  2021 - 15:03:08 by Scott Montilla MD, FASE  ------------------------------------------------------------------------ 108

## 2021-02-20 NOTE — PROGRESS NOTE ADULT - SUBJECTIVE AND OBJECTIVE BOX
INTERVAL HPI:  89 y/o female with HTN, HLD, DM2,MVR/AVR, Thrombocytopenia,  Afib was on coumadin now on Eliquis s/p AICD/PPM,  CHFrEF.   Was admitted at [facility] on  for covid and elevated pna then transferred to [facility] for rehab, where family reported that pt has been more lethargic and confused when they were face timing her and sent in.  pt just states not feeling well, but not able to offer specific sx's.  no reported fever, chills, no changes in breathing, no cp,n/v/dc. At times says she was SOB.    OVERNIGHT EVENTS:  Oxygenation stable on nasal O2.    Vital Signs Last 24 Hrs  T(C): 36.3 (2021 16:47), Max: 36.4 (2021 04:39)  T(F): 97.3 (2021 16:47), Max: 97.5 (2021 04:39)  HR: 66 (2021 16:47) (58 - 70)  BP: 98/66 (2021 16:47) (95/58 - 119/82)  BP(mean): 71 (2021 16:47) (70 - 71)  RR: 17 (2021 16:47) (17 - 18)  SpO2: 96% (2021 16:47) (95% - 99%)    PHYSICAL EXAM:  GEN:         Awake, responsive and comfortable.  HEENT:    Normal.    RESP:        no distress  CVS:          Regular rate and rhythm.   ABD:         Soft, non-tender, non-distended;     MEDICATIONS  (STANDING):  atorvastatin 40 milliGRAM(s) Oral at bedtime  chlorhexidine 4% Liquid 1 Application(s) Topical daily  dextrose 40% Gel 15 Gram(s) Oral once  dextrose 5% + sodium chloride 0.45%. 1000 milliLiter(s) (45 mL/Hr) IV Continuous <Continuous>  dextrose 5%. 1000 milliLiter(s) (50 mL/Hr) IV Continuous <Continuous>  dextrose 5%. 1000 milliLiter(s) (100 mL/Hr) IV Continuous <Continuous>  dextrose 50% Injectable 25 Gram(s) IV Push once  dextrose 50% Injectable 12.5 Gram(s) IV Push once  dextrose 50% Injectable 25 Gram(s) IV Push once  enoxaparin Injectable 80 milliGRAM(s) SubCutaneous daily  folic acid 1 milliGRAM(s) Oral daily  glucagon  Injectable 1 milliGRAM(s) IntraMuscular once  insulin lispro (ADMELOG) corrective regimen sliding scale   SubCutaneous three times a day before meals  insulin lispro (ADMELOG) corrective regimen sliding scale   SubCutaneous at bedtime  metoprolol tartrate 25 milliGRAM(s) Oral two times a day  midodrine. 10 milliGRAM(s) Oral three times a day  sodium chloride 0.65% Nasal 1 Spray(s) Nasal two times a day    MEDICATIONS  (PRN):  acetaminophen   Tablet .. 650 milliGRAM(s) Oral every 6 hours PRN Temp greater or equal to 38C (100.4F), Mild Pain (1 - 3)  acetaminophen  Suppository .. 650 milliGRAM(s) Rectal every 8 hours PRN Temp greater or equal to 38C (100.4F)  polyethylene glycol 3350 17 Gram(s) Oral daily PRN Constipation    LABS:                        9.6    9.35  )-----------( 34       ( 2021 08:21 )             31.0     02-    148<H>  |  114<H>  |  105<H>  ----------------------------<  181<H>  4.5   |  25  |  2.20<H>    Ca    7.8<L>      2021 08:21    TPro  5.9<L>  /  Alb  1.9<L>  /  TBili  0.7  /  DBili  x   /  AST  49<H>  /  ALT  74  /  AlkPhos  225<H>  02-20    PT/INR - ( 2021 08:21 )   PT: 13.0 sec;   INR: 1.13 ratio      PTT - ( 2021 08:21 )  PTT:39.1 sec    Urinalysis Basic - ( 2021 14:15 )    Color: Yellow / Appearance: Clear / S.020 / pH: x  Gluc: x / Ketone: Negative  / Bili: Negative / Urobili: Negative mg/dL   Blood: x / Protein: 30 mg/dL / Nitrite: Negative   Leuk Esterase: Moderate / RBC: 0-2 /HPF / WBC 3-5   Sq Epi: x / Non Sq Epi: Moderate / Bacteria: Moderate     ASSESSMENT AND PLAN:  ·	SOB.  ·	Pulmonary HTN.  ·	Acute metabolic Encephalopathy.  ·	Chronic Systolic/diastolic CHF.  ·	Chronic A Fib.  ·	S/P PPM/AICD.  ·	S/P AVR/MVR.  ·	HTN.  ·	HLD.  ·	DM.  ·	Anemia.  ·	Thrombocytopenia  ·	COVID 19.  ·	Renal Insuffiencey.    SPO2 97% on nasal O2.  on full dose Lovenox.  Not a candidate for Sildenafil due to low BP, requiring Midodrine.

## 2021-02-20 NOTE — PROGRESS NOTE ADULT - SUBJECTIVE AND OBJECTIVE BOX
NEPHROLOGY PROGRESS NOTE    CHIEF COMPLAINT:  MICHELLE/CKD    HPI:  Renal function a little improved.    EXAM:  T(F): 97.1 (21 @ 10:59)  HR: 68 (21 @ 10:59)  BP: 95/58 (21 @ 10:59)  RR: 17 (21 @ 10:59)  SpO2: 97% (21 @ 10:59)    deferred    LABS                             9.6    9.35  )-----------( 34       ( 2021 08:21 )             31.0              148<H>  |  114<H>  |  105<H>  ----------------------------<  181<H>  4.5   |  25  |  2.20<H>    Ca    7.8<L>      2021 08:21    TPro  5.9<L>  /  Alb  1.9<L>  /  TBili  0.7  /  DBili  x   /  AST  49<H>  /  ALT  74  /  AlkPhos  225<H>      proBNP 114,000    Urinalysis Basic - ( 2021 14:15 )  Color: Yellow / Appearance: Clear / S.020 / pH: x  Gluc: x / Ketone: Negative  / Bili: Negative / Urobili: Negative mg/dL   Blood: x / Protein: 30 mg/dL / Nitrite: Negative   Leuk Esterase: Moderate / RBC: 0-2 /HPF / WBC 3-5   Sq Epi: x / Non Sq Epi: Moderate / Bacteria: Moderate    Urine Na < 20      ASSESSMENT  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

## 2021-02-21 LAB
ANION GAP SERPL CALC-SCNC: 6 MMOL/L — SIGNIFICANT CHANGE UP (ref 5–17)
BUN SERPL-MCNC: 104 MG/DL — HIGH (ref 7–23)
CALCIUM SERPL-MCNC: 8.1 MG/DL — LOW (ref 8.5–10.1)
CHLORIDE SERPL-SCNC: 112 MMOL/L — HIGH (ref 96–108)
CO2 SERPL-SCNC: 29 MMOL/L — SIGNIFICANT CHANGE UP (ref 22–31)
CREAT SERPL-MCNC: 2.24 MG/DL — HIGH (ref 0.5–1.3)
GLUCOSE BLDC GLUCOMTR-MCNC: 150 MG/DL — HIGH (ref 70–99)
GLUCOSE BLDC GLUCOMTR-MCNC: 204 MG/DL — HIGH (ref 70–99)
GLUCOSE BLDC GLUCOMTR-MCNC: 295 MG/DL — HIGH (ref 70–99)
GLUCOSE BLDC GLUCOMTR-MCNC: 325 MG/DL — HIGH (ref 70–99)
GLUCOSE SERPL-MCNC: 133 MG/DL — HIGH (ref 70–99)
HCT VFR BLD CALC: 32.7 % — LOW (ref 34.5–45)
HGB BLD-MCNC: 9.7 G/DL — LOW (ref 11.5–15.5)
MCHC RBC-ENTMCNC: 27.3 PG — SIGNIFICANT CHANGE UP (ref 27–34)
MCHC RBC-ENTMCNC: 29.7 GM/DL — LOW (ref 32–36)
MCV RBC AUTO: 92.1 FL — SIGNIFICANT CHANGE UP (ref 80–100)
NRBC # BLD: 0 /100 WBCS — SIGNIFICANT CHANGE UP (ref 0–0)
NT-PROBNP SERPL-SCNC: HIGH PG/ML (ref 0–450)
PLATELET # BLD AUTO: 30 K/UL — LOW (ref 150–400)
POTASSIUM SERPL-MCNC: 4.1 MMOL/L — SIGNIFICANT CHANGE UP (ref 3.5–5.3)
POTASSIUM SERPL-SCNC: 4.1 MMOL/L — SIGNIFICANT CHANGE UP (ref 3.5–5.3)
RBC # BLD: 3.55 M/UL — LOW (ref 3.8–5.2)
RBC # FLD: 23.1 % — HIGH (ref 10.3–14.5)
SODIUM SERPL-SCNC: 147 MMOL/L — HIGH (ref 135–145)
WBC # BLD: 9.92 K/UL — SIGNIFICANT CHANGE UP (ref 3.8–10.5)
WBC # FLD AUTO: 9.92 K/UL — SIGNIFICANT CHANGE UP (ref 3.8–10.5)

## 2021-02-21 PROCEDURE — ZZZZZ: CPT

## 2021-02-21 RX ORDER — ACETAMINOPHEN 500 MG
1000 TABLET ORAL ONCE
Refills: 0 | Status: COMPLETED | OUTPATIENT
Start: 2021-02-21 | End: 2021-02-21

## 2021-02-21 RX ORDER — MIRTAZAPINE 45 MG/1
7.5 TABLET, ORALLY DISINTEGRATING ORAL AT BEDTIME
Refills: 0 | Status: DISCONTINUED | OUTPATIENT
Start: 2021-02-21 | End: 2021-02-22

## 2021-02-21 RX ADMIN — Medication 2: at 21:34

## 2021-02-21 RX ADMIN — Medication 1 MILLIGRAM(S): at 11:36

## 2021-02-21 RX ADMIN — MIDODRINE HYDROCHLORIDE 10 MILLIGRAM(S): 2.5 TABLET ORAL at 21:34

## 2021-02-21 RX ADMIN — MIRTAZAPINE 7.5 MILLIGRAM(S): 45 TABLET, ORALLY DISINTEGRATING ORAL at 21:34

## 2021-02-21 RX ADMIN — ENOXAPARIN SODIUM 80 MILLIGRAM(S): 100 INJECTION SUBCUTANEOUS at 11:36

## 2021-02-21 RX ADMIN — Medication 8: at 17:50

## 2021-02-21 RX ADMIN — CHLORHEXIDINE GLUCONATE 1 APPLICATION(S): 213 SOLUTION TOPICAL at 04:42

## 2021-02-21 RX ADMIN — Medication 1 SPRAY(S): at 17:50

## 2021-02-21 RX ADMIN — Medication 400 MILLIGRAM(S): at 02:21

## 2021-02-21 RX ADMIN — Medication 4: at 12:24

## 2021-02-21 RX ADMIN — MIDODRINE HYDROCHLORIDE 10 MILLIGRAM(S): 2.5 TABLET ORAL at 12:24

## 2021-02-21 RX ADMIN — Medication 1 SPRAY(S): at 06:58

## 2021-02-21 RX ADMIN — ATORVASTATIN CALCIUM 40 MILLIGRAM(S): 80 TABLET, FILM COATED ORAL at 21:34

## 2021-02-21 RX ADMIN — SODIUM CHLORIDE 45 MILLILITER(S): 9 INJECTION, SOLUTION INTRAVENOUS at 19:28

## 2021-02-21 NOTE — PROGRESS NOTE ADULT - SUBJECTIVE AND OBJECTIVE BOX
INTERVAL HPI/OVERNIGHT EVENTS:        REVIEW OF SYSTEMS:  CONSTITUTIONAL:  poor  appetite  fatigue    NECK: No pain or stiffnes  RESPIRATORY: No SOB   CARDIOVASCULAR: No chest pain, palpitations, dizziness,   GASTROINTESTINAL: No abdominal pain. No nausea, vomiting,   NEUROLOGICAL: No headaches, no  blurry  vision no  dizziness  SKIN: No itching,   MUSCULOSKELETAL: No pain    MEDICATION:  acetaminophen   Tablet .. 650 milliGRAM(s) Oral every 6 hours PRN  acetaminophen  Suppository .. 650 milliGRAM(s) Rectal every 8 hours PRN  atorvastatin 40 milliGRAM(s) Oral at bedtime  chlorhexidine 4% Liquid 1 Application(s) Topical daily  dextrose 40% Gel 15 Gram(s) Oral once  dextrose 5% + sodium chloride 0.45%. 1000 milliLiter(s) IV Continuous <Continuous>  dextrose 5%. 1000 milliLiter(s) IV Continuous <Continuous>  dextrose 5%. 1000 milliLiter(s) IV Continuous <Continuous>  dextrose 50% Injectable 25 Gram(s) IV Push once  dextrose 50% Injectable 12.5 Gram(s) IV Push once  dextrose 50% Injectable 25 Gram(s) IV Push once  enoxaparin Injectable 80 milliGRAM(s) SubCutaneous daily  folic acid 1 milliGRAM(s) Oral daily  glucagon  Injectable 1 milliGRAM(s) IntraMuscular once  insulin lispro (ADMELOG) corrective regimen sliding scale   SubCutaneous three times a day before meals  insulin lispro (ADMELOG) corrective regimen sliding scale   SubCutaneous at bedtime  midodrine. 10 milliGRAM(s) Oral three times a day  polyethylene glycol 3350 17 Gram(s) Oral daily PRN  sodium chloride 0.65% Nasal 1 Spray(s) Nasal two times a day    Vital Signs Last 24 Hrs  T(C): 36.4 (2021 11:01), Max: 36.6 (2021 04:29)  T(F): 97.5 (2021 11:01), Max: 97.8 (2021 04:29)  HR: 72 (2021 11:01) (53 - 76)  BP: 98/43 (2021 11:01) (98/43 - 128/88)  BP(mean): 61 (2021 11:01) (61 - 71)  RR: 22 (2021 11:01) (17 - 22)  SpO2: 94% (2021 11:01) (94% - 99%)    PHYSICAL EXAM:  GENERAL: NAD, well-groomed, well-developed  HEENT : Conjuntivae  clear sclerae anicteric  NECK: Supple, No JVD, Normal thyroid  NERVOUS SYSTEM:  Alert oriented   x1  no  focal  deficits;   CHEST/LUNG: decreased  bs   HEART: Regular rate and rhythm; No murmurs, rubs, or gallops  ABDOMEN: Soft, Nontender, Nondistended; Bowel sounds present  EXTREMITIES:  no  edema no  tenderness  SKIN: No rashes   LABS:                        9.7    9.92  )-----------( 30       ( 2021 06:46 )             32.7     02-21    147<H>  |  112<H>  |  104<H>  ----------------------------<  133<H>  4.1   |  29  |  2.24<H>    Ca    8.1<L>      2021 06:46    TPro  5.9<L>  /  Alb  1.9<L>  /  TBili  0.7  /  DBili  x   /  AST  49<H>  /  ALT  74  /  AlkPhos  225<H>  02-20    PT/INR - ( 2021 08:21 )   PT: 13.0 sec;   INR: 1.13 ratio         PTT - ( 2021 08:21 )  PTT:39.1 sec  Urinalysis Basic - ( 2021 14:15 )    Color: Yellow / Appearance: Clear / S.020 / pH: x  Gluc: x / Ketone: Negative  / Bili: Negative / Urobili: Negative mg/dL   Blood: x / Protein: 30 mg/dL / Nitrite: Negative   Leuk Esterase: Moderate / RBC: 0-2 /HPF / WBC 3-5   Sq Epi: x / Non Sq Epi: Moderate / Bacteria: Moderate      CAPILLARY BLOOD GLUCOSE      POCT Blood Glucose.: 150 mg/dL (2021 08:54)  POCT Blood Glucose.: 159 mg/dL (2021 21:02)  POCT Blood Glucose.: 301 mg/dL (2021 16:48)  POCT Blood Glucose.: 340 mg/dL (2021 12:00)      RADIOLOGY & ADDITIONAL TESTS:    Imaging reports  Personally Reviewed:  [ ] YES  [ ] NO    Consultant(s) Notes Reviewed:  [x ] YES  [ ] NO    Care Discussed with Consultants/Other Providers [x ] YES  [ ] NO  Problem/Plan - 1:  ·  Problem: Dehydration.   hx  CHF     slow  hydration as  RENAL  FUNCTION  WORSENING   continue  to  monitor  closely  follow renal function     Problem/Plan - 2:  ·  Problem: Altered mental status, unspecified altered mental status type.  Plan: IMPROVED form  admission continues  altered      Problem/Plan - 3:  ·  Problem: Chronic diastolic congestive heart failure.  Plan: currently not in failure check  cxr  monitor.  BNP  MOST  LIKELY  2/2  SEVERE  PULMONARY  HTN WITH  DILATED  RT  ATRIUM   discussed  with  pulmonary  unable to  add  revatio 2/2  hypotension    Problem/Plan - 4:  ·  Problem: Type 2 diabetes mellitus with hyperglycemia, without long-term current use of insulin.  Plan: ss insulin coverage.     Problem/Plan - 5:  ·  Problem: Thrombocytopenia. Plan: pt on prior admission plts  range  will monitor closely   will change to  lovenox as  patient  inconstant  with  oral  intake    Problem/Plan - 6:  Problem: Mixed hyperlipidemia.Plan: cont statin.    Problem/Plan - 7:  ·  Problem: MICHELLE (acute kidney injury). Plan:   OBSERVE  WITH  DECREASED  LASIX  Problem/Plan - 8:  ·  Problem: Preventive measure.  Plan: on ac.

## 2021-02-21 NOTE — PROGRESS NOTE ADULT - SUBJECTIVE AND OBJECTIVE BOX
INTERVAL HPI:  89 y/o female with HTN, HLD, DM2,MVR/AVR, Thrombocytopenia,  Afib was on coumadin now on Eliquis s/p AICD/PPM,  CHFrEF.   Was admitted at [facility] on 1/22 for covid and elevated pna then transferred to [facility] for rehab, where family reported that pt has been more lethargic and confused when they were face timing her and sent in.  pt just states not feeling well, but not able to offer specific sx's.  no reported fever, chills, no changes in breathing, no cp,n/v/dc. At times says she was SOB.    OVERNIGHT EVENTS:  comfortable      Vital Signs Last 24 Hrs  T(C): 36.1 (21 Feb 2021 16:39), Max: 36.6 (21 Feb 2021 04:29)  T(F): 97 (21 Feb 2021 16:39), Max: 97.8 (21 Feb 2021 04:29)  HR: 68 (21 Feb 2021 16:39) (61 - 72)  BP: 102/58 (21 Feb 2021 16:39) (98/43 - 126/89)  BP(mean): 61 (21 Feb 2021 11:01) (61 - 61)  RR: 21 (21 Feb 2021 16:39) (20 - 22)  SpO2: 95% (21 Feb 2021 16:39) (94% - 99%)        PHYSICAL EXAM:  GEN:        Resting, comfortable.  HEENT:    Normal.    RESP:       no distress  CVS:             Regular rate and rhythm.   ABD:         Soft, non-tender, non-distended;   :             No costovertebral angle tenderness  EXTR:            No clubbing, cyanosis or edema  CNS:              Intact sensory and motor function.        MEDICATIONS  (STANDING):  atorvastatin 40 milliGRAM(s) Oral at bedtime  chlorhexidine 4% Liquid 1 Application(s) Topical daily  dextrose 40% Gel 15 Gram(s) Oral once  dextrose 5% + sodium chloride 0.45%. 1000 milliLiter(s) (45 mL/Hr) IV Continuous <Continuous>  dextrose 5%. 1000 milliLiter(s) (50 mL/Hr) IV Continuous <Continuous>  dextrose 5%. 1000 milliLiter(s) (100 mL/Hr) IV Continuous <Continuous>  dextrose 50% Injectable 25 Gram(s) IV Push once  dextrose 50% Injectable 12.5 Gram(s) IV Push once  dextrose 50% Injectable 25 Gram(s) IV Push once  enoxaparin Injectable 80 milliGRAM(s) SubCutaneous daily  folic acid 1 milliGRAM(s) Oral daily  glucagon  Injectable 1 milliGRAM(s) IntraMuscular once  insulin lispro (ADMELOG) corrective regimen sliding scale   SubCutaneous three times a day before meals  insulin lispro (ADMELOG) corrective regimen sliding scale   SubCutaneous at bedtime  midodrine. 10 milliGRAM(s) Oral three times a day  mirtazapine 7.5 milliGRAM(s) Oral at bedtime  sodium chloride 0.65% Nasal 1 Spray(s) Nasal two times a day    MEDICATIONS  (PRN):  acetaminophen   Tablet .. 650 milliGRAM(s) Oral every 6 hours PRN Temp greater or equal to 38C (100.4F), Mild Pain (1 - 3)  acetaminophen  Suppository .. 650 milliGRAM(s) Rectal every 8 hours PRN Temp greater or equal to 38C (100.4F)  polyethylene glycol 3350 17 Gram(s) Oral daily PRN Constipation        LABS:                        9.7    9.92  )-----------( 30       ( 21 Feb 2021 06:46 )             32.7     02-21    147<H>  |  112<H>  |  104<H>  ----------------------------<  133<H>  4.1   |  29  |  2.24<H>    Ca    8.1<L>      21 Feb 2021 06:46    TPro  5.9<L>  /  Alb  1.9<L>  /  TBili  0.7  /  DBili  x   /  AST  49<H>  /  ALT  74  /  AlkPhos  225<H>  02-20    PT/INR - ( 20 Feb 2021 08:21 )   PT: 13.0 sec;   INR: 1.13 ratio         PTT - ( 20 Feb 2021 08:21 )  PTT:39.1 sec     ASSESSMENT AND PLAN:  ·	SOB.  ·	Pulmonary HTN.  ·	Acute metabolic Encephalopathy.  ·	Chronic Systolic/diastolic CHF.  ·	Chronic A Fib.  ·	S/P PPM/AICD.  ·	S/P AVR/MVR.  ·	HTN.  ·	HLD.  ·	DM.  ·	Anemia.  ·	Thrombocytopenia  ·	COVID 19.  ·	Renal Insuffiencey.    SPO2 stable on nasal O2.  on full dose Lovenox.  Not a candidate for Sildenafil due to low BP, requiring Midodrine.

## 2021-02-21 NOTE — PROCEDURE NOTE - NSPROCDETAILS_GEN_ALL_CORE
ultrasound guided/location identified, draped/prepped, sterile technique used/blood seen on insertion/dressing applied/flushes easily/secured in place/sterile technique, catheter placed

## 2021-02-22 LAB
ALBUMIN SERPL ELPH-MCNC: 2.1 G/DL — LOW (ref 3.3–5)
ALP SERPL-CCNC: 192 U/L — HIGH (ref 40–120)
ALT FLD-CCNC: 61 U/L — SIGNIFICANT CHANGE UP (ref 12–78)
ANION GAP SERPL CALC-SCNC: 9 MMOL/L — SIGNIFICANT CHANGE UP (ref 5–17)
AST SERPL-CCNC: 47 U/L — HIGH (ref 15–37)
BILIRUB SERPL-MCNC: 0.7 MG/DL — SIGNIFICANT CHANGE UP (ref 0.2–1.2)
BUN SERPL-MCNC: 110 MG/DL — HIGH (ref 7–23)
CALCIUM SERPL-MCNC: 7.8 MG/DL — LOW (ref 8.5–10.1)
CHLORIDE SERPL-SCNC: 116 MMOL/L — HIGH (ref 96–108)
CO2 SERPL-SCNC: 26 MMOL/L — SIGNIFICANT CHANGE UP (ref 22–31)
CREAT SERPL-MCNC: 2.09 MG/DL — HIGH (ref 0.5–1.3)
FLUAV AG NPH QL: SIGNIFICANT CHANGE UP
FLUBV AG NPH QL: SIGNIFICANT CHANGE UP
GLUCOSE BLDC GLUCOMTR-MCNC: 126 MG/DL — HIGH (ref 70–99)
GLUCOSE BLDC GLUCOMTR-MCNC: 130 MG/DL — HIGH (ref 70–99)
GLUCOSE BLDC GLUCOMTR-MCNC: 136 MG/DL — HIGH (ref 70–99)
GLUCOSE BLDC GLUCOMTR-MCNC: 373 MG/DL — HIGH (ref 70–99)
GLUCOSE SERPL-MCNC: 107 MG/DL — HIGH (ref 70–99)
HCT VFR BLD CALC: 32.8 % — LOW (ref 34.5–45)
HGB BLD-MCNC: 10.1 G/DL — LOW (ref 11.5–15.5)
MCHC RBC-ENTMCNC: 27.7 PG — SIGNIFICANT CHANGE UP (ref 27–34)
MCHC RBC-ENTMCNC: 30.8 GM/DL — LOW (ref 32–36)
MCV RBC AUTO: 90.1 FL — SIGNIFICANT CHANGE UP (ref 80–100)
NRBC # BLD: 0 /100 WBCS — SIGNIFICANT CHANGE UP (ref 0–0)
PLATELET # BLD AUTO: 30 K/UL — LOW (ref 150–400)
POTASSIUM SERPL-MCNC: 4.1 MMOL/L — SIGNIFICANT CHANGE UP (ref 3.5–5.3)
POTASSIUM SERPL-SCNC: 4.1 MMOL/L — SIGNIFICANT CHANGE UP (ref 3.5–5.3)
PROT SERPL-MCNC: 6.1 GM/DL — SIGNIFICANT CHANGE UP (ref 6–8.3)
RBC # BLD: 3.64 M/UL — LOW (ref 3.8–5.2)
RBC # FLD: 23.8 % — HIGH (ref 10.3–14.5)
SARS-COV-2 RNA SPEC QL NAA+PROBE: DETECTED
SODIUM SERPL-SCNC: 151 MMOL/L — HIGH (ref 135–145)
WBC # BLD: 11.36 K/UL — HIGH (ref 3.8–10.5)
WBC # FLD AUTO: 11.36 K/UL — HIGH (ref 3.8–10.5)

## 2021-02-22 PROCEDURE — 71045 X-RAY EXAM CHEST 1 VIEW: CPT | Mod: 26

## 2021-02-22 PROCEDURE — 99233 SBSQ HOSP IP/OBS HIGH 50: CPT

## 2021-02-22 RX ORDER — SODIUM CHLORIDE 9 MG/ML
1000 INJECTION, SOLUTION INTRAVENOUS
Refills: 0 | Status: DISCONTINUED | OUTPATIENT
Start: 2021-02-22 | End: 2021-02-23

## 2021-02-22 RX ADMIN — MIDODRINE HYDROCHLORIDE 10 MILLIGRAM(S): 2.5 TABLET ORAL at 14:21

## 2021-02-22 RX ADMIN — Medication 1 MILLIGRAM(S): at 11:56

## 2021-02-22 RX ADMIN — Medication 1 SPRAY(S): at 05:31

## 2021-02-22 RX ADMIN — CHLORHEXIDINE GLUCONATE 1 APPLICATION(S): 213 SOLUTION TOPICAL at 05:31

## 2021-02-22 RX ADMIN — MIDODRINE HYDROCHLORIDE 10 MILLIGRAM(S): 2.5 TABLET ORAL at 05:32

## 2021-02-22 RX ADMIN — Medication 1 SPRAY(S): at 17:25

## 2021-02-22 RX ADMIN — SODIUM CHLORIDE 75 MILLILITER(S): 9 INJECTION, SOLUTION INTRAVENOUS at 10:28

## 2021-02-22 RX ADMIN — MIDODRINE HYDROCHLORIDE 10 MILLIGRAM(S): 2.5 TABLET ORAL at 20:19

## 2021-02-22 RX ADMIN — SODIUM CHLORIDE 75 MILLILITER(S): 9 INJECTION, SOLUTION INTRAVENOUS at 20:23

## 2021-02-22 RX ADMIN — ENOXAPARIN SODIUM 80 MILLIGRAM(S): 100 INJECTION SUBCUTANEOUS at 11:15

## 2021-02-22 RX ADMIN — Medication 10: at 11:14

## 2021-02-22 NOTE — PROGRESS NOTE ADULT - PROBLEM SELECTOR PLAN 1
mentally pt is up and down - was alert yesterday - today I had to wake her but she came around after a short minute or 2  this is multifactorial- dehydration, renal dysfunction, COVID encephalopathy, and delirium could all play a role as pt has been moved to several different facilities in the last month or so  Per son, pt has declined in mental abilities since before she contracted COVID at [facility] mentally pt is up and down - was alert yesterday - today I had to wake her but she came around after a short minute or 2  this is multifactorial- dehydration, renal dysfunction, COVID encephalopathy, and delirium could all play a role as pt has been moved to several different facilities in the last month or so  Per son, pt has declined in mental abilities since before she contracted COVID at [facility], he asked that we minimize interventions and medications if at all possible - will d/c kaln do not think this is helping her mental state or her appetite only causing more drowsiness and d/c lipitor as benefit is likely to exceed life expectancy .

## 2021-02-22 NOTE — PROGRESS NOTE ADULT - ASSESSMENT
89 y/o female w/pmhx of HTN, HLD, DM2,MVR/AVR,thrombocytopenia,  Afib recently at [facility] for COVID, after which was in rehab at [facility]- pt admitted at [facility] for lethargy/AMS. Palliative care consulted for assistance with symptoms and ongoing GOC, Pt has MOLST DNR/I in chart.   ***Pt has 2 N- [facility] [id number]***

## 2021-02-22 NOTE — PROGRESS NOTE ADULT - SUBJECTIVE AND OBJECTIVE BOX
INTERVAL HPI/OVERNIGHT EVENTS: more lethargic  ***Pt has 2 N- [facility] [id number]***  Code Status: DNR/I  Allergies    No Known Allergies    Intolerances    MEDICATIONS  (STANDING):  atorvastatin 40 milliGRAM(s) Oral at bedtime  chlorhexidine 4% Liquid 1 Application(s) Topical daily  dextrose 40% Gel 15 Gram(s) Oral once  dextrose 5% + sodium chloride 0.45%. 1000 milliLiter(s) (75 mL/Hr) IV Continuous <Continuous>  dextrose 5%. 1000 milliLiter(s) (50 mL/Hr) IV Continuous <Continuous>  dextrose 5%. 1000 milliLiter(s) (100 mL/Hr) IV Continuous <Continuous>  dextrose 50% Injectable 25 Gram(s) IV Push once  dextrose 50% Injectable 12.5 Gram(s) IV Push once  dextrose 50% Injectable 25 Gram(s) IV Push once  enoxaparin Injectable 80 milliGRAM(s) SubCutaneous daily  folic acid 1 milliGRAM(s) Oral daily  glucagon  Injectable 1 milliGRAM(s) IntraMuscular once  insulin lispro (ADMELOG) corrective regimen sliding scale   SubCutaneous three times a day before meals  insulin lispro (ADMELOG) corrective regimen sliding scale   SubCutaneous at bedtime  midodrine. 10 milliGRAM(s) Oral three times a day  mirtazapine 7.5 milliGRAM(s) Oral at bedtime  sodium chloride 0.65% Nasal 1 Spray(s) Nasal two times a day    MEDICATIONS  (PRN):  acetaminophen   Tablet .. 650 milliGRAM(s) Oral every 6 hours PRN Temp greater or equal to 38C (100.4F), Mild Pain (1 - 3)  acetaminophen  Suppository .. 650 milliGRAM(s) Rectal every 8 hours PRN Temp greater or equal to 38C (100.4F)  polyethylene glycol 3350 17 Gram(s) Oral daily PRN Constipation      PRESENT SYMPTOMS: [x ]Unable to obtain due to poor mentation   Source if other than patient:  [ ]Family   [ ]Team     Pain (Impact on QOL):    Location:  Severity:  Minimal acceptable level (0-10 scale):       Quality:       Onset:  Duration:  Aggravating factors:  Relieving Factors  Radiation:    Dyspnea:  Yes [ ] No [ ] - [ ]Mild [ ]Moderate [ ]Severe  Anxiety:    Yes [ ] No [ ] - [ ]Mild [ ]Moderate [ ]Severe  Fatigue:    Yes [ ] No [ ] - [ ]Mild [ ]Moderate [ ]Severe  Nausea:    Yes [ ] No [ ] - [ ]Mild [ ]Moderate [ ]Severe                         Loss of appetite: Yes [ ] No [ ] - [ ]Mild [ ]Moderate [ ]Severe             Constipation:  Yes [ ] No [ ] - [ ]Mild [ ]Moderate [ ]Severe  Grief: Yes [ ] No [ ]     PAIN AD Score:	  [email or web address] (Ctrl + left click to view)    Other Symptoms:  [ ]All other review of systems negative     Karnofsky Performance Score/Palliative Performance Status Version 2:     20-30    %    http://palliative.info/resource_material/[email or web address].pdf    PHYSICAL EXAM:  Vital Signs Last 24 Hrs  T(C): 36.4 (22 Feb 2021 10:36), Max: 36.9 (22 Feb 2021 04:40)  T(F): 97.6 (22 Feb 2021 10:36), Max: 98.4 (22 Feb 2021 04:40)  HR: 74 (22 Feb 2021 10:38) (50 - 74)  BP: 113/80 (22 Feb 2021 10:38) (102/58 - 123/63)  BP(mean): --  RR: 20 (22 Feb 2021 10:36) (20 - 21)  SpO2: 98% (22 Feb 2021 10:36) (94% - 98%) I&O's Summary    21 Feb 2021 07:01  -  22 Feb 2021 07:00  --------------------------------------------------------  IN: 540 mL / OUT: 400 mL / NET: 140 mL         GENERAL:  [ ]Alert  [ ]Oriented x   [ x]Lethargic  [ ]Cachexia  [ ]Unarousable  [x ]Verbal- cannot understand utterances   [ ]Non-Verbal  Behavioral:   [ ] Anxiety  [ x] Delirium [ ] Agitation [ ] Other  HEENT:  [ ]Normal   [ x]Dry mouth   [ ]ET Tube/Trach  [ ]Oral lesions  PULMONARY:   [x ]Clear [ x]Tachypnea  [ ]Audible excessive secretions   [ ]Rhonchi        [ ]Right [ ]Left [ ]Bilateral  [ ]Crackles        [ ]Right [ ]Left [ ]Bilateral  [ ]Wheezing     [ ]Right [ ]Left [ ]Bilateral  CARDIOVASCULAR:    [ ]Regular [ ]Irregular [ ]Tachy  [ ]Vladimir [ ]Murmur [ ]Other  GASTROINTESTINAL:  [x ]Soft  [ ]Distended   [x ]+BS  [ ]Non tender [ ]Tender  [ ]PEG [ ]OGT/ NGT   Last BM: 2/20     GENITOURINARY:  [ ]Normal [x ] Incontinent   [ ]Oliguria/Anuria   [ ]Arenas  MUSCULOSKELETAL:   [ ]Normal   [ ]Weakness  [ x]Bed/Wheelchair bound [ x]Edema  NEUROLOGIC:   [ ]No focal deficits  [x ] Cognitive impairment  [ x] Dysphagia [ ]Dysarthria [ ] Paresis [ ]Other   SKIN:   [ ]Normal   [ x]Pressure ulcer(s)  Stage II buttocks [ ]Rash    CRITICAL CARE:  [ ] Shock Present  [ ]Septic [ ]Cardiogenic [ ]Neurologic [ ]Hypovolemic  [ ]  Vasopressors [ ]  Inotropes   [ ] Respiratory failure present  [ ] Acute  [ ] Chronic [ ] Hypoxic  [ ] Hypercarbic [ ] Other  [ ] Other organ failure     LABS:                        10.1   11.36 )-----------( 30       ( 22 Feb 2021 07:47 )             32.8   02-22    151<H>  |  116<H>  |  110<H>  ----------------------------<  107<H>  4.1   |  26  |  2.09<H>    Ca    7.8<L>      22 Feb 2021 07:47    TPro  6.1  /  Alb  2.1<L>  /  TBili  0.7  /  DBili  x   /  AST  47<H>  /  ALT  61  /  AlkPhos  192<H>  02-22        RADIOLOGY & ADDITIONAL STUDIES:  < from: CT Head No Cont (02.19.21 @ 14:23) >  EXAM:  CT BRAIN                            PROCEDURE DATE:  02/19/2021          INTERPRETATION:  INDICATION:  Altered mental status.  TECHNIQUE:  A non contrast 2.5mm axial CT study of the brain was performed from skull base to vertex. Coronal and sagittal reformations were generated from the axial data.  COMPARISON EXAMINATION:  CT dated 2/12/2021    FINDINGS:    HEMISPHERES:  No acute infarct or hemorrhagic lesion is noted. The study is degraded by motion. Involutional changes and volume lossare again noted, stable when compared with the prior CT.  VENTRICLES:  Midline with its vacuo enlargement.  POSTERIOR FOSSA:  No acute abnormality suggested.  EXTRACEREBRAL SPACES:  No subdural or epidural collections are noted.  SKULL BASE AND CALVARIUM:  Appears intact.  No fracture or destructive lesion is identified.  SINUSES AND MASTOIDS:  Clear.  MISCELLANEOUS:  No orbital or suprasellar abnormality noted.    IMPRESSION:    1)  Limited study, degraded by motion. Stable follow up exam with no acute abnormality or interval change suggested.  2)  chronic ischemic changes again noted in both hemispheres with volume loss.              EFRAIN MARTINEZ MD; Attending Radiologist  This document has been electronically signed. Feb 19 2021  2:22PM    < end of copied text >      Protein Calorie Malnutrition Present: [ x] yes [ ] no  [x] PPSV2 < or = 30%  [ x] significant weight loss [ x] poor nutritional intake [ x] anasarca [x ] catabolic state Albumin, Serum: 2.1 g/dL (02-22-21 @ 07:47)      REFERRALS:   [ ]Chaplaincy  [ x] Hospice  [ ]Child Life  [ ]Social Work  [ ]Case management [ ]Holistic Therapy   Goals of Care Document:  INTERVAL HPI/OVERNIGHT EVENTS: more lethargic- called back on to case as family requesting hospice now.  ***Pt has 2 N- [facility] [id number]***  Code Status: DNR/I  Allergies    No Known Allergies    Intolerances    MEDICATIONS  (STANDING):  atorvastatin 40 milliGRAM(s) Oral at bedtime  chlorhexidine 4% Liquid 1 Application(s) Topical daily  dextrose 40% Gel 15 Gram(s) Oral once  dextrose 5% + sodium chloride 0.45%. 1000 milliLiter(s) (75 mL/Hr) IV Continuous <Continuous>  dextrose 5%. 1000 milliLiter(s) (50 mL/Hr) IV Continuous <Continuous>  dextrose 5%. 1000 milliLiter(s) (100 mL/Hr) IV Continuous <Continuous>  dextrose 50% Injectable 25 Gram(s) IV Push once  dextrose 50% Injectable 12.5 Gram(s) IV Push once  dextrose 50% Injectable 25 Gram(s) IV Push once  enoxaparin Injectable 80 milliGRAM(s) SubCutaneous daily  folic acid 1 milliGRAM(s) Oral daily  glucagon  Injectable 1 milliGRAM(s) IntraMuscular once  insulin lispro (ADMELOG) corrective regimen sliding scale   SubCutaneous three times a day before meals  insulin lispro (ADMELOG) corrective regimen sliding scale   SubCutaneous at bedtime  midodrine. 10 milliGRAM(s) Oral three times a day  mirtazapine 7.5 milliGRAM(s) Oral at bedtime  sodium chloride 0.65% Nasal 1 Spray(s) Nasal two times a day    MEDICATIONS  (PRN):  acetaminophen   Tablet .. 650 milliGRAM(s) Oral every 6 hours PRN Temp greater or equal to 38C (100.4F), Mild Pain (1 - 3)  acetaminophen  Suppository .. 650 milliGRAM(s) Rectal every 8 hours PRN Temp greater or equal to 38C (100.4F)  polyethylene glycol 3350 17 Gram(s) Oral daily PRN Constipation      PRESENT SYMPTOMS: [x ]Unable to obtain due to poor mentation   Source if other than patient:  [ ]Family   [ ]Team     Pain (Impact on QOL):    Location:  Severity:  Minimal acceptable level (0-10 scale):       Quality:       Onset:  Duration:  Aggravating factors:  Relieving Factors  Radiation:    Dyspnea:  Yes [ ] No [ ] - [ ]Mild [ ]Moderate [ ]Severe  Anxiety:    Yes [ ] No [ ] - [ ]Mild [ ]Moderate [ ]Severe  Fatigue:    Yes [ ] No [ ] - [ ]Mild [ ]Moderate [ ]Severe  Nausea:    Yes [ ] No [ ] - [ ]Mild [ ]Moderate [ ]Severe                         Loss of appetite: Yes [ ] No [ ] - [ ]Mild [ ]Moderate [ ]Severe             Constipation:  Yes [ ] No [ ] - [ ]Mild [ ]Moderate [ ]Severe  Grief: Yes [ ] No [ ]     PAIN AD Score:	  [email or web address]/painad.[email or web address] (Ctrl + left click to view)    Other Symptoms:  [ ]All other review of systems negative     Karnofsky Performance Score/Palliative Performance Status Version 2:     20-30    %    http://palliative.info/resource_material/[email or web address].pdf    PHYSICAL EXAM:  Vital Signs Last 24 Hrs  T(C): 36.4 (22 Feb 2021 10:36), Max: 36.9 (22 Feb 2021 04:40)  T(F): 97.6 (22 Feb 2021 10:36), Max: 98.4 (22 Feb 2021 04:40)  HR: 74 (22 Feb 2021 10:38) (50 - 74)  BP: 113/80 (22 Feb 2021 10:38) (102/58 - 123/63)  BP(mean): --  RR: 20 (22 Feb 2021 10:36) (20 - 21)  SpO2: 98% (22 Feb 2021 10:36) (94% - 98%) I&O's Summary    21 Feb 2021 07:01  -  22 Feb 2021 07:00  --------------------------------------------------------  IN: 540 mL / OUT: 400 mL / NET: 140 mL         GENERAL:  [ ]Alert  [ ]Oriented x   [ x]Lethargic  [ ]Cachexia  [ ]Unarousable  [x ]Verbal- cannot understand utterances   [ ]Non-Verbal  Behavioral:   [ ] Anxiety  [ x] Delirium [ ] Agitation [ ] Other  HEENT:  [ ]Normal   [ x]Dry mouth   [ ]ET Tube/Trach  [ ]Oral lesions  PULMONARY:   [x ]Clear [ x]Tachypnea  [ ]Audible excessive secretions   [ ]Rhonchi        [ ]Right [ ]Left [ ]Bilateral  [ ]Crackles        [ ]Right [ ]Left [ ]Bilateral  [ ]Wheezing     [ ]Right [ ]Left [ ]Bilateral  CARDIOVASCULAR:    [ ]Regular [ ]Irregular [ ]Tachy  [ ]Vladimir [ ]Murmur [ ]Other  GASTROINTESTINAL:  [x ]Soft  [ ]Distended   [x ]+BS  [ ]Non tender [ ]Tender  [ ]PEG [ ]OGT/ NGT   Last BM: 2/20     GENITOURINARY:  [ ]Normal [x ] Incontinent   [ ]Oliguria/Anuria   [ ]Arenas  MUSCULOSKELETAL:   [ ]Normal   [ ]Weakness  [ x]Bed/Wheelchair bound [ x]Edema  NEUROLOGIC:   [ ]No focal deficits  [x ] Cognitive impairment  [ x] Dysphagia [ ]Dysarthria [ ] Paresis [ ]Other   SKIN:   [ ]Normal   [ x]Pressure ulcer(s)  Stage II buttocks [ ]Rash    CRITICAL CARE:  [ ] Shock Present  [ ]Septic [ ]Cardiogenic [ ]Neurologic [ ]Hypovolemic  [ ]  Vasopressors [ ]  Inotropes   [ ] Respiratory failure present  [ ] Acute  [ ] Chronic [ ] Hypoxic  [ ] Hypercarbic [ ] Other  [ ] Other organ failure     LABS:                        10.1   11.36 )-----------( 30       ( 22 Feb 2021 07:47 )             32.8   02-22    151<H>  |  116<H>  |  110<H>  ----------------------------<  107<H>  4.1   |  26  |  2.09<H>    Ca    7.8<L>      22 Feb 2021 07:47    TPro  6.1  /  Alb  2.1<L>  /  TBili  0.7  /  DBili  x   /  AST  47<H>  /  ALT  61  /  AlkPhos  192<H>  02-22        RADIOLOGY & ADDITIONAL STUDIES:  < from: CT Head No Cont (02.19.21 @ 14:23) >  EXAM:  CT BRAIN                            PROCEDURE DATE:  02/19/2021          INTERPRETATION:  INDICATION:  Altered mental status.  TECHNIQUE:  A non contrast 2.5mm axial CT study of the brain was performed from skull base to vertex. Coronal and sagittal reformations were generated from the axial data.  COMPARISON EXAMINATION:  CT dated 2/12/2021    FINDINGS:    HEMISPHERES:  No acute infarct or hemorrhagic lesion is noted. The study is degraded by motion. Involutional changes and volume lossare again noted, stable when compared with the prior CT.  VENTRICLES:  Midline with its vacuo enlargement.  POSTERIOR FOSSA:  No acute abnormality suggested.  EXTRACEREBRAL SPACES:  No subdural or epidural collections are noted.  SKULL BASE AND CALVARIUM:  Appears intact.  No fracture or destructive lesion is identified.  SINUSES AND MASTOIDS:  Clear.  MISCELLANEOUS:  No orbital or suprasellar abnormality noted.    IMPRESSION:    1)  Limited study, degraded by motion. Stable follow up exam with no acute abnormality or interval change suggested.  2)  chronic ischemic changes again noted in both hemispheres with volume loss.              EFRAIN MARTINEZ MD; Attending Radiologist  This document has been electronically signed. Feb 19 2021  2:22PM    < end of copied text >      Protein Calorie Malnutrition Present: [ x] yes [ ] no  [x] PPSV2 < or = 30%  [ x] significant weight loss [ x] poor nutritional intake [ x] anasarca [x ] catabolic state Albumin, Serum: 2.1 g/dL (02-22-21 @ 07:47)      REFERRALS:   [ ]Chaplaincy  [ x] Hospice  [ ]Child Life  [ ]Social Work  [ ]Case management [ ]Holistic Therapy   Goals of Care Document:

## 2021-02-22 NOTE — PROGRESS NOTE ADULT - SUBJECTIVE AND OBJECTIVE BOX
INTERVAL HPI/OVERNIGHT EVENTS:        REVIEW OF SYSTEMS:  CONSTITUTIONAL:     fatigue  poor  appetite      MEDICATION:  acetaminophen   Tablet .. 650 milliGRAM(s) Oral every 6 hours PRN  acetaminophen  Suppository .. 650 milliGRAM(s) Rectal every 8 hours PRN  atorvastatin 40 milliGRAM(s) Oral at bedtime  chlorhexidine 4% Liquid 1 Application(s) Topical daily  dextrose 40% Gel 15 Gram(s) Oral once  dextrose 5% + sodium chloride 0.45%. 1000 milliLiter(s) IV Continuous <Continuous>  dextrose 5%. 1000 milliLiter(s) IV Continuous <Continuous>  dextrose 5%. 1000 milliLiter(s) IV Continuous <Continuous>  dextrose 50% Injectable 25 Gram(s) IV Push once  dextrose 50% Injectable 12.5 Gram(s) IV Push once  dextrose 50% Injectable 25 Gram(s) IV Push once  enoxaparin Injectable 80 milliGRAM(s) SubCutaneous daily  folic acid 1 milliGRAM(s) Oral daily  glucagon  Injectable 1 milliGRAM(s) IntraMuscular once  insulin lispro (ADMELOG) corrective regimen sliding scale   SubCutaneous three times a day before meals  insulin lispro (ADMELOG) corrective regimen sliding scale   SubCutaneous at bedtime  midodrine. 10 milliGRAM(s) Oral three times a day  mirtazapine 7.5 milliGRAM(s) Oral at bedtime  polyethylene glycol 3350 17 Gram(s) Oral daily PRN  sodium chloride 0.65% Nasal 1 Spray(s) Nasal two times a day    Vital Signs Last 24 Hrs  T(C): 36.9 (22 Feb 2021 04:40), Max: 36.9 (22 Feb 2021 04:40)  T(F): 98.4 (22 Feb 2021 04:40), Max: 98.4 (22 Feb 2021 04:40)  HR: 55 (22 Feb 2021 04:40) (55 - 72)  BP: 103/70 (22 Feb 2021 04:40) (98/43 - 123/63)  BP(mean): 61 (21 Feb 2021 11:01) (61 - 61)  RR: 20 (22 Feb 2021 04:40) (20 - 22)  SpO2: 94% (22 Feb 2021 04:40) (94% - 95%)    PHYSICAL EXAM:  GENERAL: NAD, well-groomed, well-developed  HEENT : Conjuntivae  clear sclerae anicteric  NECK: Supple, No JVD, Normal thyroid  NERVOUS SYSTEM:  Alert oriented x1  no  focal  deficits;   CHEST/LUNG: decreased bs  HEART: Regular rate and rhythm; No murmurs, rubs, or gallops  ABDOMEN: Soft, Nontender, Nondistended; Bowel sounds present  EXTREMITIES:  no  edema no  tenderness  SKIN: No rashes   LABS:                        10.1   11.36 )-----------( 30       ( 22 Feb 2021 07:47 )             32.8     02-22    151<H>  |  116<H>  |  110<H>  ----------------------------<  107<H>  4.1   |  26  |  2.09<H>    Ca    7.8<L>      22 Feb 2021 07:47    TPro  6.1  /  Alb  2.1<L>  /  TBili  0.7  /  DBili  x   /  AST  47<H>  /  ALT  61  /  AlkPhos  192<H>  02-22        CAPILLARY BLOOD GLUCOSE      POCT Blood Glucose.: 126 mg/dL (22 Feb 2021 08:04)  POCT Blood Glucose.: 295 mg/dL (21 Feb 2021 21:29)  POCT Blood Glucose.: 325 mg/dL (21 Feb 2021 17:48)  POCT Blood Glucose.: 204 mg/dL (21 Feb 2021 11:59)      RADIOLOGY & ADDITIONAL TESTS:    Imaging reports  Personally Reviewed:  [ ] YES  [ ] NO    Consultant(s) Notes Reviewed:  [x ] YES  [ ] NO    Care Discussed with Consultants/Other Providers [x ] YES  [ ] NO  Problem/Plan - 1:  ·  Problem: Dehydration.   hx  CHF     slow  hydration as  RENAL  FUNCTION  WORSENING   continue  to  monitor  closely  follow renal function     Problem/Plan - 2:  ·  Problem: Altered mental status, unspecified altered mental status type.  Plan: IMPROVED form  admission continues  altered      Problem/Plan - 3:  ·  Problem: Chronic diastolic congestive heart failure.  Plan: currently not in failure check  cxr  monitor.  BNP  MOST  LIKELY  2/2  SEVERE  PULMONARY  HTN WITH  DILATED  RT  ATRIUM   discussed  with  pulmonary  unable to  add  revatio 2/2  hypotension    Problem/Plan - 4:  ·  Problem: Type 2 diabetes mellitus with hyperglycemia, without long-term current use of insulin.  Plan: ss insulin coverage.     Problem/Plan - 5:  ·  Problem: Thrombocytopenia. Plan: pt on prior admission plts  range  will monitor closely   will change to  lovenox as  patient  inconstant  with  oral  intake    Problem/Plan - 6:  Problem: Mixed hyperlipidemia.Plan: cont statin.    Problem/Plan - 7:  ·  Problem: MICHELLE (acute kidney injury). Plan:   OBSERVE  WITH  DECREASED  LASIX  Problem/Plan - 8:  ·  Problem: Preventive measure.  Plan: on ac.      INTERVAL HPI/OVERNIGHT EVENTS:        REVIEW OF SYSTEMS:  CONSTITUTIONAL:     fatigue  poor  appetite      MEDICATION:  acetaminophen   Tablet .. 650 milliGRAM(s) Oral every 6 hours PRN  acetaminophen  Suppository .. 650 milliGRAM(s) Rectal every 8 hours PRN  atorvastatin 40 milliGRAM(s) Oral at bedtime  chlorhexidine 4% Liquid 1 Application(s) Topical daily  dextrose 40% Gel 15 Gram(s) Oral once  dextrose 5% + sodium chloride 0.45%. 1000 milliLiter(s) IV Continuous <Continuous>  dextrose 5%. 1000 milliLiter(s) IV Continuous <Continuous>  dextrose 5%. 1000 milliLiter(s) IV Continuous <Continuous>  dextrose 50% Injectable 25 Gram(s) IV Push once  dextrose 50% Injectable 12.5 Gram(s) IV Push once  dextrose 50% Injectable 25 Gram(s) IV Push once  enoxaparin Injectable 80 milliGRAM(s) SubCutaneous daily  folic acid 1 milliGRAM(s) Oral daily  glucagon  Injectable 1 milliGRAM(s) IntraMuscular once  insulin lispro (ADMELOG) corrective regimen sliding scale   SubCutaneous three times a day before meals  insulin lispro (ADMELOG) corrective regimen sliding scale   SubCutaneous at bedtime  midodrine. 10 milliGRAM(s) Oral three times a day  mirtazapine 7.5 milliGRAM(s) Oral at bedtime  polyethylene glycol 3350 17 Gram(s) Oral daily PRN  sodium chloride 0.65% Nasal 1 Spray(s) Nasal two times a day    Vital Signs Last 24 Hrs  T(C): 36.9 (22 Feb 2021 04:40), Max: 36.9 (22 Feb 2021 04:40)  T(F): 98.4 (22 Feb 2021 04:40), Max: 98.4 (22 Feb 2021 04:40)  HR: 55 (22 Feb 2021 04:40) (55 - 72)  BP: 103/70 (22 Feb 2021 04:40) (98/43 - 123/63)  BP(mean): 61 (21 Feb 2021 11:01) (61 - 61)  RR: 20 (22 Feb 2021 04:40) (20 - 22)  SpO2: 94% (22 Feb 2021 04:40) (94% - 95%)    PHYSICAL EXAM:  GENERAL: NAD, well-groomed, well-developed  HEENT : Conjuntivae  clear sclerae anicteric  NECK: Supple, No JVD, Normal thyroid  NERVOUS SYSTEM:  Alert oriented x1  no  focal  deficits;   CHEST/LUNG: decreased bs  HEART: Regular rate and rhythm; No murmurs, rubs, or gallops  ABDOMEN: Soft, Nontender, Nondistended; Bowel sounds present  EXTREMITIES:  no  edema no  tenderness  SKIN: No rashes   LABS:                        10.1   11.36 )-----------( 30       ( 22 Feb 2021 07:47 )             32.8     02-22    151<H>  |  116<H>  |  110<H>  ----------------------------<  107<H>  4.1   |  26  |  2.09<H>    Ca    7.8<L>      22 Feb 2021 07:47    TPro  6.1  /  Alb  2.1<L>  /  TBili  0.7  /  DBili  x   /  AST  47<H>  /  ALT  61  /  AlkPhos  192<H>  02-22        CAPILLARY BLOOD GLUCOSE      POCT Blood Glucose.: 126 mg/dL (22 Feb 2021 08:04)  POCT Blood Glucose.: 295 mg/dL (21 Feb 2021 21:29)  POCT Blood Glucose.: 325 mg/dL (21 Feb 2021 17:48)  POCT Blood Glucose.: 204 mg/dL (21 Feb 2021 11:59)      RADIOLOGY & ADDITIONAL TESTS:    Imaging reports  Personally Reviewed:  [ ] YES  [ ] NO    Consultant(s) Notes Reviewed:  [x ] YES  [ ] NO    Care Discussed with Consultants/Other Providers [x ] YES  [ ] NO  Problem/Plan - 1:  ·  Problem: Dehydration.   hx  CHF     slow  hydration as  RENAL  FUNCTION  WORSENING   continue  to  monitor  closely  follow renal function     Problem/Plan - 2:  ·  Problem: Altered mental status, unspecified altered mental status type.  Plan: IMPROVED form  admission continues  altered      Problem/Plan - 3:  ·  Problem: Chronic diastolic congestive heart failure.  Plan: currently not in failure check  cxr  monitor.  BNP  MOST  LIKELY  2/2  SEVERE  PULMONARY  HTN WITH  DILATED  RT  ATRIUM   discussed  with  pulmonary  unable to  add  revatio 2/2  hypotension    Problem/Plan - 4:  ·  Problem: Type 2 diabetes mellitus with hyperglycemia, without long-term current use of insulin.  Plan: ss insulin coverage.     Problem/Plan - 5:  ·  Problem: Thrombocytopenia. Plan: pt on prior admission plts  range  will monitor closely   will change to  lovenox as  patient  inconstant  with  oral  intake    Problem/Plan - 6:  Problem: Mixed hyperlipidemia.Plan: cont statin.    Problem/Plan - 7:  ·  Problem: MICHELLE (acute kidney injury). Plan:   OBSERVE  WITH  DECREASED  LASIX  Problem/Plan - 8:  ·  Problem: Preventive measure.  Plan: on ac.   DISCUSSED  WITH  PT'S  SON  PEDRO COOMBS TO  SPEAK  WITH  HIM  OSMIN  AND  NOT  HER  NIECE

## 2021-02-23 ENCOUNTER — INPATIENT (INPATIENT)
Facility: HOSPITAL | Age: 86
LOS: 0 days | End: 2021-02-23
Attending: INTERNAL MEDICINE | Admitting: INTERNAL MEDICINE

## 2021-02-23 VITALS
RESPIRATION RATE: 18 BRPM | TEMPERATURE: 98 F | DIASTOLIC BLOOD PRESSURE: 39 MMHG | SYSTOLIC BLOOD PRESSURE: 63 MMHG | HEART RATE: 91 BPM | OXYGEN SATURATION: 91 %

## 2021-02-23 VITALS — HEART RATE: 88 BPM | OXYGEN SATURATION: 94 % | RESPIRATION RATE: 20 BRPM

## 2021-02-23 DIAGNOSIS — Z95.2 PRESENCE OF PROSTHETIC HEART VALVE: Chronic | ICD-10-CM

## 2021-02-23 DIAGNOSIS — Z95.810 PRESENCE OF AUTOMATIC (IMPLANTABLE) CARDIAC DEFIBRILLATOR: Chronic | ICD-10-CM

## 2021-02-23 LAB
GLUCOSE BLDC GLUCOMTR-MCNC: 151 MG/DL — HIGH (ref 70–99)
GLUCOSE BLDC GLUCOMTR-MCNC: 185 MG/DL — HIGH (ref 70–99)
GLUCOSE BLDC GLUCOMTR-MCNC: 73 MG/DL — SIGNIFICANT CHANGE UP (ref 70–99)
PROCALCITONIN SERPL-MCNC: 0.46 NG/ML — HIGH (ref 0.02–0.1)

## 2021-02-23 PROCEDURE — 99233 SBSQ HOSP IP/OBS HIGH 50: CPT

## 2021-02-23 RX ORDER — POLYETHYLENE GLYCOL 3350 17 G/17G
17 POWDER, FOR SOLUTION ORAL DAILY
Refills: 0 | Status: DISCONTINUED | OUTPATIENT
Start: 2021-02-23 | End: 2021-02-23

## 2021-02-23 RX ORDER — ACETAMINOPHEN 500 MG
650 TABLET ORAL EVERY 6 HOURS
Refills: 0 | Status: DISCONTINUED | OUTPATIENT
Start: 2021-02-23 | End: 2021-02-23

## 2021-02-23 RX ORDER — SODIUM CHLORIDE 9 MG/ML
1000 INJECTION, SOLUTION INTRAVENOUS
Qty: 0 | Refills: 0 | DISCHARGE
Start: 2021-02-23

## 2021-02-23 RX ORDER — SCOPALAMINE 1 MG/3D
1 PATCH, EXTENDED RELEASE TRANSDERMAL
Refills: 0 | Status: DISCONTINUED | OUTPATIENT
Start: 2021-02-23 | End: 2021-02-23

## 2021-02-23 RX ORDER — HYDROMORPHONE HYDROCHLORIDE 2 MG/ML
0.5 INJECTION INTRAMUSCULAR; INTRAVENOUS; SUBCUTANEOUS
Qty: 0 | Refills: 0 | DISCHARGE
Start: 2021-02-23

## 2021-02-23 RX ORDER — HYDROMORPHONE HYDROCHLORIDE 2 MG/ML
1 INJECTION INTRAMUSCULAR; INTRAVENOUS; SUBCUTANEOUS
Refills: 0 | Status: DISCONTINUED | OUTPATIENT
Start: 2021-02-23 | End: 2021-02-23

## 2021-02-23 RX ORDER — SCOPALAMINE 1 MG/3D
0 PATCH, EXTENDED RELEASE TRANSDERMAL
Qty: 0 | Refills: 0 | DISCHARGE
Start: 2021-02-23

## 2021-02-23 RX ORDER — FOLIC ACID 0.8 MG
1 TABLET ORAL DAILY
Refills: 0 | Status: DISCONTINUED | OUTPATIENT
Start: 2021-02-23 | End: 2021-02-23

## 2021-02-23 RX ORDER — HYDROMORPHONE HYDROCHLORIDE 2 MG/ML
1 INJECTION INTRAMUSCULAR; INTRAVENOUS; SUBCUTANEOUS
Qty: 0 | Refills: 0 | DISCHARGE
Start: 2021-02-23

## 2021-02-23 RX ORDER — POLYETHYLENE GLYCOL 3350 17 G/17G
17 POWDER, FOR SOLUTION ORAL
Qty: 0 | Refills: 0 | DISCHARGE
Start: 2021-02-23

## 2021-02-23 RX ORDER — INSULIN LISPRO 100/ML
0 VIAL (ML) SUBCUTANEOUS
Qty: 0 | Refills: 0 | DISCHARGE
Start: 2021-02-23

## 2021-02-23 RX ORDER — SODIUM CHLORIDE 9 MG/ML
500 INJECTION INTRAMUSCULAR; INTRAVENOUS; SUBCUTANEOUS ONCE
Refills: 0 | Status: COMPLETED | OUTPATIENT
Start: 2021-02-23 | End: 2021-02-23

## 2021-02-23 RX ORDER — SODIUM CHLORIDE 0.65 %
0 AEROSOL, SPRAY (ML) NASAL
Qty: 0 | Refills: 0 | DISCHARGE
Start: 2021-02-23

## 2021-02-23 RX ORDER — HYDROMORPHONE HYDROCHLORIDE 2 MG/ML
0.5 INJECTION INTRAMUSCULAR; INTRAVENOUS; SUBCUTANEOUS EVERY 6 HOURS
Refills: 0 | Status: DISCONTINUED | OUTPATIENT
Start: 2021-02-23 | End: 2021-02-23

## 2021-02-23 RX ORDER — SODIUM CHLORIDE 0.65 %
1 AEROSOL, SPRAY (ML) NASAL
Refills: 0 | Status: DISCONTINUED | OUTPATIENT
Start: 2021-02-23 | End: 2021-02-23

## 2021-02-23 RX ORDER — ACETAMINOPHEN 500 MG
1 TABLET ORAL
Qty: 0 | Refills: 0 | DISCHARGE
Start: 2021-02-23

## 2021-02-23 RX ORDER — SODIUM CHLORIDE 9 MG/ML
1000 INJECTION, SOLUTION INTRAVENOUS
Refills: 0 | Status: DISCONTINUED | OUTPATIENT
Start: 2021-02-23 | End: 2021-02-23

## 2021-02-23 RX ADMIN — Medication 1 SPRAY(S): at 04:57

## 2021-02-23 RX ADMIN — ENOXAPARIN SODIUM 80 MILLIGRAM(S): 100 INJECTION SUBCUTANEOUS at 10:56

## 2021-02-23 RX ADMIN — MIDODRINE HYDROCHLORIDE 10 MILLIGRAM(S): 2.5 TABLET ORAL at 04:57

## 2021-02-23 RX ADMIN — HYDROMORPHONE HYDROCHLORIDE 0.5 MILLIGRAM(S): 2 INJECTION INTRAMUSCULAR; INTRAVENOUS; SUBCUTANEOUS at 16:52

## 2021-02-23 RX ADMIN — Medication 2: at 07:57

## 2021-02-23 RX ADMIN — HYDROMORPHONE HYDROCHLORIDE 0.5 MILLIGRAM(S): 2 INJECTION INTRAMUSCULAR; INTRAVENOUS; SUBCUTANEOUS at 12:39

## 2021-02-23 RX ADMIN — Medication 1 MILLIGRAM(S): at 14:03

## 2021-02-23 RX ADMIN — Medication 2: at 10:59

## 2021-02-23 RX ADMIN — SODIUM CHLORIDE 500 MILLILITER(S): 9 INJECTION INTRAMUSCULAR; INTRAVENOUS; SUBCUTANEOUS at 02:14

## 2021-02-23 RX ADMIN — CHLORHEXIDINE GLUCONATE 1 APPLICATION(S): 213 SOLUTION TOPICAL at 04:57

## 2021-02-23 NOTE — PROGRESS NOTE ADULT - PROBLEM SELECTOR PLAN 2
TTE 2/4/21  Normal left ventricular systolic function. No segmental  wall motion abnormalities.  clinically does not appear to be in failure- high BNP can be related to pulm HTN, R heart stretch/strain and renal dysfunction   Bioprosthetic mitral valve. Gradient (12.9 mmHg at  60/min)  is severely increased for a prosthetic mitral valve.  Bioprosthetic aortic valve. Gradient across the aortic  valve cannot be estimated from this study.  Severe right ventricular enlargement with severely  decreased right ventricular systolic function.  Severe pulmonary hypertension.  Mild dilatation of the aortic root. Moderate dilitation of  the ascending aorta.  A device wire is noted in the right heart  repeat is planned - unclear significance of wire noted  per discussion with son, pt has had history of trouble keeping a balance with fluid status and kidney function
TTE 2/4/21  Normal left ventricular systolic function. No segmental  wall motion abnormalities.  clinically does not appear to be in failure- high BNP can be related to pulm HTN, R heart stretch/strain and renal dysfunction   Bioprosthetic mitral valve. Gradient (12.9 mmHg at  60/min)  is severely increased for a prosthetic mitral valve.  Bioprosthetic aortic valve. Gradient across the aortic  valve cannot be estimated from this study.  Severe right ventricular enlargement with severely  decreased right ventricular systolic function.  Severe pulmonary hypertension.  Mild dilatation of the aortic root. Moderate dilitation of  the ascending aorta.  A device wire is noted in the right heart  repeat is planned - unclear significance of wire noted
on IV fluids, lasix stopped  per discussion with son, pt has had history of trouble keeping a balance with fluid status and kidney function  at this point would minimize fluids as pt appears to be dying and would only cause swelling and discomfort, possibly make breathing worse

## 2021-02-23 NOTE — PROGRESS NOTE ADULT - PROBLEM SELECTOR PLAN 8
son Craig Singh is proxy . GAURAV on chart DNR/I. Long discussion held with him today via phone, he stated his mother would not want to lay in a hospital and not have quality and he told her long ago how to deal with her health care in this situation. He is requesting hospice eval as he feels even with the best intent and efforts from various facilities, that she is not improving and likely doesn't have a long time left. I introduced hospice benefit to him and what it generally entails. [facility] referral made, pt currently meets criteria for home hospice which could be difficult to achieve as she is COVID + and had lived alone and son lives in [place]. Son also reported that his cousin had been "hounding" the staff at all the facilities his mother was at to stay on top of them to try to advocate for pt to get the best treatment and favorable outcome but at this point Craig has spoken to his cousin about his desire to pursue hospice for his mother.
son Craig Singh is proxy . MOL on chart DNR/I. Long discussion held with him yesterday via phone, he stated his mother would not want to lay in a hospital and not have quality and he told her long ago how to deal with her health care in this situation. He is requesting hospice eval as he feels even with the best intent and efforts from various facilities, that she is not improving and likely doesn't have a long time left. He wants to pursue hospice. At this point, pt appears inpatient appropriate. [facility] liaison aware and working on transition of care. Dr. Lee aware and has notified son.
son Francisco is proxy . MOLST on chart DNR/I. Pt has no distressing symptoms to treat. Will no longer actively follow case.

## 2021-02-23 NOTE — PROGRESS NOTE ADULT - PROBLEM SELECTOR PLAN 1
pt minimally responsive today may have entered dying process  this is multifactorial- dehydration, renal dysfunction, COVID encephalopathy, and delirium could all play a role as pt has been moved to several different facilities in the last month or so. Per son, pt has declined in mental abilities since before she contracted COVID at [facility]

## 2021-02-23 NOTE — PROGRESS NOTE ADULT - REASON FOR ADMISSION
fatigue

## 2021-02-23 NOTE — PROGRESS NOTE ADULT - ASSESSMENT
89 y/o female w/pmhx of HTN, HLD, DM2,MVR/AVR,thrombocytopenia,  Afib recently at [facility] for COVID, [facility] admitted at [facility] for lethargy/AMS. Palliative care consulted for assistance with symptoms and ongoing GOC, Pt has MOLST DNR/I in chart.   ***Pt has 2 N- [facility] [phone]***

## 2021-02-23 NOTE — PROGRESS NOTE ADULT - PROBLEM SELECTOR PLAN 4
counts continue to recover  likely septic or medication induced- resolving
counts seemed to recover then decrease again  likely septic or medication induced
counts seemed to recover then decrease again  likely septic or medication induced

## 2021-02-23 NOTE — PROGRESS NOTE ADULT - PROVIDER SPECIALTY LIST ADULT
Internal Medicine
Internal Medicine
Nephrology
Nephrology
Pulmonology
Internal Medicine
Nephrology
Nephrology
Pulmonology
Cardiology
Nephrology
Nephrology
Pulmonology
Cardiology
Internal Medicine
Palliative Care

## 2021-02-23 NOTE — PROGRESS NOTE ADULT - SUBJECTIVE AND OBJECTIVE BOX
INTERVAL HPI/OVERNIGHT EVENTS:        REVIEW OF SYSTEMS:  CONSTITUTIONAL:  lethargic  poorly  arouseable       MEDICATION:  acetaminophen  Suppository .. 650 milliGRAM(s) Rectal every 8 hours PRN  chlorhexidine 4% Liquid 1 Application(s) Topical daily  dextrose 40% Gel 15 Gram(s) Oral once  dextrose 5% + sodium chloride 0.45%. 1000 milliLiter(s) IV Continuous <Continuous>  dextrose 5%. 1000 milliLiter(s) IV Continuous <Continuous>  dextrose 5%. 1000 milliLiter(s) IV Continuous <Continuous>  dextrose 50% Injectable 25 Gram(s) IV Push once  dextrose 50% Injectable 12.5 Gram(s) IV Push once  dextrose 50% Injectable 25 Gram(s) IV Push once  enoxaparin Injectable 80 milliGRAM(s) SubCutaneous daily  folic acid 1 milliGRAM(s) Oral daily  glucagon  Injectable 1 milliGRAM(s) IntraMuscular once  HYDROmorphone  Injectable 0.5 milliGRAM(s) IV Push every 6 hours  HYDROmorphone  Injectable 1 milliGRAM(s) IV Push every 1 hour PRN  insulin lispro (ADMELOG) corrective regimen sliding scale   SubCutaneous three times a day before meals  insulin lispro (ADMELOG) corrective regimen sliding scale   SubCutaneous at bedtime  LORazepam   Injectable 1 milliGRAM(s) IV Push every 2 hours PRN  midodrine. 10 milliGRAM(s) Oral three times a day  polyethylene glycol 3350 17 Gram(s) Oral daily PRN  scopolamine 1 mG/72 Hr(s) Patch 1 Patch Transdermal every 72 hours  sodium chloride 0.65% Nasal 1 Spray(s) Nasal two times a day    Vital Signs Last 24 Hrs  T(C): 36.1 (23 Feb 2021 04:59), Max: 36.3 (22 Feb 2021 16:21)  T(F): 97 (23 Feb 2021 04:59), Max: 97.4 (22 Feb 2021 23:18)  HR: 50 (23 Feb 2021 04:59) (50 - 81)  BP: 107/69 (23 Feb 2021 04:59) (80/51 - 113/76)  BP(mean): --  RR: 26 (23 Feb 2021 04:59) (20 - 26)  SpO2: 93% (23 Feb 2021 04:59) (93% - 97%)    PHYSICAL EXAM:  GENERAL: NAD, well-groomed, well-developed  HEENT : Conjuntivae  clear sclerae anicteric  NECK: Supple, No JVD, Normal thyroid  NERVOUS SYSTEM:   somnolent poorly  arouseable  CHEST/LUNG: Clear    HEART: Regular rate and rhythm; No murmurs, rubs, or gallops  ABDOMEN: Soft, Nontender, Nondistended; Bowel sounds present  EXTREMITIES:  no  edema no  tenderness  SKIN: No rashes   LABS:                        10.1   11.36 )-----------( 30       ( 22 Feb 2021 07:47 )             32.8     02-22    151<H>  |  116<H>  |  110<H>  ----------------------------<  107<H>  4.1   |  26  |  2.09<H>    Ca    7.8<L>      22 Feb 2021 07:47    TPro  6.1  /  Alb  2.1<L>  /  TBili  0.7  /  DBili  x   /  AST  47<H>  /  ALT  61  /  AlkPhos  192<H>  02-22        CAPILLARY BLOOD GLUCOSE      POCT Blood Glucose.: 151 mg/dL (23 Feb 2021 10:58)  POCT Blood Glucose.: 185 mg/dL (23 Feb 2021 07:44)  POCT Blood Glucose.: 136 mg/dL (22 Feb 2021 20:22)  POCT Blood Glucose.: 130 mg/dL (22 Feb 2021 15:17)      RADIOLOGY & ADDITIONAL TESTS:    Imaging reports  Personally Reviewed:  [ ] YES  [ ] NO    Consultant(s) Notes Reviewed:  [x ] YES  [ ] NO    Care Discussed with Consultants/Other Providers [x ] YES  [ ] NO  Problem/Plan - 1:  ·  Problem: Dehydration.   hx  CHF     slow  hydration as  RENAL  FUNCTION  WORSENING   continue  to  monitor  closely  follow renal function     Problem/Plan - 2:  ·  Problem: Altered mental status, unspecified altered mental status type. lethargy   FOR  HOSPICE  REEVLAUTION      Problem/Plan - 3:  ·  Problem: Chronic diastolic congestive heart failure.  Plan: currently not in failure check  cxr  monitor.  BNP  MOST  LIKELY  2/2  SEVERE  PULMONARY  HTN WITH  DILATED  RT  ATRIUM   discussed  with  pulmonary  unable to  add  revatio 2/2  hypotension    Problem/Plan - 4:  ·  Problem: Type 2 diabetes mellitus with hyperglycemia, without long-term current use of insulin.  Plan: ss insulin coverage.     Problem/Plan - 5:  ·  Problem: Thrombocytopenia. Plan: pt on prior admission plts  range  will monitor closely   will change to  lovenox as  patient  inconstant  with  oral  intake    Problem/Plan - 6:  Problem: Mixed hyperlipidemia.Plan: cont statin.    Problem/Plan - 7:  ·  Problem: MICHELLE (acute kidney injury).  oliguria    Problem/Plan - 8:  ·  Problem: Preventive measure.  Plan: on ac.   DISCUSSED  WITH  PT'S  SON   AWARE  IN  CHANGE  IN  PT  CONDTION

## 2021-02-23 NOTE — PROGRESS NOTE ADULT - PROBLEM SELECTOR PLAN 3
as above  creat still rising somewhat due to lasix which is now being held, pt being hydrated  would minimize fluids, stop lab draws
creat still rising somewhat due to lasix which is now being held
creat still rising somewhat due to lasix which is now being held, pt being hydrated

## 2021-02-23 NOTE — HOSPICE CARE NOTE - CONVESATION DETAILS
Spoke with Dr. Jackson. Pt not approved for In pt hospice services at this time. Pt may be appropriate for home hospice services. Spoke with pt's son. We discussed home hospice services. He is aware hospice will not provide a visiting nurse or HHA as long as pt is Covid-19 positive. He states his mother prior to hospital admissions was riding in her home alone with home care services and visiting HHA. He is unsure how care will be provided if she returns home. He will consider other options and return to [facility] if approved. he would like to speak with the covering SW. I left him with my contact info and he will give me a call once he makes a decision. Dr. Milligan made aware. Will cont to f/u as needed.    Pretty Gamboa Rn   [phone]
Pt approved for In pt hospice services. Spoke with the pt's son Craig. He is in agreement and request that his mother remain at [facility]. I have emailed him the hospice consents. And he will give me a call back to review once received. Dr. Milligan and VAN Leigh are aware. Will cont to f/u as needed.       Pretty Gamboa Rn  [phone]

## 2021-02-23 NOTE — PROGRESS NOTE ADULT - PROBLEM SELECTOR PLAN 5
stable low counts had 1 PRBC early on in stay (2/13/)
stable low counts had 1 PRBC early on in stay (2/13/)
stable low counts had 1 PRBC early on in stay (2/13/)  Hgb seems to be holding

## 2021-02-23 NOTE — PROGRESS NOTE ADULT - PROBLEM SELECTOR PLAN 6
low protein stores  frequent hospital and rehab stays   catabolic septic state   bedfast.

## 2021-02-23 NOTE — CHART NOTE - NSCHARTNOTEFT_GEN_A_CORE
Nutrition follow-up note-    Pt adm from [facility] w/fatigue. PMHx includes HTN, HLD, DM2, MVR/AVR, thrombocytopenia, Afib, s/p AICD/PPM, CHF. Per chart pt remains confused, lethargic, disoriented. Unable to meet w/pt due to pt on COVID isolation. Noted poor-fair PO intake. Pt needs & receives total assistance w/all meals. No N/V/D/C reported. Noted pt was not approved for in-patient Hospice services. Palliative care is following for ongoing [facility].       Factors impacting intake: [ ] none [ ] nausea  [ ] vomiting [ ] diarrhea [ ] constipation  [ ]chewing problems [x ] swallowing issues  [x ] other: AMS, difficulty feeding self.    Diet Prescription: Diet, Dysphagia 1 Pureed-Thin Liquids:   Consistent Carbohydrate {No Snacks}  DASH/TLC {Sodium & Cholesterol Restricted}  Supplement Feeding Modality:  Oral  Ensure Pudding Cans or Servings Per Day:  3       Frequency:  Three Times a day (02-19-21 @ 16:51)    Intake: poor-fair intake, consuming 20-50% meals and PO supplement x 1 week.    Current Weight: (2/23) 92.4 kg- likely inaccurate, (2/21) 76.8 kg, (2/20) 77.1 kg, (2/12 adm) 78.3 kg.  % Weight Change: 1.9% loss (1.5 kg) x 11 days/since admission, however, unable to assess true weight change due to noted fluctuations/discrepancies in daily weights.     Physical appearance: 3+ edema L & R arms, L & R hands. Partly, wt fluctuations related to fluctuating edema- noted throughout LOS.  Unable to conduct nutrition-focused physical exam due to pt on COVID isolation.    Pertinent Medications: MEDICATIONS  (STANDING):  chlorhexidine 4% Liquid 1 Application(s) Topical daily  dextrose 40% Gel 15 Gram(s) Oral once  dextrose 5% + sodium chloride 0.45%. 1000 milliLiter(s) (75 mL/Hr) IV Continuous <Continuous>  dextrose 5%. 1000 milliLiter(s) (50 mL/Hr) IV Continuous <Continuous>  dextrose 5%. 1000 milliLiter(s) (100 mL/Hr) IV Continuous <Continuous>  dextrose 50% Injectable 25 Gram(s) IV Push once  dextrose 50% Injectable 12.5 Gram(s) IV Push once  dextrose 50% Injectable 25 Gram(s) IV Push once  enoxaparin Injectable 80 milliGRAM(s) SubCutaneous daily  folic acid 1 milliGRAM(s) Oral daily  glucagon  Injectable 1 milliGRAM(s) IntraMuscular once  insulin lispro (ADMELOG) corrective regimen sliding scale   SubCutaneous three times a day before meals  insulin lispro (ADMELOG) corrective regimen sliding scale   SubCutaneous at bedtime  midodrine. 10 milliGRAM(s) Oral three times a day  sodium chloride 0.65% Nasal 1 Spray(s) Nasal two times a day    MEDICATIONS  (PRN):  acetaminophen  Suppository .. 650 milliGRAM(s) Rectal every 8 hours PRN Temp greater or equal to 38C (100.4F)  polyethylene glycol 3350 17 Gram(s) Oral daily PRN Constipation    Pertinent Labs: 02-22 Na151 mmol/L<H> Glu 107 mg/dL<H> K+ 4.1 mmol/L Cr  2.09 mg/dL<H>  mg/dL<H> 02-22 Alb 2.1 g/dL<L>    02-13-21 @ 11:33A1C 7.5    Skin: stage 2 pressure ulcer- R-buttock.    Estimated Needs:   [x ] no change since previous assessment (2/16)  [ ] recalculated:     Previous Nutrition Diagnosis:   Nutrition Diagnostic Terminology #1 Inadequate Oral Intake.     Etiology COVID 19, AMS, dysphagia, pressure ulcer.     Signs/Symptoms <50% nutrition needs > 1 week.     Goal/Expected Outcome Pt to consume > 50 - 75% of meals and supplement (Goal not being met).    Nutrition Diagnosis is [x ] ongoing  [ ] resolved [ ] not applicable     New Nutrition Diagnosis: [x ] not applicable      Interventions:   Recommend  [x ] Change Diet To: Liberalize diet to- Dysphagia 1 pureed, Consistent CHO.  [x ] Nutrition Supplement: Change PO supplement to Glucerna x 2/day (provides 440 kcal, 20 g protein).  [ ] Nutrition Support  [x ] Other: Continue to provide total assistance w/all meals.     Monitoring and Evaluation:   [x ] PO intake [ x ] Tolerance to diet prescription [ x ] weights [ x ] labs[ x ] follow up per protocol  [ ] other:

## 2021-02-23 NOTE — PROGRESS NOTE ADULT - PROBLEM SELECTOR PLAN 7
bedbound dependent for all care.  starting to have bedsores- came with stage II buttocks wound

## 2021-02-23 NOTE — PROGRESS NOTE ADULT - SUBJECTIVE AND OBJECTIVE BOX
INTERVAL HPI/OVERNIGHT EVENTS: worse today, moaning, labored breathing    Code Status:   Allergies    No Known Allergies    Intolerances    MEDICATIONS  (STANDING):  chlorhexidine 4% Liquid 1 Application(s) Topical daily  dextrose 40% Gel 15 Gram(s) Oral once  dextrose 5% + sodium chloride 0.45%. 1000 milliLiter(s) (75 mL/Hr) IV Continuous <Continuous>  dextrose 5%. 1000 milliLiter(s) (50 mL/Hr) IV Continuous <Continuous>  dextrose 5%. 1000 milliLiter(s) (100 mL/Hr) IV Continuous <Continuous>  dextrose 50% Injectable 25 Gram(s) IV Push once  dextrose 50% Injectable 12.5 Gram(s) IV Push once  dextrose 50% Injectable 25 Gram(s) IV Push once  enoxaparin Injectable 80 milliGRAM(s) SubCutaneous daily  folic acid 1 milliGRAM(s) Oral daily  glucagon  Injectable 1 milliGRAM(s) IntraMuscular once  insulin lispro (ADMELOG) corrective regimen sliding scale   SubCutaneous three times a day before meals  insulin lispro (ADMELOG) corrective regimen sliding scale   SubCutaneous at bedtime  midodrine. 10 milliGRAM(s) Oral three times a day  sodium chloride 0.65% Nasal 1 Spray(s) Nasal two times a day    MEDICATIONS  (PRN):  acetaminophen  Suppository .. 650 milliGRAM(s) Rectal every 8 hours PRN Temp greater or equal to 38C (100.4F)  polyethylene glycol 3350 17 Gram(s) Oral daily PRN Constipation      PRESENT SYMPTOMS: [ ]Unable to obtain due to poor mentation   Source if other than patient:  [ ]Family   [ ]Team     Pain (Impact on QOL):    Location:  Severity:  Minimal acceptable level (0-10 scale):       Quality:       Onset:  Duration:  Aggravating factors:  Relieving Factors  Radiation:    Dyspnea:  Yes [ ] No [ ] - [ ]Mild [ ]Moderate [ ]Severe  Anxiety:    Yes [ ] No [ ] - [ ]Mild [ ]Moderate [ ]Severe  Fatigue:    Yes [ ] No [ ] - [ ]Mild [ ]Moderate [ ]Severe  Nausea:    Yes [ ] No [ ] - [ ]Mild [ ]Moderate [ ]Severe                         Loss of appetite: Yes [ ] No [ ] - [ ]Mild [ ]Moderate [ ]Severe             Constipation:  Yes [ ] No [ ] - [ ]Mild [ ]Moderate [ ]Severe  Grief: Yes [ ] No [ ]     PAIN AD Score:	  [email or web address]/[email or web address] (Ctrl + left click to view)    Other Symptoms:  [ ]All other review of systems negative     Karnofsky Performance Score/Palliative Performance Status Version 2:         %    http://palliative.info/resource_material/[email or web address].pdf    PHYSICAL EXAM:  Vital Signs Last 24 Hrs  T(C): 36.1 (23 Feb 2021 04:59), Max: 36.3 (22 Feb 2021 16:21)  T(F): 97 (23 Feb 2021 04:59), Max: 97.4 (22 Feb 2021 23:18)  HR: 50 (23 Feb 2021 04:59) (50 - 81)  BP: 107/69 (23 Feb 2021 04:59) (80/51 - 113/76)  BP(mean): --  RR: 26 (23 Feb 2021 04:59) (20 - 26)  SpO2: 93% (23 Feb 2021 04:59) (93% - 97%) I&O's Summary    22 Feb 2021 07:01  -  23 Feb 2021 07:00  --------------------------------------------------------  IN: 750 mL / OUT: 0 mL / NET: 750 mL         GENERAL:  [ ]Alert  [ ]Oriented x   [ ]Lethargic  [ ]Cachexia  [ ]Unarousable  [ ]Verbal  [ ]Non-Verbal  Behavioral:   [ ] Anxiety  [ ] Delirium [ ] Agitation [ ] Other  HEENT:  [ ]Normal   [ ]Dry mouth   [ ]ET Tube/Trach  [ ]Oral lesions  PULMONARY:   [ ]Clear [ ]Tachypnea  [ ]Audible excessive secretions   [ ]Rhonchi        [ ]Right [ ]Left [ ]Bilateral  [ ]Crackles        [ ]Right [ ]Left [ ]Bilateral  [ ]Wheezing     [ ]Right [ ]Left [ ]Bilateral  CARDIOVASCULAR:    [ ]Regular [ ]Irregular [ ]Tachy  [ ]Vladimir [ ]Murmur [ ]Other  GASTROINTESTINAL:  [ ]Soft  [ ]Distended   [ ]+BS  [ ]Non tender [ ]Tender  [ ]PEG [ ]OGT/ NGT   Last BM:      GENITOURINARY:  [ ]Normal [ ] Incontinent   [ ]Oliguria/Anuria   [ ]Arenas  MUSCULOSKELETAL:   [ ]Normal   [ ]Weakness  [ ]Bed/Wheelchair bound [ ]Edema  NEUROLOGIC:   [ ]No focal deficits  [ ] Cognitive impairment  [ ] Dysphagia [ ]Dysarthria [ ] Paresis [ ]Other   SKIN:   [ ]Normal   [ ]Pressure ulcer(s)  [ ]Rash    CRITICAL CARE:  [ ] Shock Present  [ ]Septic [ ]Cardiogenic [ ]Neurologic [ ]Hypovolemic  [ ]  Vasopressors [ ]  Inotropes   [ ] Respiratory failure present  [ ] Acute  [ ] Chronic [ ] Hypoxic  [ ] Hypercarbic [ ] Other  [ ] Other organ failure     LABS:                        10.1   11.36 )-----------( 30       ( 22 Feb 2021 07:47 )             32.8   02-22    151<H>  |  116<H>  |  110<H>  ----------------------------<  107<H>  4.1   |  26  |  2.09<H>    Ca    7.8<L>      22 Feb 2021 07:47    TPro  6.1  /  Alb  2.1<L>  /  TBili  0.7  /  DBili  x   /  AST  47<H>  /  ALT  61  /  AlkPhos  192<H>  02-22        RADIOLOGY & ADDITIONAL STUDIES:    Protein Calorie Malnutrition Present: [ ] yes [ ] no  [ ] PPSV2 < or = 30%  [ ] significant weight loss [ ] poor nutritional intake [ ] anasarca [ ] catabolic state Albumin, Serum: 2.1 g/dL (02-22-21 @ 07:47)      REFERRALS:   [ ]Chaplaincy  [ ] Hospice  [ ]Child Life  [ ]Social Work  [ ]Case management [ ]Holistic Therapy   Goals of Care Document:  INTERVAL HPI/OVERNIGHT EVENTS: worse today, moaning, labored breathing    Code Status: DNR/I  Allergies    No Known Allergies    Intolerances    MEDICATIONS  (STANDING):  chlorhexidine 4% Liquid 1 Application(s) Topical daily  dextrose 40% Gel 15 Gram(s) Oral once  dextrose 5% + sodium chloride 0.45%. 1000 milliLiter(s) (75 mL/Hr) IV Continuous <Continuous>  dextrose 5%. 1000 milliLiter(s) (50 mL/Hr) IV Continuous <Continuous>  dextrose 5%. 1000 milliLiter(s) (100 mL/Hr) IV Continuous <Continuous>  dextrose 50% Injectable 25 Gram(s) IV Push once  dextrose 50% Injectable 12.5 Gram(s) IV Push once  dextrose 50% Injectable 25 Gram(s) IV Push once  enoxaparin Injectable 80 milliGRAM(s) SubCutaneous daily  folic acid 1 milliGRAM(s) Oral daily  glucagon  Injectable 1 milliGRAM(s) IntraMuscular once  insulin lispro (ADMELOG) corrective regimen sliding scale   SubCutaneous three times a day before meals  insulin lispro (ADMELOG) corrective regimen sliding scale   SubCutaneous at bedtime  midodrine. 10 milliGRAM(s) Oral three times a day  sodium chloride 0.65% Nasal 1 Spray(s) Nasal two times a day    MEDICATIONS  (PRN):  acetaminophen  Suppository .. 650 milliGRAM(s) Rectal every 8 hours PRN Temp greater or equal to 38C (100.4F)  polyethylene glycol 3350 17 Gram(s) Oral daily PRN Constipation      PRESENT SYMPTOMS: [x ]Unable to obtain due to poor mentation   Source if other than patient:  [ ]Family   [ ]Team     Pain (Impact on QOL):    Location:  Severity:  Minimal acceptable level (0-10 scale):       Quality:       Onset:  Duration:  Aggravating factors:  Relieving Factors  Radiation:    Dyspnea:  Yes [ ] No [ ] - [ ]Mild [ ]Moderate [ ]Severe  Anxiety:    Yes [ ] No [ ] - [ ]Mild [ ]Moderate [ ]Severe  Fatigue:    Yes [ ] No [ ] - [ ]Mild [ ]Moderate [ ]Severe  Nausea:    Yes [ ] No [ ] - [ ]Mild [ ]Moderate [ ]Severe                         Loss of appetite: Yes [ ] No [ ] - [ ]Mild [ ]Moderate [ ]Severe             Constipation:  Yes [ ] No [ ] - [ ]Mild [ ]Moderate [ ]Severe  Grief: Yes [ ] No [ ]     PAIN AD Score:	6  [email or web address] (Ctrl + left click to view)    Other Symptoms:  [ ]All other review of systems negative     Karnofsky Performance Score/Palliative Performance Status Version 2:      10   %    http://palliative.info/resource_material/PPSv2.pdf    PHYSICAL EXAM:  Vital Signs Last 24 Hrs  T(C): 36.1 (23 Feb 2021 04:59), Max: 36.3 (22 Feb 2021 16:21)  T(F): 97 (23 Feb 2021 04:59), Max: 97.4 (22 Feb 2021 23:18)  HR: 50 (23 Feb 2021 04:59) (50 - 81)  BP: 107/69 (23 Feb 2021 04:59) (80/51 - 113/76)  BP(mean): --  RR: 26 (23 Feb 2021 04:59) (20 - 26)  SpO2: 93% (23 Feb 2021 04:59) (93% - 97%) I&O's Summary    22 Feb 2021 07:01  -  23 Feb 2021 07:00  --------------------------------------------------------  IN: 750 mL / OUT: 0 mL / NET: 750 mL         GENERAL:  [ ]Alert  [ ]Oriented x   [x ]Lethargic  [ ]Cachexia  [ ]Unarousable  [ ]Verbal  [x ]Non-Verbal- only moaning  Behavioral:   [ ] Anxiety  [ ] Delirium [ ] Agitation [ ] Other  HEENT:  [ ]Normal   [ x]Dry mouth   [ ]ET Tube/Trach  [ ]Oral lesions  PULMONARY:   [ ]Clear [x ]Tachypnea  [ x]Audible excessive secretions   [ ]Rhonchi        [ ]Right [ ]Left [ ]Bilateral  [ ]Crackles        [ ]Right [ ]Left [ ]Bilateral  [ ]Wheezing     [ ]Right [ ]Left [ ]Bilateral  CARDIOVASCULAR:    [ ]Regular [ ]Irregular [ ]Tachy  [ x]Vladimir [ ]Murmur [ ]Other  GASTROINTESTINAL:  [ x]Soft  [ ]Distended   [x ]+BS  [ x]Non tender [ ]Tender  [ ]PEG [ ]OGT/ NGT   Last BM: 2/23     GENITOURINARY:  [ ]Normal [x ] Incontinent   [ ]Oliguria/Anuria   [ ]Arenas  MUSCULOSKELETAL:   [ ]Normal   [ ]Weakness  [x ]Bed/Wheelchair bound [ x]Edema  NEUROLOGIC:   [ ]No focal deficits  [x ] Cognitive impairment  [ ] Dysphagia [ ]Dysarthria [ ] Paresis [ ]Other   SKIN:   [ ]Normal   [ x]Pressure ulcer(s)- stage II buttocks  [ ]Rash    CRITICAL CARE:  [ ] Shock Present  [ ]Septic [ ]Cardiogenic [ ]Neurologic [ ]Hypovolemic  [ ]  Vasopressors [ ]  Inotropes   [ ] Respiratory failure present  [ ] Acute  [ ] Chronic [ ] Hypoxic  [ ] Hypercarbic [ ] Other  [ ] Other organ failure     LABS:                        10.1   11.36 )-----------( 30       ( 22 Feb 2021 07:47 )             32.8   02-22    151<H>  |  116<H>  |  110<H>  ----------------------------<  107<H>  4.1   |  26  |  2.09<H>    Ca    7.8<L>      22 Feb 2021 07:47    TPro  6.1  /  Alb  2.1<L>  /  TBili  0.7  /  DBili  x   /  AST  47<H>  /  ALT  61  /  AlkPhos  192<H>  02-22        RADIOLOGY & ADDITIONAL STUDIES:    Protein Calorie Malnutrition Present: [ x] yes [ ] no  [x ] PPSV2 < or = 30%  [x ] significant weight loss [x ] poor nutritional intake [x ] anasarca [x ] catabolic state Albumin, Serum: 2.1 g/dL (02-22-21 @ 07:47)      REFERRALS:   [ ]Chaplaincy  [ ] Hospice  [ ]Child Life  [ ]Social Work  [ ]Case management [ ]Holistic Therapy   Goals of Care Document:

## 2021-02-24 NOTE — DISCHARGE NOTE FOR THE EXPIRED PATIENT - HOSPITAL COURSE
Hospitalist Medicine NP    89 y/o female w/pmhx of HTN, HLD, DM2,MVR/AVR,thrombocytopenia,  Afib recently at [facility] for COVID, after which was in rehab at [facility]- pt admitted at [facility] for lethargy/AMS. Palliative care consulted for assistance with symptoms and ongoing GOC, Pt has MOLST DNR/I in chart. Patient admitted to Hospice service 2/23/21.     Notified by RN at 2340 to evaluate patient for absence of spontaneous respirations and pulses. Pt seen at bedside and evaluated. Pt is unresponsive to verbal and tactile stimuli, pupils round and fixed, respirations absent, heart sounds and pulses absent. Pronounced at 2346.    - Dr. Jackson  notified and aware  - Patient's family notified

## 2021-02-24 NOTE — DISCHARGE NOTE FOR THE EXPIRED PATIENT - AUTOPSY OFFERED
How Severe Is Your Cyst?: mild Is This A New Presentation, Or A Follow-Up?: Cysts Additional History: Patient wants to know if they can be taken out today and what causes them no

## 2021-02-24 NOTE — DISCHARGE NOTE FOR THE EXPIRED PATIENT - REASON FOR ADMISSION
89 y/o female w/pmhx of HTN, HLD, DM2,MVR/AVR,thrombocytopenia,  Afib recently at [facility] for COVID, after which was in rehab at [facility]- pt admitted at [facility] for lethargy/AMS. Palliative care consulted for assistance with symptoms and ongoing GOC, Pt has MOLST DNR/I in chart.

## 2021-03-01 DIAGNOSIS — U07.1 COVID-19: ICD-10-CM

## 2021-03-01 DIAGNOSIS — I50.42 CHRONIC COMBINED SYSTOLIC (CONGESTIVE) AND DIASTOLIC (CONGESTIVE) HEART FAILURE: ICD-10-CM

## 2021-03-01 DIAGNOSIS — R53.2 FUNCTIONAL QUADRIPLEGIA: ICD-10-CM

## 2021-03-01 DIAGNOSIS — L89.312 PRESSURE ULCER OF RIGHT BUTTOCK, STAGE 2: ICD-10-CM

## 2021-03-01 DIAGNOSIS — G93.41 METABOLIC ENCEPHALOPATHY: ICD-10-CM

## 2021-03-01 DIAGNOSIS — I27.20 PULMONARY HYPERTENSION, UNSPECIFIED: ICD-10-CM

## 2021-03-01 DIAGNOSIS — Z78.1 PHYSICAL RESTRAINT STATUS: ICD-10-CM

## 2021-03-01 DIAGNOSIS — D63.1 ANEMIA IN CHRONIC KIDNEY DISEASE: ICD-10-CM

## 2021-03-01 DIAGNOSIS — D63.8 ANEMIA IN OTHER CHRONIC DISEASES CLASSIFIED ELSEWHERE: ICD-10-CM

## 2021-03-01 DIAGNOSIS — E11.22 TYPE 2 DIABETES MELLITUS WITH DIABETIC CHRONIC KIDNEY DISEASE: ICD-10-CM

## 2021-03-01 DIAGNOSIS — N17.9 ACUTE KIDNEY FAILURE, UNSPECIFIED: ICD-10-CM

## 2021-03-01 DIAGNOSIS — I95.9 HYPOTENSION, UNSPECIFIED: ICD-10-CM

## 2021-03-01 DIAGNOSIS — N18.4 CHRONIC KIDNEY DISEASE, STAGE 4 (SEVERE): ICD-10-CM

## 2021-03-01 DIAGNOSIS — E86.0 DEHYDRATION: ICD-10-CM

## 2021-03-01 DIAGNOSIS — Z95.810 PRESENCE OF AUTOMATIC (IMPLANTABLE) CARDIAC DEFIBRILLATOR: ICD-10-CM

## 2021-03-01 DIAGNOSIS — Z79.4 LONG TERM (CURRENT) USE OF INSULIN: ICD-10-CM

## 2021-03-01 DIAGNOSIS — E87.0 HYPEROSMOLALITY AND HYPERNATREMIA: ICD-10-CM

## 2021-03-01 DIAGNOSIS — Z74.01 BED CONFINEMENT STATUS: ICD-10-CM

## 2021-03-01 DIAGNOSIS — Z51.5 ENCOUNTER FOR PALLIATIVE CARE: ICD-10-CM

## 2021-03-01 DIAGNOSIS — Z95.3 PRESENCE OF XENOGENIC HEART VALVE: ICD-10-CM

## 2021-03-01 DIAGNOSIS — E43 UNSPECIFIED SEVERE PROTEIN-CALORIE MALNUTRITION: ICD-10-CM

## 2021-03-01 DIAGNOSIS — E11.65 TYPE 2 DIABETES MELLITUS WITH HYPERGLYCEMIA: ICD-10-CM

## 2021-03-01 DIAGNOSIS — I48.20 CHRONIC ATRIAL FIBRILLATION, UNSPECIFIED: ICD-10-CM

## 2021-03-01 DIAGNOSIS — N39.0 URINARY TRACT INFECTION, SITE NOT SPECIFIED: ICD-10-CM

## 2021-03-01 DIAGNOSIS — E78.5 HYPERLIPIDEMIA, UNSPECIFIED: ICD-10-CM

## 2021-03-01 DIAGNOSIS — Z66 DO NOT RESUSCITATE: ICD-10-CM

## 2021-03-01 DIAGNOSIS — D69.59 OTHER SECONDARY THROMBOCYTOPENIA: ICD-10-CM

## 2021-03-01 DIAGNOSIS — E87.5 HYPERKALEMIA: ICD-10-CM

## 2021-03-01 DIAGNOSIS — Z79.01 LONG TERM (CURRENT) USE OF ANTICOAGULANTS: ICD-10-CM

## 2021-03-01 DIAGNOSIS — I13.0 HYPERTENSIVE HEART AND CHRONIC KIDNEY DISEASE WITH HEART FAILURE AND STAGE 1 THROUGH STAGE 4 CHRONIC KIDNEY DISEASE, OR UNSPECIFIED CHRONIC KIDNEY DISEASE: ICD-10-CM

## 2021-03-02 ENCOUNTER — FORM ENCOUNTER (OUTPATIENT)
Age: 86
End: 2021-03-02

## 2021-03-07 DIAGNOSIS — J96.01 ACUTE RESPIRATORY FAILURE WITH HYPOXIA: ICD-10-CM

## 2021-03-07 DIAGNOSIS — I48.91 UNSPECIFIED ATRIAL FIBRILLATION: ICD-10-CM

## 2021-03-07 DIAGNOSIS — D69.6 THROMBOCYTOPENIA, UNSPECIFIED: ICD-10-CM

## 2021-03-07 DIAGNOSIS — I50.9 HEART FAILURE, UNSPECIFIED: ICD-10-CM

## 2021-03-07 DIAGNOSIS — E11.9 TYPE 2 DIABETES MELLITUS WITHOUT COMPLICATIONS: ICD-10-CM

## 2021-03-07 DIAGNOSIS — Z51.5 ENCOUNTER FOR PALLIATIVE CARE: ICD-10-CM

## 2021-03-07 DIAGNOSIS — I11.0 HYPERTENSIVE HEART DISEASE WITH HEART FAILURE: ICD-10-CM

## 2021-03-07 DIAGNOSIS — Z66 DO NOT RESUSCITATE: ICD-10-CM

## 2021-03-07 DIAGNOSIS — U07.1 COVID-19: ICD-10-CM

## 2021-03-07 DIAGNOSIS — J12.89 OTHER VIRAL PNEUMONIA: ICD-10-CM

## 2021-03-07 DIAGNOSIS — E78.5 HYPERLIPIDEMIA, UNSPECIFIED: ICD-10-CM

## 2021-03-07 DIAGNOSIS — G93.41 METABOLIC ENCEPHALOPATHY: ICD-10-CM

## 2021-03-24 ENCOUNTER — APPOINTMENT (OUTPATIENT)
Dept: ELECTROPHYSIOLOGY | Facility: CLINIC | Age: 86
End: 2021-03-24

## 2021-09-21 NOTE — ED PROVIDER NOTE - AXIS
Alert-The patient is alert, awake and responds to voice. The patient is oriented to time, place, and person. The triage nurse is able to obtain subjective information.
Left Deviation

## 2021-12-20 NOTE — DIETITIAN INITIAL EVALUATION ADULT. - PROBLEM SELECTOR PROBLEM 8
SUBJECTIVE:  Tequila Wang is seen today at the request of Eliza Ferris MD. Mr. Duane Calvo was here today for a hearing evaluation. He reported that he has had to increase the volume on the TV and that he has a hard time understanding conversation at times. He reported fullness in his right ear. He stated that his daughter wears hearing aids. 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 Patient denied tinnitus, otalgia, dizziness. OBJECTIVE:  AUDIOMETRIC RESULTS  Otoscopic Evaluation:  Examination reveals minimal cerumen bilaterally. Audiogram:  Right ear:  Pure tone air and bone conduction thresholds revealed a mild to moderately severe sensorineural hearing loss. Left ear:  Pure tone air and bone conduction thresholds revealed a mild to moderately severe sensorineural hearing loss. Speech Audiometry:  Speech Reception Thresholds:  30 dB HL right ear and 30 dB HL left ear. Word Recognition Test Scores:  96% right ear when presented at 75 dB HL in quiet and 84% left ear when presented at 80 dB HL in quiet. Acoustic Immittance: Obtained due to the report of fullness in the right ear. Right ear:  Normal middle ear pressure  Hyper eardrum mobility    Left ear:  Unable to test due to the fact that seal could not be obtained    IMPRESSIONS:  1. Bilateral sensorineural hearing loss    2. Sensation of fullness in ear, right    Pure tone air and bone conduction thresholds revealed a mild to moderately severe sensorineural hearing loss in both ears. RECOMMENDATIONS:  These results were reviewed with the patient and will be forwarded to Eliza Ferris MD.     Mr. Duane Calvo is a candidate for binaural amplification. He was scheduled for a hearing aid consult. He was instructed to contact his insurance for hearing aid benefits. Medical clearance will be sent to Dr. Miriam Warner. Mr. Duane Calvo should have his hearing rechecked again if any subjective change in hearing occurs.     The patient indicated understanding of the diagnosis and was encouraged to contact our department with any questions or concerns. Preventive measure

## 2021-12-20 NOTE — DIETITIAN INITIAL EVALUATION ADULT. - NAME AND PHONE
[Oral Contraceptive] : uses oral contraception pills [Y] : Patient is sexually active [N] : Patient denies prior pregnancies [Menarche Age: ____] : age at menarche was [unfilled] [PapSmeardate] : 11/18 [PGHxTotal] : 0 [PGHxFullTerm] : 0 [PGHxPremature] : 0 Magdaleno Hoskins, Dietetic Intern [PGHxAbortions] : 0 [[facility] Hospital[facility]Living] : 0 [PGHxABInduced] : 0 [PGHxABSpont] : 0 [PGHxEctopic] : 0 [PGHxMultBirths] : 0 [FreeTextEntry1] : 12/03/21

## 2022-03-26 NOTE — PROGRESS NOTE ADULT - PROBLEM SELECTOR PLAN 4
[facility] MEDICINE PROGRESS NOTE   Patient: Pepe Fermin  Today's Date: 3/26/2022    YOB: 1927  Admission Date: 3/9/2022    MRN: [id number]  Inpatient LOS: 15    Room: [facility]/A  Hospital Day: Hospital Day: 18    Subjective   HISTORY AND SUBJECTIVE COMPLAINTS     Chief Complaint:   Shortness of breath    Interval History / Subjective:   Seen in presence of a video  as well as family. We started off using the  but patient not understanding much and preferred son to take over interpretation. He had complained of chest pressure yesterday and had some trop elevation. This morning, he still feels a \"discomfort\" in the chest but I assured him that he's been seen by cardiology and this is not an ACS event. We talked about his renal function improvement and awaiting BUN to start also improving. All questions answered. Family on board with plan.    Hospital Course:  Pepe Fermin is a 89 year old male who presented on 3/9/2022 with complaints of Shortness of Breath and Chest Pain Adult    89 year old male with past medical history including seizure disorder, chronic kidney disease, coronary artery disease, hypertension, presenting accompanied by his son with c/o shortness of breath beginning several days ago with minimal activity, aggravated when laying down. On day of presentation, patient saw his PCP where EKG was obtained, abnormal, and was referred to the ED. Patient denies chest pain, palpitations, headache. Denies cardiac history.    In the ED, labs with lactic acid 2.0, NTproBNP 48497, troponin not elevated. EKG showed atrial fibrillation with RVR.  Small bilateral pleural effusions concerning of mild congestion on chest x-ray.    S/p Echo (03/10) with new HFrEF (33%) and severe AS, severe MR, severe TR. EP on consult, adjusting metoprolol. Noted softer pressures on 03/10 with associated light-headedness and fatigue, still alert and oriented,  improved s/p SPA x1. Nephrology also consulted for continued worsening of CASSI.    Continuing with Lasix PRN daily while monitoring BP. Started SPA and midodrine. Anticipate cardiac cath with renal recovery, he may also be a good candidate for TAVR. Noted LUQ abdominal pain, not associated with PO intolerance, with no urinary or bowel complaints. LA was normal on . Will continue to monitor abdominal pain, consider further workup if there are acute changes.    Nephrology has discussed possibility of need for RRT after cath with patient and family. Patient agreed if needed. Underwent LHC on 3/18. Renal function peaked and has since started to recover.    ROS:  Pertinent systems negative except as above.    Objective   PHYSICAL EXAMINATION     Vital 24 Hour Range Most Recent Value   Temperature Temp  Min: 97.3 °F (36.3 °C)  Max: 97.7 °F (36.5 °C) 97.7 °F (36.5 °C)   Pulse Pulse  Min: 73  Max: 88 73   Respiratory Resp  Min: 18  Max: 24 (!) 24   Blood Pressure BP  Min: 116/58  Max: 133/58 116/58   Pulse Oximetry SpO2  Min: 96 %  Max: 99 % 96 %   Arterial BP No data recorded     O2 O2 Flow Rate (L/min)  Av L/min  Min: 2 L/min   Min taken time: 22 0335  Max: 2 L/min   Max taken time: 22 0335       Recorded Intake and Output:    Intake/Output Summary (Last 24 hours) at 3/26/2022 0757  Last data filed at 3/26/2022 0520  Gross per 24 hour   Intake 745.44 ml   Output 400 ml   Net 345.44 ml      Recorded Last Stool Occurrence:  (0) (22 0520)     Vital Most Recent Value First Value   Weight 71.4 kg (157 lb 6.5 oz) Weight: 75.8 kg (167 lb 1.7 oz)   Height 5' 11\" (180.3 cm) Height: 5' 11\" (180.3 cm)   BMI 21.95 N/A     General:  tired-appearing  CV: irregularly irregular  Resp: diminished bilateral bases  Abd: soft, nontender and nondistended  Ext: no clubbing, cyanosis, or ischemia, edema absent  and radial pulses equal bilaterally and dorsalis pedis pulses equal bilaterally   Skin: no rashes, lesions,  or ulcers noted  Neuro: no focal deficits noted  Psych: oriented to time, place and person and normal mood and affect    TEST RESULTS     Labs: The Laboratory values listed below have been reviewed and pertinent findings discussed in the Assessment and Plan.    Laboratory values:   Recent Labs   Lab 03/24/22  0517 03/22/22  0519 03/21/22  0514   WBC 6.0 6.7 6.8   HGB 9.9* 10.2* 10.6*   HCT 31.3* 32.3* 34.4*   * 131* 134*       Recent Labs   Lab 03/25/22  0610 03/24/22  0517 03/23/22  0508   SODIUM 133* 133* 133*   POTASSIUM 3.8 4.2 4.4   CHLORIDE 96* 98 97*   CO2 25 22 21   CALCIUM 9.2 9.0 9.1   GLUCOSE 119* 104* 102*   * 139* 126*   CREATININE 3.71* 4.46* 4.21*          Radiology: Imaging studies have been reviewed and pertinent findings discussed in the Assessment and Plan.  Results for orders placed or performed during the hospital encounter of 03/09/22 (from the past 48 hour(s))   XR CHEST PA AND LATERAL    Impression    IMPRESSION:    Small bilateral pleural effusions with very mild bibasilar parenchymal  opacification/associated compressive basilar atelectasis, little interval  change.        ANCILLARY ORDERS     Diet:  Renal (2400mg Na+, 60meq K+, 1000mg P) Diet  Daily W Lunch; Nepro With Carbsteady/renal Supplement, Butter Pecan Oral Nutrition Supplement  Telemetry: On  Consults:    IP CONSULT TO CARDIAC REHAB  IP CONSULT TO PHARMACY  IP CONSULT TO ELECTROPHYSIOLOGY  IP CONSULT TO CARDIOLOGY  IP CONSULT TO NEPHROLOGY  IP CONSULT TO PALLIATIVE CARE  IP CONSULT TO SOCIAL WORK  IP CONSULT TO CARDIOTHORACIC SURGERY  Therapy Orders:   PT and OT Orders Placed this Encounter   Procedures   • Occupational Therapy   • Occupational Therapy   • Physical Therapy   • Physical Therapy       Advance Care Planning    ADVANCED DIRECTIVES     Code Status: Full Resuscitation      ASSESSMENT AND PLAN     # Atrial fibrillation, new onset with rapid ventricular response  - Telemetry monitor  - on metoprolol BID  -  Eliquis held, on heparin drip. Once BUN starts downtrending, will make transition from heparin gtt back to Eliquis.  - EP following, stable      # Acute, new HFrEF  #Severe AS  - Daily weight, I&O  - Echo (03/10) with new HFrEF (33%), severe AS, severe MR, severe TR  - post LHC on 3/18  - diuretics on hold per Nephrology, pending recs   - cr continues to go up  - seen by CV surgery team - would recommend balloon valvuloplasty outpatient in 2-3 weeks      # CASSI on CKD, stage 3  # Hyperkalemia  - Continue to monitor renal function, finally with downtrending Cr  - S/p Veltassa x1 on 03/11  - Nephrology following  - Holding nephrotoxic agents    #dysuria/UTI?  - clx neg, dc abx    # Borderline hypotension  # Essential hypertension  - With associated light-headedness and fatigue on 03/10, improved s/p SPA x1  - Continuing midodrine and SPA, as above  - Previously had continued lisinopril in-house, now holding for hypotension and CASSI  - Holding PTA amlodipine, hydrochlorothiazide  - Echo, as above with HFrEF, severe valvular disease  - TSH low, fT4 and fT3 wnl; AM cortisol wnl  - Continue to monitor closely    # Seizure disorder  - Continue Keppra     # Elevated TBili  - Mild elevation, stable  - Continue to monitor as acute issues treated    # High risk of delirium  - Apply nonpharmacological measures: reorient patient, communicate clearly, update whiteboard  - Encourage presence of family members  - Ok to use mini team/sitter if needed  - High risk of fall: use bed/chair alarms  - Up in chair with each meal, walk to the toilet    #constipation  Cont bowel regimen    #debility   PT/OT          Smoking status: non smoker    Nutrition status: appropriate  Body mass index is 21.95 kg/m². - appropriate weight BMI 18.5-24  DVT Prophylaxis: Heparin drip per protocol  Limited English proficiency with : an  (via two-way interactive audio/visual electronic device), Slovak       DISCHARGE PLANNING     The  patient's treatment plans were discussed with patient and family.     Recommendations for Discharge   SW     PT 24 Hour assist, Home, Home therapy, Post acute therapy (Pepe oliver, present during session and unsure about 24 hour support)   OT 24 Hour assist, Home, Home therapy   SLP        Anticipated discharge destination: Home  Expected Discharge Date: 03/30  Barriers to Discharge: Patient is not medically ready and needs to remain in the hospital today due to worsening renal function    Lianne Walton MD  Hospitalist  3/26/2022  7:57 AM   c/w rate control with metoprolol  Continue AC with coumadin

## 2022-04-15 NOTE — ED ADULT NURSE NOTE - NS PRO AD ANY ON CHART
[Left] : left shoulder [5___] : external rotation 5[unfilled]/5 [] : motor and sensory intact distally [TWNoteComboBox7] : active forward flexion 160 degrees [TWNoteComboBox6] : internal rotation L3 [de-identified] : external rotation 60 degrees No

## 2022-04-18 NOTE — PATIENT PROFILE ADULT - NSASFALLNEEDSASSIST_GEN_A_NUR
yes Tetracycline Counseling: Patient counseled regarding possible photosensitivity and increased risk for sunburn.  Patient instructed to avoid sunlight, if possible.  When exposed to sunlight, patients should wear protective clothing, sunglasses, and sunscreen.  The patient was instructed to call the office immediately if the following severe adverse effects occur:  hearing changes, easy bruising/bleeding, severe headache, or vision changes.  The patient verbalized understanding of the proper use and possible adverse effects of tetracycline.  All of the patient's questions and concerns were addressed. Patient understands to avoid pregnancy while on therapy due to potential birth defects.

## 2023-05-02 NOTE — ED PROVIDER NOTE - SKIN NEGATIVE STATEMENT, MLM
2 pt identifiers used  Allergies reviewed      Administered 2 cc ( 30 mg/ml ) of toradol to the left upper outer gluteal. Patient tolerated injections well. Informed of s/s to report verbalized understanding. No adverse reactions noted. Left facility in stable condition.    Administered 1 cc ( 1000 mcg/ml ) of b12 to the right arm. Informed of s/s to report verbalized understanding. No adverse reactions noted.         no abrasions, no jaundice, no lesions, no pruritis, and no rashes.

## 2024-02-25 NOTE — PHYSICAL THERAPY INITIAL EVALUATION ADULT - ASR EQUIP NEEDS DISCH PT EVAL
DISPLAY PLAN FREE TEXT
As per previous PT eval pt has a rolling walker and wheelchair at home, unable to confirm if they are easily accessible and in good working condition due to patients cognitive status

## 2024-03-22 RX ORDER — HYDRALAZINE HCL 50 MG
0 TABLET ORAL
Qty: 0 | Refills: 0 | DISCHARGE

## 2024-03-22 RX ORDER — ACETAMINOPHEN 500 MG
0 TABLET ORAL
Qty: 0 | Refills: 0 | DISCHARGE

## 2024-03-22 RX ORDER — INSULIN LISPRO 100/ML
0 VIAL (ML) SUBCUTANEOUS
Qty: 0 | Refills: 0 | DISCHARGE

## 2024-03-22 RX ORDER — ROSUVASTATIN CALCIUM 5 MG/1
1 TABLET ORAL
Qty: 0 | Refills: 0 | DISCHARGE

## 2024-03-22 RX ORDER — ISOSORBIDE MONONITRATE 60 MG/1
1 TABLET, EXTENDED RELEASE ORAL
Qty: 0 | Refills: 0 | DISCHARGE

## 2024-03-22 RX ORDER — SENNA PLUS 8.6 MG/1
1 TABLET ORAL
Qty: 0 | Refills: 0 | DISCHARGE

## 2024-03-22 RX ORDER — METOPROLOL TARTRATE 50 MG
1 TABLET ORAL
Qty: 0 | Refills: 0 | DISCHARGE

## 2024-03-22 RX ORDER — APIXABAN 2.5 MG/1
1 TABLET, FILM COATED ORAL
Qty: 0 | Refills: 0 | DISCHARGE

## 2024-03-22 RX ORDER — FUROSEMIDE 40 MG
1 TABLET ORAL
Qty: 0 | Refills: 0 | DISCHARGE

## 2024-03-22 RX ORDER — FOLIC ACID 0.8 MG
1 TABLET ORAL
Qty: 0 | Refills: 0 | DISCHARGE

## 2024-03-22 RX ORDER — INSULIN GLARGINE 100 [IU]/ML
0 INJECTION, SOLUTION SUBCUTANEOUS
Qty: 0 | Refills: 0 | DISCHARGE

## 2024-03-22 RX ORDER — LACTOBACILLUS ACIDOPHILUS 100MM CELL
1 CAPSULE ORAL
Qty: 0 | Refills: 0 | DISCHARGE

## 2025-01-02 NOTE — PATIENT PROFILE ADULT - NSASFALLNEEDSASSISTWITH_GEN_A_NUR
I reviewed the H&P, I examined the patient, and there are no changes in the patient's condition.    
I reviewed the H&P, I examined the patient, and there are no changes in the patient's condition.    
standing/walking/toileting

## 2025-01-15 NOTE — ED ADULT NURSE NOTE - NS ED NOTE  TALK SOMEONE YN
Clinical Pharmacy Appointment    Patient ID: Heaven Alfaro is a 62 y.o. female who presents for Follow Up appointment.     Referring Provider: Vern Miranda MD  PCP: Vern Miranda MD   Last visit with PCP: 9/23/24   Next visit with PCP: 12/23/24    Subjective     DIABETES MELLITUS TYPE II:    Known diabetic complications: None.  Does patient follow with Endocrinology: No  Last optometry exam: coming up   Most recent visit in Podiatry: Needs to schedule -- patient denies sores or cuts on feet today     Current diabetic medications include:  Rybelsus 7 mg daily (started Monday)  Metformin  mg BID     Adverse Effects: less of appetite and occasional nausea - improving    Past diabetic medications include:  Alogliptin - stopped due to transition to Rybelsus   Jardiance - yeast infection    Glucose Readings:  Glucometer/CGM Type: One Touch Ultra   Patient tests BG 2 times per day - FBG and evening glucose     Current home BG readings: Mostly 117 mg/dL, 114 mg/dL     Any episodes of hypoglycemia? No, none reported .  Did patient treat episode of hypoglycemia appropriately? N/A  Does the patient have a prescription for ready-to-use Glucagon? Not on insulin    Lifestyle:  Diet: 2-3 meals/day. Tries to eat healthy. Has met with a nutritionist before and found the information helpful. Some reduction in appetite.   BK: multigrain cherrios, martínez or sausage with one egg (boiled or omelette)  LN: sometimes skips. Enjoys turkey sandwich with cheese.   DN: meatloaf with rice and stanley beans  Snacks: yogurt with fruit. Granola bar.  Drinks: water or green tea.   Physical Activity: Stays active at home. Walks around her neighborhood.     Secondary Prevention:  Statin? Yes - Simvastatin 40 mg daily   ACE-I/ARB? Yes - losartan-hydrochlorothiazide 100-12.5 mg daily   Aspirin? No     Pertinent PMH Review:  PMH of Pancreatitis: No  PMH of Retinopathy: Yes  PMH of Urinary Tract Infections: Yes  PMH of MTC: No  PMH of Obesity:  Yes  PMH of ASCVD: Risk factors   PMH of CKD: No   PMH of CHF: No     Medication Reconciliation:  No changes     Drug Interactions  No relevant drug interactions were noted.     Medication System Management  Patient[facility] preferred [facility]  Adherence/Organization: No issues   Affordability/Accessibility: $20 per month for Rybelsus.   Enrolled in [employer]    Objective   Allergies   Allergen Reactions    Chocolate Flavor Unknown    Oxycodone-Acetaminophen Other    Propoxyphene N-Acetaminophen Other     Social History     Social History Narrative    Not on file      Medication Review  Current Outpatient Medications   Medication Instructions    albuterol (Ventolin HFA) 90 mcg/actuation inhaler 2 puffs, inhalation, Every 4 hours PRN    Allergy Relief (fexofenadine) 180 mg, oral, Daily PRN    blood sugar diagnostic (OneTouch Ultra Test) strip USE 1 STRIP DAILY TO TEST BLOOD SUGAR    carvedilol (COREG) 12.5 mg, oral, 2 times daily    cyanocobalamin (VITAMIN B-12) 1,000 mcg, Daily    estradiol (Estrace) 0.01 % (0.1 mg/gram) vaginal cream APPLY A PEA-SIZED AMOUNT TO urethral and vagina EVERY EVENING FOR TWO WEEKS, THEN DECREASE TO EVERY MONDAY, WEDNESDAY AND FRIDAY THEREAFTER    fluticasone (Flonase) 50 mcg/actuation nasal spray 2 sprays, Daily, Shake liquid prior to use.    gabapentin (NEURONTIN) 600 mg, oral, 3 times daily    ibuprofen 600 mg tablet TAKE 1 TABLET BY MOUTH EVERY MORNING, EVERY EVENING AND EVERY NIGHT AT BEDTIME    lidocaine (Lidoderm) 5 % patch 1 patch, transdermal, Daily, Apply to painful area 12 hours per day, remove for 12 hours.    losartan-hydrochlorothiazide (Hyzaar) 100-12.5 mg tablet 1 tablet, oral, Daily    melatonin 3 mg tablet Take 1 tablet (3 mg) by mouth as needed at bedtime for sleep.    metFORMIN  mg 24 hr tablet 1 tablet (500 mg) 2 times daily (morning and late afternoon).    methocarbamol (ROBAXIN) 750 mg, oral, 3 times daily    moisturizing mouth (Biotene Moisturizing  Mouth) solution USE AS DIRECTED AS A SALIVA SUBSTITUTE    potassium chloride CR 10 mEq ER tablet 10 mEq, oral, Daily    Rybelsus 7 mg, oral, Daily    simvastatin (ZOCOR) 40 mg, oral, Daily      Vitals  BP Readings from Last 2 Encounters:   12/23/24 128/82   09/23/24 126/79     BMI Readings from Last 1 Encounters:   12/23/24 33.47 kg/m²      Labs  A1C  Lab Results   Component Value Date    HGBA1C 6.8 (A) 12/23/2024    HGBA1C 7.1 (A) 06/18/2024    HGBA1C 7.0 (A) 02/22/2024     BMP  Lab Results   Component Value Date    CALCIUM 9.9 06/18/2024     06/18/2024    K 3.8 06/18/2024    CO2 23 06/18/2024     (H) 06/18/2024    BUN 20 06/18/2024    CREATININE 0.73 06/18/2024    EGFR >90 06/18/2024     LFTs  Lab Results   Component Value Date    ALT 21 06/18/2024    AST 19 06/18/2024    ALKPHOS 53 06/18/2024    BILITOT 0.8 06/18/2024     FLP  Lab Results   Component Value Date    TRIG 98 06/18/2024    CHOL 163 06/18/2024    LDLF 81 08/17/2023    LDLCALC 104 (H) 06/18/2024    HDL 39.5 06/18/2024     Urine Microalbumin  Lab Results   Component Value Date    MICROALBCREA 6.9 08/17/2023     Weight Management  Wt Readings from Last 3 Encounters:   12/23/24 88.5 kg (195 lb)   12/02/24 93 kg (205 lb 0.4 oz)   09/23/24 93 kg (205 lb)      There is no height or weight on file to calculate BMI.     Assessment/Plan   Problem List Items Addressed This Visit       Diabetes mellitus without complication (Multi)     Patient's goal A1c is < 7.0%.  Is pt at goal? most recent A1c on 6/18/24 of 7.1%. Prior A1c was 7.0% on 2/22/24.      Rationale for plan: Patient's SMBGs are at goal per patient reported levels. Most recent A1c is suggestive of stable BG levels. Current regimen includes Metformin  mg BID and Rybelsus 7 mg once daily. Patient reports that nausea has improved with Rybelsus and she has noticed a reduced appetite. Denies any other concerns at this time. Advised patient to continue current regimen. Will follow up in  two months to determine if further dose adjustment is warranted.     Medication Changes: None   CONTINUE:  Metformin  mg BID    Rybelsus 7 mg once daily     Monitoring and Education:  Counseled patient on Rybelsus MOA, expectations, side effects, duration of therapy, administration, and monitoring parameters.   Discussed administering greater than 30 minutes before first food, beverage, or other medications in the morning.   Reviewed Rybelsus titration schedule, starting with 3mg once daily for 30 days, then increase to 7 mg once daily; may increase to 14 mg once daily after 30 days on the 7 mg dose if needed to achieve glycemic goals.  If there is a missed dose, then this dose should be skipped; resume at the next scheduled dose.  Counseled patient on the benefits of GLP-1ra, such as cardiovascular risk reduction, glycemic control, and weight loss potential.  Advised patient that they may experience improved satiety after meals and portion sizes of meals may be reduced as doses of Rybelsus increase.  Counseled patient to avoid foods that are fatty/oily as this may precipitate the nausea/GI upset that may occur with new start Rybelsus    Clinical Pharmacist follow-up: 3/19/25 @ 4 pm, Telehealth visit    Continue all meds under the continuation of care with the referring provider and clinical pharmacy team.    Thank you,  Minerva De La O, PharmD  Clinical Pharmacist  [phone]    Verbal consent to manage patient's drug therapy was obtained from the patient. They were informed they may decline to participate or withdraw from participation in pharmacy services at any time.     No